# Patient Record
Sex: MALE | Race: WHITE | NOT HISPANIC OR LATINO | Employment: OTHER | URBAN - METROPOLITAN AREA
[De-identification: names, ages, dates, MRNs, and addresses within clinical notes are randomized per-mention and may not be internally consistent; named-entity substitution may affect disease eponyms.]

---

## 2021-06-04 ENCOUNTER — HOSPITAL ENCOUNTER (INPATIENT)
Facility: CLINIC | Age: 81
LOS: 5 days | Discharge: HOME HEALTH | DRG: 853 | End: 2021-06-10
Attending: EMERGENCY MEDICINE | Admitting: STUDENT IN AN ORGANIZED HEALTH CARE EDUCATION/TRAINING PROGRAM
Payer: MEDICARE

## 2021-06-04 DIAGNOSIS — N30.01 ACUTE CYSTITIS WITH HEMATURIA: ICD-10-CM

## 2021-06-04 DIAGNOSIS — N47.1 PHIMOSIS: ICD-10-CM

## 2021-06-04 DIAGNOSIS — I26.99 PULMONARY EMBOLISM WITHOUT ACUTE COR PULMONALE, UNSPECIFIED CHRONICITY, UNSPECIFIED PULMONARY EMBOLISM TYPE (H): Primary | ICD-10-CM

## 2021-06-04 DIAGNOSIS — R53.1 WEAKNESS: ICD-10-CM

## 2021-06-04 DIAGNOSIS — N13.30 HYDRONEPHROSIS OF RIGHT KIDNEY: ICD-10-CM

## 2021-06-04 LAB
BASOPHILS # BLD AUTO: 0.1 10E9/L (ref 0–0.2)
BASOPHILS NFR BLD AUTO: 0.3 %
DIFFERENTIAL METHOD BLD: ABNORMAL
EOSINOPHIL # BLD AUTO: 0 10E9/L (ref 0–0.7)
EOSINOPHIL NFR BLD AUTO: 0.2 %
ERYTHROCYTE [DISTWIDTH] IN BLOOD BY AUTOMATED COUNT: 13.2 % (ref 10–15)
HCT VFR BLD AUTO: 42.4 % (ref 40–53)
HGB BLD-MCNC: 13.9 G/DL (ref 13.3–17.7)
IMM GRANULOCYTES # BLD: 0.2 10E9/L (ref 0–0.4)
IMM GRANULOCYTES NFR BLD: 0.9 %
INTERPRETATION ECG - MUSE: NORMAL
LYMPHOCYTES # BLD AUTO: 1 10E9/L (ref 0.8–5.3)
LYMPHOCYTES NFR BLD AUTO: 5.8 %
MCH RBC QN AUTO: 32.4 PG (ref 26.5–33)
MCHC RBC AUTO-ENTMCNC: 32.8 G/DL (ref 31.5–36.5)
MCV RBC AUTO: 99 FL (ref 78–100)
MONOCYTES # BLD AUTO: 2 10E9/L (ref 0–1.3)
MONOCYTES NFR BLD AUTO: 11.2 %
NEUTROPHILS # BLD AUTO: 14.2 10E9/L (ref 1.6–8.3)
NEUTROPHILS NFR BLD AUTO: 81.6 %
NRBC # BLD AUTO: 0 10*3/UL
NRBC BLD AUTO-RTO: 0 /100
PLATELET # BLD AUTO: 177 10E9/L (ref 150–450)
RBC # BLD AUTO: 4.29 10E12/L (ref 4.4–5.9)
WBC # BLD AUTO: 17.4 10E9/L (ref 4–11)

## 2021-06-04 PROCEDURE — 80053 COMPREHEN METABOLIC PANEL: CPT | Performed by: EMERGENCY MEDICINE

## 2021-06-04 PROCEDURE — 87077 CULTURE AEROBIC IDENTIFY: CPT | Performed by: EMERGENCY MEDICINE

## 2021-06-04 PROCEDURE — 85025 COMPLETE CBC W/AUTO DIFF WBC: CPT | Performed by: EMERGENCY MEDICINE

## 2021-06-04 PROCEDURE — 83605 ASSAY OF LACTIC ACID: CPT | Performed by: EMERGENCY MEDICINE

## 2021-06-04 PROCEDURE — 87086 URINE CULTURE/COLONY COUNT: CPT | Performed by: EMERGENCY MEDICINE

## 2021-06-04 PROCEDURE — 84484 ASSAY OF TROPONIN QUANT: CPT | Performed by: EMERGENCY MEDICINE

## 2021-06-04 PROCEDURE — 87040 BLOOD CULTURE FOR BACTERIA: CPT | Performed by: EMERGENCY MEDICINE

## 2021-06-04 PROCEDURE — 87186 SC STD MICRODIL/AGAR DIL: CPT | Performed by: EMERGENCY MEDICINE

## 2021-06-04 PROCEDURE — 99285 EMERGENCY DEPT VISIT HI MDM: CPT | Mod: 25

## 2021-06-04 PROCEDURE — 87088 URINE BACTERIA CULTURE: CPT | Performed by: EMERGENCY MEDICINE

## 2021-06-04 PROCEDURE — C9803 HOPD COVID-19 SPEC COLLECT: HCPCS

## 2021-06-04 PROCEDURE — 87800 DETECT AGNT MULT DNA DIREC: CPT | Performed by: EMERGENCY MEDICINE

## 2021-06-04 PROCEDURE — 81001 URINALYSIS AUTO W/SCOPE: CPT | Performed by: EMERGENCY MEDICINE

## 2021-06-04 PROCEDURE — 93005 ELECTROCARDIOGRAM TRACING: CPT

## 2021-06-04 ASSESSMENT — MIFFLIN-ST. JEOR: SCORE: 1544.94

## 2021-06-05 PROBLEM — N30.01 ACUTE CYSTITIS WITH HEMATURIA: Status: ACTIVE | Noted: 2021-06-05

## 2021-06-05 PROBLEM — R53.1 WEAKNESS: Status: ACTIVE | Noted: 2021-06-05

## 2021-06-05 LAB
ALBUMIN SERPL-MCNC: 3.3 G/DL (ref 3.4–5)
ALBUMIN UR-MCNC: 50 MG/DL
ALP SERPL-CCNC: 147 U/L (ref 40–150)
ALT SERPL W P-5'-P-CCNC: 35 U/L (ref 0–70)
ANION GAP SERPL CALCULATED.3IONS-SCNC: 4 MMOL/L (ref 3–14)
ANION GAP SERPL CALCULATED.3IONS-SCNC: 6 MMOL/L (ref 3–14)
APPEARANCE UR: ABNORMAL
AST SERPL W P-5'-P-CCNC: 22 U/L (ref 0–45)
BACTERIA #/AREA URNS HPF: ABNORMAL /HPF
BILIRUB SERPL-MCNC: 0.6 MG/DL (ref 0.2–1.3)
BILIRUB UR QL STRIP: NEGATIVE
BUN SERPL-MCNC: 25 MG/DL (ref 7–30)
BUN SERPL-MCNC: 27 MG/DL (ref 7–30)
CALCIUM SERPL-MCNC: 8.1 MG/DL (ref 8.5–10.1)
CALCIUM SERPL-MCNC: 8.8 MG/DL (ref 8.5–10.1)
CHLORIDE SERPL-SCNC: 104 MMOL/L (ref 94–109)
CHLORIDE SERPL-SCNC: 108 MMOL/L (ref 94–109)
CO2 SERPL-SCNC: 27 MMOL/L (ref 20–32)
CO2 SERPL-SCNC: 30 MMOL/L (ref 20–32)
COLOR UR AUTO: ABNORMAL
CREAT SERPL-MCNC: 1.09 MG/DL (ref 0.66–1.25)
CREAT SERPL-MCNC: 1.23 MG/DL (ref 0.66–1.25)
ERYTHROCYTE [DISTWIDTH] IN BLOOD BY AUTOMATED COUNT: 13.3 % (ref 10–15)
GFR SERPL CREATININE-BSD FRML MDRD: 54 ML/MIN/{1.73_M2}
GFR SERPL CREATININE-BSD FRML MDRD: 62 ML/MIN/{1.73_M2}
GLUCOSE BLDC GLUCOMTR-MCNC: 112 MG/DL (ref 70–99)
GLUCOSE SERPL-MCNC: 129 MG/DL (ref 70–99)
GLUCOSE SERPL-MCNC: 148 MG/DL (ref 70–99)
GLUCOSE UR STRIP-MCNC: NEGATIVE MG/DL
HCT VFR BLD AUTO: 39.6 % (ref 40–53)
HGB BLD-MCNC: 13 G/DL (ref 13.3–17.7)
HGB UR QL STRIP: ABNORMAL
KETONES UR STRIP-MCNC: NEGATIVE MG/DL
LABORATORY COMMENT REPORT: NORMAL
LACTATE BLD-SCNC: 1.6 MMOL/L (ref 0.7–2)
LACTATE BLD-SCNC: 1.9 MMOL/L (ref 0.7–2)
LEUKOCYTE ESTERASE UR QL STRIP: ABNORMAL
MCH RBC QN AUTO: 32.4 PG (ref 26.5–33)
MCHC RBC AUTO-ENTMCNC: 32.8 G/DL (ref 31.5–36.5)
MCV RBC AUTO: 99 FL (ref 78–100)
NITRATE UR QL: NEGATIVE
PH UR STRIP: 6.5 PH (ref 5–7)
PLATELET # BLD AUTO: 149 10E9/L (ref 150–450)
POTASSIUM SERPL-SCNC: 3.4 MMOL/L (ref 3.4–5.3)
POTASSIUM SERPL-SCNC: 3.6 MMOL/L (ref 3.4–5.3)
PROT SERPL-MCNC: 7.2 G/DL (ref 6.8–8.8)
RBC # BLD AUTO: 4.01 10E12/L (ref 4.4–5.9)
RBC #/AREA URNS AUTO: >182 /HPF (ref 0–2)
SARS-COV-2 RNA RESP QL NAA+PROBE: NEGATIVE
SODIUM SERPL-SCNC: 138 MMOL/L (ref 133–144)
SODIUM SERPL-SCNC: 141 MMOL/L (ref 133–144)
SOURCE: ABNORMAL
SP GR UR STRIP: 1.01 (ref 1–1.03)
SPECIMEN SOURCE: NORMAL
SQUAMOUS #/AREA URNS AUTO: 4 /HPF (ref 0–1)
TROPONIN I SERPL-MCNC: <0.015 UG/L (ref 0–0.04)
UROBILINOGEN UR STRIP-MCNC: 0 MG/DL (ref 0–2)
WBC # BLD AUTO: 14.7 10E9/L (ref 4–11)
WBC #/AREA URNS AUTO: >182 /HPF (ref 0–5)
WBC CLUMPS #/AREA URNS HPF: PRESENT /HPF

## 2021-06-05 PROCEDURE — 258N000003 HC RX IP 258 OP 636: Performed by: STUDENT IN AN ORGANIZED HEALTH CARE EDUCATION/TRAINING PROGRAM

## 2021-06-05 PROCEDURE — 80048 BASIC METABOLIC PNL TOTAL CA: CPT | Performed by: STUDENT IN AN ORGANIZED HEALTH CARE EDUCATION/TRAINING PROGRAM

## 2021-06-05 PROCEDURE — 96365 THER/PROPH/DIAG IV INF INIT: CPT

## 2021-06-05 PROCEDURE — 99223 1ST HOSP IP/OBS HIGH 75: CPT | Mod: AI | Performed by: STUDENT IN AN ORGANIZED HEALTH CARE EDUCATION/TRAINING PROGRAM

## 2021-06-05 PROCEDURE — 96361 HYDRATE IV INFUSION ADD-ON: CPT

## 2021-06-05 PROCEDURE — 87040 BLOOD CULTURE FOR BACTERIA: CPT | Performed by: EMERGENCY MEDICINE

## 2021-06-05 PROCEDURE — 87635 SARS-COV-2 COVID-19 AMP PRB: CPT | Performed by: EMERGENCY MEDICINE

## 2021-06-05 PROCEDURE — 258N000003 HC RX IP 258 OP 636: Performed by: EMERGENCY MEDICINE

## 2021-06-05 PROCEDURE — 85027 COMPLETE CBC AUTOMATED: CPT | Performed by: STUDENT IN AN ORGANIZED HEALTH CARE EDUCATION/TRAINING PROGRAM

## 2021-06-05 PROCEDURE — 36415 COLL VENOUS BLD VENIPUNCTURE: CPT | Performed by: INTERNAL MEDICINE

## 2021-06-05 PROCEDURE — 250N000013 HC RX MED GY IP 250 OP 250 PS 637: Performed by: STUDENT IN AN ORGANIZED HEALTH CARE EDUCATION/TRAINING PROGRAM

## 2021-06-05 PROCEDURE — 999N001017 HC STATISTIC GLUCOSE BY METER IP

## 2021-06-05 PROCEDURE — 83605 ASSAY OF LACTIC ACID: CPT | Performed by: INTERNAL MEDICINE

## 2021-06-05 PROCEDURE — 120N000001 HC R&B MED SURG/OB

## 2021-06-05 PROCEDURE — 250N000011 HC RX IP 250 OP 636: Performed by: INTERNAL MEDICINE

## 2021-06-05 PROCEDURE — 250N000011 HC RX IP 250 OP 636: Performed by: EMERGENCY MEDICINE

## 2021-06-05 PROCEDURE — 250N000013 HC RX MED GY IP 250 OP 250 PS 637: Performed by: EMERGENCY MEDICINE

## 2021-06-05 PROCEDURE — 36415 COLL VENOUS BLD VENIPUNCTURE: CPT | Performed by: STUDENT IN AN ORGANIZED HEALTH CARE EDUCATION/TRAINING PROGRAM

## 2021-06-05 RX ORDER — MULTIVITAMIN,THERAPEUTIC
1 TABLET ORAL DAILY
COMMUNITY

## 2021-06-05 RX ORDER — METRONIDAZOLE 7.5 MG/G
GEL TOPICAL 2 TIMES DAILY PRN
Status: ON HOLD | COMMUNITY
End: 2021-06-05

## 2021-06-05 RX ORDER — ONDANSETRON 4 MG/1
4 TABLET, ORALLY DISINTEGRATING ORAL EVERY 6 HOURS PRN
Status: DISCONTINUED | OUTPATIENT
Start: 2021-06-05 | End: 2021-06-10 | Stop reason: HOSPADM

## 2021-06-05 RX ORDER — CETIRIZINE HYDROCHLORIDE 10 MG/1
10 TABLET ORAL EVERY EVENING
COMMUNITY

## 2021-06-05 RX ORDER — SWAB
1 SWAB, NON-MEDICATED MISCELLANEOUS DAILY
COMMUNITY

## 2021-06-05 RX ORDER — LIDOCAINE 40 MG/G
CREAM TOPICAL
Status: DISCONTINUED | OUTPATIENT
Start: 2021-06-05 | End: 2021-06-10 | Stop reason: HOSPADM

## 2021-06-05 RX ORDER — CEFTRIAXONE 1 G/1
1 INJECTION, POWDER, FOR SOLUTION INTRAMUSCULAR; INTRAVENOUS ONCE
Status: COMPLETED | OUTPATIENT
Start: 2021-06-05 | End: 2021-06-05

## 2021-06-05 RX ORDER — DUTASTERIDE 0.5 MG/1
0.5 CAPSULE, LIQUID FILLED ORAL DAILY
COMMUNITY

## 2021-06-05 RX ORDER — TORSEMIDE 10 MG/1
10 TABLET ORAL DAILY
COMMUNITY

## 2021-06-05 RX ORDER — CEFTRIAXONE 2 G/1
2 INJECTION, POWDER, FOR SOLUTION INTRAMUSCULAR; INTRAVENOUS EVERY 24 HOURS
Status: DISCONTINUED | OUTPATIENT
Start: 2021-06-05 | End: 2021-06-10 | Stop reason: HOSPADM

## 2021-06-05 RX ORDER — VIT A/VIT C/VIT E/ZINC/COPPER 4296-226
1 CAPSULE ORAL 2 TIMES DAILY
COMMUNITY

## 2021-06-05 RX ORDER — LEVOFLOXACIN 750 MG/1
750 TABLET, FILM COATED ORAL DAILY
Status: ON HOLD | COMMUNITY
End: 2021-06-05

## 2021-06-05 RX ORDER — ONDANSETRON 2 MG/ML
4 INJECTION INTRAMUSCULAR; INTRAVENOUS EVERY 6 HOURS PRN
Status: DISCONTINUED | OUTPATIENT
Start: 2021-06-05 | End: 2021-06-10 | Stop reason: HOSPADM

## 2021-06-05 RX ORDER — CEPHALEXIN 500 MG/1
500 CAPSULE ORAL 2 TIMES DAILY
Status: ON HOLD | COMMUNITY
End: 2021-06-05

## 2021-06-05 RX ORDER — SODIUM CHLORIDE 9 MG/ML
INJECTION, SOLUTION INTRAVENOUS CONTINUOUS
Status: DISCONTINUED | OUTPATIENT
Start: 2021-06-05 | End: 2021-06-06

## 2021-06-05 RX ORDER — AMLODIPINE AND BENAZEPRIL HYDROCHLORIDE 5; 10 MG/1; MG/1
1 CAPSULE ORAL DAILY
COMMUNITY
Start: 2021-06-08

## 2021-06-05 RX ORDER — ZINC GLUCONATE 50 MG
50 TABLET ORAL DAILY
Status: ON HOLD | COMMUNITY
End: 2021-06-05

## 2021-06-05 RX ORDER — ACETAMINOPHEN 500 MG
1000 TABLET ORAL ONCE
Status: COMPLETED | OUTPATIENT
Start: 2021-06-05 | End: 2021-06-05

## 2021-06-05 RX ORDER — ACETAMINOPHEN 325 MG/1
650 TABLET ORAL EVERY 4 HOURS PRN
Status: DISCONTINUED | OUTPATIENT
Start: 2021-06-05 | End: 2021-06-10 | Stop reason: HOSPADM

## 2021-06-05 RX ORDER — CEFTRIAXONE 1 G/1
1 INJECTION, POWDER, FOR SOLUTION INTRAMUSCULAR; INTRAVENOUS EVERY 24 HOURS
Status: DISCONTINUED | OUTPATIENT
Start: 2021-06-06 | End: 2021-06-05

## 2021-06-05 RX ORDER — ASPIRIN 81 MG/1
81 TABLET ORAL DAILY
Status: ON HOLD | COMMUNITY
End: 2021-06-10

## 2021-06-05 RX ADMIN — CEFTRIAXONE SODIUM 1 G: 1 INJECTION, POWDER, FOR SOLUTION INTRAMUSCULAR; INTRAVENOUS at 01:06

## 2021-06-05 RX ADMIN — SODIUM CHLORIDE 1000 ML: 9 INJECTION, SOLUTION INTRAVENOUS at 00:13

## 2021-06-05 RX ADMIN — SODIUM CHLORIDE: 9 INJECTION, SOLUTION INTRAVENOUS at 06:50

## 2021-06-05 RX ADMIN — SODIUM CHLORIDE: 9 INJECTION, SOLUTION INTRAVENOUS at 03:23

## 2021-06-05 RX ADMIN — ACETAMINOPHEN 650 MG: 325 TABLET, FILM COATED ORAL at 09:41

## 2021-06-05 RX ADMIN — ACETAMINOPHEN 1000 MG: 500 TABLET, FILM COATED ORAL at 00:13

## 2021-06-05 RX ADMIN — CEFTRIAXONE SODIUM 2 G: 2 INJECTION, POWDER, FOR SOLUTION INTRAMUSCULAR; INTRAVENOUS at 14:02

## 2021-06-05 RX ADMIN — SODIUM CHLORIDE: 9 INJECTION, SOLUTION INTRAVENOUS at 16:12

## 2021-06-05 ASSESSMENT — ACTIVITIES OF DAILY LIVING (ADL)
ADLS_ACUITY_SCORE: 24
DEPENDENT_IADLS:: INDEPENDENT
NUMBER_OF_TIMES_PATIENT_HAS_FALLEN_WITHIN_LAST_SIX_MONTHS: 3
ADLS_ACUITY_SCORE: 24
FALL_HISTORY_WITHIN_LAST_SIX_MONTHS: YES
DRESSING/BATHING_DIFFICULTY: NO
DOING_ERRANDS_INDEPENDENTLY_DIFFICULTY: NO
ADLS_ACUITY_SCORE: 24
DIFFICULTY_COMMUNICATING: NO
DIFFICULTY_EATING/SWALLOWING: NO
CONCENTRATING,_REMEMBERING_OR_MAKING_DECISIONS_DIFFICULTY: NO
WHICH_OF_THE_ABOVE_FUNCTIONAL_RISKS_HAD_A_RECENT_ONSET_OR_CHANGE?: AMBULATION
WALKING_OR_CLIMBING_STAIRS_DIFFICULTY: YES
WALKING_OR_CLIMBING_STAIRS: AMBULATION DIFFICULTY, REQUIRES EQUIPMENT
TOILETING_ASSISTANCE: TOILETING DIFFICULTY, REQUIRES EQUIPMENT
TOILETING_ISSUES: YES
EQUIPMENT_CURRENTLY_USED_AT_HOME: WALKER, STANDARD
ADLS_ACUITY_SCORE: 20
ADLS_ACUITY_SCORE: 25

## 2021-06-05 ASSESSMENT — ENCOUNTER SYMPTOMS
WEAKNESS: 1
FREQUENCY: 1
NAUSEA: 0
FEVER: 0
ABDOMINAL PAIN: 0
HEMATURIA: 1
DIARRHEA: 0
DYSURIA: 0
SHORTNESS OF BREATH: 0
VOMITING: 0

## 2021-06-05 NOTE — PHARMACY-ADMISSION MEDICATION HISTORY
Pharmacy Medication History  Admission medication history interview status for the 6/4/2021  admission is complete. See EPIC admission navigator for prior to admission medications     Location of Interview: Patient room  Medication history sources: Patient's family/friend (Wife Aziza )    Significant changes made to the medication list:      In the past week, patient estimated taking medication this percent of the time: greater than 90%    Additional medication history information:       Medication reconciliation completed by provider prior to medication history? No    Time spent in this activity: 10min     Prior to Admission medications    Medication Sig Last Dose Taking? Auth Provider   amLODIPine-benazepril (LOTREL) 5-10 MG capsule Take 1 capsule by mouth daily 6/4/2021 at Unknown time Yes Unknown, Entered By History   aspirin 81 MG EC tablet Take 81 mg by mouth daily 6/4/2021 at Unknown time Yes Unknown, Entered By History   cetirizine (ZYRTEC) 10 MG tablet Take 10 mg by mouth daily 6/4/2021 at Unknown time Yes Unknown, Entered By History   dutasteride (AVODART) 0.5 MG capsule Take 0.5 mg by mouth daily 6/4/2021 at Unknown time Yes Unknown, Entered By History   Multiple Vitamins-Minerals (PRESERVISION AREDS) CAPS Take by mouth 2 times daily 6/4/2021 at Unknown time Yes Unknown, Entered By History   multivitamin, therapeutic (THERA-VIT) TABS tablet Take 1 tablet by mouth daily 6/4/2021 at Unknown time Yes Unknown, Entered By History   torsemide (DEMADEX) 10 MG tablet Take 10 mg by mouth daily 6/4/2021 at Unknown time Yes Unknown, Entered By History   vitamin D3 (CHOLECALCIFEROL) 10 MCG (400 UNIT) capsule Take 1 capsule by mouth daily 6/4/2021 at Unknown time Yes Unknown, Entered By History   Zinc Gluconate 15 MG TABS Take by mouth daily  6/4/2021 at Unknown time Yes Unknown, Entered By History       The information provided in this note is only as accurate as the sources available at the time of update(s)   Karen  Dorita PharmD

## 2021-06-05 NOTE — ED PROVIDER NOTES
History   Chief Complaint:  Generalized Weakness and Fall       HPI   Shilo Menchaca is a 81 year old male with history of HTN and BPH who presents via EMS with generalized weakness after a fall.  The patient reports developing generalized weakness this afternoon and then this evening fell while ambulating with his walker.  He did not hit his head or lose consciousness.  He was admitted to [facility] 5/16 - 5/18/21 for urosepsis treated with Ceftriaxone and IV fluid resuscitation and discharged on Keflex.  He did feel improved upon completing the antibiotics however has continued to have occasional hematuria.  He has been using a walker since discharge which is new and is set up for outpatient PT.  He had a CT scan yesterday that showed new severe right-sided hydronephrosis and bladder wall thickening.  He is set up to see urology in about 10 days.  He has been urinating more than usual although denies any pain with urination.  He denies any abdominal pain, nausea, vomiting, or diarrhea and has not had any fevers at home.  He does have foot and lower leg swelling however notes this is not new or significantly increased from baseline.    Review of Systems   Constitutional: Negative for fever.   Respiratory: Negative for shortness of breath.    Cardiovascular: Positive for leg swelling. Negative for chest pain.   Gastrointestinal: Negative for abdominal pain, diarrhea, nausea and vomiting.   Genitourinary: Positive for frequency and hematuria. Negative for dysuria.   Neurological: Positive for weakness.   All other systems reviewed and are negative.    Allergies:  Azithromycin     Medications:  Amlodipine-benazepril  Torsemide   Aspirin  Avodart  Zyrtec  Vitamin D  Zinc  Multivitamin     Past Medical History:    Lower extremity edema  Benign prostatic hypertrophy  Hypertension  Spinal stenosis      Social History:  Presents to the ED with his wife  PCP: Gretel Malhotra     Physical Exam     Patient Vitals for  "the past 24 hrs:   BP Temp Temp src Pulse Resp SpO2 Height Weight   06/05/21 0256 137/85 100  F (37.8  C) Oral 110 22 94 % -- --   06/05/21 0145 -- -- -- 115 22 94 % -- --   06/05/21 0130 (!) 158/91 -- -- 112 24 92 % -- --   06/05/21 0115 -- -- -- 112 11 91 % -- --   06/05/21 0100 (!) 150/94 -- -- 115 25 92 % -- --   06/05/21 0045 -- -- -- 122 24 93 % -- --   06/05/21 0030 (!) 160/90 -- -- 116 25 93 % -- --   06/05/21 0015 -- -- -- 117 28 94 % -- --   06/05/21 0000 -- -- -- 121 24 94 % -- --   06/04/21 2345 -- -- -- 115 27 94 % -- --   06/04/21 2330 (!) 155/83 -- -- 122 23 94 % -- --   06/04/21 2311 (!) 160/104 100.4  F (38  C) Oral 121 18 94 % 1.676 m (5' 6\") 90.7 kg (200 lb)       Physical Exam  General: Appears well-developed and well-nourished.   Head: No signs of trauma.   Mouth/Throat: Oropharynx is clear and moist.   CV: Tachycardia and regular rhythm.    Resp: Effort normal and breath sounds normal. No respiratory distress.   GI: Soft. There is no tenderness.  No rebound or guarding.  Normal bowel sounds.  No CVA tenderness.  MSK: Normal range of motion. No Calf tenderness.  Neuro: The patient is alert and oriented.  Strength in upper/lower extremities normal and symmetrical. Sensation normal. Speech normal.  GCS 15  Skin: Skin is warm and dry. No rash noted.   Psych: normal mood and affect. behavior is normal.       Emergency Department Course   ECG:  ECG taken at 2320, ECG read at 2328  Sinu tachycardia. Nonspecific T-wave abnormality. Abnormal ECG.   Rate 123 bpm. LA interval 180 ms. QRS duration 70 ms. QT/QTc 290/415 ms. P-R-T axes 51 -12 81.     Laboratory:   CBC: WBC 17.4 (H), HGB 13.9,   CMP: Glucose 129 (H), GFR 54 (L), Albumin 3.3 (L), otherwise WNL (Creatinine 1.23)    Troponin I: <0.015  Lactic Acid: 1.6   Blood Culture: pending x 2     UA: Cloudy light brown urine, Blood large, Protein 50, Leukocyte Esterase large, RBC/HPF >182 (H), WBC/HPF >182 (H), WBC clumps present, Bacteria moderate, " Squamous Epithelial Cells/HPF 4 (H), otherwise WNL   Urine Culture: pending     Asymptomatic COVID19 Virus PCR, nasopharyngeal: pending      Emergency Department Course:  Reviewed:  I reviewed nursing notes, vitals and past medical history    Assessments:  (3186) I obtained history and examined the patient as noted above.   (0115) I rechecked the patient.  Findings and plan explained to the patient who consents to admission.     Consults:  (3891) I discussed the patient with Dr. Engel of the hospitalist service, who will admit the patient to a medical bed for further monitoring, evaluation, and treatment.      Interventions:  (0013) Tylenol, 1000 mg, PO   (0013) Normal Saline, 1 liter, IV bolus   (0106) Ceftriaxone, 1 g, IV infusion     Disposition:  The patient was admitted to the hospital under the care of Dr. Engel.     Impression & Plan     Medical Decision Making:  Shilo Menchaca 81-year-old gentleman who presents due to weakness and concern for urinary tract infection.  He reports that his legs felt quite weak and he has had some increased urinary frequency similar to prior presentations of a urinary tract infection.  Blood work was obtained that did show an elevated white blood cell count and UA did show findings that were consistent with a UTI.  Given his weakness the patient was given ceftriaxone and admitted to the hospital service for continued treatment and monitoring.    Covid-19  Shilo Menchaca was evaluated during a global COVID-19 pandemic, which necessitated consideration that the patient might be at risk for infection with the SARS-CoV-2 virus that causes COVID-19.   Applicable protocols for evaluation were followed during the patient's care.   COVID-19 was considered as part of the patient's evaluation. The plan for testing is:  a test was obtained during this visit.    Diagnosis:    ICD-10-CM    1. Acute cystitis with hematuria  N30.01 Asymptomatic SARS-CoV-2 COVID-19 Virus (Coronavirus) by PCR    2. Weakness  R53.1        Scribe Disclosure:  I, Shirin Whelan, am serving as a scribe at 11:56 PM on 6/4/2021 to document services personally performed by Rick Lerma MD based on my observations and the provider's statements to me.         Rick Lerma MD  06/05/21 0513

## 2021-06-05 NOTE — ED TRIAGE NOTES
"Pt was brought from home via EMS due to weakness and fall. Pt denies hitting his head. Pt reports having recent UTIs and was in the kitchen when he felt weak and \" fell\" which he states he lowered himself to the ground.   "

## 2021-06-05 NOTE — H&P
"[facility]    History and Physical - Hospitalist Service       Date of Admission:  6/4/2021    Assessment & Plan   Shilo Menchaca is a 81 year old male with past medical history significant for HTN, BPH, admitted on 6/4/2021 with fall and generalized weakness. He was found to have sepsis 2/2 UTI.     Sepsis 2/2 Complicated Urinary Tract Infection  Pt presented with generalized weakness and a fall. He has a low grade fever and tachycardia. Laboratory evaluation notable for leukocytosis with WBC count of 17.4. Lactate is wnl. Urinalysis is grossly abnormal.   Of note pt was recently admitted to [facility] from 5/16-5/18 for urosepsis. CT scan at the time showed \"capacious bladder... with moderately enlarged prostate\" there was also multiple calcification in the dependent portion of the bladder that were thought to represent bladder stones. Furthermore, he wa noted at this time to have penile phimosis and was instructed to follow-up with urology   - Continue Ceftriaxone   - Continue IVF   - Urine and blood cultures pending   - Consult urology     Generalized weakness  Mechanical Fall   2/2 above infection. No apparent injuries   - PT consult     HTN  Pt appears to be taking amlodipine-benazepril.  - resume when verified.      Diet: Advance Diet as Tolerated: Clear Liquid Diet    DVT Prophylaxis: Pneumatic Compression Devices  Alonzo Catheter: not present  Code Status: Full Code           Disposition Plan   Expected discharge: 2 - 3 days, recommended to prior living arrangement once antibiotic plan established and SIRS/Sepsis treated.  Entered: Ruth Engel MD 06/05/2021, 3:31 AM     The patient's care was discussed with the Patient.    Ruth Engel MD  [facility]  Contact information available via [facility] Paging/Directory      ______________________________________________________________________    Chief Complaint   Weakness, fall    History is obtained from " "the patient    History of Present Illness   Shilo Menchaca is a 81 year old male who presented with generalized weakness and a fall.     He was walking with his walker and just felt very weak and couldn't stand any more. His legs just gave out on him.   He did not hurt himself or hit his head.     In the ED he was found to have a low grade fever and tachycardia. Laboratory evaluation notable for leukocytosis with WBC count of 17.4. Lactate is wnl. Urinalysis is grossly abnormal.     Of note pt was recently admitted to [facility] from 5/16-5/18 for urosepsis. CT scan at the time showed \"capacious bladder... with moderately enlarged prostate\" there was also multiple calcification in the dependent portion of the bladder that were thought to represent bladder stones. Furthermore, he wa noted at this time to have penile phimosis and was instructed to follow-up with urology     Review of Systems    The 10 point Review of Systems is negative other than noted in the HPI or here.     Past Medical History    I have reviewed this patient's medical history and updated it with pertinent information if needed.   No past medical history on file.   - HTN   - BPH     Past Surgical History   I have reviewed this patient's surgical history and updated it with pertinent information if needed.  No past surgical history on file.    Social History   I have reviewed this patient's social history and updated it with pertinent information if needed.  Social History     Tobacco Use     Smoking status: Not on file   Substance Use Topics     Alcohol use: Not on file     Drug use: Not on file       Family History   Reviewed in Care Everywhere and non-contributory.   Prior to Admission Medications   None     Allergies   No Known Allergies    Physical Exam   Vital Signs: Temp: 100  F (37.8  C) Temp src: Oral BP: 137/85 Pulse: 110   Resp: 22 SpO2: 94 % O2 Device: None (Room air)    Weight: 200 lbs 0 oz    Constitutional: Awake, alert, cooperative, no " apparent distress.  Eyes: Conjunctiva and pupils examined and normal.  HEENT: Moist mucous membranes, normal dentition.  Respiratory: Clear to auscultation bilaterally, no crackles or wheezing.  Cardiovascular: Regular rate and rhythm, normal S1 and S2, and no murmur noted.  GI/: Soft, non-distended, non-tender, normal bowel sounds. Penile phimosis, appears somewhat inflamed    Skin: No rashes, no cyanosis, bilateral pedal edema.   Musculoskeletal: No joint swelling, erythema or tenderness.  Neurologic: Cranial nerves 2-12 intact, normal strength and sensation.  Psychiatric: Alert, oriented to person, place and time, no obvious anxiety or depression.      Data   Data reviewed today: I reviewed all medications, new labs and imaging results over the last 24 hours.     Recent Labs   Lab 06/04/21  2323   WBC 17.4*   HGB 13.9   MCV 99         POTASSIUM 3.6   CHLORIDE 104   CO2 30   BUN 27   CR 1.23   ANIONGAP 4   SABRINA 8.8   *   ALBUMIN 3.3*   PROTTOTAL 7.2   BILITOTAL 0.6   ALKPHOS 147   ALT 35   AST 22   TROPI <0.015     No results found for this or any previous visit (from the past 24 hour(s)).

## 2021-06-05 NOTE — PLAN OF CARE
Admission    Patient arrives to room 605 -2 via cart from ED.  Care plan note:   DATE & TIME: 06- Night shift   Cognitive Concerns/ Orientation : A/Ox4. [place]. Wears hearing aids at home; not with him here.    BEHAVIOR & AGGRESSION TOOL COLOR: Green; calm and cooperativ3   ABNL VS/O2: VSS on RA, except Temp 100.0 upon arrival from ED, later Temp 98.7. Tachycardic at time; -110s.   MOBILITY: Total care for now; T/R q2hr ; hx of recent falls at home.   PAIN MANAGMENT: Denies pain  DIET: Clear liquids, ADAT. Tolerating clears. Mayo nausea.   BOWEL/BLADDER: Incontinent of urine. No BM this shift. Abdomen is distended, obese, firm. +BS.   ABNL LAB/BG: WBC 17.4. Urine culture pending.   DRAIN/DEVICES: PIV infusing with IVF at 100 ml/hr; interm IV abx Rocephin q24hr  TELEMETRY RHYTHM: NSR to ST at times  SKIN: Manisha area excoriation/rash. Under abdominal fold rash. Cleaned and powder applied. Coccyx with blanchable erythema. Encouraged to turn side to side.   TESTS/PROCEDURES: Urology consulted; PT/SW consulted  D/C DATE: pending; once urology consult complete and plan for antibiotic established.   Inpatient nursing criteria listed below were met:    PCD's Documented: NA  Skin issues/needs documented :Yes  Isolation education started/completed NA  Patient allergies verified with patient: NA  Verified completion of [place] Risk Assessment Tool:  Yes  Verified completion of Guardianship screening tool: Yes  Fall Prevention: Care plan updated, Education given and documented Yes  Care Plan initiated: Yes  Home medications documented in belongings flowsheet: NA  Patient belongings documented in belongings flowsheet: NA  Reminder note (belongings/ medications) placed in discharge instructions:NA  Admission profile/ required documentation complete: NA  Bedside Report Letter given and explained to patient Yes  Visitor Designated? Yes  If patient is a 72 hour hold/Commitment are belongings removed from room and locked  up? NA

## 2021-06-05 NOTE — PROGRESS NOTES
RECEIVING UNIT ED HANDOFF REVIEW    ED Nurse Handoff Report was reviewed by: Nava Devi RN on June 5, 2021 at 2:21 AM

## 2021-06-05 NOTE — PROGRESS NOTES
Non Billing PN    Patient admitted early am with uti     Labs reviewed, wbc coming down and low grade temp 100.1    Continue IVF and antibiotics.    Noted hydronephrosis on CT from 6/3 and patient was referred to outpatient urology on 6/15    Awaiting  Urology consultation.    Jhonny Cuevas MD

## 2021-06-05 NOTE — CONSULTS
Care Management Initial Consult    General Information  Assessment completed with: Spouse or significant other, Aziza   Type of CM/SW Visit: Initial Assessment  Primary Care Provider verified and updated as needed: Yes(Dr. Malhotra [phone] [facility] )   Readmission within the last 30 days:        Reason for Consult: discharge planning  Advance Care Planning:    no ACP documents on file      Communication Assessment  Patient's communication style: spoken language (English or Bilingual)    Hearing Difficulty or Deaf: yes   Wear Glasses or Blind: yes    Cognitive  Cognitive/Neuro/Behavioral: WDL  Level of Consciousness: alert  Arousal Level: opens eyes spontaneously  Orientation: oriented x 4  Mood/Behavior: calm, cooperative  Best Language: 0 - No aphasia  Speech: clear    Living Environment:   People in home: spouse  Aziza  Current living Arrangements: house(townhouse )      Able to return to prior arrangements:  yes     Family/Social Support:  Care provided by: self  Provides care for:  Self   Marital Status:   Wife, Children  Aziza        Description of Support System: Supportive, Involved       Current Resources:   Patient receiving home care services: Yes([facility])  Skilled Home Care Services: Physicial Therapy, Occupational Therapy    [facility]  Phone: [phone]  / Fax: [phone]    Community Resources:  n/a  Equipment currently used at home: walker, standard, cane, straight, tub bench, shower chair, grab bar, toilet, grab bar, tub/shower  Supplies currently used at home:  n/a    Employment/Financial:  Employment Status: retired     Employment/ Comments: sales  Financial Concerns: No concerns identified   Finance Comments: active MEDICARE/MEDICARE and BCBS/BCBS [employer] insurance     Lifestyle & Psychosocial Needs:  Socioeconomic History     Marital status:      Spouse name: Not on file     Number of children: Not on file      Years of education: Not on file     Highest education level: Not on file     Functional Status:  Prior to admission patient needed assistance:   Dependent ADLs:: Independent  Dependent IADLs:: Independent  Assesssment of Functional Status: Not at  functional baseline(weakness)    Mental Health Status:  Mental Health Status: No Current Concerns       Chemical Dependency Status:  Chemical Dependency Status: No Current Concerns       Values/Beliefs:  Spiritual, Cultural Beliefs, Bahai Practices, Values that affect care: no          Values/Beliefs Comment: Buddhist     Additional Information:  Met with patient's spouse in room, introduced self and role in discharge planning.   She confirms the information in the above assessment.  Educated that an order for therapy is in place and they will assess and help with discharge recommendations--wife is hopeful pt will be able to return home with resumed home care services.  CM will follow along during the hospitalization and communicate discharge home care orders to [facility] (which spouse does want to continue).      Sarah Wyatt RN, BSN, PHN  [facility]  Inpatient Care Management - FLOAT  Mobile: [phone] 06/05/21 until 4pm  (after today's date, please call the patient's unit)

## 2021-06-05 NOTE — ED NOTES
Bed: ED01  Expected date:   Expected time:   Means of arrival:   Comments:  HEMS 427 81M weakness, recent UTI eta 2300

## 2021-06-05 NOTE — ED PROVIDER NOTES
[facility]  ED Nurse Handoff Report    ED Chief complaint: Generalized Weakness and Fall      ED Diagnosis:   Final diagnoses:   Acute cystitis with hematuria   Weakness       Code Status: Admitting provider     Allergies: No Known Allergies    Patient Story: Pt is a 81 year old male who presents via EMS with generalized weakness after a fall.  The patient reports developing generalized weakness this afternoon and then this evening fell while ambulating with his walker.  He did not hit his head or lose consciousness.  He was admitted to [facility] 5/16 - 5/18/21 for urosepsis treated with Ceftriaxone and IV fluid resuscitation and discharged on Keflex.  He did feel improved upon completing the antibiotics however has continued to have occasional hematuria.     He has been using a walker since discharge which is new and is set up for outpatient PT.  He had a CT scan yesterday that showed new severe right-sided hydronephrosis and bladder wall thickening.  He is set up to see urology in about 10 days.  He has been urinating more than usual although denies any pain with urination.         Focused Assessment:  Pt is awake, alert and orientated X 4. Pleasant mood. Positive for leg swelling.   Genitourinary: Positive for frequency and hematuria. Negative for dysuria. Neurological: Positive for weakness.     Treatments and/or interventions provided: Labs, IV fluids, A/B, Covid swab    Patient's response to treatments and/or interventions: Tolerating well.     To be done/followed up on inpatient unit:  None    Does this patient have any cognitive concerns?: AOX 4    Activity level - Baseline/Home:  Independent  Activity Level - Current:   Independent    Patient's Preferred language: English   Needed?: No    Isolation: None  Infection: Not Applicable  Patient tested for COVID 19 prior to admission: YES  Bariatric?: No    Vital Signs:   Vitals:    06/05/21 0015 06/05/21 0030 06/05/21 0045  06/05/21 0100   BP:  (!) 160/90  (!) 150/94   Pulse: 117 116 122 115   Resp: 28 25 24 25   Temp:       TempSrc:       SpO2: 94% 93% 93% 92%   Weight:       Height:           Cardiac Rhythm:     Was the PSS-3 completed:   Yes  What interventions are required if any? None  Family Comments: Wife Aziza at bedside   OBS brochure/video discussed/provided to patient/family: No              Name of person given brochure if not patient:               Relationship to patient:     For the majority of the shift this patient's behavior was Green.   Behavioral interventions performed were .    ED NURSE PHONE NUMBER: *[id number]

## 2021-06-06 LAB
BACTERIA SPEC CULT: ABNORMAL
BACTERIA SPEC CULT: ABNORMAL
LACTATE BLD-SCNC: 1.4 MMOL/L (ref 0.7–2)
Lab: ABNORMAL
SPECIMEN SOURCE: ABNORMAL

## 2021-06-06 PROCEDURE — 258N000003 HC RX IP 258 OP 636: Performed by: STUDENT IN AN ORGANIZED HEALTH CARE EDUCATION/TRAINING PROGRAM

## 2021-06-06 PROCEDURE — 52000 CYSTOURETHROSCOPY: CPT | Performed by: UROLOGY

## 2021-06-06 PROCEDURE — 250N000013 HC RX MED GY IP 250 OP 250 PS 637: Performed by: STUDENT IN AN ORGANIZED HEALTH CARE EDUCATION/TRAINING PROGRAM

## 2021-06-06 PROCEDURE — 99223 1ST HOSP IP/OBS HIGH 75: CPT | Mod: 25 | Performed by: UROLOGY

## 2021-06-06 PROCEDURE — 250N000011 HC RX IP 250 OP 636: Performed by: INTERNAL MEDICINE

## 2021-06-06 PROCEDURE — 99232 SBSQ HOSP IP/OBS MODERATE 35: CPT | Performed by: INTERNAL MEDICINE

## 2021-06-06 PROCEDURE — 36415 COLL VENOUS BLD VENIPUNCTURE: CPT | Performed by: INTERNAL MEDICINE

## 2021-06-06 PROCEDURE — 250N000013 HC RX MED GY IP 250 OP 250 PS 637: Performed by: UROLOGY

## 2021-06-06 PROCEDURE — 120N000001 HC R&B MED SURG/OB

## 2021-06-06 PROCEDURE — 250N000013 HC RX MED GY IP 250 OP 250 PS 637: Performed by: INTERNAL MEDICINE

## 2021-06-06 PROCEDURE — 83605 ASSAY OF LACTIC ACID: CPT | Performed by: INTERNAL MEDICINE

## 2021-06-06 RX ORDER — TAMSULOSIN HYDROCHLORIDE 0.4 MG/1
0.4 CAPSULE ORAL DAILY
Status: DISCONTINUED | OUTPATIENT
Start: 2021-06-06 | End: 2021-06-10 | Stop reason: HOSPADM

## 2021-06-06 RX ORDER — HYDROMORPHONE HYDROCHLORIDE 1 MG/ML
.3-.5 INJECTION, SOLUTION INTRAMUSCULAR; INTRAVENOUS; SUBCUTANEOUS ONCE
Status: COMPLETED | OUTPATIENT
Start: 2021-06-06 | End: 2021-06-06

## 2021-06-06 RX ORDER — HYDROMORPHONE HCL IN WATER/PF 6 MG/30 ML
0.2 PATIENT CONTROLLED ANALGESIA SYRINGE INTRAVENOUS
Status: ACTIVE | OUTPATIENT
Start: 2021-06-06 | End: 2021-06-06

## 2021-06-06 RX ADMIN — CEFTRIAXONE SODIUM 2 G: 2 INJECTION, POWDER, FOR SOLUTION INTRAMUSCULAR; INTRAVENOUS at 13:43

## 2021-06-06 RX ADMIN — SODIUM CHLORIDE: 9 INJECTION, SOLUTION INTRAVENOUS at 02:12

## 2021-06-06 RX ADMIN — ACETAMINOPHEN 650 MG: 325 TABLET, FILM COATED ORAL at 02:12

## 2021-06-06 RX ADMIN — TAMSULOSIN HYDROCHLORIDE 0.4 MG: 0.4 CAPSULE ORAL at 16:41

## 2021-06-06 RX ADMIN — MICONAZOLE NITRATE: 20 POWDER TOPICAL at 23:58

## 2021-06-06 RX ADMIN — MICONAZOLE NITRATE: 20 POWDER TOPICAL at 16:41

## 2021-06-06 RX ADMIN — HYDROMORPHONE HYDROCHLORIDE 0.5 MG: 1 INJECTION, SOLUTION INTRAMUSCULAR; INTRAVENOUS; SUBCUTANEOUS at 09:28

## 2021-06-06 ASSESSMENT — ACTIVITIES OF DAILY LIVING (ADL)
ADLS_ACUITY_SCORE: 22
ADLS_ACUITY_SCORE: 18
ADLS_ACUITY_SCORE: 22
ADLS_ACUITY_SCORE: 22

## 2021-06-06 NOTE — PROVIDER NOTIFICATION
MD Notification    Notified Person: MD    Notified Person Name: Mason    Notification Date/Time:  6/6/2021 @ 9:06    Notification Interaction: Text page    Purpose of Notification:     Urology requesting pain medication to pre-medicate for ultrasound guided Alonzo insertion. Please advise.    Orders Received:    Dilaudid 1x dose    Comments:

## 2021-06-06 NOTE — PLAN OF CARE
DATE & TIME: 06- Night shift   Cognitive Concerns/ Orientation : A/Ox4. [place]. Wears hearing aids at home; not with him here.    BEHAVIOR & AGGRESSION TOOL COLOR: Green; calm and cooperative   ABNL VS/O2: VSS on RA, except tachycardiac and Temp of 100.1*F orally  Temp after tylenol was 99.2 and continues to have low grade temp. Sepsis protocol triggered but LA returned normal at 1.9.  MOBILITY: AX2 with GB and walker  PAIN MANAGMENT: Denies pain  DIET: Regular diet  BOWEL/BLADDER: Incontinent of urine. No BM this shift. Abdomen is distended, obese, firm. +BS.   ABNL LAB/BG: WBC improved from 17.4 to 14.7, Ca down at 8.1.Urine culture pending.   DRAIN/DEVICES: PIV infusing with IVF at 100 ml/hr; interm IV abx Rocephin q24hr  TELEMETRY RHYTHM: ST with PVCs  SKIN: Manisha area excoriation/rash. Under abdominal fold rash. Cleaned and powder applied. Coccyx with blanchable erythema. Encouraged to turn side to side.   TESTS/PROCEDURES: CT abd  D/C DATE: pending Urology,  PT/SW consult

## 2021-06-06 NOTE — PLAN OF CARE
DATE & TIME: 06-05-21 1900-0730  Cognitive Concerns/ Orientation : A/Ox4. [place]. Wears hearing aids at home; not with him here.    BEHAVIOR & AGGRESSION TOOL COLOR: Green; calm and cooperative   ABNL VS/O2: Tmax of 100.8, tylenol given, temp went down to 98.3. tachycardiac with fevers  MOBILITY: AX2 with GB and walker  PAIN MANAGMENT: Denies pain  DIET: Regular diet  BOWEL/BLADDER: Incontinent of urine. No BM this shift. Abdomen is distended, obese, firm. +BS.   ABNL LAB/BG: WBC improved from 17.4 to 14.7, Ca down at 8.1. Urine culture pending.   DRAIN/DEVICES: PIV infusing with IVF at 100 ml/hr; interm IV abx Rocephin q24hr  TELEMETRY RHYTHM: SR with PVCs  SKIN: Manisha area excoriation/rash. Under abdominal fold rash. Cleaned and powder applied. Coccyx with blanchable erythema. Repositioning pt Q@ hours.   TESTS/PROCEDURES: n/a  D/C DATE: pending Urology consult for today  PT/SW consult

## 2021-06-06 NOTE — CONSULTS
Urology Consult    Name: Shilo Menchaca    MRN: [id number]   YOB: 1940               Chief Complaint:   Presumed Urosepsis     History is obtained from the patient and chart review          History of Present Illness:   Shilo Menchaca is a 81 year old male with  PMH of HTN and urologic history of persistent UTI and recently diagnosed bladder stones in the setting of a large prostate and bladder capacity who presented to the ED on 6/5 with generalized weakness and an associated fall as he was using his walker. On work up in the ED he was demonstrated to have a low grade fever with tachycardia and leukocytosis to 17.4. A UA was obtained and was suggestive of infection. Of note, the patient had recently been admitted to [facility] from 5/16-5/18 for urosepsis and at that time was noted to have the aforementioned findings of bladder stones. He was also reported to have notable phimosis. At that time he was to follow up with Urology and has an appointment on 6/15. Presently he is afebrile, cultures are pending, his tachycardia has resolved and he is feeling better compared to when he was admitted. A jessica catheter was not noted to be present and when we performed a bladder scan at the bedside he was noted to have ~ 1L in his bladder. He has been incontinent and this is likely secondary to overflow incontinence. An attempt was made to place a catheter at the bedside however after numerous failed attempts the decision was made to proceed with cystoscopic placement of jessica catheter.     Procedural note:     After obtaining verbal consent and pre-medication we prepped and draped in the patient in standard fashion. A 16-Fr flexible cystoscope was then inserted and once in the bladder a sensor wire advanced through the scope. The scope was then withdrawn and an 18-Fr [place] tipped catheter was advanced over the wire and into the bladder with return of clear yellow urine. We then instilled 10 ml in the balloon and  the catheter was secured in place and attached to drainage.           Past Medical History:   No past medical history on file.         Past Surgical History:   No past surgical history on file.         Social History:     Social History     Tobacco Use     Smoking status: Not on file   Substance Use Topics     Alcohol use: Not on file            Family History:   No family history on file.         Allergies:   No Known Allergies         Medications:     Current Facility-Administered Medications   Medication     acetaminophen (TYLENOL) tablet 650 mg     cefTRIAXone (ROCEPHIN) 2 g vial to attach to  ml bag for ADULTS or NS 50 ml bag for PEDS     lidocaine (LMX4) cream     lidocaine 1 % 0.1-1 mL     melatonin tablet 1 mg     ondansetron (ZOFRAN-ODT) ODT tab 4 mg    Or     ondansetron (ZOFRAN) injection 4 mg     sodium chloride (PF) 0.9% PF flush 3 mL     sodium chloride (PF) 0.9% PF flush 3 mL     sodium chloride 0.9% infusion             Review of Systems:    ROS: 10 point ROS neg other than the symptoms noted above in the HPI           Physical Exam:   VS:  T: 98.3    HR: 105    BP: 140/80    RR: 18   GEN:  AOx3.  NAD.  Difficulty hearing, hearing aids at home.   CV:  RRR  LUNGS: Non-labored breathing.   BACK:  No midline or CVA tenderness.  ABD:  Soft.  NT.  ND.  No rebound or guarding.  No masses.  :  Uncircumcised. Notable phimosis. Normal penile shaft.     EXT:  Warm, well perfused.  No edema.  SKIN:  Warm.  Dry.  No rashes.  NEURO:  CN grossly intact.            Data:   All laboratory data reviewed:    Recent Labs   Lab 06/05/21  0826 06/04/21  2323   WBC 14.7* 17.4*   HGB 13.0* 13.9   * 177     Recent Labs   Lab 06/05/21  0826 06/04/21  2323    138   POTASSIUM 3.4 3.6   CHLORIDE 108 104   CO2 27 30   BUN 25 27   CR 1.09 1.23   * 129*   SABRINA 8.1* 8.8     Recent Labs   Lab 06/04/21  2357   COLOR Light Brown   APPEARANCE Cloudy   URINEGLC Negative   URINEBILI Negative   URINEKETONE  Negative   SG 1.011   URINEPH 6.5   PROTEIN 50*   NITRITE Negative   LEUKEST Large*   RBCU >182*   WBCU >182*       All pertinent imaging reviewed.           Impression and Plan:   Impression:   Shilo Menchaca is a 81 year old male who presented with urosepsis after reporting generalized weakness and an associated fall. Previous imaging from a recent admission for urosepsis demonstrates he has bladder stones which are likely contributing towards this UTI. Additionally he has significant urinary retention and associated phimosis and required cystoscopic placement of a catheter at the bedside.       Plan:     - Patient should discharge with catheter and continue until follow up with his Urologist.     - Monitor electrolytes and follow urine cultures, narrow antibiotics as indicated.     - Do not anticipate any acute urologic intervention during this admission, please obtain CT in 24 hours to assess for previously documented right hydronephrosis.     - Urology will continue to follow. Please contact resident/PA on call with any questions or concerns.         This patient's exam findings, labs, and imaging discussed with urology staff surgeon Dr. Man, who developed the treatment plan.    Paulie Bruce MD  Urology Resident

## 2021-06-06 NOTE — PLAN OF CARE
DATE & TIME: 6/6/2021 5850-3075  Cognitive Concerns/ Orientation : A/Ox4. [place]. Wears hearing aids at home; not with him here.    BEHAVIOR & AGGRESSION TOOL COLOR: Green; calm and cooperative            ABNL VS/O2: VSS ex tachy on RA, afebrile   MOBILITY: AX2 with GB and walker pivot to BSC  PAIN MANAGMENT: Denies any pain  DIET: Tolerating regular diet  BOWEL/BLADDER: Jessica placed by urology for retention w/ large amount of UOP; BM x1 this shift.Abdomen is distended, obese, firm. +BS.   ABNL LAB/BG: WBC improved from 17.4 to 14.7, Ca 8.1. Urine culture pending.   DRAIN/DEVICES: PIV infusing with IVF at 100 ml/hr; IV Rocephin q24hr  TELEMETRY RHYTHM: NSR  SKIN: Manisha area excoriation/rash. Under abdominal fold rash. Cleaned and powder applied. Coccyx with blanchable erythema.   TESTS/PROCEDURES: n/a  D/C DATE: PT/SW consult; Urology saw patient today. Possibly discharging to prior living arrangement w/ home care, abx, & jessica in place.

## 2021-06-06 NOTE — PROGRESS NOTES
"[facility]  Hospitalist Progress Note  Jhonny Cuevas MD  06/06/2021    Assessment & Plan   Shilo Menchaca is a 81 year old male with past medical history significant for HTN, BPH, admitted on 6/4/2021 with fall and generalized weakness. He was found to have sepsis 2/2 UTI.      Sepsis 2/2 Complicated Urinary Tract Infection  Severe right sided hydronephrosis with hydroureter and urinary retention in bladder  Phimosis  Cystoscopy with jessica catheter placement  Pt presented with generalized weakness and a fall. He has a low grade fever and tachycardia. Laboratory evaluation notable for leukocytosis with WBC count of 17.4. Lactate is wnl. Urinalysis is grossly abnormal.   Of note pt was recently admitted to [facility] from 5/16-5/18 for urosepsis. CT scan at the time showed \"capacious bladder... with moderately enlarged prostate\" there was also multiple calcification in the dependent portion of the bladder that were thought to represent bladder stones. Furthermore, he wa noted at this time to have penile phimosis and was instructed to follow-up with urology   - Continue Ceftriaxone, anticipate to po at discharge  - Continue IVF   - Urine cxs show e.coli  and blood cultures negative to date  - seen by urology underwent cysto with catheter placement  - follow up abd CT on 6/7     Generalized weakness  Mechanical Fall   2/2 above infection. No apparent injuries   - PT consult      HTN  Pt appears to be taking amlodipine-benazepril.  - continue  - TTE for LE edema        Diet: Advance Diet as Tolerated: regular diet  DVT Prophylaxis: Pneumatic Compression Devices  Jessica Catheter: not present  Code Status: Full Code         Disposition Plan  -- anticipate home on 6/7    Interval History   Fever and tachycardia improved with jessica placement  Decrease in lower ext edema  Urology consult noted    -Data reviewed today: I reviewed all new labs and imaging over the last 24 hours. I personally reviewed " "no images or EKG's today.    Physical Exam    , Blood pressure 123/73, pulse 84, temperature 97.9  F (36.6  C), temperature source Oral, resp. rate 18, height 1.676 m (5' 6\"), weight 90.7 kg (200 lb), SpO2 93 %.  Vitals:    06/04/21 2311   Weight: 90.7 kg (200 lb)     Vital Signs with Ranges  Temp:  [97.9  F (36.6  C)-100.8  F (38.2  C)] 97.9  F (36.6  C)  Pulse:  [] 84  Resp:  [18-20] 18  BP: (123-142)/(73-91) 123/73  SpO2:  [93 %-96 %] 93 %  I/O's Last 24 hours  I/O last 3 completed shifts:  In: 600 [P.O.:600]  Out: -     Constitutional: Awake, alert, cooperative, no apparent distress  Respiratory: Clear to auscultation bilaterally, no crackles or wheezing  Cardiovascular: Regular rate and rhythm, normal S1 and S2, and no murmur noted  GI: Normal bowel sounds, obese, non-distended, non-tender  Skin/Integumen: No rashes, no cyanosis, no edema  Other:      Medications   All medications were reviewed.    sodium chloride 100 mL/hr at 06/06/21 0212       cefTRIAXone  2 g Intravenous Q24H     sodium chloride (PF)  3 mL Intracatheter Q8H        Data   Recent Labs   Lab 06/05/21  0826 06/04/21  2323   WBC 14.7* 17.4*   HGB 13.0* 13.9   MCV 99 99   * 177    138   POTASSIUM 3.4 3.6   CHLORIDE 108 104   CO2 27 30   BUN 25 27   CR 1.09 1.23   ANIONGAP 6 4   SABRINA 8.1* 8.8   * 129*   ALBUMIN  --  3.3*   PROTTOTAL  --  7.2   BILITOTAL  --  0.6   ALKPHOS  --  147   ALT  --  35   AST  --  22   TROPI  --  <0.015       No results found for this or any previous visit (from the past 24 hour(s)).    Jhonny Cuevas MD  Text Page  (7am to 6pm)       "

## 2021-06-07 ENCOUNTER — APPOINTMENT (OUTPATIENT)
Dept: CT IMAGING | Facility: CLINIC | Age: 81
DRG: 853 | End: 2021-06-07
Attending: UROLOGY
Payer: MEDICARE

## 2021-06-07 ENCOUNTER — PREP FOR PROCEDURE (OUTPATIENT)
Dept: UROLOGY | Facility: CLINIC | Age: 81
End: 2021-06-07

## 2021-06-07 ENCOUNTER — APPOINTMENT (OUTPATIENT)
Dept: PHYSICAL THERAPY | Facility: CLINIC | Age: 81
DRG: 853 | End: 2021-06-07
Payer: MEDICARE

## 2021-06-07 ENCOUNTER — APPOINTMENT (OUTPATIENT)
Dept: ULTRASOUND IMAGING | Facility: CLINIC | Age: 81
DRG: 853 | End: 2021-06-07
Attending: INTERNAL MEDICINE
Payer: MEDICARE

## 2021-06-07 ENCOUNTER — PREP FOR PROCEDURE (OUTPATIENT)
Dept: SURGERY | Facility: CLINIC | Age: 81
End: 2021-06-07

## 2021-06-07 ENCOUNTER — APPOINTMENT (OUTPATIENT)
Dept: CARDIOLOGY | Facility: CLINIC | Age: 81
DRG: 853 | End: 2021-06-07
Attending: INTERNAL MEDICINE
Payer: MEDICARE

## 2021-06-07 DIAGNOSIS — N47.1 PHIMOSIS: ICD-10-CM

## 2021-06-07 DIAGNOSIS — N13.30 HYDRONEPHROSIS OF RIGHT KIDNEY: Primary | ICD-10-CM

## 2021-06-07 LAB
ANION GAP SERPL CALCULATED.3IONS-SCNC: 6 MMOL/L (ref 3–14)
BUN SERPL-MCNC: 17 MG/DL (ref 7–30)
CALCIUM SERPL-MCNC: 8 MG/DL (ref 8.5–10.1)
CHLORIDE SERPL-SCNC: 108 MMOL/L (ref 94–109)
CO2 SERPL-SCNC: 25 MMOL/L (ref 20–32)
CREAT SERPL-MCNC: 0.92 MG/DL (ref 0.66–1.25)
ERYTHROCYTE [DISTWIDTH] IN BLOOD BY AUTOMATED COUNT: 13.1 % (ref 10–15)
ERYTHROCYTE [DISTWIDTH] IN BLOOD BY AUTOMATED COUNT: 13.2 % (ref 10–15)
GFR SERPL CREATININE-BSD FRML MDRD: 76 ML/MIN/{1.73_M2}
GLUCOSE SERPL-MCNC: 99 MG/DL (ref 70–99)
HCT VFR BLD AUTO: 36.9 % (ref 40–53)
HCT VFR BLD AUTO: 40.9 % (ref 40–53)
HGB BLD-MCNC: 12.3 G/DL (ref 13.3–17.7)
HGB BLD-MCNC: 13.4 G/DL (ref 13.3–17.7)
MCH RBC QN AUTO: 32.1 PG (ref 26.5–33)
MCH RBC QN AUTO: 32.8 PG (ref 26.5–33)
MCHC RBC AUTO-ENTMCNC: 32.8 G/DL (ref 31.5–36.5)
MCHC RBC AUTO-ENTMCNC: 33.3 G/DL (ref 31.5–36.5)
MCV RBC AUTO: 98 FL (ref 78–100)
MCV RBC AUTO: 98 FL (ref 78–100)
PLATELET # BLD AUTO: 148 10E9/L (ref 150–450)
PLATELET # BLD AUTO: 172 10E9/L (ref 150–450)
POTASSIUM SERPL-SCNC: 3.6 MMOL/L (ref 3.4–5.3)
RADIOLOGIST FLAGS: ABNORMAL
RBC # BLD AUTO: 3.75 10E12/L (ref 4.4–5.9)
RBC # BLD AUTO: 4.18 10E12/L (ref 4.4–5.9)
SODIUM SERPL-SCNC: 139 MMOL/L (ref 133–144)
UFH PPP CHRO-ACNC: >1.1 IU/ML
WBC # BLD AUTO: 10 10E9/L (ref 4–11)
WBC # BLD AUTO: 9.9 10E9/L (ref 4–11)

## 2021-06-07 PROCEDURE — 250N000011 HC RX IP 250 OP 636: Performed by: INTERNAL MEDICINE

## 2021-06-07 PROCEDURE — 99233 SBSQ HOSP IP/OBS HIGH 50: CPT | Performed by: UROLOGY

## 2021-06-07 PROCEDURE — 99233 SBSQ HOSP IP/OBS HIGH 50: CPT | Performed by: INTERNAL MEDICINE

## 2021-06-07 PROCEDURE — 80048 BASIC METABOLIC PNL TOTAL CA: CPT | Performed by: INTERNAL MEDICINE

## 2021-06-07 PROCEDURE — 120N000001 HC R&B MED SURG/OB

## 2021-06-07 PROCEDURE — 250N000011 HC RX IP 250 OP 636: Performed by: UROLOGY

## 2021-06-07 PROCEDURE — 97110 THERAPEUTIC EXERCISES: CPT | Mod: GP | Performed by: PHYSICAL THERAPIST

## 2021-06-07 PROCEDURE — 999N000208 ECHOCARDIOGRAM COMPLETE

## 2021-06-07 PROCEDURE — 85027 COMPLETE CBC AUTOMATED: CPT | Performed by: INTERNAL MEDICINE

## 2021-06-07 PROCEDURE — 97116 GAIT TRAINING THERAPY: CPT | Mod: GP | Performed by: PHYSICAL THERAPIST

## 2021-06-07 PROCEDURE — 93970 EXTREMITY STUDY: CPT

## 2021-06-07 PROCEDURE — 85520 HEPARIN ASSAY: CPT | Performed by: INTERNAL MEDICINE

## 2021-06-07 PROCEDURE — 74178 CT ABD&PLV WO CNTR FLWD CNTR: CPT | Mod: MG

## 2021-06-07 PROCEDURE — 250N000009 HC RX 250: Performed by: UROLOGY

## 2021-06-07 PROCEDURE — 250N000013 HC RX MED GY IP 250 OP 250 PS 637: Performed by: INTERNAL MEDICINE

## 2021-06-07 PROCEDURE — 36415 COLL VENOUS BLD VENIPUNCTURE: CPT | Performed by: INTERNAL MEDICINE

## 2021-06-07 PROCEDURE — 250N000013 HC RX MED GY IP 250 OP 250 PS 637: Performed by: UROLOGY

## 2021-06-07 PROCEDURE — 93306 TTE W/DOPPLER COMPLETE: CPT | Mod: 26 | Performed by: INTERNAL MEDICINE

## 2021-06-07 PROCEDURE — 255N000002 HC RX 255 OP 636: Performed by: INTERNAL MEDICINE

## 2021-06-07 PROCEDURE — 97161 PT EVAL LOW COMPLEX 20 MIN: CPT | Mod: GP | Performed by: PHYSICAL THERAPIST

## 2021-06-07 RX ORDER — HEPARIN SODIUM 10000 [USP'U]/100ML
0-5000 INJECTION, SOLUTION INTRAVENOUS CONTINUOUS
Status: DISCONTINUED | OUTPATIENT
Start: 2021-06-07 | End: 2021-06-08

## 2021-06-07 RX ORDER — AMLODIPINE AND BENAZEPRIL HYDROCHLORIDE 5; 10 MG/1; MG/1
1 CAPSULE ORAL DAILY
Status: DISCONTINUED | OUTPATIENT
Start: 2021-06-07 | End: 2021-06-07 | Stop reason: CLARIF

## 2021-06-07 RX ORDER — TORSEMIDE 10 MG/1
10 TABLET ORAL DAILY
Status: DISCONTINUED | OUTPATIENT
Start: 2021-06-07 | End: 2021-06-10 | Stop reason: HOSPADM

## 2021-06-07 RX ORDER — IOPAMIDOL 755 MG/ML
101 INJECTION, SOLUTION INTRAVASCULAR ONCE
Status: COMPLETED | OUTPATIENT
Start: 2021-06-07 | End: 2021-06-07

## 2021-06-07 RX ORDER — AMLODIPINE BESYLATE 5 MG/1
5 TABLET ORAL DAILY
Status: DISCONTINUED | OUTPATIENT
Start: 2021-06-07 | End: 2021-06-10 | Stop reason: HOSPADM

## 2021-06-07 RX ORDER — FUROSEMIDE 10 MG/ML
40 INJECTION INTRAMUSCULAR; INTRAVENOUS ONCE
Status: DISCONTINUED | OUTPATIENT
Start: 2021-06-07 | End: 2021-06-07

## 2021-06-07 RX ORDER — LISINOPRIL 10 MG/1
10 TABLET ORAL DAILY
Status: DISCONTINUED | OUTPATIENT
Start: 2021-06-07 | End: 2021-06-10 | Stop reason: HOSPADM

## 2021-06-07 RX ORDER — MULTIVITAMIN,THERAPEUTIC
1 TABLET ORAL DAILY
Status: DISCONTINUED | OUTPATIENT
Start: 2021-06-07 | End: 2021-06-10 | Stop reason: HOSPADM

## 2021-06-07 RX ORDER — ZINC SULFATE 50(220)MG
220 CAPSULE ORAL DAILY
Status: DISCONTINUED | OUTPATIENT
Start: 2021-06-07 | End: 2021-06-10 | Stop reason: HOSPADM

## 2021-06-07 RX ORDER — DUTASTERIDE 0.5 MG/1
0.5 CAPSULE, LIQUID FILLED ORAL DAILY
Status: DISCONTINUED | OUTPATIENT
Start: 2021-06-07 | End: 2021-06-10 | Stop reason: HOSPADM

## 2021-06-07 RX ORDER — CETIRIZINE HYDROCHLORIDE 10 MG/1
10 TABLET ORAL DAILY
Status: DISCONTINUED | OUTPATIENT
Start: 2021-06-07 | End: 2021-06-10 | Stop reason: HOSPADM

## 2021-06-07 RX ORDER — VIT C/E/ZN/COPPR/LUTEIN/ZEAXAN 60 MG-6 MG
1 CAPSULE ORAL 2 TIMES DAILY
Status: DISCONTINUED | OUTPATIENT
Start: 2021-06-07 | End: 2021-06-10 | Stop reason: HOSPADM

## 2021-06-07 RX ADMIN — HEPARIN SODIUM 1350 UNITS/HR: 10000 INJECTION, SOLUTION INTRAVENOUS at 23:30

## 2021-06-07 RX ADMIN — ZINC SULFATE 220 MG (50 MG) CAPSULE 220 MG: CAPSULE at 14:25

## 2021-06-07 RX ADMIN — MICONAZOLE NITRATE: 20 POWDER TOPICAL at 20:07

## 2021-06-07 RX ADMIN — TAMSULOSIN HYDROCHLORIDE 0.4 MG: 0.4 CAPSULE ORAL at 08:16

## 2021-06-07 RX ADMIN — CETIRIZINE HYDROCHLORIDE 10 MG: 10 TABLET, FILM COATED ORAL at 13:52

## 2021-06-07 RX ADMIN — LISINOPRIL 10 MG: 10 TABLET ORAL at 13:52

## 2021-06-07 RX ADMIN — CHOLECALCIFEROL TAB 10 MCG (400 UNIT) 10 MCG: 10 TAB at 14:25

## 2021-06-07 RX ADMIN — TORSEMIDE 10 MG: 10 TABLET ORAL at 13:52

## 2021-06-07 RX ADMIN — HEPARIN SODIUM 1650 UNITS/HR: 10000 INJECTION, SOLUTION INTRAVENOUS at 14:25

## 2021-06-07 RX ADMIN — AMLODIPINE BESYLATE 5 MG: 5 TABLET ORAL at 13:52

## 2021-06-07 RX ADMIN — HUMAN ALBUMIN MICROSPHERES AND PERFLUTREN 9 ML: 10; .22 INJECTION, SOLUTION INTRAVENOUS at 11:18

## 2021-06-07 RX ADMIN — CEFTRIAXONE SODIUM 2 G: 2 INJECTION, POWDER, FOR SOLUTION INTRAMUSCULAR; INTRAVENOUS at 12:26

## 2021-06-07 RX ADMIN — Medication 1 CAPSULE: at 20:06

## 2021-06-07 RX ADMIN — SODIUM CHLORIDE 69 ML: 9 INJECTION, SOLUTION INTRAVENOUS at 09:18

## 2021-06-07 RX ADMIN — THERA TABS 1 TABLET: TAB at 13:52

## 2021-06-07 RX ADMIN — IOPAMIDOL 101 ML: 755 INJECTION, SOLUTION INTRAVENOUS at 09:17

## 2021-06-07 RX ADMIN — RIVAROXABAN 15 MG: 15 TABLET, FILM COATED ORAL at 10:59

## 2021-06-07 RX ADMIN — MICONAZOLE NITRATE: 20 POWDER TOPICAL at 08:16

## 2021-06-07 RX ADMIN — DUTASTERIDE 0.5 MG: 0.5 CAPSULE, LIQUID FILLED ORAL at 13:52

## 2021-06-07 ASSESSMENT — ACTIVITIES OF DAILY LIVING (ADL)
ADLS_ACUITY_SCORE: 18

## 2021-06-07 NOTE — PLAN OF CARE
DATE & TIME: 06/06/21 8770-3230  Cognitive Concerns/ Orientation : A/Ox4. [place]  BEHAVIOR & AGGRESSION TOOL COLOR: Green  ABNL VS/O2:  VSS on RA, afebrile  MOBILITY: AX1 with GB and walker  PAIN MANAGMENT: Denies pain  DIET: Regular diet  BOWEL/BLADDER: Alonzo with good UO - cloudy with sediment, no BM this shift  ABNL LAB/BG: UC positive for Ecoli   DRAIN/DEVICES: IV SL   TELEMETRY RHYTHM: ST with PVCs  SKIN: Manisha area excoriation/rash. Q2h turns. Under abdominal fold rash. Cleaned and powder applied. Coccyx with blanchable erythema.  TESTS/PROCEDURES: ECHO and CT today  D/C DATE: pending  Urology following, Edema on BLE. PT/SW consult.

## 2021-06-07 NOTE — PROGRESS NOTES
"[facility]  Hospitalist Progress Note  Jhonny Cuevas MD  06/07/2021    Assessment & Plan   Shilo Menchaca is a 81 year old male with past medical history significant for HTN, BPH, admitted on 6/4/2021 with fall and generalized weakness. He was found to have sepsis 2/2 UTI.      E.coli Sepsis 2/2 Complicated Urinary Tract Infection, infected stone  Severe right sided hydronephrosis with hydroureter and urinary retention in bladder  Phimosis  Cystoscopy with jessica catheter placement  Pt presented with generalized weakness and a fall. He has a low grade fever and tachycardia. Laboratory evaluation notable for leukocytosis with WBC count of 17.4. Lactate is wnl. Urinalysis is grossly abnormal.   Of note pt was recently admitted to [facility] from 5/16-5/18 for urosepsis. CT scan at the time showed \"capacious bladder... with moderately enlarged prostate\" there was also multiple calcification in the dependent portion of the bladder that were thought to represent bladder stones. Furthermore, he wa noted at this time to have penile phimosis and was instructed to follow-up with urology   - Continue Ceftriaxone, anticipate to po at discharge  - Continue IVF   - Urine cxs show e.coli and now bcx + for e.coli  , plan to treat for total of 14 days.  - seen by urology underwent cysto with catheter placement  - follow up abd CT on 6/7 shows  hydronephrosis and hydroureter.  - discussed with urology - Dr Man  - now plan for phimosis surgery on Wednesday with ureteral stent placement.  - now anticipate discharge on 6/10     Rt Lung PE  -- noted on CT urogram with filling defects on RUL and RLL segmental and subsegmental lobes  -- patient not hypoxic  -- was given one dose of xarelto but hold further oral anticoagulation.    -- will start iv heparin pending further urological procedures before transitioning to DOAC.  -- CT urogram otherwise has not shown any suspicious lesion for malignancy.  Chest, " "abdomen and pelvis seemed to be visualized.   -- will check US of LE for clot burden    Generalized weakness  Mechanical Fall   2/2 above infection. No apparent injuries   - PT consult recommending TCU     HTN  Pt appears to be taking amlodipine-benazepril.  - continue  - TTE for LE edema pending along with now PE  - restart lotrel    Intertrigo  - micatin power to site in groin and abd folds  - exacerbated by antibiotic use    BPH  - continue avodart     Diet: Advance Diet as Tolerated: regular diet  DVT Prophylaxis: on iv heparin  Alonzo Catheter: not present  Code Status: Full Code         Disposition Plan  -- anticipate to TCU vs home on 6/10      Interval History   Noted + CT for PE  PT recommending TCU  Bcx + for e.coli      -Data reviewed today: I reviewed all new labs and imaging over the last 24 hours. I personally reviewed no images or EKG's today.    Physical Exam    , Blood pressure (!) 160/95, pulse 93, temperature 99.3  F (37.4  C), temperature source Oral, resp. rate 16, height 1.676 m (5' 6\"), weight 90.7 kg (200 lb), SpO2 92 %.  Vitals:    06/04/21 2311   Weight: 90.7 kg (200 lb)     Vital Signs with Ranges  Temp:  [97.6  F (36.4  C)-99.3  F (37.4  C)] 99.3  F (37.4  C)  Pulse:  [] 93  Resp:  [16-18] 16  BP: (141-170)/(85-97) 160/95  SpO2:  [92 %-96 %] 92 %  I/O's Last 24 hours  I/O last 3 completed shifts:  In: 250 [P.O.:250]  Out: 4500 [Urine:4500]    Constitutional: Awake, alert, cooperative, no apparent distress  Respiratory: Clear to auscultation bilaterally, no crackles or wheezing  Cardiovascular: Regular rate and rhythm, normal S1 and S2   GI: Normal bowel sounds, obese, non-distended, non-tender  Skin/Integumen: No rashes, no cyanosis, + edema  Other:      Medications   All medications were reviewed.      cefTRIAXone  2 g Intravenous Q24H     miconazole   Topical BID     rivaroxaban ANTICOAGULANT  15 mg Oral BID w/meals     sodium chloride (PF)  3 mL Intracatheter Q8H     tamsulosin  " 0.4 mg Oral Daily        Data   Recent Labs   Lab 06/07/21  0657 06/05/21  0826 06/04/21  2323   WBC 9.9 14.7* 17.4*   HGB 12.3* 13.0* 13.9   MCV 98 99 99   * 149* 177    141 138   POTASSIUM 3.6 3.4 3.6   CHLORIDE 108 108 104   CO2 25 27 30   BUN 17 25 27   CR 0.92 1.09 1.23   ANIONGAP 6 6 4   SABRINA 8.0* 8.1* 8.8   GLC 99 148* 129*   ALBUMIN  --   --  3.3*   PROTTOTAL  --   --  7.2   BILITOTAL  --   --  0.6   ALKPHOS  --   --  147   ALT  --   --  35   AST  --   --  22   TROPI  --   --  <0.015       Recent Results (from the past 24 hour(s))   CT Urogram wo & w Contrast   Result Value    Radiologist flags Pulmonary embolism (AA)    Narrative    CT UROGRAM WO & W CONTRAST 6/7/2021 9:42 AM    CLINICAL HISTORY: Hydronephrosis; Hydroureter    TECHNIQUE: CT urogram was performed without and following injection of  IV contrast. Multiplanar reformats were obtained. Dose reduction  techniques were used.  CONTRAST: 101mL Isovue-370    COMPARISON: None available.    FINDINGS:   LOWER CHEST: Mild bibasilar dependent atelectasis. Filling defects in  the segmental and subsegmental arteries of the right lower lobe,  compatible with pulmonary emboli (series 6, image 8, 18 and 25 for  example). Partly visualized filling defect in the right upper lobe  main pulmonary artery (series 6, image 2) also consistent with  pulmonary emboli.    HEPATOBILIARY: Subcentimeter hypodense lesions in the liver are too  small to characterize, likely cysts and hemangiomas. In the inferior  medial left hepatic lobe, 2.5 cm peripherally calcified hypodense  lesion, likely a peripherally calcified cyst. The gallbladder is not  visualized, and may be surgically absent. No biliary ductal  dilatation.    PANCREAS: Normal.    SPLEEN: Normal.    ADRENAL GLANDS: Normal.    KIDNEYS/BLADDER:   RIGHT KIDNEY: Moderate hydroureteronephrosis to the level of the  ureterovesicular junction. Cluster of calcifications at the right  ureterovesicular junction  measuring 2.2 x 1.6 cm. Subcentimeter  hypodense renal cortical lesions are likely small cysts. No delayed  nephrogram. No suspicious renal masses. Mild linear urothelial  enhancement in the ureter is likely inflammatory. No filling defects  in the right kidney. The right ureter is not opacified.    LEFT KIDNEY: Few punctate nonobstructing calculi in the left kidney  measuring up to 0.4 cm. No hydronephrosis, perinephric stranding or  hydroureter. Subcentimeter hypodense lesion in the left kidney is too  small to adequately characterize but likely a cyst. No suspicious  renal masses. No suspicious urothelial enhancement or filling defect  in the right renal collecting system or ureter.    BLADDER: Urinary bladder is decompressed with a Alonzo catheter. A  cluster of calcifications in the right ureterovesicular junction as  discussed. Few other calcification in the bladder lumen versus wall.  Circumferential urinary bladder wall thickening, exaggerated by the  bladder being decompressed.    BOWEL: Diverticulosis in the colon. No acute inflammatory change. No  obstruction.     PELVIC ORGANS: Mild prostatomegaly and intraprostatic calcifications.    ADDITIONAL FINDINGS: Indeterminate right perivesicle/external iliac  lymph node measuring 1.7 x 1.0 cm. No other lymphadenopathy in the  abdomen and pelvis. Moderate-sized fat-containing left inguinal hernia  and small fat-containing abdominal hernia. No ascites.    MUSCULOSKELETAL: Degenerative changes in the spine. No suspicious  lesions in the bones.      Impression    IMPRESSION:   1.  Filling defects in the partly visualized right upper lobe  pulmonary artery and segmental arteries of the right lower lobe  compatible with pulmonary emboli.  2.  Right hydroureteronephrosis to the level of the ureterovesicular  junction where there is a cluster of calcifications either in the  bladder lumen, urinary bladder diverticulum or urinary bladder wall.  3.  Mild linear  enhancement of the right ureter is likely  inflammatory. The right ureter is not opacified with contrast which  limits evaluation for filling defects.  4.  Few punctate nonobstructing left renal calculi.  5.  The urinary bladder is decompressed with a Alonzo catheter with  marked urinary bladder wall thickening. This limits evaluation for  focal masses by CT. Cystoscopic correlation should be considered as  clinically indicated.  6.  Indeterminate mildly enlarged right perivesical/external iliac  lymph node. This is either reactive or metastatic.    [Critical Result: Pulmonary embolism]    Finding was identified on 6/7/2021 9:57 AM.     MERI Arevalo RN was contacted by me on 6/7/2021 10:15 AM and verbalized  understanding of the critical result.     MD Jhonny PEREZ MD  Text Page  (7am to 6pm)

## 2021-06-07 NOTE — PROGRESS NOTES
06/07/21 1134   Quick Adds   Type of Visit Initial PT Evaluation   Living Environment   Current Living Arrangements house   Home Accessibility stairs to enter home;stairs within home   Number of Stairs, Main Entrance 2   Stair Railings, Main Entrance none   Number of Stairs, Within Home, Primary other (see comments)  (14)   Stair Railings, Within Home, Primary railings safe and in good condition   Living Environment Comments 2 level [facility], doesn't need to access lower level   Self-Care   Usual Activity Tolerance moderate   Current Activity Tolerance moderate   Regular Exercise Yes   Activity/Exercise Type other (see comments)  (Nu step device 500 steps daily)   Exercise Amount/Frequency daily   Equipment Currently Used at Home walker, standard   Activity/Exercise/Self-Care Comment indep w/ self cares and ADLs, WW for mobility prior to recent decline, had been having home PT/OT 2 x week   Disability/Function   Hearing Difficulty or Deaf yes   Patient's preferred means of communication verbal   Describe hearing loss bilateral hearing loss   Use of hearing assistive devices bilateral hearing aids   Were auxiliary aids offered? no   The following aids were provided; patient declined offer of auxiliary aids   Wear Glasses or Blind yes   Concentrating, Remembering or Making Decisions Difficulty no   Difficulty Communicating no   Difficulty Eating/Swallowing no   Walking or Climbing Stairs Difficulty yes   Walking or Climbing Stairs ambulation difficulty, requires equipment   Dressing/Bathing Difficulty no   Toileting issues yes   Toileting Assistance toileting difficulty, requires equipment   Doing Errands Independently Difficulty (such as shopping) no   Fall history within last six months yes   Number of times patient has fallen within last six months 3   General Information   Onset of Illness/Injury or Date of Surgery 06/05/21   Referring Physician Maren   Patient/Family Therapy Goals Statement (PT) pt wants to  return home with inc home PT/OT   Pertinent History of Current Problem (include personal factors and/or comorbidities that impact the POC) 81 year old male with history of HTN and BPH who presents via EMS with generalized weakness after a fall.  The patient reports developing generalized weakness this afternoon and then this evening fell while ambulating with his walker   Existing Precautions/Restrictions fall   Cognition   Orientation Status (Cognition) oriented x 3   Affect/Mental Status (Cognition) WNL   Follows Commands (Cognition) follows one-step commands;50-74% accuracy;repetition of directions required  ([place])   Safety Deficit (Cognition) at risk behavior observed   Posture    Posture Forward head position;Protracted shoulders   Range of Motion (ROM)   ROM Comment LE ROM WFL   Strength   Strength Comments 4-/5 B HF, reliance on UE to come to standing, unable to SLR thru full ROM   Bed Mobility   Comment (Bed Mobility) SBA   Transfers   Transfer Safety Comments CGA w WW   Gait/Stairs (Locomotion)   Comment (Gait/Stairs) CGA w/ WW dec step length slow pace dec endurance   Balance   Balance Comments impaired from baseline using WW at home   Clinical Impression   Criteria for Skilled Therapeutic Intervention yes, treatment indicated   PT Diagnosis (PT) Weakness   Influenced by the following impairments difficulty walking, dec indep w transfers dec endurance   Functional limitations due to impairments impaired mobility   Clinical Presentation Evolving/Changing   Clinical Presentation Rationale PMH and clinical assessment   Clinical Decision Making (Complexity) low complexity   Therapy Frequency (PT) Daily   Predicted Duration of Therapy Intervention (days/wks) 5 days   Planned Therapy Interventions (PT) bed mobility training;gait training;strengthening;transfer training   Risk & Benefits of therapy have been explained evaluation/treatment results reviewed;care plan/treatment goals reviewed;risks/benefits  "reviewed;patient   PT Discharge Planning    PT Discharge Recommendation (DC Rec) Transitional Care Facility;home with home care physical therapy   PT Rationale for DC Rec Pt with 2nd admit within 30 days, below baseline for ADLs and mobility req CGA for ambulation with WW, limited activity tolerance, had been rec PT/OT 2 x wk, pts wife stating pt \"wiped out\" after therapy session, pt will benefit from cont PT during stay, would benefit from OT as well as pts goal is to return home, pending progress pt could possible return home with inc frequency in home therapies however if progress limited or spouse uncomfortable, TCU should be the plan      "

## 2021-06-07 NOTE — PLAN OF CARE
DATE & TIME: 06/06/21 Evening shift  Cognitive Concerns/ Orientation : A/Ox4. [place]. Wears hearing aids at home; not with him here.  SO pleasant  BEHAVIOR & AGGRESSION TOOL COLOR: Green; calm and cooperative   ABNL VS/O2:  VSS on RA,  MOBILITY: AX1 with GB and walker  PAIN MANAGMENT: Denies pain  DIET: Regular diet- good appetite  BOWEL/BLADDER: Jessica with good UO, no BM this shift  ABNL LAB/BG: UC positive for Ecoli   DRAIN/DEVICES: IV SL   TELEMETRY RHYTHM: ST with PVCs  SKIN: Manisha area excoriation/rash. Under abdominal fold rash. Cleaned and powder applied. Coccyx with blanchable erythema. Repositioning pt Q@ hours.   TESTS/PROCEDURES: ECHO and CT tomorrow  D/C DATE: Urology following, jessica placed by them. Up to chair for meals, edema on BLE +2, kink in neck relieved with heat pack. PT/SW consult

## 2021-06-07 NOTE — PLAN OF CARE
A&O x4. SBA w GB and walker. Tele SR w/ PVC's. Lungs clear. Tolerating diet. Voiding without difficulty. Pt had CT which revealed pulm emboli. Pt started on xarelto. Currently asymptomatic for PE's. Uro considering cysto as outpt. CT showing stones as well.

## 2021-06-07 NOTE — PROGRESS NOTES
Infectious disease lab at the  called with critical lab of update to the blood cultures that were growing gram negative rods are now identified at [facility]. Blood culture is from 6/4/2021 2324 left arm. MD text paged with results.

## 2021-06-07 NOTE — PROGRESS NOTES
[facility] Urology Progress Note          Assessment and Plan:   Active Problems:    Weakness    Acute cystitis with hematuria  Urinary retention  BPH  Right hydro    Assessment: improving    Plan: Will need for further work-up with office cystoscopy, possible urodynamics, likely followed by a circumcision versus dorsal slit with a cystolitholapaxy and bladder outlet procedure.  Awaiting CT results.       Libra Feng PA-C  [facility] Urology  [phone]               Interval History:    Alonzo clear.              Review of Systems:   The 5 point Review of Systems is negative other than noted in the HPI             Medications:     Current Facility-Administered Medications Ordered in Epic   Medication Dose Route Frequency Last Rate Last Admin     acetaminophen (TYLENOL) tablet 650 mg  650 mg Oral Q4H PRN   650 mg at 06/06/21 0212     cefTRIAXone (ROCEPHIN) 2 g vial to attach to  ml bag for ADULTS or NS 50 ml bag for PEDS  2 g Intravenous Q24H 200 mL/hr at 06/06/21 1343 2 g at 06/06/21 1343     lidocaine (LMX4) cream   Topical Q1H PRN         lidocaine 1 % 0.1-1 mL  0.1-1 mL Other Q1H PRN         melatonin tablet 1 mg  1 mg Oral At Bedtime PRN         miconazole (MICATIN) 2 % powder   Topical BID   Given at 06/07/21 0816     ondansetron (ZOFRAN-ODT) ODT tab 4 mg  4 mg Oral Q6H PRN        Or     ondansetron (ZOFRAN) injection 4 mg  4 mg Intravenous Q6H PRN         sodium chloride (PF) 0.9% PF flush 3 mL  3 mL Intracatheter Q8H         sodium chloride (PF) 0.9% PF flush 3 mL  3 mL Intracatheter q1 min prn         tamsulosin (FLOMAX) capsule 0.4 mg  0.4 mg Oral Daily   0.4 mg at 06/07/21 0816     No current [facility]-ordered outpatient medications on file.                  Physical Exam:   Vitals were reviewed  Patient Vitals for the past 8 hrs:   BP Temp Temp src Pulse Resp SpO2   06/07/21 0747 (!) 160/95 99.3  F (37.4  C) Oral 93 16 92 %   06/07/21 0500 (!) 141/85 97.6  F (36.4  C) Oral 199 16 94 %      GEN: NAD  NEURO: AAO  : Clear           Data:   No results found for: NTBNPI, NTBNP  Lab Results   Component Value Date    WBC 9.9 06/07/2021    WBC 14.7 (H) 06/05/2021    WBC 17.4 (H) 06/04/2021    HGB 12.3 (L) 06/07/2021    HGB 13.0 (L) 06/05/2021    HGB 13.9 06/04/2021    HCT 36.9 (L) 06/07/2021    HCT 39.6 (L) 06/05/2021    HCT 42.4 06/04/2021    MCV 98 06/07/2021    MCV 99 06/05/2021    MCV 99 06/04/2021     (L) 06/07/2021     (L) 06/05/2021     06/04/2021     No results found for: INR

## 2021-06-08 ENCOUNTER — APPOINTMENT (OUTPATIENT)
Dept: PHYSICAL THERAPY | Facility: CLINIC | Age: 81
DRG: 853 | End: 2021-06-08
Payer: MEDICARE

## 2021-06-08 ENCOUNTER — ANESTHESIA EVENT (OUTPATIENT)
Dept: SURGERY | Facility: CLINIC | Age: 81
DRG: 853 | End: 2021-06-08
Payer: MEDICARE

## 2021-06-08 LAB
ANION GAP SERPL CALCULATED.3IONS-SCNC: 5 MMOL/L (ref 3–14)
APTT PPP: 123 SEC (ref 22–37)
APTT PPP: 94 SEC (ref 22–37)
BUN SERPL-MCNC: 17 MG/DL (ref 7–30)
CALCIUM SERPL-MCNC: 7.8 MG/DL (ref 8.5–10.1)
CHLORIDE SERPL-SCNC: 108 MMOL/L (ref 94–109)
CO2 SERPL-SCNC: 27 MMOL/L (ref 20–32)
CREAT SERPL-MCNC: 1 MG/DL (ref 0.66–1.25)
DEPRECATED CALCIDIOL+CALCIFEROL SERPL-MC: 36 UG/L (ref 20–75)
ERYTHROCYTE [DISTWIDTH] IN BLOOD BY AUTOMATED COUNT: 13.1 % (ref 10–15)
ERYTHROCYTE [DISTWIDTH] IN BLOOD BY AUTOMATED COUNT: 13.1 % (ref 10–15)
GFR SERPL CREATININE-BSD FRML MDRD: 69 ML/MIN/{1.73_M2}
GLUCOSE SERPL-MCNC: 96 MG/DL (ref 70–99)
HCT VFR BLD AUTO: 35.8 % (ref 40–53)
HCT VFR BLD AUTO: 39.3 % (ref 40–53)
HGB BLD-MCNC: 11.9 G/DL (ref 13.3–17.7)
HGB BLD-MCNC: 12.7 G/DL (ref 13.3–17.7)
MCH RBC QN AUTO: 31.8 PG (ref 26.5–33)
MCH RBC QN AUTO: 32.6 PG (ref 26.5–33)
MCHC RBC AUTO-ENTMCNC: 32.3 G/DL (ref 31.5–36.5)
MCHC RBC AUTO-ENTMCNC: 33.2 G/DL (ref 31.5–36.5)
MCV RBC AUTO: 98 FL (ref 78–100)
MCV RBC AUTO: 99 FL (ref 78–100)
PLATELET # BLD AUTO: 181 10E9/L (ref 150–450)
PLATELET # BLD AUTO: 188 10E9/L (ref 150–450)
POTASSIUM SERPL-SCNC: 3.3 MMOL/L (ref 3.4–5.3)
POTASSIUM SERPL-SCNC: 3.9 MMOL/L (ref 3.4–5.3)
RBC # BLD AUTO: 3.65 10E12/L (ref 4.4–5.9)
RBC # BLD AUTO: 3.99 10E12/L (ref 4.4–5.9)
SODIUM SERPL-SCNC: 140 MMOL/L (ref 133–144)
UFH PPP CHRO-ACNC: >1.1 IU/ML
UFH PPP CHRO-ACNC: >1.1 IU/ML
WBC # BLD AUTO: 7.7 10E9/L (ref 4–11)
WBC # BLD AUTO: 9.4 10E9/L (ref 4–11)

## 2021-06-08 PROCEDURE — 36415 COLL VENOUS BLD VENIPUNCTURE: CPT | Performed by: HOSPITALIST

## 2021-06-08 PROCEDURE — 120N000001 HC R&B MED SURG/OB

## 2021-06-08 PROCEDURE — 85027 COMPLETE CBC AUTOMATED: CPT | Performed by: INTERNAL MEDICINE

## 2021-06-08 PROCEDURE — 36415 COLL VENOUS BLD VENIPUNCTURE: CPT | Performed by: INTERNAL MEDICINE

## 2021-06-08 PROCEDURE — 250N000011 HC RX IP 250 OP 636: Performed by: INTERNAL MEDICINE

## 2021-06-08 PROCEDURE — 85520 HEPARIN ASSAY: CPT | Performed by: HOSPITALIST

## 2021-06-08 PROCEDURE — 97110 THERAPEUTIC EXERCISES: CPT | Mod: GP | Performed by: PHYSICAL THERAPIST

## 2021-06-08 PROCEDURE — 250N000013 HC RX MED GY IP 250 OP 250 PS 637: Performed by: UROLOGY

## 2021-06-08 PROCEDURE — 80048 BASIC METABOLIC PNL TOTAL CA: CPT | Performed by: INTERNAL MEDICINE

## 2021-06-08 PROCEDURE — 82306 VITAMIN D 25 HYDROXY: CPT | Performed by: INTERNAL MEDICINE

## 2021-06-08 PROCEDURE — 250N000013 HC RX MED GY IP 250 OP 250 PS 637: Performed by: INTERNAL MEDICINE

## 2021-06-08 PROCEDURE — 84132 ASSAY OF SERUM POTASSIUM: CPT | Performed by: STUDENT IN AN ORGANIZED HEALTH CARE EDUCATION/TRAINING PROGRAM

## 2021-06-08 PROCEDURE — 85730 THROMBOPLASTIN TIME PARTIAL: CPT | Performed by: INTERNAL MEDICINE

## 2021-06-08 PROCEDURE — 85520 HEPARIN ASSAY: CPT | Performed by: INTERNAL MEDICINE

## 2021-06-08 PROCEDURE — 97116 GAIT TRAINING THERAPY: CPT | Mod: GP | Performed by: PHYSICAL THERAPIST

## 2021-06-08 PROCEDURE — 99233 SBSQ HOSP IP/OBS HIGH 50: CPT | Performed by: INTERNAL MEDICINE

## 2021-06-08 PROCEDURE — 250N000013 HC RX MED GY IP 250 OP 250 PS 637: Performed by: STUDENT IN AN ORGANIZED HEALTH CARE EDUCATION/TRAINING PROGRAM

## 2021-06-08 PROCEDURE — 84132 ASSAY OF SERUM POTASSIUM: CPT | Performed by: INTERNAL MEDICINE

## 2021-06-08 RX ORDER — NALOXONE HYDROCHLORIDE 0.4 MG/ML
0.2 INJECTION, SOLUTION INTRAMUSCULAR; INTRAVENOUS; SUBCUTANEOUS
Status: CANCELLED | OUTPATIENT
Start: 2021-06-08 | End: 2021-06-09

## 2021-06-08 RX ORDER — POTASSIUM CHLORIDE 1500 MG/1
40 TABLET, EXTENDED RELEASE ORAL ONCE
Status: DISCONTINUED | OUTPATIENT
Start: 2021-06-08 | End: 2021-06-08

## 2021-06-08 RX ORDER — NALOXONE HYDROCHLORIDE 0.4 MG/ML
0.4 INJECTION, SOLUTION INTRAMUSCULAR; INTRAVENOUS; SUBCUTANEOUS
Status: CANCELLED | OUTPATIENT
Start: 2021-06-08 | End: 2021-06-09

## 2021-06-08 RX ORDER — POTASSIUM CHLORIDE 1500 MG/1
40 TABLET, EXTENDED RELEASE ORAL ONCE
Status: COMPLETED | OUTPATIENT
Start: 2021-06-08 | End: 2021-06-08

## 2021-06-08 RX ORDER — HEPARIN SODIUM 10000 [USP'U]/100ML
0-5000 INJECTION, SOLUTION INTRAVENOUS CONTINUOUS
Status: ACTIVE | OUTPATIENT
Start: 2021-06-08 | End: 2021-06-09

## 2021-06-08 RX ADMIN — Medication 1 CAPSULE: at 21:30

## 2021-06-08 RX ADMIN — ZINC SULFATE 220 MG (50 MG) CAPSULE 220 MG: CAPSULE at 08:44

## 2021-06-08 RX ADMIN — THERA TABS 1 TABLET: TAB at 08:44

## 2021-06-08 RX ADMIN — AMLODIPINE BESYLATE 5 MG: 5 TABLET ORAL at 08:44

## 2021-06-08 RX ADMIN — POTASSIUM CHLORIDE 40 MEQ: 1500 TABLET, EXTENDED RELEASE ORAL at 08:43

## 2021-06-08 RX ADMIN — TAMSULOSIN HYDROCHLORIDE 0.4 MG: 0.4 CAPSULE ORAL at 08:44

## 2021-06-08 RX ADMIN — Medication 1 CAPSULE: at 08:44

## 2021-06-08 RX ADMIN — TORSEMIDE 10 MG: 10 TABLET ORAL at 08:44

## 2021-06-08 RX ADMIN — DUTASTERIDE 0.5 MG: 0.5 CAPSULE, LIQUID FILLED ORAL at 08:44

## 2021-06-08 RX ADMIN — CHOLECALCIFEROL TAB 10 MCG (400 UNIT) 10 MCG: 10 TAB at 08:44

## 2021-06-08 RX ADMIN — LISINOPRIL 10 MG: 10 TABLET ORAL at 08:44

## 2021-06-08 RX ADMIN — CEFTRIAXONE SODIUM 2 G: 2 INJECTION, POWDER, FOR SOLUTION INTRAMUSCULAR; INTRAVENOUS at 12:55

## 2021-06-08 RX ADMIN — HEPARIN SODIUM 1050 UNITS/HR: 10000 INJECTION, SOLUTION INTRAVENOUS at 08:49

## 2021-06-08 RX ADMIN — MICONAZOLE NITRATE: 20 POWDER TOPICAL at 21:33

## 2021-06-08 RX ADMIN — CETIRIZINE HYDROCHLORIDE 10 MG: 10 TABLET, FILM COATED ORAL at 08:44

## 2021-06-08 RX ADMIN — Medication 1 MG: at 00:53

## 2021-06-08 RX ADMIN — MICONAZOLE NITRATE: 20 POWDER TOPICAL at 08:53

## 2021-06-08 ASSESSMENT — ACTIVITIES OF DAILY LIVING (ADL)
ADLS_ACUITY_SCORE: 18

## 2021-06-08 NOTE — PROGRESS NOTES
[facility]    HOSPITALIST PROGRESS NOTE :   --------------------------------------------------    Date of Admission:  6/4/2021    Cumulative Summary: Shilo Menchaca is a 81 year old male with past medical history significant for essential hypertension, benign prostate hyperplasia, admitted on June 4, 2021 with fall and generalized weakness and was found to have E. coli sepsis secondary to complicated urinary tract infection and infected nephrolithiasis.    Assessment & Plan     Generalized weakness.  E. coli sepsis secondary to complicated urinary tract infection with infected nephrolithiasis.  Severe right-sided hydronephrosis with hydroureter and urinary retention and bladder  Phimosis  S/p cystoscopy with Alonzo catheter placement;    Patient presented with generalized weakness and a fall.  He also had associated low-grade fever and tachycardia, lab was concerning for leukocytosis with WBC count of 17.4, normal lactate, urine analysis was grossly abnormal.  Of note patient was recently admitted to [facility] from May 16 to May 18 with sepsis associated with UTI.  CT scan at that time showed capacious bladder with moderately enlarged prostate there was also multiple calcifications in the dependent portion of the bladder that were thought to represent bladder stones.  He was also noticed to have penile phimosis and was instructed to follow-up with urology.  Patient was admitted for further evaluation and management, his urine cultures grew E. coli with positive bacteremia.  Urology took patient for cystoscopy with catheter placement.  CT scan was repeated on June 7 which showed hydronephrosis and hydroureter.    --Patient care was assumed this morning, patient was seen and examined, wife also present in the room.  --Highly appreciate urology help, plan for taking patient for phimosis surgery on Wednesday with ureteral stent placement.  --Plan to make patient n.p.o. after midnight.  --Start  heparin GTT at 1 AM for surgery tomorrow at 7:30 with Dr. Man , patient is planned to undergo cystoscopy, right ureteral stent and dorsal slit.  --Patient is recommended to be discharged with Alonzo on 10 to 14-day course of antibiotics  --Trial of voiding in 2 to 3 weeks.  --Urology also discussed with patient and wife that if trial of voiding is unsuccessful, then he will likely need replacement of his Alonzo catheter until he could be taken to the operating room for bladder outlet procedure after completion of 3 months of anticoagulation for his pulmonary embolism, patient will need Alonzo catheter change every 4 weeks.  --Patient and his wife do not remember having this conversation with urology, I reiterated the plan with them again this morning.    Right lung pulmonary embolism; incidentally was noted to have filling defects on the right upper lung and right lower lung segmental and subsegmental lobes on CT urogram, patient was not hypoxic.  --Patient was just given 1 dose of Xarelto but then was held further anticoagulation due to plan for further procedures.  --Patient has been started on heparin infusion pending further urological procedures before transitioning to DOAC.  --CT urogram otherwise has not shown any suspicious lesions or malignancy.  Chest, abdomen and pelvis seem to be visualized  --Bilateral lower extremity venous duplex is negative for any deep vein thrombosis.  We will discuss with urology regarding safe time to start heparin infusion or DOAC.    Generalized weakness  Mechanical fall  Most likely multifactorial, secondary to infection, no apparent injuries  --Physical therapy has been consulted, recommending TCU, patient granddaughter is graduating this week and they have her graduation party this Sunday.  --Wife and patient has questions if he would be able to go home with home therapies, and also discussed with him regarding possibility of patient going to rehab and then can get the past  to go and attend her granddaughter's graduation.    Essential hypertension  Bilateral lower extremity edema: Patient does not seem to have any history of congestive heart failure, lower extremity edema can be secondary to amlodipine.  Blood pressure is slightly elevated.    --Continue PT amlodipine/benazepril and Lotrel has been started.  --Transthoracic echocardiogram was ordered for lower extremity edema along with recent diagnosis of pulmonary embolism to check for right heart strain.  --Echocardiogram results were reviewed, frequent PVCs make wall motion and Doppler analysis difficult, there is mild concentric left ventricular hypertrophy with normal LV systolic function.  Normal LV wall motion.  Septal bounce seen this can be seen with bundle branch block, constrictive physiology, abnormal RV loading none of these are classically seen on this echo however right ventricle not well seen, grossly normal size and function.  -- Most likely these findings are secondary to pulmonary embolism, curbside to Dr. Beckwith, who was in agreement with assessment, recommended to continue patient for PE, possibly will repeat echocardiogram in 3 months and can be seen by cardiology as an outpatient.    Intertrigo:   --Micatin powder to groin and abdominal folds, mostly exacerbated by antibiotic use.    Benign prostate hyperplasia  --Continue Avodart.    Diet: Advance Diet as Tolerated: Regular Diet Adult  NPO per Anesthesia Guidelines for Procedure/Surgery Except for: Meds    Alonzo Catheter: in place, indication: Retention  DVT Prophylaxis: on IV Heparin   Code Status: No CPR- Do NOT Intubate    The patient's care was discussed with the Bedside Nurse, Patient and Patient's Family.    Disposition Plan   Expected discharge: Tomorrow or Thursday, recommended to transitional care unit   Patient and wife are wondering that if he does well with physical therapy if he would be able to go home, they are interested in attending their  granddaughter graduation party on Sunday.  We also discussed about possibility of going to TCU and getting the past to go and attend granddaughter graduation.  More than 35 min of the time was spend in care today, more than 50% of the time was spent in patient care coordination and counseling.    Rita Webb MD, FACP  Text Page (7am - 6pm)    ----------------------------------------------------------------------------------------------------------------------    Interval History   Patient care was assumed this morning, patient was seen and examined, wife also present in the room, patient is sitting in the chair.  Patient is feeling slightly tired, unable to sleep very well in the hospital, aware about plan for going for surgery tomorrow, we discussed about the stopping heparin and then going for surgery, they had questions regarding anticoagulation, surgery, Alonzo catheter, echocardiogram.  All the questions were answered  Echocardiogram results were also reviewed with patient and his wife.  Wife was also wondering if patient does well with physical therapy if he would be able to go to home, the main reason they are trying to go home is their granddaughter is graduating this week and does have a graduation party on Sunday.    -Data reviewed today: I reviewed all new labs and imaging results over the last 24 hours.    I personally reviewed EKG showing sinus tachycardia, echocardiogram seems to have mild concentric left ventricular hypertrophy, normal LV function and septal bounce with no significant right heart strain.    Physical Exam   Temp: 98.3  F (36.8  C) Temp src: Oral BP: (!) 151/85 Pulse: 98   Resp: 16 SpO2: 94 % O2 Device: None (Room air)    Vitals:    06/04/21 2311   Weight: 90.7 kg (200 lb)     Vital Signs with Ranges  Temp:  [97.9  F (36.6  C)-98.8  F (37.1  C)] 98.3  F (36.8  C)  Pulse:  [] 98  Resp:  [16] 16  BP: (140-160)/(85-91) 151/85  SpO2:  [94 %-95 %] 94 %  I/O last 3 completed  shifts:  In: 520 [P.O.:520]  Out: 2825 [Urine:2825]    GENERAL: Alert , awake and oriented. NAD. Conversational, appropriate, sitting in chair , wife present in the room    HEENT: Normocephalic. EOMI. No icterus or injection. Nares normal.   LUNGS: Clear to auscultation. No dyspnea at rest.   HEART: Regular rate. Extremities perfused.   ABDOMEN: Soft, nontender, and nondistended. Positive bowel sounds.   EXTREMITIES: positive 2+ B/L lower extremities edema   NEUROLOGIC: Moves extremities x4 on command. No acute focal neurologic abnormalities noted.     Medications     heparin 1,050 Units/hr (06/08/21 0849)       amLODIPine  5 mg Oral Daily    And     lisinopril  10 mg Oral Daily     cefTRIAXone  2 g Intravenous Q24H     cetirizine  10 mg Oral Daily     cholecalciferol  10 mcg Oral Daily     dutasteride  0.5 mg Oral Daily     miconazole   Topical BID     multivitamin  with lutein  1 capsule Oral BID     multivitamin, therapeutic  1 tablet Oral Daily     sodium chloride (PF)  3 mL Intracatheter Q8H     tamsulosin  0.4 mg Oral Daily     torsemide  10 mg Oral Daily     zinc sulfate  220 mg Oral Daily       Data   Recent Labs   Lab 06/08/21  0531 06/07/21  1420 06/07/21  0657 06/05/21  0826 06/04/21  2323   WBC 9.4 10.0 9.9 14.7* 17.4*   HGB 11.9* 13.4 12.3* 13.0* 13.9   MCV 98 98 98 99 99    172 148* 149* 177     --  139 141 138   POTASSIUM 3.3*  --  3.6 3.4 3.6   CHLORIDE 108  --  108 108 104   CO2 27  --  25 27 30   BUN 17  --  17 25 27   CR 1.00  --  0.92 1.09 1.23   ANIONGAP 5  --  6 6 4   SABRINA 7.8*  --  8.0* 8.1* 8.8   GLC 96  --  99 148* 129*   ALBUMIN  --   --   --   --  3.3*   PROTTOTAL  --   --   --   --  7.2   BILITOTAL  --   --   --   --  0.6   ALKPHOS  --   --   --   --  147   ALT  --   --   --   --  35   AST  --   --   --   --  22   TROPI  --   --   --   --  <0.015       Imaging:   Recent Results (from the past 24 hour(s))   US Lower Extremity Venous Duplex Bilateral    Narrative    VENOUS  ULTRASOUND BOTH LEGS  6/7/2021 3:55 PM     HISTORY: Pulmonary embolism. Leg swelling.    COMPARISON: None.    FINDINGS:  Examination of the deep veins with graded compression and  color flow Doppler with spectral wave form analysis was performed.   There is no evidence for DVT in the right lower extremity.      Impression    IMPRESSION: No evidence of deep venous thrombosis.    SILVERIO PEREZ MD

## 2021-06-08 NOTE — PROGRESS NOTES
Urology Chart Check    NPO after midnight and Heparin gtt to be stopped at 1am for surgery tomorrow at 7:30am with Dr. Man (cysto, right ureteral stent, dorsal slit).     He will need to discharge with Alonzo in place on 10-14 day course of abx.  TOV in 2-3 weeks. Patient and his wife understand that if this trial of void is unsuccessful will likely need replacement of his Alonzo catheter until he could be taken to the operating room for bladder outlet procedure after completion of 3 months of anticoagulation for his pulmonary embolism (Changing Alonzo q 4 wks).     Libra Feng PA-C  [facility]rology  [phone]

## 2021-06-08 NOTE — PROVIDER NOTIFICATION
Floor Pharmacist Jeremy notified for   PTT critical high 123.     Orders:  Pharmacist going to change Heparin dosing based on PTT instead of Hep10A.      Comment:  Pt received Xaxelto yesterday.       Update: Heparin 10a greater than 1.1. Floor pharmacist notified again-no new orders.

## 2021-06-08 NOTE — PLAN OF CARE
Pt is Ax1 w/ GB and walker, A&Ox4, regular diet. Has continuous heparin ordered, was just stopped at 2230 d/t Hep 10a being greater than 1.10. Stop for 1 hour then decrease units by 300. Recheck ordered for AM. Tele was SR w/ CODI's.

## 2021-06-08 NOTE — PLAN OF CARE
DATE & TIME: 06/07-06/08/21 Night shift   Cognitive Concerns/ Orientation : alert and oriented x4; so pleasant   BEHAVIOR & AGGRESSION TOOL COLOR: GREEN  CIWA SCORE: NA   ABNL VS/O2: WDL on RA, except for tachycardia (not new)  MOBILITY: SBAx1 BG walker  PAIN MANAGMENT: None needed  DIET: Regular diet  BOWEL/BLADDER: No BM, has jessica with great output  ABNL LAB/BG: Hep 10a once again above 1.10; K+ 3.3.   DRAIN/DEVICES: IV infusing heparin drip; jessica.  TELEMETRY RHYTHM: ST with PVCs  SKIN: Pale; bruised, +2 edema BLE. Yeasty rash in folds (abdomen/breasts)  TESTS/PROCEDURES: None today; had CT yesterday  D/C DATE: Patient to have cystoscopy tomorrow; on heparin drip for PEs. Will need to be transferred to PO anticoagulants.

## 2021-06-08 NOTE — PLAN OF CARE
DATE & TIME: 6/8/2021 [id number]   Cognitive Concerns/ Orientation : alert and oriented x4; so pleasant   BEHAVIOR & AGGRESSION TOOL COLOR: GREEN  CIWA SCORE: NA   ABNL VS/O2: VS stable on room air  MOBILITY: SBAx1 BG walker. Up in chair for meals. Ambulated in halls.  PAIN MANAGMENT: Denies  DIET: Regular diet  BOWEL/BLADDER: No BM, has jessica with great output-clear  ABNL LAB/BG:  K+ 3.3-replaced, recheck K 3.9. heparin 10A greater than 1.1. .   DRAIN/DEVICES: IV infusing heparin drip; Jessica.  TELEMETRY RHYTHM: SR with PVCs  SKIN: Pale; bruised, +2 edema BLE. Yeasty rash in folds (abdomen/breasts), using antifungal powder to groin/perineum.  TESTS/PROCEDURES: plan is cystoscopy with stent placement tomorrow  D/C DATE:  Plan is cystoscopy with stent placement tomorrow at 0730. Heparin need to stop 1am. Following PTT level for heparin drip dosing per pharmacist. PTT check at 1945. Urology following. Continue to monitor.  Addendum:  NPO at midnight-pt aware.

## 2021-06-09 ENCOUNTER — ANESTHESIA (OUTPATIENT)
Dept: SURGERY | Facility: CLINIC | Age: 81
DRG: 853 | End: 2021-06-09
Payer: MEDICARE

## 2021-06-09 ENCOUNTER — APPOINTMENT (OUTPATIENT)
Dept: GENERAL RADIOLOGY | Facility: CLINIC | Age: 81
DRG: 853 | End: 2021-06-09
Attending: UROLOGY
Payer: MEDICARE

## 2021-06-09 ENCOUNTER — APPOINTMENT (OUTPATIENT)
Dept: OCCUPATIONAL THERAPY | Facility: CLINIC | Age: 81
DRG: 853 | End: 2021-06-09
Payer: MEDICARE

## 2021-06-09 LAB
APTT PPP: 64 SEC (ref 22–37)
BACTERIA SPEC CULT: ABNORMAL
POTASSIUM SERPL-SCNC: 3.7 MMOL/L (ref 3.4–5.3)
SPECIMEN SOURCE: ABNORMAL

## 2021-06-09 PROCEDURE — 120N000001 HC R&B MED SURG/OB

## 2021-06-09 PROCEDURE — 54001 SLITTING OF PREPUCE: CPT | Mod: 51 | Performed by: UROLOGY

## 2021-06-09 PROCEDURE — 360N000075 HC SURGERY LEVEL 2, PER MIN: Performed by: UROLOGY

## 2021-06-09 PROCEDURE — 36415 COLL VENOUS BLD VENIPUNCTURE: CPT | Performed by: ANESTHESIOLOGY

## 2021-06-09 PROCEDURE — 250N000011 HC RX IP 250 OP 636: Performed by: UROLOGY

## 2021-06-09 PROCEDURE — 52351 CYSTOURETERO & OR PYELOSCOPE: CPT | Mod: RT | Performed by: UROLOGY

## 2021-06-09 PROCEDURE — 250N000013 HC RX MED GY IP 250 OP 250 PS 637: Performed by: UROLOGY

## 2021-06-09 PROCEDURE — 272N000001 HC OR GENERAL SUPPLY STERILE: Performed by: UROLOGY

## 2021-06-09 PROCEDURE — 97165 OT EVAL LOW COMPLEX 30 MIN: CPT | Mod: GO

## 2021-06-09 PROCEDURE — C1769 GUIDE WIRE: HCPCS | Performed by: UROLOGY

## 2021-06-09 PROCEDURE — 250N000009 HC RX 250: Performed by: ANESTHESIOLOGY

## 2021-06-09 PROCEDURE — 74420 UROGRAPHY RTRGR +-KUB: CPT | Mod: 26 | Performed by: UROLOGY

## 2021-06-09 PROCEDURE — 84132 ASSAY OF SERUM POTASSIUM: CPT | Performed by: ANESTHESIOLOGY

## 2021-06-09 PROCEDURE — 0T768DZ DILATION OF RIGHT URETER WITH INTRALUMINAL DEVICE, VIA NATURAL OR ARTIFICIAL OPENING ENDOSCOPIC: ICD-10-PCS | Performed by: UROLOGY

## 2021-06-09 PROCEDURE — 250N000011 HC RX IP 250 OP 636: Performed by: NURSE ANESTHETIST, CERTIFIED REGISTERED

## 2021-06-09 PROCEDURE — 710N000009 HC RECOVERY PHASE 1, LEVEL 1, PER MIN: Performed by: UROLOGY

## 2021-06-09 PROCEDURE — 258N000003 HC RX IP 258 OP 636: Performed by: NURSE ANESTHETIST, CERTIFIED REGISTERED

## 2021-06-09 PROCEDURE — 97530 THERAPEUTIC ACTIVITIES: CPT | Mod: GO

## 2021-06-09 PROCEDURE — 99233 SBSQ HOSP IP/OBS HIGH 50: CPT | Performed by: INTERNAL MEDICINE

## 2021-06-09 PROCEDURE — 250N000013 HC RX MED GY IP 250 OP 250 PS 637: Performed by: INTERNAL MEDICINE

## 2021-06-09 PROCEDURE — 370N000017 HC ANESTHESIA TECHNICAL FEE, PER MIN: Performed by: UROLOGY

## 2021-06-09 PROCEDURE — 250N000009 HC RX 250: Performed by: UROLOGY

## 2021-06-09 PROCEDURE — 0VNTXZZ RELEASE PREPUCE, EXTERNAL APPROACH: ICD-10-PCS | Performed by: UROLOGY

## 2021-06-09 PROCEDURE — C2617 STENT, NON-COR, TEM W/O DEL: HCPCS | Performed by: UROLOGY

## 2021-06-09 PROCEDURE — 97535 SELF CARE MNGMENT TRAINING: CPT | Mod: GO

## 2021-06-09 PROCEDURE — 999N000179 XR SURGERY CARM FLUORO LESS THAN 5 MIN W STILLS: Mod: TC

## 2021-06-09 PROCEDURE — 250N000025 HC SEVOFLURANE, PER MIN: Performed by: UROLOGY

## 2021-06-09 PROCEDURE — 85730 THROMBOPLASTIN TIME PARTIAL: CPT | Performed by: INTERNAL MEDICINE

## 2021-06-09 PROCEDURE — 250N000011 HC RX IP 250 OP 636: Performed by: INTERNAL MEDICINE

## 2021-06-09 PROCEDURE — 258N000003 HC RX IP 258 OP 636: Performed by: ANESTHESIOLOGY

## 2021-06-09 PROCEDURE — 52332 CYSTOSCOPY AND TREATMENT: CPT | Mod: RT | Performed by: UROLOGY

## 2021-06-09 PROCEDURE — 250N000009 HC RX 250: Performed by: NURSE ANESTHETIST, CERTIFIED REGISTERED

## 2021-06-09 PROCEDURE — 999N000141 HC STATISTIC PRE-PROCEDURE NURSING ASSESSMENT: Performed by: UROLOGY

## 2021-06-09 PROCEDURE — 36415 COLL VENOUS BLD VENIPUNCTURE: CPT | Performed by: INTERNAL MEDICINE

## 2021-06-09 DEVICE — STENT URETERAL POLARIS ULTRA 6FRX24CM M0061921320: Type: IMPLANTABLE DEVICE | Site: URETHRA | Status: FUNCTIONAL

## 2021-06-09 RX ORDER — DEXAMETHASONE SODIUM PHOSPHATE 4 MG/ML
INJECTION, SOLUTION INTRA-ARTICULAR; INTRALESIONAL; INTRAMUSCULAR; INTRAVENOUS; SOFT TISSUE PRN
Status: DISCONTINUED | OUTPATIENT
Start: 2021-06-09 | End: 2021-06-09

## 2021-06-09 RX ORDER — SODIUM CHLORIDE, SODIUM LACTATE, POTASSIUM CHLORIDE, CALCIUM CHLORIDE 600; 310; 30; 20 MG/100ML; MG/100ML; MG/100ML; MG/100ML
INJECTION, SOLUTION INTRAVENOUS CONTINUOUS
Status: DISCONTINUED | OUTPATIENT
Start: 2021-06-09 | End: 2021-06-09 | Stop reason: HOSPADM

## 2021-06-09 RX ORDER — CEFAZOLIN SODIUM 2 G/100ML
2 INJECTION, SOLUTION INTRAVENOUS SEE ADMIN INSTRUCTIONS
Status: DISCONTINUED | OUTPATIENT
Start: 2021-06-09 | End: 2021-06-09 | Stop reason: HOSPADM

## 2021-06-09 RX ORDER — ONDANSETRON 4 MG/1
4 TABLET, ORALLY DISINTEGRATING ORAL EVERY 30 MIN PRN
Status: DISCONTINUED | OUTPATIENT
Start: 2021-06-09 | End: 2021-06-09 | Stop reason: HOSPADM

## 2021-06-09 RX ORDER — HEPARIN SODIUM 10000 [USP'U]/100ML
0-5000 INJECTION, SOLUTION INTRAVENOUS CONTINUOUS
Status: DISCONTINUED | OUTPATIENT
Start: 2021-06-09 | End: 2021-06-10

## 2021-06-09 RX ORDER — ONDANSETRON 2 MG/ML
INJECTION INTRAMUSCULAR; INTRAVENOUS PRN
Status: DISCONTINUED | OUTPATIENT
Start: 2021-06-09 | End: 2021-06-09

## 2021-06-09 RX ORDER — FENTANYL CITRATE 50 UG/ML
25-50 INJECTION, SOLUTION INTRAMUSCULAR; INTRAVENOUS
Status: DISCONTINUED | OUTPATIENT
Start: 2021-06-09 | End: 2021-06-09 | Stop reason: HOSPADM

## 2021-06-09 RX ORDER — ONDANSETRON 2 MG/ML
4 INJECTION INTRAMUSCULAR; INTRAVENOUS EVERY 30 MIN PRN
Status: DISCONTINUED | OUTPATIENT
Start: 2021-06-09 | End: 2021-06-09 | Stop reason: HOSPADM

## 2021-06-09 RX ORDER — FENTANYL CITRATE 50 UG/ML
INJECTION, SOLUTION INTRAMUSCULAR; INTRAVENOUS PRN
Status: DISCONTINUED | OUTPATIENT
Start: 2021-06-09 | End: 2021-06-09

## 2021-06-09 RX ORDER — LIDOCAINE HYDROCHLORIDE 20 MG/ML
INJECTION, SOLUTION INFILTRATION; PERINEURAL PRN
Status: DISCONTINUED | OUTPATIENT
Start: 2021-06-09 | End: 2021-06-09

## 2021-06-09 RX ORDER — PROPOFOL 10 MG/ML
INJECTION, EMULSION INTRAVENOUS PRN
Status: DISCONTINUED | OUTPATIENT
Start: 2021-06-09 | End: 2021-06-09

## 2021-06-09 RX ORDER — BACITRACIN ZINC 500 [USP'U]/G
OINTMENT TOPICAL PRN
Status: DISCONTINUED | OUTPATIENT
Start: 2021-06-09 | End: 2021-06-09 | Stop reason: HOSPADM

## 2021-06-09 RX ORDER — HYDROMORPHONE HYDROCHLORIDE 1 MG/ML
.3-.5 INJECTION, SOLUTION INTRAMUSCULAR; INTRAVENOUS; SUBCUTANEOUS EVERY 5 MIN PRN
Status: DISCONTINUED | OUTPATIENT
Start: 2021-06-09 | End: 2021-06-09 | Stop reason: HOSPADM

## 2021-06-09 RX ORDER — CEFAZOLIN SODIUM 2 G/100ML
2 INJECTION, SOLUTION INTRAVENOUS
Status: COMPLETED | OUTPATIENT
Start: 2021-06-09 | End: 2021-06-09

## 2021-06-09 RX ORDER — FENTANYL CITRATE 50 UG/ML
50 INJECTION, SOLUTION INTRAMUSCULAR; INTRAVENOUS
Status: DISCONTINUED | OUTPATIENT
Start: 2021-06-09 | End: 2021-06-09 | Stop reason: HOSPADM

## 2021-06-09 RX ORDER — MEPERIDINE HYDROCHLORIDE 25 MG/ML
12.5 INJECTION INTRAMUSCULAR; INTRAVENOUS; SUBCUTANEOUS EVERY 5 MIN PRN
Status: DISCONTINUED | OUTPATIENT
Start: 2021-06-09 | End: 2021-06-09 | Stop reason: HOSPADM

## 2021-06-09 RX ORDER — POLYETHYLENE GLYCOL 3350 17 G/17G
17 POWDER, FOR SOLUTION ORAL DAILY
Status: DISCONTINUED | OUTPATIENT
Start: 2021-06-09 | End: 2021-06-10 | Stop reason: HOSPADM

## 2021-06-09 RX ADMIN — FENTANYL CITRATE 50 MCG: 50 INJECTION, SOLUTION INTRAMUSCULAR; INTRAVENOUS at 07:37

## 2021-06-09 RX ADMIN — CEFTRIAXONE SODIUM 2 G: 2 INJECTION, POWDER, FOR SOLUTION INTRAMUSCULAR; INTRAVENOUS at 13:23

## 2021-06-09 RX ADMIN — PHENYLEPHRINE HYDROCHLORIDE 100 MCG: 10 INJECTION INTRAVENOUS at 08:16

## 2021-06-09 RX ADMIN — PHENYLEPHRINE HYDROCHLORIDE 100 MCG: 10 INJECTION INTRAVENOUS at 08:04

## 2021-06-09 RX ADMIN — PROPOFOL 170 MG: 10 INJECTION, EMULSION INTRAVENOUS at 07:37

## 2021-06-09 RX ADMIN — CEFAZOLIN SODIUM 2 G: 2 INJECTION, SOLUTION INTRAVENOUS at 07:43

## 2021-06-09 RX ADMIN — TORSEMIDE 10 MG: 10 TABLET ORAL at 10:49

## 2021-06-09 RX ADMIN — PROPOFOL 30 MG: 10 INJECTION, EMULSION INTRAVENOUS at 07:59

## 2021-06-09 RX ADMIN — FENTANYL CITRATE 50 MCG: 50 INJECTION, SOLUTION INTRAMUSCULAR; INTRAVENOUS at 07:54

## 2021-06-09 RX ADMIN — ZINC SULFATE 220 MG (50 MG) CAPSULE 220 MG: CAPSULE at 10:50

## 2021-06-09 RX ADMIN — SODIUM CHLORIDE, POTASSIUM CHLORIDE, SODIUM LACTATE AND CALCIUM CHLORIDE: 600; 310; 30; 20 INJECTION, SOLUTION INTRAVENOUS at 07:29

## 2021-06-09 RX ADMIN — ONDANSETRON 4 MG: 2 INJECTION INTRAMUSCULAR; INTRAVENOUS at 08:27

## 2021-06-09 RX ADMIN — DUTASTERIDE 0.5 MG: 0.5 CAPSULE, LIQUID FILLED ORAL at 10:50

## 2021-06-09 RX ADMIN — POLYETHYLENE GLYCOL 3350 17 G: 17 POWDER, FOR SOLUTION ORAL at 13:31

## 2021-06-09 RX ADMIN — PHENYLEPHRINE HYDROCHLORIDE 100 MCG: 10 INJECTION INTRAVENOUS at 08:22

## 2021-06-09 RX ADMIN — AMLODIPINE BESYLATE 5 MG: 5 TABLET ORAL at 06:03

## 2021-06-09 RX ADMIN — Medication 1 CAPSULE: at 10:48

## 2021-06-09 RX ADMIN — LIDOCAINE HYDROCHLORIDE 100 MG: 20 INJECTION, SOLUTION INFILTRATION; PERINEURAL at 07:37

## 2021-06-09 RX ADMIN — LISINOPRIL 10 MG: 10 TABLET ORAL at 10:48

## 2021-06-09 RX ADMIN — Medication 1 CAPSULE: at 20:54

## 2021-06-09 RX ADMIN — TAMSULOSIN HYDROCHLORIDE 0.4 MG: 0.4 CAPSULE ORAL at 10:47

## 2021-06-09 RX ADMIN — DEXMEDETOMIDINE HYDROCHLORIDE 12 MCG: 100 INJECTION, SOLUTION INTRAVENOUS at 07:36

## 2021-06-09 RX ADMIN — AMLODIPINE BESYLATE 5 MG: 5 TABLET ORAL at 10:49

## 2021-06-09 RX ADMIN — PHENYLEPHRINE HYDROCHLORIDE 100 MCG: 10 INJECTION INTRAVENOUS at 08:00

## 2021-06-09 RX ADMIN — THERA TABS 1 TABLET: TAB at 10:48

## 2021-06-09 RX ADMIN — CHOLECALCIFEROL TAB 10 MCG (400 UNIT) 10 MCG: 10 TAB at 10:49

## 2021-06-09 RX ADMIN — PHENYLEPHRINE HYDROCHLORIDE 100 MCG: 10 INJECTION INTRAVENOUS at 08:13

## 2021-06-09 RX ADMIN — MICONAZOLE NITRATE: 20 POWDER TOPICAL at 20:55

## 2021-06-09 RX ADMIN — DEXAMETHASONE SODIUM PHOSPHATE 4 MG: 4 INJECTION, SOLUTION INTRA-ARTICULAR; INTRALESIONAL; INTRAMUSCULAR; INTRAVENOUS; SOFT TISSUE at 07:49

## 2021-06-09 RX ADMIN — HEPARIN SODIUM 850 UNITS/HR: 10000 INJECTION, SOLUTION INTRAVENOUS at 13:23

## 2021-06-09 RX ADMIN — CETIRIZINE HYDROCHLORIDE 10 MG: 10 TABLET, FILM COATED ORAL at 10:50

## 2021-06-09 RX ADMIN — PHENYLEPHRINE HYDROCHLORIDE 100 MCG: 10 INJECTION INTRAVENOUS at 08:07

## 2021-06-09 RX ADMIN — PHENYLEPHRINE HYDROCHLORIDE 100 MCG: 10 INJECTION INTRAVENOUS at 07:45

## 2021-06-09 ASSESSMENT — ENCOUNTER SYMPTOMS
DYSRHYTHMIAS: 0
SEIZURES: 0
ORTHOPNEA: 0

## 2021-06-09 ASSESSMENT — ACTIVITIES OF DAILY LIVING (ADL)
ADLS_ACUITY_SCORE: 18
ADLS_ACUITY_SCORE: 20
ADLS_ACUITY_SCORE: 18

## 2021-06-09 ASSESSMENT — LIFESTYLE VARIABLES: TOBACCO_USE: 0

## 2021-06-09 NOTE — PROVIDER NOTIFICATION
Brief update:    Paged re: difficulty sleeping    Increased melatonin to 5 mg hs prn    Mil Griffiths MD  1:16 AM

## 2021-06-09 NOTE — PROGRESS NOTES
Called and spoke with urology office, regarding time of rounds. Wife concerned that she may miss rounds and information.    Crystal (spelling?) at the urology office states that they will reach out to the patient's wife to discuss progress and updates.

## 2021-06-09 NOTE — PROGRESS NOTES
06/09/21 1141   Quick Adds   Type of Visit Initial Occupational Therapy Evaluation   Living Environment   People in home spouse   Current Living Arrangements house  ([facility])   Home Accessibility stairs to enter home;stairs within home   Number of Stairs, Main Entrance 2   Number of Stairs, Within Home, Primary other (see comments)  (14)   Transportation Anticipated car, drives self   Living Environment Comments Able to live on main, no need for basement   Self-Care   Usual Activity Tolerance moderate   Current Activity Tolerance fair   Regular Exercise Yes   Activity/Exercise Type other (see comments)  (Nu step device 500 steps daily)   Exercise Amount/Frequency daily   Equipment Currently Used at Home walker, standard   Activity/Exercise/Self-Care Comment indep w/ self cares and ADLs, WW for mobility prior to recent decline, had been having home PT/OT 2 x week. Wife is very helpful per pt report and completes all cooking and running errands.    Disability/Function   Wear Glasses or Blind yes   Vision Management glasses   Fall history within last six months yes   Number of times patient has fallen within last six months 3   Change in Functional Status Since Onset of Current Illness/Injury yes   General Information   Onset of Illness/Injury or Date of Surgery 06/04/21   Referring Physician Shadi Man MD   Patient/Family Therapy Goal Statement (OT) return home for [facility] graduation   Additional Occupational Profile Info/Pertinent History of Current Problem Shilo Menchaca is a 81 year old male with past medical history significant for essential hypertension, benign prostate hyperplasia, admitted on June 4, 2021 with fall and generalized weakness and was found to have E. coli sepsis secondary to complicated urinary tract infection and infected nephrolithiasis.   Existing Precautions/Restrictions fall   Cognitive Status Examination   Orientation Status orientation to person, place and time   Visual  Perception   Visual Impairment/Limitations corrective lenses full-time   Sensory   Sensory Quick Adds No deficits were identified   Posture   Posture forward head position;protracted shoulders   Range of Motion Comprehensive   General Range of Motion upper extremity range of motion deficits identified   Comment, General Range of Motion Limited in B UE shld flex but pt reports it does not inhibit his function   Strength Comprehensive (MMT)   General Manual Muscle Testing (MMT) Assessment upper extremity strength deficits identified   Comment, General Manual Muscle Testing (MMT) Assessment Generalized 4/5   Coordination   Upper Extremity Coordination No deficits were identified   Bed Mobility   Bed Mobility supine-sit   Supine-Sit Caroline (Bed Mobility) moderate assist (50% patient effort)   Assistive Device (Bed Mobility) bed rails  (HOB elevated)   Transfers   Transfers sit-stand transfer;toilet transfer   Transfer Comments min A with FWW   Activities of Daily Living   BADL Assessment/Intervention grooming;toileting   Grooming Assessment/Training   Caroline Level (Grooming) contact guard assist   Position (Grooming) sink side   Clinical Impression   Criteria for Skilled Therapeutic Interventions Met (OT) yes;skilled treatment is necessary;meets criteria   OT Diagnosis Impaired self-cares and functional mobility   OT Problem List-Impairments impacting ADL problems related to;activity tolerance impaired;balance;range of motion (ROM);strength   Assessment of Occupational Performance 1-3 Performance Deficits   Identified Performance Deficits dressing, functional mobility, bed mobility   Planned Therapy Interventions (OT) ADL retraining;bed mobility training;strengthening;transfer training;home program guidelines;progressive activity/exercise   Clinical Decision Making Complexity (OT) low complexity   Therapy Frequency (OT) Daily   Predicted Duration of Therapy 3   Risk & Benefits of therapy have been explained  evaluation/treatment results reviewed;care plan/treatment goals reviewed   OT Discharge Planning    OT Discharge Recommendation (DC Rec) home with home care occupational therapy   OT Rationale for DC Rec Pt and wife have strong desire to return home and continue with home therapy. Pt just out of surgery requiring mod A for bed mobility and min A for ambulation. Pt reports he did much better yesterday and believe this is still side affects from sx. Will continue to assess progress and possible need for TCU if mobility/self-care status does not improve.    Total Evaluation Time (Minutes)   Total Evaluation Time (Minutes) 10

## 2021-06-09 NOTE — PLAN OF CARE
DATE & TIME: 6/8/21 5504-3400    Cognitive Concerns/ Orientation : Pt A/Ox4   BEHAVIOR & AGGRESSION TOOL COLOR: Green   ABNL VS/O2: VSS on RA  MOBILITY: SBA with gait belt and walker, fall risk  PAIN MANAGMENT: Denies  DIET: Regular, NPO at midnight  BOWEL/BLADDER: Alonzo patent  ABNL LAB/BG: K 3.9/PTT 34  DRAIN/DEVICES: Hep gtt infusing at 850 units/hr  TELEMETRY RHYTHM: NSR  SKIN: Bruised. Bilateral lower extremity edema  D/C DATE: Discharge pending progerss  OTHER IMPORTANT INFO: Plan for cysto with stent placement at 0730. Stop heparin gtt at 1am.

## 2021-06-09 NOTE — PLAN OF CARE
Cognitive Concerns/ Orientation : Pt A/Ox4   BEHAVIOR & AGGRESSION TOOL COLOR: Green   ABNL VS/O2: VSS on RA  MOBILITY: SBA with gait belt and walker, fall risk  PAIN MANAGMENT: Denies  DIET: Regular  BOWEL/BLADDER: Alonzo patent, good UOP  ABNL LAB/BG: K 3.7 (replacement protocol), PTT scheduled for 2000 (not using Xa).  DRAIN/DEVICES: Hep gtt infusing at 850 units/hr- restarted at 1345.  TELEMETRY RHYTHM: ST  SKIN: Bruised. Bilateral lower extremity edema +2  D/C DATE: Discharge pending progress    OTHER IMPORTANT INFO:   cysto with stent placement done this AM.   PT/OT following  Walking around unit

## 2021-06-09 NOTE — ANESTHESIA PREPROCEDURE EVALUATION
Anesthesia Pre-Procedure Evaluation    Patient: Shilo Menchaca   MRN: [id number] : 1940        Preoperative Diagnosis: Hydronephrosis of right kidney [N13.30]  Phimosis [N47.1]   Procedure : Procedure(s):  CYSTOSCOPY, WITH RIGHT RETROGRADE PYELOGRAM AND URETERAL STENT REPLACEMENT  DORSAL SLIT     History reviewed. No pertinent past medical history.   History reviewed. No pertinent surgical history.   No Known Allergies   Social History     Tobacco Use     Smoking status: Not on file   Substance Use Topics     Alcohol use: Not on file      Wt Readings from Last 1 Encounters:   21 90.7 kg (200 lb)        Prior to Admission medications    Medication Sig Start Date End Date Taking? Authorizing Provider   amLODIPine-benazepril (LOTREL) 5-10 MG capsule Take 1 capsule by mouth daily 21  Yes Unknown, Entered By History   aspirin 81 MG EC tablet Take 81 mg by mouth daily   Yes Unknown, Entered By History   cetirizine (ZYRTEC) 10 MG tablet Take 10 mg by mouth daily   Yes Unknown, Entered By History   dutasteride (AVODART) 0.5 MG capsule Take 0.5 mg by mouth daily   Yes Unknown, Entered By History   Multiple Vitamins-Minerals (PRESERVISION AREDS) CAPS Take by mouth 2 times daily   Yes Unknown, Entered By History   multivitamin, therapeutic (THERA-VIT) TABS tablet Take 1 tablet by mouth daily   Yes Unknown, Entered By History   torsemide (DEMADEX) 10 MG tablet Take 10 mg by mouth daily   Yes Unknown, Entered By History   vitamin D3 (CHOLECALCIFEROL) 10 MCG (400 UNIT) capsule Take 1 capsule by mouth daily   Yes Unknown, Entered By History   Zinc Gluconate 15 MG TABS Take by mouth daily    Yes Unknown, Entered By History     Recent Results (from the past 744 hour(s))   CT Urogram wo & w Contrast   Result Value    Radiologist flags Pulmonary embolism (AA)    Narrative    CT UROGRAM WO & W CONTRAST 2021 9:42 AM    CLINICAL HISTORY: Hydronephrosis; Hydroureter    TECHNIQUE: CT urogram was performed without and  following injection of  IV contrast. Multiplanar reformats were obtained. Dose reduction  techniques were used.  CONTRAST: 101mL Isovue-370    COMPARISON: None available.    FINDINGS:   LOWER CHEST: Mild bibasilar dependent atelectasis. Filling defects in  the segmental and subsegmental arteries of the right lower lobe,  compatible with pulmonary emboli (series 6, image 8, 18 and 25 for  example). Partly visualized filling defect in the right upper lobe  main pulmonary artery (series 6, image 2) also consistent with  pulmonary emboli.    HEPATOBILIARY: Subcentimeter hypodense lesions in the liver are too  small to characterize, likely cysts and hemangiomas. In the inferior  medial left hepatic lobe, 2.5 cm peripherally calcified hypodense  lesion, likely a peripherally calcified cyst. The gallbladder is not  visualized, and may be surgically absent. No biliary ductal  dilatation.    PANCREAS: Normal.    SPLEEN: Normal.    ADRENAL GLANDS: Normal.    KIDNEYS/BLADDER:   RIGHT KIDNEY: Moderate hydroureteronephrosis to the level of the  ureterovesicular junction. Cluster of calcifications at the right  ureterovesicular junction measuring 2.2 x 1.6 cm. Subcentimeter  hypodense renal cortical lesions are likely small cysts. No delayed  nephrogram. No suspicious renal masses. Mild linear urothelial  enhancement in the ureter is likely inflammatory. No filling defects  in the right kidney. The right ureter is not opacified.    LEFT KIDNEY: Few punctate nonobstructing calculi in the left kidney  measuring up to 0.4 cm. No hydronephrosis, perinephric stranding or  hydroureter. Subcentimeter hypodense lesion in the left kidney is too  small to adequately characterize but likely a cyst. No suspicious  renal masses. No suspicious urothelial enhancement or filling defect  in the right renal collecting system or ureter.    BLADDER: Urinary bladder is decompressed with a Alonzo catheter. A  cluster of calcifications in the right  ureterovesicular junction as  discussed. Few other calcification in the bladder lumen versus wall.  Circumferential urinary bladder wall thickening, exaggerated by the  bladder being decompressed.    BOWEL: Diverticulosis in the colon. No acute inflammatory change. No  obstruction.     PELVIC ORGANS: Mild prostatomegaly and intraprostatic calcifications.    ADDITIONAL FINDINGS: Indeterminate right perivesicle/external iliac  lymph node measuring 1.7 x 1.0 cm. No other lymphadenopathy in the  abdomen and pelvis. Moderate-sized fat-containing left inguinal hernia  and small fat-containing abdominal hernia. No ascites.    MUSCULOSKELETAL: Degenerative changes in the spine. No suspicious  lesions in the bones.      Impression    IMPRESSION:   1.  Filling defects in the partly visualized right upper lobe  pulmonary artery and segmental arteries of the right lower lobe  compatible with pulmonary emboli.  2.  Right hydroureteronephrosis to the level of the ureterovesicular  junction where there is a cluster of calcifications either in the  bladder lumen, urinary bladder diverticulum or urinary bladder wall.  3.  Mild linear enhancement of the right ureter is likely  inflammatory. The right ureter is not opacified with contrast which  limits evaluation for filling defects.  4.  Few punctate nonobstructing left renal calculi.  5.  The urinary bladder is decompressed with a Alonzo catheter with  marked urinary bladder wall thickening. This limits evaluation for  focal masses by CT. Cystoscopic correlation should be considered as  clinically indicated.  6.  Indeterminate mildly enlarged right perivesical/external iliac  lymph node. This is either reactive or metastatic.    [Critical Result: Pulmonary embolism]    Finding was identified on 6/7/2021 9:57 AM.     MERI Arevalo RN was contacted by me on 6/7/2021 10:15 AM and verbalized  understanding of the critical result.     LEONIDES OROPEZA MD   Echocardiogram Complete    Narrative  "   [id number]  [facility]  [id number]  [id number]^RHONDA^YSABEL^LUIS M     [facility]  Echocardiography Laboratory  [address] [place] [address]     Name: KHADIJAH VELARDE  MRN: [id number]  : 1940  Study Date: 2021 10:52 AM  Age: 81 yrs  Gender: Male  Patient Location: [facility]  Reason For Study: Edema  Ordering Physician: YSABEL ELLIS  Referring Physician: GURU MANZO  Performed By: Mil Stevenson RDCS     BSA: 2.0 m2  Height: 66 in  Weight: 200 lb  HR: 104  BP: 123/73 mmHg  ______________________________________________________________________________  Procedure  Complete Portable Echo Adult. Optison (NDC #[id number]) given intravenously.  ______________________________________________________________________________  Interpretation Summary     Technically difficult study limited views obtained due to body habitus  Very frequent PVC's make wall motion and doppler analysis difficult  There is mild concentric left ventricular hypertrophy.  Left ventricular systolic function is normal.  Normal left ventricular wall motion  \"Septal bounce\" seen- this can be seen with bundle branch block, constrictive  physiology, abnormal RV loading-none of these are classically seen on this  echo however  RV not well seen, grossly normal size/function  Right ventricular systolic pressure could not be approximated due to  inadequate tricuspid regurgitation.  IVC diameter and respiratory changes fall into an intermediate range  suggesting an RA pressure of 8 mmHg.  The rhythm was sinus tachycardia.  The patient exhibited frequent PVCs.  ______________________________________________________________________________  Left Ventricle  The left ventricle is normal in size. There is mild concentric left  ventricular hypertrophy. Left ventricular systolic function is normal. The  visual ejection fraction is estimated at 55-60%. Diastolic function not  assessed due to arrhythmia. Normal left ventricular wall " "motion. \"Septal  bounce\" seen- this can be seen with bundle branch block, constrictive  physiology, abnormal RV loading-none of these are classically seen on this  echo however. There is no thrombus seen in the left ventricle.     Right Ventricle  The right ventricle is not well visualized. RV not well seen, grossly normal  size/function.     Atria  Normal left atrial size. Right atrial size is normal.     Mitral Valve  The mitral valve leaflets appear normal. There is no evidence of stenosis,  fluttering, or prolapse.     Tricuspid Valve  Normal tricuspid valve. Right ventricular systolic pressure could not be  approximated due to inadequate tricuspid regurgitation. There is trace  tricuspid regurgitation.     Aortic Valve  The aortic valve is not well visualized. There is trace aortic regurgitation.  No hemodynamically significant valvular aortic stenosis.     Pulmonic Valve  The pulmonic valve is not well seen, but is grossly normal.     Vessels  The ascending aorta is Borderline dilated. IVC diameter and respiratory  changes fall into an intermediate range suggesting an RA pressure of 8 mmHg.     Pericardium  The pericardium appears normal.     Rhythm  The rhythm was sinus tachycardia. The patient exhibited frequent PVCs.  ______________________________________________________________________________  MMode/2D Measurements & Calculations  IVSd: 1.2 cm     LVIDd: 4.0 cm  LVIDs: 3.4 cm  LVPWd: 1.0 cm  FS: 14.7 %  LV mass(C)d: 148.9 grams  LV mass(C)dI: 74.4 grams/m2  Ao root diam: 3.7 cm  LA dimension: 3.5 cm  asc Aorta Diam: 3.7 cm  LA/Ao: 0.93  RWT: 0.50     Doppler Measurements & Calculations  PA acc time: 0.09 sec     ______________________________________________________________________________  Report approved by: Andrews Fontana 06/07/2021 03:12 PM         US Lower Extremity Venous Duplex Bilateral    Narrative    VENOUS ULTRASOUND BOTH LEGS  6/7/2021 3:55 PM     HISTORY: Pulmonary embolism. Leg " "swelling.    COMPARISON: None.    FINDINGS:  Examination of the deep veins with graded compression and  color flow Doppler with spectral wave form analysis was performed.   There is no evidence for DVT in the right lower extremity.      Impression    IMPRESSION: No evidence of deep venous thrombosis.    SILVERIO PEREZ MD     Anesthesia Evaluation   Pt has had prior anesthetic. Type: General.    No history of anesthetic complications       ROS/MED HX  ENT/Pulmonary:     (+) allergic rhinitis,  (-) tobacco use, asthma and sleep apnea   Neurologic: Comment: Generalized Weakness   (-) no seizures and no CVA   Cardiovascular:     (+) hypertension-----Irregular Heartbeat/Palpitations (PVCs noted on rhythm strip in preop), Previous cardiac testing   Echo: Date: Results:  Technically difficult study limited views obtained due to body habitus  Very frequent PVC's make wall motion and doppler analysis difficult  There is mild concentric left ventricular hypertrophy.  Left ventricular systolic function is normal.  Normal left ventricular wall motion  \"Septal bounce\" seen- this can be seen with bundle branch block, constrictive  physiology, abnormal RV loading-none of these are classically seen on this  echo however  RV not well seen, grossly normal size/function  Right ventricular systolic pressure could not be approximated due to  inadequate tricuspid regurgitation.  IVC diameter and respiratory changes fall into an intermediate range  suggesting an RA pressure of 8 mmHg.  The rhythm was sinus tachycardia.  The patient exhibited frequent PVCs.  Stress Test: Date: Results:    ECG Reviewed: Date: Results:    Cath: Date: Results:   (-) angina, CAD, orthopnea/PND, syncope, arrhythmias, angina, murmur and wheezes   METS/Exercise Tolerance:     Hematologic: Comments: Current PEs noted on CT scan - anticoagulation started      Musculoskeletal:       GI/Hepatic:    (-) GERD and liver disease   Renal/Genitourinary:     (+) renal " disease, Nephrolithiasis , BPH,     Endo:    (-) Type II DM, thyroid disease and chronic steroid usage   Psychiatric/Substance Use:       Infectious Disease: Comment: Current E Coli Sepsis on this admit      Malignancy:       Other:            Physical Exam    Airway        Mallampati: III   TM distance: > 3 FB   Neck ROM: full   Mouth opening: > 3 cm    Respiratory Devices and Support         Dental       (+) caps      Cardiovascular          Rhythm and rate: regular and tachycardia (-) no murmur    Pulmonary           breath sounds clear to auscultation   (-) no rhonchi and no wheezes        OUTSIDE LABS:  CBC:   Lab Results   Component Value Date    WBC 7.7 06/08/2021    WBC 9.4 06/08/2021    HGB 12.7 (L) 06/08/2021    HGB 11.9 (L) 06/08/2021    HCT 39.3 (L) 06/08/2021    HCT 35.8 (L) 06/08/2021     06/08/2021     06/08/2021     BMP:   Lab Results   Component Value Date     06/08/2021     06/07/2021    POTASSIUM 3.9 06/08/2021    POTASSIUM 3.3 (L) 06/08/2021    CHLORIDE 108 06/08/2021    CHLORIDE 108 06/07/2021    CO2 27 06/08/2021    CO2 25 06/07/2021    BUN 17 06/08/2021    BUN 17 06/07/2021    CR 1.00 06/08/2021    CR 0.92 06/07/2021    GLC 96 06/08/2021    GLC 99 06/07/2021     COAGS:   Lab Results   Component Value Date    PTT 94 (H) 06/08/2021     POC:   Lab Results   Component Value Date     (H) 06/05/2021     HEPATIC:   Lab Results   Component Value Date    ALBUMIN 3.3 (L) 06/04/2021    PROTTOTAL 7.2 06/04/2021    ALT 35 06/04/2021    AST 22 06/04/2021    ALKPHOS 147 06/04/2021    BILITOTAL 0.6 06/04/2021     OTHER:   Lab Results   Component Value Date    LACT 1.6 06/04/2021    SABRINA 7.8 (L) 06/08/2021       Anesthesia Plan    ASA Status:  3   NPO Status:  NPO Appropriate    Anesthesia Type: General.     - Airway: LMA   Induction: Propofol, Intravenous.   Maintenance: Balanced.        Consents    Anesthesia Plan(s) and associated risks, benefits, and realistic alternatives  discussed. Questions answered and patient/representative(s) expressed understanding.     - Discussed with:  Patient         Postoperative Care    Pain management: Multi-modal analgesia.   PONV prophylaxis: Ondansetron (or other 5HT-3), Dexamethasone or Solumedrol     Comments:    Avoid Versed  Jack Dotson MD

## 2021-06-09 NOTE — ANESTHESIA CARE TRANSFER NOTE
Patient: Shilo Menchaca    Procedure(s):  CYSTOSCOPY, WITH RIGHT RETROGRADE PYELOGRAM, RIGHT DIAGNOSTIC  URETEROSCOPY  AND URETERAL STENT REPLACEMENT AND PENILE BLOCK  DORSAL SLIT    Diagnosis: Hydronephrosis of right kidney [N13.30]  Phimosis [N47.1]  Diagnosis Additional Information: No value filed.    Anesthesia Type:   General     Note:    Oropharynx: oropharynx clear of all foreign objects  Level of Consciousness: drowsy  Oxygen Supplementation: face mask    Independent Airway: airway patency satisfactory and stable  Dentition: dentition unchanged  Vital Signs Stable: post-procedure vital signs reviewed and stable  Report to RN Given: handoff report given  Patient transferred to: PACU    Handoff Report: Identifed the Patient, Identified the Reponsible Provider, Reviewed the pertinent medical history, Discussed the surgical course, Reviewed Intra-OP anesthesia mangement and issues during anesthesia, Set expectations for post-procedure period and Allowed opportunity for questions and acknowledgement of understanding      Vitals: (Last set prior to Anesthesia Care Transfer)  CRNA VITALS  6/9/2021 0813 - 6/9/2021 0849      6/9/2021             Pulse:  115    SpO2:  99 %    Resp Rate (observed):  (!) 5    Resp Rate (set):  10        Electronically Signed By: GARY Solorio CRNA  June 9, 2021  8:49 AM

## 2021-06-09 NOTE — PROGRESS NOTES
[facility]    HOSPITALIST PROGRESS NOTE :   --------------------------------------------------    Date of Admission:  6/4/2021    Cumulative Summary: Shilo Menchaca is a 81 year old male with past medical history significant for essential hypertension, benign prostate hyperplasia, admitted on June 4, 2021 with fall and generalized weakness and was found to have E. coli sepsis secondary to complicated urinary tract infection and infected nephrolithiasis.    Assessment & Plan     Generalized weakness.  E. coli sepsis secondary to complicated urinary tract infection with infected nephrolithiasis.  Severe right-sided hydronephrosis with hydroureter and urinary retention and bladder  Phimosis  S/p cystoscopy with Alonzo catheter placement;  S/p cystoscopy, right ureteroscopy, right JJ stent placement, right retrograde pyelogram and dorsal slit of phimosis with penile nerve block on 06/09:      Patient presented with generalized weakness and a fall.  He also had associated low-grade fever and tachycardia, lab was concerning for leukocytosis with WBC count of 17.4, normal lactate, urine analysis was grossly abnormal.  Of note patient was recently admitted to [facility] from May 16 to May 18 with sepsis associated with UTI.  CT scan at that time showed capacious bladder with moderately enlarged prostate there was also multiple calcifications in the dependent portion of the bladder that were thought to represent bladder stones.  He was also noticed to have penile phimosis and was instructed to follow-up with urology.  Patient was admitted for further evaluation and management, his urine cultures grew E. coli with positive bacteremia.  Urology took patient for cystoscopy with catheter placement.  CT scan was repeated on June 7 which showed hydronephrosis and hydroureter.  Patient was seen and examined the s/p procedure.  Patient underwent dorsal slit of phimosis, cystoscopy, right retrograde pyelogram  and ureteroscopy diagnostic and right JJ stent placement and penile nerve block.    --Patient was seen and examined, wife also present in the room, hoping to have a bowel movement today.  --Patient tolerated the procedure well, denying any pain at this point.  -- start patient back on heparin infusion as per urology recommendation at noon   --Planing of constipation, will add MiraLAX and milk of magnesia.  Patient has used Xarelto before the procedure, might change him to oral anticoagulant tomorrow, see below.  --Patient is recommended to be discharged with Alonzo on 10 to 14-day course of antibiotics  --Trial of voiding in 2 to 3 weeks.  --Urology also discussed with patient and wife that if trial of voiding is unsuccessful, then he will likely need replacement of his Alonzo catheter until he could be taken to the operating room for bladder outlet procedure after completion of 3 months of anticoagulation for his pulmonary embolism, patient will need Alonzo catheter change every 4 weeks.  --Patient and his wife do not remember having this conversation with urology, I reiterated the plan with them again this morning.  --Patient is currently receiving IV ceftriaxone 2 g every 24 hours.    Right lung pulmonary embolism; incidentally was noted to have filling defects on the right upper lung and right lower lung segmental and subsegmental lobes on CT urogram, patient was not hypoxic.  --Patient was just given 1 dose of Xarelto but then was held further anticoagulation due to plan for further procedures.  --Start patient back on heparin infusion  --CT urogram otherwise has not shown any suspicious lesions or malignancy.  Chest, abdomen and pelvis seem to be visualized  --Bilateral lower extremity venous duplex is negative for any deep vein thrombosis.  -- will post pharmacy to check DOAC coverage     Generalized weakness  Mechanical fall  Most likely multifactorial, secondary to infection, no apparent injuries  --Physical  therapy has been consulted, recommending TCU, patient granddaughter is graduating this week and they have her graduation party this Sunday.  --Wife and patient has questions if he would be able to go home with home therapies, and also discussed with him regarding possibility of patient going to rehab and then can get the past to go and attend her granddaughter's graduation.    Essential hypertension  Bilateral lower extremity edema: Patient does not seem to have any history of congestive heart failure, lower extremity edema can be secondary to amlodipine.  Blood pressure is slightly elevated.    --Continue PT amlodipine/benazepril and Lotrel has been started.  --Transthoracic echocardiogram was ordered for lower extremity edema along with recent diagnosis of pulmonary embolism to check for right heart strain.  --Echocardiogram results were reviewed, frequent PVCs make wall motion and Doppler analysis difficult, there is mild concentric left ventricular hypertrophy with normal LV systolic function.  Normal LV wall motion.  Septal bounce seen this can be seen with bundle branch block, constrictive physiology, abnormal RV loading none of these are classically seen on this echo however right ventricle not well seen, grossly normal size and function.  -- Most likely these findings are secondary to pulmonary embolism, curbside to Dr. Beckwith, who was in agreement with assessment, recommended to continue patient for PE, possibly will repeat echocardiogram in 3 months and can be seen by cardiology as an outpatient.    Intertrigo:   --Micatin powder to groin and abdominal folds, mostly exacerbated by antibiotic use.    Benign prostate hyperplasia  --Continue Avodart.    Diet: Regular Diet Adult    Alonzo Catheter: in place, indication: Retention  DVT Prophylaxis: on IV Heparin   Code Status: No CPR- Do NOT Intubate    The patient's care was discussed with the Bedside Nurse, Patient and Patient's Family.    Disposition Plan    Expected discharge: Tomorrow or Thursday, recommended to transitional care unit Planning to work with physical therapy and occupational therapy later in the day and is hoping to go home with home health care.  More than 35 min of the time was spend in care today, more than 50% of the time was spent in patient care coordination and counseling.    Rita Webb MD, FACP  Text Page (7am - 6pm)    ----------------------------------------------------------------------------------------------------------------------    Interval History    Patient was seen and examined, wife also present in the room , just came back from the procedure, had bowel movement 2 days ago and is requesting Miralax to be added , discussed with patient that at this time his ECHO seems ok, as I curb sided  with cardiology with the plan for repeating ECHO in 3 months and then maybe following up with cards as an outpatient , we also discussed abwout starting patient back on heparin infusion     -Data reviewed today: I reviewed all new labs and imaging results over the last 24 hours.    I personally reviewed EKG showing sinus tachycardia, echocardiogram seems to have mild concentric left ventricular hypertrophy, normal LV function and septal bounce with no significant right heart strain.    Physical Exam   Temp: 99.3  F (37.4  C) Temp src: Temporal BP: 139/75 Pulse: 98   Resp: 16 SpO2: 99 % O2 Device: None (Room air) Oxygen Delivery: 6 LPM  Vitals:    06/04/21 2311   Weight: 90.7 kg (200 lb)     Vital Signs with Ranges  Temp:  [97  F (36.1  C)-99.3  F (37.4  C)] 99.3  F (37.4  C)  Pulse:  [] 98  Resp:  [13-26] 16  BP: (135-162)/(75-98) 139/75  SpO2:  [93 %-99 %] 99 %  I/O last 3 completed shifts:  In: 520 [P.O.:520]  Out: 2950 [Urine:2950]    GENERAL: Alert , awake and oriented. NAD. Conversational, appropriate, sitting in chair , wife present in the room    HEENT: Normocephalic. EOMI. No icterus or injection. Nares normal.   LUNGS: Clear to  auscultation. No dyspnea at rest.   HEART: Regular rate. Extremities perfused.   ABDOMEN: Soft, nontender, and nondistended. Positive bowel sounds.   EXTREMITIES: positive 2+ B/L lower extremities edema   NEUROLOGIC: Moves extremities x4 on command. No acute focal neurologic abnormalities noted.     Medications       amLODIPine  5 mg Oral Daily    And     lisinopril  10 mg Oral Daily     cefTRIAXone  2 g Intravenous Q24H     cetirizine  10 mg Oral Daily     cholecalciferol  10 mcg Oral Daily     dutasteride  0.5 mg Oral Daily     miconazole   Topical BID     multivitamin  with lutein  1 capsule Oral BID     multivitamin, therapeutic  1 tablet Oral Daily     sodium chloride (PF)  3 mL Intracatheter Q8H     tamsulosin  0.4 mg Oral Daily     torsemide  10 mg Oral Daily     zinc sulfate  220 mg Oral Daily       Data   Recent Labs   Lab 06/09/21  0641 06/08/21  1448 06/08/21  1257 06/08/21  0531 06/07/21  1420 06/07/21  0657 06/05/21  0826 06/04/21  2323   WBC  --  7.7  --  9.4 10.0 9.9 14.7* 17.4*   HGB  --  12.7*  --  11.9* 13.4 12.3* 13.0* 13.9   MCV  --  99  --  98 98 98 99 99   PLT  --  188  --  181 172 148* 149* 177   NA  --   --   --  140  --  139 141 138   POTASSIUM 3.7  --  3.9 3.3*  --  3.6 3.4 3.6   CHLORIDE  --   --   --  108  --  108 108 104   CO2  --   --   --  27  --  25 27 30   BUN  --   --   --  17  --  17 25 27   CR  --   --   --  1.00  --  0.92 1.09 1.23   ANIONGAP  --   --   --  5  --  6 6 4   SABRINA  --   --   --  7.8*  --  8.0* 8.1* 8.8   GLC  --   --   --  96  --  99 148* 129*   ALBUMIN  --   --   --   --   --   --   --  3.3*   PROTTOTAL  --   --   --   --   --   --   --  7.2   BILITOTAL  --   --   --   --   --   --   --  0.6   ALKPHOS  --   --   --   --   --   --   --  147   ALT  --   --   --   --   --   --   --  35   AST  --   --   --   --   --   --   --  22   TROPI  --   --   --   --   --   --   --  <0.015       Imaging:   Recent Results (from the past 24 hour(s))   XR Surgery IVA L/T 5 Min Fluoro  w Stills    Narrative    This exam was marked as non-reportable because it will not be read by a   radiologist or a [place] non-radiologist provider.

## 2021-06-09 NOTE — PLAN OF CARE
DATE & TIME: 6/8-06/09/21 Night shift Cognitive Concerns/ Orientation : Pt A/Ox4   BEHAVIOR & AGGRESSION TOOL COLOR: Green   ABNL VS/O2: VSS on RA  MOBILITY: SBA with gait belt and walker, fall risk  PAIN MANAGMENT: Denies  DIET: Regular, NPO at midnight  BOWEL/BLADDER: Alonzo patent, great output  ABNL LAB/BG: K 3.9/PTT 34  DRAIN/DEVICES: Hep gtt infusing at 850 units/hr- stopped at 0100.   TELEMETRY RHYTHM: NSR  SKIN: Bruised. Bilateral lower extremity edema +2  D/C DATE: Discharge pending progerss  OTHER IMPORTANT INFO: Plan for cysto with stent placement at 0730. Stop heparin gtt at 1am. Once heparin drip restarts- place order to check PTT 6 hours afterwards.

## 2021-06-09 NOTE — ANESTHESIA POSTPROCEDURE EVALUATION
Patient: Shilo Menchaca    Procedure(s):  CYSTOSCOPY, WITH RIGHT RETROGRADE PYELOGRAM, RIGHT DIAGNOSTIC  URETEROSCOPY  AND URETERAL STENT REPLACEMENT AND PENILE BLOCK  DORSAL SLIT    Diagnosis:Hydronephrosis of right kidney [N13.30]  Phimosis [N47.1]  Diagnosis Additional Information: No value filed.    Anesthesia Type:  General    Note:  Disposition: Inpatient   Postop Pain Control: Uneventful            Sign Out: Well controlled pain   PONV: No   Neuro/Psych: Uneventful            Sign Out: Acceptable/Baseline neuro status   Airway/Respiratory: Uneventful            Sign Out: Acceptable/Baseline resp. status   CV/Hemodynamics: Uneventful            Sign Out: Acceptable CV status   Other NRE: NONE   DID A NON-ROUTINE EVENT OCCUR? No    Event details/Postop Comments:  Pt doing well prior to discharge home.             Last vitals:  Vitals:    06/09/21 0920 06/09/21 0930 06/09/21 1025   BP: (!) 147/83 139/75    Pulse: 96 98    Resp: 16 26 16   Temp:  37.4  C (99.3  F)    SpO2:          Last vitals prior to Anesthesia Care Transfer:  [facility] VITALS  6/9/2021 0813 - 6/9/2021 0913      6/9/2021             Pulse:  115    SpO2:  99 %    Resp Rate (observed):  (!) 5    Resp Rate (set):  10          Electronically Signed By: Mil Dotson MD  June 9, 2021  12:19 PM

## 2021-06-09 NOTE — ANESTHESIA PROCEDURE NOTES
Airway       Patient location during procedure: OR  Staff -        Anesthesiologist:  Mil Dotson MD       CRNA: Claudia Mejia APRN CRNA       Other Anesthesia Staff: Tiera Chow       Performed By: SRNA  Consent for Airway        Urgency: elective  Indications and Patient Condition       Indications for airway management: tyron-procedural       Induction type:intravenous       Mask difficulty assessment: 0 - not attempted    Final Airway Details       Final airway type: supraglottic airway    Supraglottic Airway Details        Type: LMA       Brand: ProSeal       LMA size: 5    Post intubation assessment        Placement verified by: capnometry, equal breath sounds and chest rise        Number of attempts at approach: 1       Number of other approaches attempted: 0       Secured with: pink tape       Ease of procedure: easy       Dentition: Intact and Unchanged

## 2021-06-09 NOTE — OP NOTE
OPERATIVE REPORT  DATE OF SURGERY: 06/09/21  LOCATION OF SURGERY: [facility]  PREOPERATIVE DIAGNOSIS:  (N30.01) Acute cystitis with hematuria  (N13.30) Hydronephrosis of right kidney  (N47.1) Phimosis  POSTOPERATIVE DIAGNOSIS: (N30.01) Acute cystitis with hematuria  (N13.30) Hydronephrosis of right kidney  (N47.1) Phimosis    START TIME: 7:51 AM  END TIME: 8:33 AM    PROCEDURE PERFORMED:   1. Dorsal slit of phimosis  2. Cystoscopy  3. RIGHT retrograde pyelogram  4. RIGHT ureteroscopy diagnostic  5. RIGHT JJ stent placement  6. <1hr physician fluoroscopy time   7. Penile nerve block     STAFF SURGEON: Shadi Man MD  ANESTHESIA: General.   ESTIMATED BLOOD LOSS: 2 mL.   DRAINS AND TUBES: RIGHT 6fr x 24cm ureteral stent, 18fr coude catheter  COMPLICATIONS: None.   DISPOSITION: PACU.   SPECIMENS OBTAINED: None  SIGNIFICANT FINDINGS: Significant phimosis opened with a dorsal slit. Cystoscopy with evidence of significant trilobar prostatic hypertrophy with a fairly large median lobe and numerous bladder stones.  Right retrograde pyelogram with evidence of a dilated and tortuous right ureter.  Right ureteroscopy with no evidence of stone in the distal ureter and ureteral stent placed.  Penile nerve block completed.     HISTORY OF PRESENT ILLNESS: Shilo Menchaca is a 81 year old man with prior urologic history of recurrent urinary tract infections and bladder stones who presented to the emergency room on 6/5/2021 with generalized weakness.  Work-up in the emergency room found to have a low-grade fever and leukocytosis to 17.4.  Outpatient CT scan prior to admission had demonstrated significant right hydronephrosis and a distended bladder.  He was noted to have significant phimosis requiring bedside cystoscopy for Alonzo catheter placement with drainage of roughly 2 L of urine.  Follow-up CT urogram demonstrated an incidental pulmonary embolism for which she has now started on anticoagulation and persistent  hydroureteronephrosis on the right with possible obstruction at the level of the UVJ.  He was counseled on the need for the above surgery and elected to proceed.    OPERATION PERFORMED:   Informed consent was obtained and the patient was brought to the operating room where general anesthesia was induced. The patient was given appropriate preoperative antibiotics and positioned supine. The patient was then repositioned in dorsal lithotomy with all pressure points padded. We then performed a timeout, verifying the correct patient's site and procedure to be performed.    A midline dorsal slit was planned.  The foreskin was crushed with a hemostat and then opened with cutting current electrocautery down to the base of the corona.  Betadine and irrigation were used to clean around the corona.  Hemostasis was ensured with electrocautery.  The edges of the dorsal slit were then reapproximated with 3-0 chromic interrupted sutures.  A 22 Malay cystoscope was then inserted atraumatically into the bladder.  He was noted to have significant trilobar prostatic hypertrophy with a fairly large median lobe.  He had numerous bladder stones surrounding the right ureteral orifice which was noted to be edematous.  The right ureteral orifice was cannulated with a 5 Malay open-ended catheter and a retrograde pyelogram was attempted with no opacification of the ureter.  A 0.035 sensor wire was advanced to the open-ended catheter, however would not advance up the ureter.  The cystoscope was removed.  A semirigid ureteroscope was assembled and inserted atraumatically into the bladder.  The right ureteral orifice was cannulated and the ureteroscope was advanced up the distal ureter.  No evidence of an obstructing stone was encountered however he was noted to have a torturous ureter.  A repeat retrograde pyelogram was performed through the ureteroscope with evidence of significant hydroureteronephrosis and a tortuous ureter.  The sensor  wire was advanced up to the renal pelvis under fluoroscopic guidance and the ureteroscope was removed.  The cystoscope was replaced in the bladder and a 5 Mauritanian open-ended catheter was advanced over the wire to the proximal ureter and the wire was removed.  Repeat retrograde pyelogram was performed which outlined the dilated renal pelvis and proximal ureter.  The wire was replaced and the open-ended catheter was removed.  A 6 Mauritanian by 24 cm JJ ureteral stent was advanced over the wire with good curl noted in the renal pelvis fluoroscopically and in the bladder on direct vision.  The cystoscope was removed.  A 18 Mauritanian Alonzo catheter was placed with 10 cc in the balloon.  A penile block was performed with 10 cc 1% lidocaine.  The dorsal slit incision was covered with bacitracin and Vaseline gauze and wrapped with Kerlix and Coban.  He was emerged from anesthesia and taken to the PACU in stable condition.    Plan:  -Maintain Alonzo catheter to gravity drainage  -Okay to resume his heparin drip at noon  -We will monitor for sign of bleeding at his dorsal slit incision site  -Plan for potential discharge tomorrow pending his clinical course    Shadi Man MD   Urology  [facility]  Clinic Phone [phone]

## 2021-06-10 ENCOUNTER — APPOINTMENT (OUTPATIENT)
Dept: OCCUPATIONAL THERAPY | Facility: CLINIC | Age: 81
DRG: 853 | End: 2021-06-10
Payer: MEDICARE

## 2021-06-10 ENCOUNTER — APPOINTMENT (OUTPATIENT)
Dept: PHYSICAL THERAPY | Facility: CLINIC | Age: 81
DRG: 853 | End: 2021-06-10
Payer: MEDICARE

## 2021-06-10 VITALS
TEMPERATURE: 98.7 F | HEIGHT: 66 IN | BODY MASS INDEX: 32.14 KG/M2 | HEART RATE: 100 BPM | RESPIRATION RATE: 16 BRPM | DIASTOLIC BLOOD PRESSURE: 102 MMHG | OXYGEN SATURATION: 94 % | SYSTOLIC BLOOD PRESSURE: 159 MMHG | WEIGHT: 200 LBS

## 2021-06-10 LAB
APTT PPP: 62 SEC (ref 22–37)
ERYTHROCYTE [DISTWIDTH] IN BLOOD BY AUTOMATED COUNT: 13.2 % (ref 10–15)
HCT VFR BLD AUTO: 37.3 % (ref 40–53)
HGB BLD-MCNC: 12 G/DL (ref 13.3–17.7)
MCH RBC QN AUTO: 31.7 PG (ref 26.5–33)
MCHC RBC AUTO-ENTMCNC: 32.2 G/DL (ref 31.5–36.5)
MCV RBC AUTO: 98 FL (ref 78–100)
PLATELET # BLD AUTO: 228 10E9/L (ref 150–450)
RBC # BLD AUTO: 3.79 10E12/L (ref 4.4–5.9)
WBC # BLD AUTO: 13.1 10E9/L (ref 4–11)

## 2021-06-10 PROCEDURE — 85730 THROMBOPLASTIN TIME PARTIAL: CPT | Performed by: INTERNAL MEDICINE

## 2021-06-10 PROCEDURE — 97116 GAIT TRAINING THERAPY: CPT | Mod: GP | Performed by: PHYSICAL THERAPIST

## 2021-06-10 PROCEDURE — 250N000013 HC RX MED GY IP 250 OP 250 PS 637: Performed by: INTERNAL MEDICINE

## 2021-06-10 PROCEDURE — 250N000013 HC RX MED GY IP 250 OP 250 PS 637: Performed by: UROLOGY

## 2021-06-10 PROCEDURE — 36415 COLL VENOUS BLD VENIPUNCTURE: CPT | Performed by: UROLOGY

## 2021-06-10 PROCEDURE — 85027 COMPLETE CBC AUTOMATED: CPT | Performed by: UROLOGY

## 2021-06-10 PROCEDURE — 97535 SELF CARE MNGMENT TRAINING: CPT | Mod: GO | Performed by: OCCUPATIONAL THERAPIST

## 2021-06-10 PROCEDURE — 36415 COLL VENOUS BLD VENIPUNCTURE: CPT | Performed by: INTERNAL MEDICINE

## 2021-06-10 PROCEDURE — 97110 THERAPEUTIC EXERCISES: CPT | Mod: GP | Performed by: PHYSICAL THERAPIST

## 2021-06-10 PROCEDURE — 99239 HOSP IP/OBS DSCHRG MGMT >30: CPT | Performed by: INTERNAL MEDICINE

## 2021-06-10 RX ORDER — CEFPODOXIME PROXETIL 200 MG/1
200 TABLET, FILM COATED ORAL 2 TIMES DAILY
Qty: 20 TABLET | Refills: 0 | Status: SHIPPED | OUTPATIENT
Start: 2021-06-10 | End: 2021-06-20

## 2021-06-10 RX ORDER — TAMSULOSIN HYDROCHLORIDE 0.4 MG/1
0.4 CAPSULE ORAL DAILY
Qty: 30 CAPSULE | Refills: 3 | Status: SHIPPED | OUTPATIENT
Start: 2021-06-10 | End: 2021-06-23

## 2021-06-10 RX ADMIN — POLYETHYLENE GLYCOL 3350 17 G: 17 POWDER, FOR SOLUTION ORAL at 08:47

## 2021-06-10 RX ADMIN — THERA TABS 1 TABLET: TAB at 08:46

## 2021-06-10 RX ADMIN — Medication 1 CAPSULE: at 08:46

## 2021-06-10 RX ADMIN — TORSEMIDE 10 MG: 10 TABLET ORAL at 08:46

## 2021-06-10 RX ADMIN — CETIRIZINE HYDROCHLORIDE 10 MG: 10 TABLET, FILM COATED ORAL at 08:47

## 2021-06-10 RX ADMIN — MICONAZOLE NITRATE: 20 POWDER TOPICAL at 08:55

## 2021-06-10 RX ADMIN — RIVAROXABAN 15 MG: 15 TABLET, FILM COATED ORAL at 08:46

## 2021-06-10 RX ADMIN — ZINC SULFATE 220 MG (50 MG) CAPSULE 220 MG: CAPSULE at 08:46

## 2021-06-10 RX ADMIN — AMLODIPINE BESYLATE 5 MG: 5 TABLET ORAL at 08:46

## 2021-06-10 RX ADMIN — DUTASTERIDE 0.5 MG: 0.5 CAPSULE, LIQUID FILLED ORAL at 08:46

## 2021-06-10 RX ADMIN — TAMSULOSIN HYDROCHLORIDE 0.4 MG: 0.4 CAPSULE ORAL at 08:47

## 2021-06-10 RX ADMIN — LISINOPRIL 10 MG: 10 TABLET ORAL at 08:47

## 2021-06-10 RX ADMIN — CHOLECALCIFEROL TAB 10 MCG (400 UNIT) 10 MCG: 10 TAB at 08:46

## 2021-06-10 ASSESSMENT — ACTIVITIES OF DAILY LIVING (ADL)
ADLS_ACUITY_SCORE: 18
ADLS_ACUITY_SCORE: 19
ADLS_ACUITY_SCORE: 18
ADLS_ACUITY_SCORE: 19
ADLS_ACUITY_SCORE: 18

## 2021-06-10 NOTE — CONSULTS
Discharge Pharmacy Test Claim    Ran test scripts for eliquis and xarelto through pt's [facility] [employer] insurance. Both eliquis and xarelto are covered for $50/mo copay. Pt is also eligible for copay cards that reduce the copay to $10/mo. Pt will either have to call or visit the websites below to activate. [place] pharmacy has one-time use 30-day free trial vouchers available for eliquis or xarelto.    Eliquis copay card: [email or web address]#  Xarelto copay card: [email or web address]://[email or web address]/hcp/xarelto/[email or web address]-program-overview      Chelle Person 6/7 - 6/11  [facility] Pharmacy Liaison  Ph: [phone] Pager: [phone]

## 2021-06-10 NOTE — PROGRESS NOTES
Care Management Discharge Note    Discharge Date: 06/10/21       Discharge Disposition: Home     Discharge Services: [facility] RN/PT/OT     Discharge DME:  NA    Discharge Transportation: car, drives self    Private pay costs discussed: Not applicable    PAS Confirmation Code:  NA  Patient/family educated on Medicare website which has current facility and service quality ratings: NA     Education Provided on the Discharge Plan: yes   Persons Notified of Discharge Plans: Pt, Nsg  Patient/Family in Agreement with the Plan:  yes    Handoff Referral Completed: No    Additional Information:  Pt is discharging home with resumption of [facility] and adding RN.  Have asked that home care sees pt more often than once per week for a few weeks.  They are able to provide this.  HC orders have been faxed through [employer] to [facility] at ([phone]) and have confirmed orders were received.  Follow-up appointments have been scheduled for both PCP and Urology.  Nsg will educate on catheter care prior to discharge.        Nina Almonte RN   Inpatient Care Management  [phone]

## 2021-06-10 NOTE — DISCHARGE INSTRUCTIONS
You are eligible for a discounted co-pay on your Xarelto.  You will need to apply for this on line or phone.    Please go to [email or web address]    [facility] will resume services and we have asked that they see you more often for a few weeks.  Also adding a home nurse.  [facility] phone number is ([phone])        POSTOPERATIVE INSTRUCTIONS    Diagnosis-------------------------------   Phimosis  Urinary retention  Right hydronephrosis  Bladder stones  BPH with retention     Procedure-------------------------------  Procedure(s) (LRB):  CYSTOSCOPY, WITH RIGHT RETROGRADE PYELOGRAM, RIGHT DIAGNOSTIC  URETEROSCOPY  AND URETERAL STENT REPLACEMENT AND PENILE BLOCK (Right)  DORSAL SLIT (N/A)      Home-going instructions-----------------           FOLLOW THESE INSTRUCTIONS AS INDICATED BELOW:  - Observe operative area for signs of excessive bleeding.  - You may shower.  - Increase fluid intake to promote clear urine.  - Resume usual diet as tolerated    What to expect while recovering-----------  - You may experience some intermittent bleeding that makes your urine pink or cherry colored. This is normal.  - However, if you are unable to urinate, passing large amount of clots, have kaley blood in your urine, or have a temperature >101 degrees, call the urology nurse on call, or present to your nearest emergency department.  - You are encouraged to walk daily, and have no activity restrictions.   - A URETERAL STENT has been placed that allows urine to flow unobstructed from your kidney into your bladder.  The stent has a curl in the kidney and a curl in the bladder.  The curl in the bladder can cause some urgency and frequency of urination as well as some mild blood in the urine.  The curl in the kidney can cause some mild flank discomfort.  This may be more noticeable when you urinate.  A URETERAL STENT is meant to be left in temporarily.  It must be removed or changed  no later than 3 months after it's insertion.  If it's not removed it can result in stone overgrowth on the stent that can cause pain, infection, and can be very difficult to remove.      What to expect while recovering----------- WOUND CARE:  - You may shower and get incisions wet starting 48 hrs after surgery.  - Do not scrub incisions or submerge wounds (aka, bath, pool, hot tub, etc.) for 2 weeks or until wounds have healed and catheter is removed.   - Remove wound dressing 48 hours after surgery if already not removed.   - Leave incision open to air. Cover with gauze only if needed for comfort or to protect clothing from drainage.  - Apply Bacitracin daily to the incision site on both sides  - The stitches with dissolve and the knots will fall out sometime in the next ~2-4 weeks.      What to expect while recovering----------- TUBES AND DRAINS:  1) Alonzo Catheter: You are going home with a Alonzo catheter which will remain in place until your follow-up appointment. Your nurse or  will provide written catheter care instructions for you to take home.   - Protect the catheter and treat it like an extension of your body - keep it secured to your leg and do not let it get caught, snagged, or tugged.   - Should the catheter somehow come out of position, do not let anyone but a urologist who is aware of your recent surgery try to replace a catheter. Best yet, contact our urology office right away with any concerns.   - Apply vaseline twice daily to the tip of the penis/catheter insertion point to keep the area lubricated and more comfortable.      Discharge Medications/instructions:     - Flomax (tamsulosin) to be taken daily     - Take Tylenol 1000mg every 6 hours for pain      Questions/concerns------------------------  [facility]: [phone]    Future appointments  You will be contacted to arrange follow up with Dr. Man in ~2 weeks.    Shadi Man MD

## 2021-06-10 NOTE — PLAN OF CARE
Occupational Therapy Discharge Summary    Reason for therapy discharge:    Discharged to home with home therapy.    Progress towards therapy goal(s). See goals on Care Plan in [facility] electronic health record for goal details.  Goals partially met.  Barriers to achieving goals:   discharge from facility.    Therapy recommendation(s):    Continued therapy is recommended.  Rationale/Recommendations:  Increase ADL independence and safety.

## 2021-06-10 NOTE — DISCHARGE SUMMARY
[facility]  Hospitalist Discharge Summary      Date of Admission:  6/4/2021  Date of Discharge:  6/10/2021  Discharging Provider: Rita Webb MD, FACP    Discharge Diagnoses   Generalized weakness.  E. coli sepsis secondary to complicated urinary tract infection with infected nephrolithiasis.  Severe right-sided hydronephrosis with hydroureter and urinary retention and bladder  Phimosis  S/p cystoscopy with Alonzo catheter placement  S/p cystoscopy, right ureteroscopy, right JJ stent placement, right retrograde pyelogram and dorsal slit of phimosis with penile nerve block on 06/09  Right lung pulmonary embolism, most likely present at admission,  Generalized weakness  Mechanical fall  Essential hypertension  Bilateral lower extremity edema  Intertrigo    Benign prostate hyperplasia    Follow-ups Needed After Discharge   Follow-up Appointments     Follow-up and recommended labs and tests      Follow up with primary care provider, Gretel Malhotra, within 7 days to   evaluate medication change, for hospital follow- up and to follow up on   results.  The following labs/tests are recommended: BMP and CBC.  Follow up with urology as recommended           Unresulted Labs Ordered in the Past 30 Days of this Admission     Date and Time Order Name Status Description    6/5/2021 0011 Blood culture Preliminary       These results will be followed up by PCP    Discharge Disposition   Discharged to home  Condition at discharge: Stable    Hospital Course     Cumulative Summary: Shilo Menchaca is a 81 year old male with past medical history significant for essential hypertension, benign prostate hyperplasia, admitted on June 4, 2021 with fall and generalized weakness and was found to have E. coli sepsis secondary to complicated urinary tract infection and infected nephrolithiasis.Here are further details regarding his current hospitalization.      Generalized weakness:  E. coli sepsis secondary to  complicated urinary tract infection with infected nephrolithiasis:  Severe right-sided hydronephrosis with hydroureter and urinary retention and bladder:  Phimosis:  S/p cystoscopy with Alonzo catheter placement:  S/p cystoscopy, right ureteroscopy, right JJ stent placement, right retrograde pyelogram and dorsal slit of phimosis with penile nerve block on 06/09:      Patient presented with generalized weakness and a fall.  He also had associated low-grade fever and tachycardia, lab was concerning for leukocytosis with WBC count of 17.4, normal lactate, urine analysis was grossly abnormal.  Of note patient was recently admitted to [facility] from May 16 to May 18 with sepsis associated with UTI.  CT scan at that time showed capacious bladder with moderately enlarged prostate there was also multiple calcifications in the dependent portion of the bladder that were thought to represent bladder stones.  He was also noticed to have penile phimosis and was instructed to follow-up with urology.  Patient was admitted for further evaluation and management, his urine cultures grew E. coli with positive bacteremia.  Urology took patient for cystoscopy with catheter placement.  CT scan was repeated on June 7 which showed hydronephrosis and hydroureter.  Patient was seen and examined the s/p procedure.  Patient underwent dorsal slit of phimosis, cystoscopy, right retrograde pyelogram and ureteroscopy diagnostic and right JJ stent placement and penile nerve block.    --Patient was seen and examined on the day of discharge, wife was also present in the room.  --Patient has tolerated the procedure well, hoping to go home today, has been off heparin infusion and we discussed about restarting him back on Xarelto first dose is given in the hospital and a prescription is given.  --His constipation has resolved and he will continue to use MiraLAX after the discharge.  --Patient is aware about plan to be discharged with Alonzo catheter , he  will also be discharged with antibiotics to finish the course after the discharge.  --Urology is planning to give trial of voiding in 2 to 3 weeks.  --Urology has also discussed with patient and wife that if trial of voiding is unsuccessful in few weeks then he will likely need replacement of his Alonzo catheter until he could be taken to the operating room for bladder outlet procedure after completion of 3 months of anticoagulation for his pulmonary embolism.     Right lung pulmonary embolism; present on admission and was incidentally was noted to have filling defects on the right upper lung and right lower lung segmental and subsegmental lobes on CT urogram, patient was not hypoxic.    -- Initially patient was given a dose of Xarelto but then it was held for further urological procedure and he was a started on heparin infusion.  After the procedure, patient was started back on heparin infusion after the urological procedure.  This morning will stop heparin infusion and will start patient back on Xarelto twice daily.    Generalized weakness  Mechanical fall  Most likely multifactorial, secondary to infection, no apparent injuries  --  Physical therapy was consulted, initially recommended TCU, patient granddaughter is graduating this week and they have her graduation party this Sunday.  --  Patient continued to work with therapies and have made improvement and prefers to go home with home therapies      Essential hypertension  Bilateral lower extremity edema: Patient does not seem to have any history of congestive heart failure, lower extremity edema can be secondary to amlodipine. Blood pressure was slightly elevated in the beginning     -- Continue PTA Amlodipine/benazepril and Lotrel on discharge  --Transthoracic echocardiogram was ordered for lower extremity edema along with recent diagnosis of pulmonary embolism to check for right heart strain.  --Echocardiogram results were reviewed, frequent PVCs make wall  motion and Doppler analysis difficult, there is mild concentric left ventricular hypertrophy with normal LV systolic function.  Normal LV wall motion.  Septal bounce seen this can be seen with bundle branch block, constrictive physiology, abnormal RV loading none of these are classically seen on this echo however right ventricle not well seen, grossly normal size and function.  -- Most likely these findings are secondary to pulmonary embolism, curbsided Dr. Beckwith, who was in agreement with assessment, recommended to continue patient for PE, possibly will repeat echocardiogram in 3 months and can be seen by cardiology as an outpatient.     Intertrigo:   -- Micatin powder to groin and abdominal folds, mostly exacerbated by antibiotic use.     Benign prostate hyperplasia  -- Continue Avodart.     Patient was seen and examined on the day of discharge , he is feeling well, does not have any complaints , I did review the discharge medications and instructions with the patient and plan for him to follow up with the PCP after the hospitalization .patient was in agreement , he is discharged in stable condition back to his home.    Consultations This Hospital Stay   PHYSICAL THERAPY ADULT IP CONSULT  CARE MANAGEMENT / SOCIAL WORK IP CONSULT  UROLOGY IP CONSULT  OCCUPATIONAL THERAPY ADULT IP CONSULT  PHARMACY IP CONSULT  PHARMACY IP CONSULT  PHARMACY IP CONSULT  PHARMACY IP CONSULT  PHARMACY LIAISON FOR MEDICATION COVERAGE CONSULT    Code Status   No CPR- Do NOT Intubate    Time Spent on this Encounter   I, Rita Webb MD, personally saw the patient today and spent greater than 30 minutes discharging this patient.       Rita Webb MD  [place] [address] [facility]  [address] [place] [address]  Phone: [phone]  ______________________________________________________________________    Physical Exam   Vital Signs: Temp: 98.7  F (37.1  C) Temp src: Oral BP: (!) 159/102 Pulse: 100   Resp: 16  SpO2: 94 % O2 Device: None (Room air)    Weight: 200 lbs 0 oz    Physical Exam  Vitals signs and nursing note reviewed.   Constitutional:       Appearance: He is well-developed.   HENT:      Head: Normocephalic and atraumatic.   Eyes:      Pupils: Pupils are equal, round, and reactive to light.   Neck:      Musculoskeletal: Normal range of motion and neck supple.      Thyroid: No thyromegaly.   Cardiovascular:      Rate and Rhythm: Normal rate and regular rhythm.      Heart sounds: Normal heart sounds.   Pulmonary:      Effort: Pulmonary effort is normal. No respiratory distress.      Breath sounds: Normal breath sounds.   Abdominal:      General: Bowel sounds are normal. There is no distension.      Palpations: Abdomen is soft.   Musculoskeletal: Normal range of motion.         General: No tenderness.   Skin:     General: Skin is warm and dry.   Neurological:      General: No focal deficit present.      Mental Status: He is alert and oriented to person, place, and time. Mental status is at baseline.   Psychiatric:         Behavior: Behavior normal.          Primary Care Physician   Gretel Malhotra    Discharge Orders      Home care nursing referral      Home Care PT Referral for Hospital Discharge      Home Care OT Referral for Hospital Discharge      Reason for your hospital stay    You were admitted to the hospital secondary to E coli UTI and Bacteremia with phimosis , you underwent surgery and will be finishing antibiotics , you were also found to have blood clot and are discharged on blood thinners.     Follow-up and recommended labs and tests    Follow up with primary care provider, Gretel Malhotra, within 7 days to evaluate medication change, for hospital follow- up and to follow up on results.  The following labs/tests are recommended: BMP and CBC.  Follow up with urology as recommended     Activity    Your activity upon discharge: activity as tolerated     Discharge Instructions    You are started on blood  thinner Xarelto, you will be taking 15 mg two times day for 3 weeks , then you will continue 20 mg po daily to finish your 3 weeks course, your PCP will give you further refills . Please stay off baby ASA while you are on Xarelto, please resume it back when you are done with the blood thinner course.you are also discharged on Flomax, you will also be seen by urology after the discharge, you are given 10 days of antibiotics to finish the course.Continue your other home medications as above     No CPR- Do NOT Intubate     Diet    Follow this diet upon discharge: Orders Placed This Encounter  Regular Diet Adult       Significant Results and Procedures   Results for orders placed or performed during the hospital encounter of 06/04/21   CT Urogram wo & w Contrast     Value    Radiologist flags Pulmonary embolism (AA)    Narrative    CT UROGRAM WO & W CONTRAST 6/7/2021 9:42 AM    CLINICAL HISTORY: Hydronephrosis; Hydroureter    TECHNIQUE: CT urogram was performed without and following injection of  IV contrast. Multiplanar reformats were obtained. Dose reduction  techniques were used.  CONTRAST: 101mL Isovue-370    COMPARISON: None available.    FINDINGS:   LOWER CHEST: Mild bibasilar dependent atelectasis. Filling defects in  the segmental and subsegmental arteries of the right lower lobe,  compatible with pulmonary emboli (series 6, image 8, 18 and 25 for  example). Partly visualized filling defect in the right upper lobe  main pulmonary artery (series 6, image 2) also consistent with  pulmonary emboli.    HEPATOBILIARY: Subcentimeter hypodense lesions in the liver are too  small to characterize, likely cysts and hemangiomas. In the inferior  medial left hepatic lobe, 2.5 cm peripherally calcified hypodense  lesion, likely a peripherally calcified cyst. The gallbladder is not  visualized, and may be surgically absent. No biliary ductal  dilatation.    PANCREAS: Normal.    SPLEEN: Normal.    ADRENAL GLANDS:  Normal.    KIDNEYS/BLADDER:   RIGHT KIDNEY: Moderate hydroureteronephrosis to the level of the  ureterovesicular junction. Cluster of calcifications at the right  ureterovesicular junction measuring 2.2 x 1.6 cm. Subcentimeter  hypodense renal cortical lesions are likely small cysts. No delayed  nephrogram. No suspicious renal masses. Mild linear urothelial  enhancement in the ureter is likely inflammatory. No filling defects  in the right kidney. The right ureter is not opacified.    LEFT KIDNEY: Few punctate nonobstructing calculi in the left kidney  measuring up to 0.4 cm. No hydronephrosis, perinephric stranding or  hydroureter. Subcentimeter hypodense lesion in the left kidney is too  small to adequately characterize but likely a cyst. No suspicious  renal masses. No suspicious urothelial enhancement or filling defect  in the right renal collecting system or ureter.    BLADDER: Urinary bladder is decompressed with a Alonzo catheter. A  cluster of calcifications in the right ureterovesicular junction as  discussed. Few other calcification in the bladder lumen versus wall.  Circumferential urinary bladder wall thickening, exaggerated by the  bladder being decompressed.    BOWEL: Diverticulosis in the colon. No acute inflammatory change. No  obstruction.     PELVIC ORGANS: Mild prostatomegaly and intraprostatic calcifications.    ADDITIONAL FINDINGS: Indeterminate right perivesicle/external iliac  lymph node measuring 1.7 x 1.0 cm. No other lymphadenopathy in the  abdomen and pelvis. Moderate-sized fat-containing left inguinal hernia  and small fat-containing abdominal hernia. No ascites.    MUSCULOSKELETAL: Degenerative changes in the spine. No suspicious  lesions in the bones.      Impression    IMPRESSION:   1.  Filling defects in the partly visualized right upper lobe  pulmonary artery and segmental arteries of the right lower lobe  compatible with pulmonary emboli.  2.  Right hydroureteronephrosis to the level  of the ureterovesicular  junction where there is a cluster of calcifications either in the  bladder lumen, urinary bladder diverticulum or urinary bladder wall.  3.  Mild linear enhancement of the right ureter is likely  inflammatory. The right ureter is not opacified with contrast which  limits evaluation for filling defects.  4.  Few punctate nonobstructing left renal calculi.  5.  The urinary bladder is decompressed with a Alonzo catheter with  marked urinary bladder wall thickening. This limits evaluation for  focal masses by CT. Cystoscopic correlation should be considered as  clinically indicated.  6.  Indeterminate mildly enlarged right perivesical/external iliac  lymph node. This is either reactive or metastatic.    [Critical Result: Pulmonary embolism]    Finding was identified on 2021 9:57 AM.     MERI Arevalo RN was contacted by me on 2021 10:15 AM and verbalized  understanding of the critical result.     LEONIDES OROPEZA MD   US Lower Extremity Venous Duplex Bilateral    Narrative    VENOUS ULTRASOUND BOTH LEGS  2021 3:55 PM     HISTORY: Pulmonary embolism. Leg swelling.    COMPARISON: None.    FINDINGS:  Examination of the deep veins with graded compression and  color flow Doppler with spectral wave form analysis was performed.   There is no evidence for DVT in the right lower extremity.      Impression    IMPRESSION: No evidence of deep venous thrombosis.    SILVERIO PEREZ MD   XR Surgery IVA L/T 5 Min Fluoro w Stills    Narrative    This exam was marked as non-reportable because it will not be read by a   radiologist or a [place] non-radiologist provider.         Echocardiogram Complete    Narrative    [id number]  [id number]  [id number]  [id number]^RHONDA^YSABEL^LUIS M     [facility]  Echocardiography Laboratory  [address] [place] [address]     Name: KHADIJAH VELARDE  MRN: [id number]  : 1940  Study Date: 2021 10:52 AM  Age: 81 yrs  Gender: Male  Patient  "Location: [facility]  Reason For Study: Edema  Ordering Physician: YSABEL ELLIS  Referring Physician: GURU MANZO  Performed By: Mil Stevenson RDCS     BSA: 2.0 m2  Height: 66 in  Weight: 200 lb  HR: 104  BP: 123/73 mmHg  ______________________________________________________________________________  Procedure  Complete Portable Echo Adult. Optison (NDC #[id number]) given intravenously.  ______________________________________________________________________________  Interpretation Summary     Technically difficult study limited views obtained due to body habitus  Very frequent PVC's make wall motion and doppler analysis difficult  There is mild concentric left ventricular hypertrophy.  Left ventricular systolic function is normal.  Normal left ventricular wall motion  \"Septal bounce\" seen- this can be seen with bundle branch block, constrictive  physiology, abnormal RV loading-none of these are classically seen on this  echo however  RV not well seen, grossly normal size/function  Right ventricular systolic pressure could not be approximated due to  inadequate tricuspid regurgitation.  IVC diameter and respiratory changes fall into an intermediate range  suggesting an RA pressure of 8 mmHg.  The rhythm was sinus tachycardia.  The patient exhibited frequent PVCs.  ______________________________________________________________________________  Left Ventricle  The left ventricle is normal in size. There is mild concentric left  ventricular hypertrophy. Left ventricular systolic function is normal. The  visual ejection fraction is estimated at 55-60%. Diastolic function not  assessed due to arrhythmia. Normal left ventricular wall motion. \"Septal  bounce\" seen- this can be seen with bundle branch block, constrictive  physiology, abnormal RV loading-none of these are classically seen on this  echo however. There is no thrombus seen in the left ventricle.     Right Ventricle  The right ventricle is not well visualized. " RV not well seen, grossly normal  size/function.     Atria  Normal left atrial size. Right atrial size is normal.     Mitral Valve  The mitral valve leaflets appear normal. There is no evidence of stenosis,  fluttering, or prolapse.     Tricuspid Valve  Normal tricuspid valve. Right ventricular systolic pressure could not be  approximated due to inadequate tricuspid regurgitation. There is trace  tricuspid regurgitation.     Aortic Valve  The aortic valve is not well visualized. There is trace aortic regurgitation.  No hemodynamically significant valvular aortic stenosis.     Pulmonic Valve  The pulmonic valve is not well seen, but is grossly normal.     Vessels  The ascending aorta is Borderline dilated. IVC diameter and respiratory  changes fall into an intermediate range suggesting an RA pressure of 8 mmHg.     Pericardium  The pericardium appears normal.     Rhythm  The rhythm was sinus tachycardia. The patient exhibited frequent PVCs.  ______________________________________________________________________________  MMode/2D Measurements & Calculations  IVSd: 1.2 cm     LVIDd: 4.0 cm  LVIDs: 3.4 cm  LVPWd: 1.0 cm  FS: 14.7 %  LV mass(C)d: 148.9 grams  LV mass(C)dI: 74.4 grams/m2  Ao root diam: 3.7 cm  LA dimension: 3.5 cm  asc Aorta Diam: 3.7 cm  LA/Ao: 0.93  RWT: 0.50     Doppler Measurements & Calculations  PA acc time: 0.09 sec     ______________________________________________________________________________  Report approved by: Andrews Fontana 06/07/2021 03:12 PM               Discharge Medications   Current Discharge Medication List      START taking these medications    Details   bacitracin-neomycin-polymyxin (NEOSPORIN) 400-5-5000 external ointment Apply to the effected area 2-3 tikes a day  Qty: 28 g, Refills: 0    Comments: Future refills by PCP Dr. Gretel Malhotra with phone number [phone].  Associated Diagnoses: Acute cystitis with hematuria; Weakness; Hydronephrosis of right kidney       cefpodoxime (VANTIN) 200 MG tablet Take 1 tablet (200 mg) by mouth 2 times daily for 10 days  Qty: 20 tablet, Refills: 0    Associated Diagnoses: Acute cystitis with hematuria; Hydronephrosis of right kidney      miconazole (MICATIN) 2 % external powder Apply topically 2 times daily  Qty: 90 g, Refills: 0    Comments: Future refills by PCP Dr. Gretel Malhotra with phone number [phone].  Associated Diagnoses: Acute cystitis with hematuria; Hydronephrosis of right kidney      Rivaroxaban ANTICOAGULANT 15 & 20 MG TBPK 15 mg po BID for 21 days then 20 mg po daily .  Qty: 51 each, Refills: 0    Comments: 15 mg po BID for 21 days then 20 mg po daily . Future refills by PCP Dr. Gretel Malhotra with phone number [phone].  Associated Diagnoses: Acute cystitis with hematuria; Hydronephrosis of right kidney; Pulmonary embolism without acute cor pulmonale, unspecified chronicity, unspecified pulmonary embolism type (H)      tamsulosin (FLOMAX) 0.4 MG capsule Take 1 capsule (0.4 mg) by mouth daily  Qty: 30 capsule, Refills: 3    Associated Diagnoses: Acute cystitis with hematuria; Hydronephrosis of right kidney         CONTINUE these medications which have NOT CHANGED    Details   amLODIPine-benazepril (LOTREL) 5-10 MG capsule Take 1 capsule by mouth daily      cetirizine (ZYRTEC) 10 MG tablet Take 10 mg by mouth daily      dutasteride (AVODART) 0.5 MG capsule Take 0.5 mg by mouth daily      Multiple Vitamins-Minerals (PRESERVISION AREDS) CAPS Take by mouth 2 times daily      multivitamin, therapeutic (THERA-VIT) TABS tablet Take 1 tablet by mouth daily      torsemide (DEMADEX) 10 MG tablet Take 10 mg by mouth daily      vitamin D3 (CHOLECALCIFEROL) 10 MCG (400 UNIT) capsule Take 1 capsule by mouth daily      Zinc Gluconate 15 MG TABS Take by mouth daily          STOP taking these medications       aspirin 81 MG EC tablet Comments:   Reason for Stopping:             Allergies   No Known Allergies

## 2021-06-10 NOTE — PROGRESS NOTES
"Urology  Progress Note    No acute issues overnight  Jessica in place with clear urine     Exam  BP (!) 159/102 (BP Location: Right arm)   Pulse 100   Temp 98.7  F (37.1  C) (Oral)   Resp 16   Ht 1.676 m (5' 6\")   Wt 90.7 kg (200 lb)   SpO2 94%   BMI 32.28 kg/m    No acute distress  Unlabored breathing  Jessica clear yellow in appearance  Took down dressing overlying penis, incision CDI, dressed with bacitracin     UOP 1800/350    Labs  WBC 13.1  Hgb 12.0    Assessment/Plan  81 year old y/o male POD#1 s/p dorsal slit and right ureteral stent placment and for phimosis and right hydronephrosis in the setting of a UTI and urinary retention     - Patient may discharge with jessica catheter from Urology perspective   - Instructed patient to dress incision with Bacitracin twice per day   - Follow up as scheduled for removal of jessica catheter and TOV    Discussed with Dr. Man.    Paulie Bruce MD, PGY-5  Urology Resident      "

## 2021-06-10 NOTE — PLAN OF CARE
Cognitive Concerns/ Orientation : Pt A/Ox4   BEHAVIOR & AGGRESSION TOOL COLOR: Green   ABNL VS/O2: VSS on RA  MOBILITY: SBA with walker and gait belt, fall risk  PAIN MANAGMENT: Denies  DIET: Regular  BOWEL/BLADDER: Alonzo patent with pink/red urine  ABNL LAB/BG: WBC 13.1, reviewed with MD prior to discharge.  DRAIN/DEVICES: Hep gtt stopped. Xarelto given.  TELEMETRY RHYTHM: NSR  SKIN: Bruised. Lower extremity edema +2. Dressing on penis, CDI  D/C DATE: Discharge home with home care    Reviewed AVS and medications with patient and wife. Catheter care with teach back. Leg bag placed. Leg bag use taught with teach back and demonstration. Wipes given. CC has educated regarding home care.   All education leaflets attached to AVS and reviewed at discharge with wife and patient.  To door 2 with transport for discharge.    PCP follow up with [facility] 6/16/21 at 1330 and Urology on 6/23/21 for catheter evaluation/removal.

## 2021-06-10 NOTE — PROGRESS NOTES
Discharge orders from hospitalist. Charge RN reached out to urology to establish approximate time of rounding.     Plan is for member of their team to round prior to patient's discharge.    Education for meatal, glans cleansing/wound care and follow up needs per their recommendation.    Will discharge after urology rounds. CC aware of need for home care resumption.

## 2021-06-10 NOTE — PLAN OF CARE
DATE & TIME: 6/9/21 4724-2558    Cognitive Concerns/ Orientation : Pt A/Ox4   BEHAVIOR & AGGRESSION TOOL COLOR: Green   ABNL VS/O2: VSS on RA  MOBILITY: SBA with walker and gait belt, fall risk  PAIN MANAGMENT: Denies  DIET: Regular  BOWEL/BLADDER: Alonzo patent with pink/red urine  ABNL LAB/BG: PTT 62 (Hep 10a recheck tomorrow morning)  DRAIN/DEVICES: Hep gtt infusing at 850 units/hr  TELEMETRY RHYTHM: ST with PVC  SKIN: Bruised. Lower extremity edema +2. Dressing on penis, CDI  D/C DATE: Discharge 1-2 days home with home care  OTHER IMPORTANT INFO: Pt doing well after cysto yesterday morning. Daughter him visiting this evening.

## 2021-06-11 ENCOUNTER — TELEPHONE (OUTPATIENT)
Dept: UROLOGY | Facility: CLINIC | Age: 81
End: 2021-06-11

## 2021-06-11 LAB
BACTERIA SPEC CULT: NO GROWTH
SPECIMEN SOURCE: NORMAL

## 2021-06-11 NOTE — PLAN OF CARE
Physical Therapy Discharge Summary    Reason for therapy discharge:    Discharged to home with home therapy.    Progress towards therapy goal(s). See goals on Care Plan in [facility] electronic health record for goal details.  Goals partially met.  Barriers to achieving goals:   discharge from facility.    Therapy recommendation(s):    Continued therapy is recommended.  Rationale/Recommendations:  to maximize return of indep function.

## 2021-06-11 NOTE — TELEPHONE ENCOUNTER
[facility] Call Center    Phone Message    May a detailed message be left on voicemail: yes     Reason for Call: Other: pts is calling, he was sent home with a small cath bag, he needs a bigger one at night, how can the pt get a bigger bag? please call Can, thank you     Action Taken: Message routed to:  [facility]): uro    Travel Screening: Not Applicable

## 2021-06-16 NOTE — TELEPHONE ENCOUNTER
Had patient pick-up a night-bag and gave care instructions for how to change it and clean bag.     Dinorah Cunningham LPN

## 2021-06-23 ENCOUNTER — OFFICE VISIT (OUTPATIENT)
Dept: UROLOGY | Facility: CLINIC | Age: 81
End: 2021-06-23
Payer: MEDICARE

## 2021-06-23 VITALS — HEIGHT: 66 IN | BODY MASS INDEX: 32.14 KG/M2 | WEIGHT: 200 LBS

## 2021-06-23 DIAGNOSIS — N13.8 BPH WITH OBSTRUCTION/LOWER URINARY TRACT SYMPTOMS: Primary | ICD-10-CM

## 2021-06-23 DIAGNOSIS — N30.01 ACUTE CYSTITIS WITH HEMATURIA: ICD-10-CM

## 2021-06-23 DIAGNOSIS — N40.1 BPH WITH OBSTRUCTION/LOWER URINARY TRACT SYMPTOMS: Primary | ICD-10-CM

## 2021-06-23 DIAGNOSIS — N13.30 HYDRONEPHROSIS OF RIGHT KIDNEY: ICD-10-CM

## 2021-06-23 DIAGNOSIS — N21.0 BLADDER STONES: ICD-10-CM

## 2021-06-23 DIAGNOSIS — I26.99 PULMONARY EMBOLISM WITHOUT ACUTE COR PULMONALE, UNSPECIFIED CHRONICITY, UNSPECIFIED PULMONARY EMBOLISM TYPE (H): ICD-10-CM

## 2021-06-23 DIAGNOSIS — N47.1 PHIMOSIS: ICD-10-CM

## 2021-06-23 PROCEDURE — 99214 OFFICE O/P EST MOD 30 MIN: CPT | Performed by: UROLOGY

## 2021-06-23 RX ORDER — TAMSULOSIN HYDROCHLORIDE 0.4 MG/1
0.4 CAPSULE ORAL DAILY
Qty: 90 CAPSULE | Refills: 3 | Status: SHIPPED | OUTPATIENT
Start: 2021-06-23 | End: 2021-09-16

## 2021-06-23 RX ORDER — CEFPODOXIME PROXETIL 200 MG/1
TABLET, FILM COATED ORAL
COMMUNITY
Start: 2021-06-10 | End: 2021-06-23

## 2021-06-23 RX ORDER — TRIAMCINOLONE ACETONIDE 1 MG/G
OINTMENT TOPICAL 2 TIMES DAILY PRN
COMMUNITY
Start: 2021-06-16 | End: 2021-09-16

## 2021-06-23 SDOH — HEALTH STABILITY: MENTAL HEALTH: HOW MANY STANDARD DRINKS CONTAINING ALCOHOL DO YOU HAVE ON A TYPICAL DAY?: 1 OR 2

## 2021-06-23 SDOH — HEALTH STABILITY: MENTAL HEALTH: HOW OFTEN DO YOU HAVE 6 OR MORE DRINKS ON ONE OCCASION?: NEVER

## 2021-06-23 SDOH — HEALTH STABILITY: MENTAL HEALTH: HOW OFTEN DO YOU HAVE A DRINK CONTAINING ALCOHOL?: MONTHLY OR LESS

## 2021-06-23 ASSESSMENT — MIFFLIN-ST. JEOR: SCORE: 1544.94

## 2021-06-23 ASSESSMENT — PAIN SCALES - GENERAL: PAINLEVEL: MILD PAIN (2)

## 2021-06-23 NOTE — PROGRESS NOTES
Urology Consult History and Physical  [facility]   Name: Shilo Menchaca    MRN: [id number]   YOB: 1940       We were asked to see Shilo Menchaca at the request of Dr. Malhotra for evaluation and treatment of hospital follow-up, urinary retention, phimosis status post dorsal slit, hydronephrosis status post right ureteral stent placement.        Chief Complaint:   hospital follow-up, urinary retention, phimosis status post dorsal slit, hydronephrosis status post right ureteral stent placement.    History is obtained from the patient, his wife, and the medical record            History of Present Illness:   Shilo Menchaca is a 81 year old male with significant past medical history of hypertension, a history of BPH with an episode of retention about 20 years ago for which she has been on Avodart for the last 20 years.  He was recently hospitalized at [facility] with a UTI.  He underwent an outpatient CT on 6/4/2021 which demonstrated a significantly distended bladder, bladder stones, and right hydroureteronephrosis with no clear evidence of obstruction.  He was then admitted through the emergency room on 6/5/2021 and noted to have significant leukocytosis to 17.4 and urinalysis concerning for infection.  Alonzo catheter placement was complicated by his significant phimosis which required bedside cystoscopy and placement of a catheter over a wire.  A CT urogram was then obtained on 6/7/2021 which demonstrated persistent right hydronephrosis and an incidental pulmonary embolism and he was started on anticoagulation.  On 6/9/2021 he underwent a dorsal slit of his phimosis, cystoscopy and right retrograde pyelogram, diagnostic ureteroscopy, and right ureteral stent placement.  He returns today for a trial of void.             Past Medical History:     Past Medical History:   Diagnosis Date     Cancer (H)     skin     Hypertension             Past Surgical History:     Past Surgical History:   Procedure  Laterality Date     CIRCUMCISION N/A 6/9/2021    Procedure: DORSAL SLIT;  Surgeon: Shadi Man MD;  Location: [facility] OR     COMBINED CYSTOSCOPY, RETROGRADES, EXCHANGE STENT URETER(S) Right 6/9/2021    Procedure: CYSTOSCOPY, WITH RIGHT RETROGRADE PYELOGRAM, RIGHT DIAGNOSTIC  URETEROSCOPY  AND URETERAL STENT REPLACEMENT AND PENILE BLOCK;  Surgeon: Shadi Man MD;  Location: [facility] OR     CYSTOSCOPY       ENT SURGERY       HERNIA REPAIR              Social History:     Social History     Tobacco Use     Smoking status: Former Smoker     Types: Cigars     Smokeless tobacco: Never Used   Substance Use Topics     Alcohol use: Yes     Frequency: Monthly or less     Drinks per session: 1 or 2     Binge frequency: Never     Comment: 1 glass of wine per week       History   Smoking Status     Former Smoker     Types: Cigars   Smokeless Tobacco     Never Used            Family History:   No family history on file.           Allergies:     Allergies   Allergen Reactions     Azithromycin      PN: LW Reaction: UNKNOWN            Medications:     Current Outpatient Medications   Medication Sig     amLODIPine-benazepril (LOTREL) 5-10 MG capsule Take 1 capsule by mouth daily     bacitracin-neomycin-polymyxin (NEOSPORIN) 400-5-5000 external ointment Apply to the effected area 2-3 tikes a day     cetirizine (ZYRTEC) 10 MG tablet Take 10 mg by mouth daily     dutasteride (AVODART) 0.5 MG capsule Take 0.5 mg by mouth daily     miconazole (MICATIN) 2 % external powder Apply topically 2 times daily     Multiple Vitamins-Minerals (PRESERVISION AREDS) CAPS Take by mouth 2 times daily     multivitamin, therapeutic (THERA-VIT) TABS tablet Take 1 tablet by mouth daily     Rivaroxaban ANTICOAGULANT 15 & 20 MG TBPK 15 mg po BID for 21 days then 20 mg po daily .     tamsulosin (FLOMAX) 0.4 MG capsule Take 1 capsule (0.4 mg) by mouth daily     torsemide (DEMADEX) 10 MG tablet Take 10 mg by mouth daily     triamcinolone (KENALOG) 0.1 % external  "ointment      vitamin D3 (CHOLECALCIFEROL) 10 MCG (400 UNIT) capsule Take 1 capsule by mouth daily     Zinc Gluconate 15 MG TABS Take by mouth daily      aspirin (ASA) 81 MG EC tablet Take 81 mg by mouth     No current facility-administered medications for this visit.              Review of Systems:     Skin: negative  Eyes: negative  Ears/Nose/Throat: negative  Respiratory: No shortness of breath, dyspnea on exertion, cough, or hemoptysis  Cardiovascular: negative  Gastrointestinal: negative  Genitourinary: as above  Musculoskeletal: negative  Neurologic: negative  Psychiatric: negative  Hematologic/Lymphatic/Immunologic: negative  Endocrine: negative          Physical Exam:     Patient Vitals for the past 24 hrs:   Height Weight   06/23/21 1311 1.676 m (5' 6\") 90.7 kg (200 lb)     Body mass index is 32.28 kg/m .     General: age-appropriate appearing male in NAD  HEENT: Head AT/NC, EOMI, CN Grossly intact  Lungs: no respiratory distress, or pursed lip breathing  Heart: No obvious jugular venous distension present  Back: no bony midline tenderness, no CVAT bilaterally.  Abdomen: soft, obesely-distended, non-tender. No organomegaly  : Phallus with well-healing dorsal slit incision with no evidence of infection or hematoma, mild edema of the penile skin, Alonzo catheter draining clear.   Lymph: no palpable inguinal lymphadenopathy.  LE: no edema.   Musculoskeltal: extremities normal, no peripheral edema  Skin: no suspicious lesions or rashes  Neuro: Alert, oriented, speech and mentation normal;  moving all 4 extremities equally.  Psych: affect and mood normal          Data:   All laboratory data reviewed:    UA RESULTS:  Recent Labs   Lab Test 06/04/21  2357   COLOR Light Brown   APPEARANCE Cloudy   URINEGLC Negative   URINEBILI Negative   URINEKETONE Negative   SG 1.011   UBLD Large*   URINEPH 6.5   PROTEIN 50*   NITRITE Negative   LEUKEST Large*   RBCU >182*   WBCU >182*     Lab Results   Component Value Date    " CR 1.00 06/08/2021      IMAGING:  All pertinent imaging reviewed:    All imaging studies reviewed by me.  I personally reviewed these imaging films.  A formal report from radiology will follow.    CT UROGRAM 6/7/21:  IMPRESSION:   1.  Filling defects in the partly visualized right upper lobe  pulmonary artery and segmental arteries of the right lower lobe  compatible with pulmonary emboli.  2.  Right hydroureteronephrosis to the level of the ureterovesicular  junction where there is a cluster of calcifications either in the  bladder lumen, urinary bladder diverticulum or urinary bladder wall.  3.  Mild linear enhancement of the right ureter is likely  inflammatory. The right ureter is not opacified with contrast which  limits evaluation for filling defects.  4.  Few punctate nonobstructing left renal calculi.  5.  The urinary bladder is decompressed with a Alonzo catheter with  marked urinary bladder wall thickening. This limits evaluation for  focal masses by CT. Cystoscopic correlation should be considered as  clinically indicated.  6.  Indeterminate mildly enlarged right perivesical/external iliac  lymph node. This is either reactive or metastatic.         Impression and Plan:   Impression:   81-year-old man with recent hospitalization for urinary retention and urinary tract infection with significant phimosis and right hydroureteronephrosis now status post a dorsal slit and a right ureteral stent placement on 6/9/2021.  Hospital course complicated by an incidental finding of a pulmonary embolism for which she is now on anticoagulation      Plan:   Longstanding BPH with bladder outlet obstruction  -We discussed the pathophysiology of the bladder and the prostate and the changes associated with development of BPH and LUTS  -He has been on Avodart for the last 20 years  -He was started on tamsulosin 0.4 mg daily and will continue on this medication  -We discussed the finding of bladder stones indicates a long  "history of bladder outlet obstruction  -His cystoscopy on 6/9/2021 was notable for \"significant trilobar prostatic hypertrophy with a fairly large median lobe, with numerous bladder stones surrounding an edematous right ureteral orifice.\"  -We discussed that trial of void is reasonable today and that further work-up for possible bladder outlet procedure will need to wait for 3 months of anticoagulation for his PE  -Trial of void was successful today    Right hydroureteronephrosis  -There is no clear evidence of a stone within the distal ureter and he was noted to have a tortuous ureter  -We will plan for return in about 1 month for ureteral stent removal if he continues to do well     Thank you for the kind consultation.    Chart documentation with Dragon Voice recognition Software. Although reviewed after completion, some words and grammatical errors may remain.     Time spent: 40 minutes spent on the date of the encounter doing chart review, history and exam, documentation and further activities as noted above.    Shadi Man MD   Urology  [facility]  [facility] Phone: [phone]  [facility] Phone: [phone]       "

## 2021-06-23 NOTE — NURSING NOTE
Chief Complaint   Patient presents with     Phimosis     Patient is here for two week post-op check Post Op Inniswoldolga Yao/ SOLANGE Cunningham LPN

## 2021-06-23 NOTE — LETTER
6/23/2021       RE: Shilo Menchaca  [address]  [place] [address]     Dear Colleague,    Thank you for referring your patient, Shilo Menchaca, to the [facility]. Please see a copy of my visit note below.    Urology Consult History and Physical  [place]   Name: Shilo Menchaca    MRN: [id number]   YOB: 1940       We were asked to see Shilo Menchaca at the request of Dr. Malhotra for evaluation and treatment of hospital follow-up, urinary retention, phimosis status post dorsal slit, hydronephrosis status post right ureteral stent placement.        Chief Complaint:   hospital follow-up, urinary retention, phimosis status post dorsal slit, hydronephrosis status post right ureteral stent placement.    History is obtained from the patient, his wife, and the medical record            History of Present Illness:   Shilo Menchaca is a 81 year old male with significant past medical history of hypertension, a history of BPH with an episode of retention about 20 years ago for which she has been on Avodart for the last 20 years.  He was recently hospitalized at [facility] with a UTI.  He underwent an outpatient CT on 6/4/2021 which demonstrated a significantly distended bladder, bladder stones, and right hydroureteronephrosis with no clear evidence of obstruction.  He was then admitted through the emergency room on 6/5/2021 and noted to have significant leukocytosis to 17.4 and urinalysis concerning for infection.  Alonzo catheter placement was complicated by his significant phimosis which required bedside cystoscopy and placement of a catheter over a wire.  A CT urogram was then obtained on 6/7/2021 which demonstrated persistent right hydronephrosis and an incidental pulmonary embolism and he was started on anticoagulation.  On 6/9/2021 he underwent a dorsal slit of his phimosis, cystoscopy and right retrograde  pyelogram, diagnostic ureteroscopy, and right ureteral stent placement.  He returns today for a trial of void.             Past Medical History:     Past Medical History:   Diagnosis Date     Cancer (H)     skin     Hypertension             Past Surgical History:     Past Surgical History:   Procedure Laterality Date     CIRCUMCISION N/A 6/9/2021    Procedure: DORSAL SLIT;  Surgeon: Shadi Man MD;  Location:  OR     COMBINED CYSTOSCOPY, RETROGRADES, EXCHANGE STENT URETER(S) Right 6/9/2021    Procedure: CYSTOSCOPY, WITH RIGHT RETROGRADE PYELOGRAM, RIGHT DIAGNOSTIC  URETEROSCOPY  AND URETERAL STENT REPLACEMENT AND PENILE BLOCK;  Surgeon: Shadi Man MD;  Location:  OR     CYSTOSCOPY       ENT SURGERY       HERNIA REPAIR              Social History:     Social History     Tobacco Use     Smoking status: Former Smoker     Types: Cigars     Smokeless tobacco: Never Used   Substance Use Topics     Alcohol use: Yes     Frequency: Monthly or less     Drinks per session: 1 or 2     Binge frequency: Never     Comment: 1 glass of wine per week       History   Smoking Status     Former Smoker     Types: Cigars   Smokeless Tobacco     Never Used            Family History:   No family history on file.           Allergies:     Allergies   Allergen Reactions     Azithromycin      PN: LW Reaction: UNKNOWN            Medications:     Current Outpatient Medications   Medication Sig     amLODIPine-benazepril (LOTREL) 5-10 MG capsule Take 1 capsule by mouth daily     bacitracin-neomycin-polymyxin (NEOSPORIN) 400-5-5000 external ointment Apply to the effected area 2-3 tikes a day     cetirizine (ZYRTEC) 10 MG tablet Take 10 mg by mouth daily     dutasteride (AVODART) 0.5 MG capsule Take 0.5 mg by mouth daily     miconazole (MICATIN) 2 % external powder Apply topically 2 times daily     Multiple Vitamins-Minerals (PRESERVISION AREDS) CAPS Take by mouth 2 times daily     multivitamin, therapeutic (THERA-VIT) TABS tablet Take 1  "tablet by mouth daily     Rivaroxaban ANTICOAGULANT 15 & 20 MG TBPK 15 mg po BID for 21 days then 20 mg po daily .     tamsulosin (FLOMAX) 0.4 MG capsule Take 1 capsule (0.4 mg) by mouth daily     torsemide (DEMADEX) 10 MG tablet Take 10 mg by mouth daily     triamcinolone (KENALOG) 0.1 % external ointment      vitamin D3 (CHOLECALCIFEROL) 10 MCG (400 UNIT) capsule Take 1 capsule by mouth daily     Zinc Gluconate 15 MG TABS Take by mouth daily      aspirin (ASA) 81 MG EC tablet Take 81 mg by mouth     No current facility-administered medications for this visit.              Review of Systems:     Skin: negative  Eyes: negative  Ears/Nose/Throat: negative  Respiratory: No shortness of breath, dyspnea on exertion, cough, or hemoptysis  Cardiovascular: negative  Gastrointestinal: negative  Genitourinary: as above  Musculoskeletal: negative  Neurologic: negative  Psychiatric: negative  Hematologic/Lymphatic/Immunologic: negative  Endocrine: negative          Physical Exam:     Patient Vitals for the past 24 hrs:   Height Weight   06/23/21 1311 1.676 m (5' 6\") 90.7 kg (200 lb)     Body mass index is 32.28 kg/m .     General: age-appropriate appearing male in NAD  HEENT: Head AT/NC, EOMI, CN Grossly intact  Lungs: no respiratory distress, or pursed lip breathing  Heart: No obvious jugular venous distension present  Back: no bony midline tenderness, no CVAT bilaterally.  Abdomen: soft, obesely-distended, non-tender. No organomegaly  : Phallus with well-healing dorsal slit incision with no evidence of infection or hematoma, mild edema of the penile skin, Alonzo catheter draining clear.   Lymph: no palpable inguinal lymphadenopathy.  LE: no edema.   Musculoskeltal: extremities normal, no peripheral edema  Skin: no suspicious lesions or rashes  Neuro: Alert, oriented, speech and mentation normal;  moving all 4 extremities equally.  Psych: affect and mood normal          Data:   All laboratory data reviewed:    DANIELE " RESULTS:  Recent Labs   Lab Test 06/04/21  2357   COLOR Light Brown   APPEARANCE Cloudy   URINEGLC Negative   URINEBILI Negative   URINEKETONE Negative   SG 1.011   UBLD Large*   URINEPH 6.5   PROTEIN 50*   NITRITE Negative   LEUKEST Large*   RBCU >182*   WBCU >182*     Lab Results   Component Value Date    CR 1.00 06/08/2021      IMAGING:  All pertinent imaging reviewed:    All imaging studies reviewed by me.  I personally reviewed these imaging films.  A formal report from radiology will follow.    CT UROGRAM 6/7/21:  IMPRESSION:   1.  Filling defects in the partly visualized right upper lobe  pulmonary artery and segmental arteries of the right lower lobe  compatible with pulmonary emboli.  2.  Right hydroureteronephrosis to the level of the ureterovesicular  junction where there is a cluster of calcifications either in the  bladder lumen, urinary bladder diverticulum or urinary bladder wall.  3.  Mild linear enhancement of the right ureter is likely  inflammatory. The right ureter is not opacified with contrast which  limits evaluation for filling defects.  4.  Few punctate nonobstructing left renal calculi.  5.  The urinary bladder is decompressed with a Alonzo catheter with  marked urinary bladder wall thickening. This limits evaluation for  focal masses by CT. Cystoscopic correlation should be considered as  clinically indicated.  6.  Indeterminate mildly enlarged right perivesical/external iliac  lymph node. This is either reactive or metastatic.         Impression and Plan:   Impression:   81-year-old man with recent hospitalization for urinary retention and urinary tract infection with significant phimosis and right hydroureteronephrosis now status post a dorsal slit and a right ureteral stent placement on 6/9/2021.  Hospital course complicated by an incidental finding of a pulmonary embolism for which she is now on anticoagulation      Plan:   Longstanding BPH with bladder outlet obstruction  -We discussed  "the pathophysiology of the bladder and the prostate and the changes associated with development of BPH and LUTS  -He has been on Avodart for the last 20 years  -He was started on tamsulosin 0.4 mg daily and will continue on this medication  -We discussed the finding of bladder stones indicates a long history of bladder outlet obstruction  -His cystoscopy on 6/9/2021 was notable for \"significant trilobar prostatic hypertrophy with a fairly large median lobe, with numerous bladder stones surrounding an edematous right ureteral orifice.\"  -We discussed that trial of void is reasonable today and that further work-up for possible bladder outlet procedure will need to wait for 3 months of anticoagulation for his PE  -Trial of void was successful today    Right hydroureteronephrosis  -There is no clear evidence of a stone within the distal ureter and he was noted to have a tortuous ureter  -We will plan for return in about 1 month for ureteral stent removal if he continues to do well     Thank you for the kind consultation.    Chart documentation with Dragon Voice recognition Software. Although reviewed after completion, some words and grammatical errors may remain.     Time spent: 40 minutes spent on the date of the encounter doing chart review, history and exam, documentation and further activities as noted above.    Shadi Man MD   Urology  [facility]  [facility] Phone: [phone]  [facility] Phone: [phone]         "

## 2021-06-23 NOTE — NURSING NOTE
Patient presents to clinic for a trial of void per MD order. Patient properly identified and procedure explained to patient. Patient's leg-bag emptied, 250cc's clear-yellow urine drained from patient's catheter.Catheter detached from leg-bag and sterile cysto tubing inserted into catheter opening. Via gravity 200cc's sterile water was gently instilled with brief pauses into bladder. Patient tolerated fairly well and then catheter balloon was completely deflated.  Alonzo catheter was removed from bladder. Patient was able to successfully void 125cc and leaked some following procedure. Patient was instructed to drink plenty of water, (At least 6-8 glasses daily). Patient instructed to call office during daytime hours, otherwise go to ER if unable to urinate/difficulty emptying. Per MD-Okay to go home without catheter.     Dinorah Cunningham LPN

## 2021-07-20 ENCOUNTER — TELEPHONE (OUTPATIENT)
Dept: UROLOGY | Facility: CLINIC | Age: 81
End: 2021-07-20

## 2021-07-20 NOTE — TELEPHONE ENCOUNTER
Urology pt of Dr. Man s/p dorsal slit of phimosis, RIGHT ureteroscopy diagnostic, and RIGHT stent placement on 6/9/21. Shilo was seen in clinic for follow-up 6/23. Stent removal scheduled for 8/4.  Plan is for further work-up for possible bladder outlet procedure but will need to wait for 3 months of anticoagulation for his PE. Blood thinner is being managed by PCP.  Spoke with pt's wife, Aziza, re: bleeding with stent in place, his anticoagulants, and the possible procedure as above. Advised her to contact Can's PCP re: blood thinner and monitoring this. Encouraged increased water intake. She expressed understanding and will contact clinic with further questions or concerns.  NAEL Ochoa RN       Health Call Center    Phone Message    May a detailed message be left on voicemail: yes     Reason for Call: Symptoms or Concerns     If patient has red-flag symptoms, warm transfer to triage line    Current symptom or concern: PT Spouse Aziza called on behalf of PT Can. Per Aziza PT Can started having intermittent clots in urine about 10 days to 2 weeks ago, and gradually has become worse. PT currently is experiencing cloudy urine, blood clots in urine, blood in urine all the time. Per Aziza PT Can would like to be seen in the next couple of days if possible. Per protocol referring to clinic for same day or next day scheduling. Pls call Aziza to schedule [phone].    Symptoms have been present for:  2 week(s)    Has patient previously been seen for this? Yes    By : Dr. Man    Date: 7/20/21    Are there any new or worsening symptoms? Yes: Blood clots in urine, cloudy urine.      Action Taken: Other: [facility] URO    Travel Screening: Not Applicable

## 2021-07-20 NOTE — TELEPHONE ENCOUNTER
Returned call to Aziza. Left message requesting call back. Also will try again to reach her later this morning.  NAEL Ochoa RN

## 2021-07-29 ENCOUNTER — TELEPHONE (OUTPATIENT)
Dept: UROLOGY | Facility: CLINIC | Age: 81
End: 2021-07-29

## 2021-07-29 NOTE — TELEPHONE ENCOUNTER
Patient's wife called nurse line with patient. They report that patient was having a level 5 out of ten pain when he passed a unsizeable blood clot/(s). Patient and his wife were instructed to monitor urine for more and if this reoccurs to call back. Also instructed to call during office hours if unable to urinate. If unable to urinate during non-office hours. Patient and his wife understand they are to go to ER. Patient encouraged to drink plenty of water for now and to monitor. Call back if questions/concerns.       Dinorah Cunningham LPN

## 2021-08-04 ENCOUNTER — OFFICE VISIT (OUTPATIENT)
Dept: UROLOGY | Facility: CLINIC | Age: 81
End: 2021-08-04
Payer: MEDICARE

## 2021-08-04 VITALS — HEIGHT: 66 IN | WEIGHT: 193 LBS | BODY MASS INDEX: 31.02 KG/M2

## 2021-08-04 DIAGNOSIS — R82.90 CLOUDY URINE: ICD-10-CM

## 2021-08-04 DIAGNOSIS — N40.1 BPH WITH OBSTRUCTION/LOWER URINARY TRACT SYMPTOMS: Primary | ICD-10-CM

## 2021-08-04 DIAGNOSIS — N13.8 BPH WITH OBSTRUCTION/LOWER URINARY TRACT SYMPTOMS: Primary | ICD-10-CM

## 2021-08-04 DIAGNOSIS — N13.30 HYDRONEPHROSIS OF RIGHT KIDNEY: ICD-10-CM

## 2021-08-04 PROCEDURE — 87086 URINE CULTURE/COLONY COUNT: CPT | Performed by: UROLOGY

## 2021-08-04 PROCEDURE — 99215 OFFICE O/P EST HI 40 MIN: CPT | Mod: 25 | Performed by: UROLOGY

## 2021-08-04 PROCEDURE — 52310 CYSTOSCOPY AND TREATMENT: CPT | Performed by: UROLOGY

## 2021-08-04 RX ORDER — LIDOCAINE HYDROCHLORIDE 20 MG/ML
JELLY TOPICAL ONCE
Status: COMPLETED | OUTPATIENT
Start: 2021-08-04 | End: 2021-08-04

## 2021-08-04 RX ADMIN — LIDOCAINE HYDROCHLORIDE: 20 JELLY TOPICAL at 14:19

## 2021-08-04 ASSESSMENT — MIFFLIN-ST. JEOR: SCORE: 1513.19

## 2021-08-04 ASSESSMENT — PAIN SCALES - GENERAL: PAINLEVEL: NO PAIN (0)

## 2021-08-04 NOTE — PROCEDURES
CYSTOSCOPY AND URETERAL STENT REMOVAL PROCEDURE NOTE:    Shilo Menchaca is a 81 year old male  who presents with ureteral stent  for cystoscopy and ureteral stent removal.    Pt ID verified with patient: Yes     Procedure verified with patient: Yes     Procedure confirmed with physician and support staff: Yes     Consent form confirmed with physician and support staff.    Sign In  History and Physical Exam reviewed .  Informed Consent Discussed: Yes   Sign in Communication: Yes   Time Out:  Team Confirms the Correct Patient, Correct Procedure; Yes , Correct Site and Site Marking, Correct Position (if applicable).    Affirmation of Time Out: Yes   Sign Out:  Sign Out Discussion: Yes   Shilo Menchaca is a 81 year old male with an indwelling ureteral stent in need of removal.    CYSTOSCOPY PROCEDURE:  After sterile preparation and draping of the patient,  a 17-Estonian flexible cystoscope was introduced via the urethra.  It was passed without difficulty into the bladder.  The urethra was open without evidence of stricture. He has significantly enlarged prostate. Bladder with large volume of cloudy urine. Bladder drained of ~600cc. The ureteral orifices were orthotopic.  The double J stent was seen coming out the right side.  It was grasped with an alligator forceps and extracted intact without difficulty.  The patient tolerated the procedure well    A/P Successful stent removal  - See clinic note    Watch for any new onset fevers, signs of UTI.  May expect some pain after removal.  If this is severe, or last many hours, you may need to return for replacement of stent.    Shadi Man MD   Urology  [facility]

## 2021-08-04 NOTE — PATIENT INSTRUCTIONS
"AFTER YOUR CYSTOSCOPY  ?  ?  You have just completed a cystoscopy, or \"cysto\", which allowed your physician to learn more about your bladder (or to remove a stent placed after surgery). We suggest that you continue to avoid caffeine, fruit juice, and alcohol for the next 24 hours, however, you are encouraged to return to your normal activities.  ?  ?  A few things that are considered normal after your cystoscopy:  ?  * small amount of bleeding (or spotting) that clears within the next 24 hours  ?  * slight burning sensation with urination  ?  * sensation of needing to void (urinate) more frequently  ?  * the feeling of \"air\" in your urine  ?  * mild discomfort that is relieved with Tylenol    * bladder spasms  ?  ?  ?  Please contact our office promptly if you:  ?  * develop a fever above 101 degrees  ?  * are unable to urinate  ?  * develop bright red blood that does not stop  ?  * experience severe pain or swelling  ?  ?  ?  And of course, please contact our office with any concerns or questions [phone]  ?    AFTER YOUR CYSTOSCOPY        You have just completed a cystoscopy, or \"cysto\", which allowed your physician to learn more about your bladder (or to remove a stent placed after surgery). We suggest that you continue to avoid caffeine, fruit juice, and alcohol for the next 24 hours, however, you are encouraged to return to your normal activities.         A few things that are considered normal after your cystoscopy:     * Small amount of bleeding (or spotting) that clears within the next 24 hours     * Slight burning sensation with urination     * Sensation to of needing to avoid more frequently     * The feeling of \"air\" in your urine     * Mild discomfort that is relieved with Tylenol        Please contact our office promptly if you:     * Develop a fever above 101 degrees     * Are unable to urinate     * Develop bright red blood that does not stop     * Severe pain or swelling         Please contact " our office with any concerns or questions @[facility].

## 2021-08-04 NOTE — NURSING NOTE
Prior to the start of the procedure and with procedural staff participation, I verbally confirmed the patient s identity using two indicators, relevant allergies, that the procedure was appropriate and matched the consent or emergent situation, and that the correct equipment/implants were available. Immediately prior to starting the procedure I conducted the Time Out with the procedural staff and re-confirmed the patient s name, procedure, and site/side. I have wiped the patient off with the povidone-Iodine solution, draped them,  used Lidocaine hydrochloride jelly, and instilled sterile water into the bladder. (The Joint Commission universal protocol was followed.)  Yes    Sedation (Moderate or Deep): None  5mL 2% lidocaine hydrochloride Urojet instilled into urethra.    NDC# [address]-4  Lot #: [id number]  Expiration Date:  2-23  Janna King LPN

## 2021-08-04 NOTE — PROGRESS NOTES
"[facility]  CHIEF COMPLAINT   It was my pleasure to see Shilo Menchaca who is a 81 year old male for follow-up of Retention, right hydronephrosis.      HPI  Shilo Menchaca is a 81 year old male with significant past medical history of hypertension, a history of BPH with an episode of retention about 20 years ago for which she has been on Avodart for the last 20 years.  He was recently hospitalized at [facility] with a UTI.  He underwent an outpatient CT on 6/4/2021 which demonstrated a significantly distended bladder, bladder stones, and right hydroureteronephrosis with no clear evidence of obstruction.  He was then admitted through the emergency room on 6/5/2021 and noted to have significant leukocytosis to 17.4 and urinalysis concerning for infection.  Alonzo catheter placement was complicated by his significant phimosis which required bedside cystoscopy and placement of a catheter over a wire.  A CT urogram was then obtained on 6/7/2021 which demonstrated persistent right hydronephrosis and an incidental pulmonary embolism and he was started on anticoagulation.  On 6/9/2021 he underwent a dorsal slit of his phimosis, cystoscopy and right retrograde pyelogram, diagnostic ureteroscopy, and right ureteral stent placement.  He returns today for a trial of void.    TODAY 8/4/21:  He returns today for follow-up for ureteral stent removal  He notes that he has been having significant urinary frequency with small volume voids and urine leakage especially overnight  He notes intermittent sensation of incomplete bladder emptying    PHYSICAL EXAM  Patient is a 81 year old  male   Vitals: Height 1.676 m (5' 6\"), weight 87.5 kg (193 lb).  Body mass index is 31.15 kg/m .  General Appearance Adult:   Alert, no acute distress, oriented  HENT: throat/mouth:normal, good dentition  Lungs: no respiratory distress, or pursed lip breathing  Heart: No obvious jugular venous distension present  Abdomen: soft, nontender, no " organomegaly or masses  Musculoskeltal: extremities normal, no peripheral edema  Skin: no suspicious lesions or rashes  Neuro: Alert, oriented, speech and mentation normal  Psych: affect and mood normal  : Well-healed dorsal slit    Creatinine   Date Value Ref Range Status   06/08/2021 1.00 0.66 - 1.25 mg/dL Final      IMAGING  All pertinent imaging reviewed:    All imaging studies reviewed by me.  I personally reviewed these imaging films.  A formal report from radiology will follow.    CT UROGRAM 6/7/21:  FINDINGS:   LOWER CHEST: Mild bibasilar dependent atelectasis. Filling defects in  the segmental and subsegmental arteries of the right lower lobe,  compatible with pulmonary emboli (series 6, image 8, 18 and 25 for  example). Partly visualized filling defect in the right upper lobe  main pulmonary artery (series 6, image 2) also consistent with  pulmonary emboli.     HEPATOBILIARY: Subcentimeter hypodense lesions in the liver are too  small to characterize, likely cysts and hemangiomas. In the inferior  medial left hepatic lobe, 2.5 cm peripherally calcified hypodense  lesion, likely a peripherally calcified cyst. The gallbladder is not  visualized, and may be surgically absent. No biliary ductal  dilatation.     PANCREAS: Normal.     SPLEEN: Normal.     ADRENAL GLANDS: Normal.     KIDNEYS/BLADDER:   RIGHT KIDNEY: Moderate hydroureteronephrosis to the level of the  ureterovesicular junction. Cluster of calcifications at the right  ureterovesicular junction measuring 2.2 x 1.6 cm. Subcentimeter  hypodense renal cortical lesions are likely small cysts. No delayed  nephrogram. No suspicious renal masses. Mild linear urothelial  enhancement in the ureter is likely inflammatory. No filling defects  in the right kidney. The right ureter is not opacified.     LEFT KIDNEY: Few punctate nonobstructing calculi in the left kidney  measuring up to 0.4 cm. No hydronephrosis, perinephric stranding or  hydroureter.  Subcentimeter hypodense lesion in the left kidney is too  small to adequately characterize but likely a cyst. No suspicious  renal masses. No suspicious urothelial enhancement or filling defect  in the right renal collecting system or ureter.     BLADDER: Urinary bladder is decompressed with a Alonzo catheter. A  cluster of calcifications in the right ureterovesicular junction as  discussed. Few other calcification in the bladder lumen versus wall.  Circumferential urinary bladder wall thickening, exaggerated by the  bladder being decompressed.     BOWEL: Diverticulosis in the colon. No acute inflammatory change. No  obstruction.      PELVIC ORGANS: Mild prostatomegaly and intraprostatic calcifications.     ADDITIONAL FINDINGS: Indeterminate right perivesicle/external iliac  lymph node measuring 1.7 x 1.0 cm. No other lymphadenopathy in the  abdomen and pelvis. Moderate-sized fat-containing left inguinal hernia  and small fat-containing abdominal hernia. No ascites.     MUSCULOSKELETAL: Degenerative changes in the spine. No suspicious  lesions in the bones.                                                                      IMPRESSION:   1.  Filling defects in the partly visualized right upper lobe  pulmonary artery and segmental arteries of the right lower lobe  compatible with pulmonary emboli.  2.  Right hydroureteronephrosis to the level of the ureterovesicular  junction where there is a cluster of calcifications either in the  bladder lumen, urinary bladder diverticulum or urinary bladder wall.  3.  Mild linear enhancement of the right ureter is likely  inflammatory. The right ureter is not opacified with contrast which  limits evaluation for filling defects.  4.  Few punctate nonobstructing left renal calculi.  5.  The urinary bladder is decompressed with a Alonzo catheter with  marked urinary bladder wall thickening. This limits evaluation for  focal masses by CT. Cystoscopic correlation should be considered  "as  clinically indicated.  6.  Indeterminate mildly enlarged right perivesical/external iliac  lymph node. This is either reactive or metastatic.    ASSESSMENT and PLAN  81-year-old man with recent hospitalization for urinary retention and urinary tract infection with significant phimosis and right hydroureteronephrosis now status post a dorsal slit and a right ureteral stent placement on 6/9/2021.  Hospital course complicated by an incidental finding of a pulmonary embolism for which she is now on anticoagulation.     Urinary retention  -He had previously had a successful trial of void  -Cystoscopy today with removal of roughly 600 cc cloudy urine  -Following the cystoscopy he was able to void with bladder scan of roughly 200  -Will plan for follow-up for RN visit next week with a bladder scan and possible Alonzo placement  -His cystoscopy on 6/9/2021 was notable for \"significant trilobar prostatic hypertrophy with a fairly large median lobe, with numerous bladder stones surrounding an edematous right ureteral orifice.\"  -He will continue on tamsulosin and Avodart  -He is meeting with anticoagulation tomorrow to discuss duration of his Xarelto  -We discussed that a bladder outlet procedure is possible with a greenlight PVP while on Xarelto, however this greatly increases the risk of bleeding and would require hospitalization postoperatively for monitoring  -Pending the results of his bladder scan next week, and the input from his anticoagulation team will discuss timing of bladder outlet procedures  -He will finish his course of Keflex    Right hydroureteronephrosis  -Right ureteral stent removed today  -We will plan for repeat CT urogram in 1 month to assess for any return of hydronephrosis  -At his diagnostic ureteroscopy there is no evidence of ureteral obstruction    Time spent: 40 minutes spent on the date of the encounter doing chart review, history and exam, documentation and further activities as noted " above.  This was in addition to cystoscopy time    Shadi Man MD   Urology  [facility]  [facility] Phone: [phone]  [facility] Phone: [phone]

## 2021-08-04 NOTE — LETTER
"8/4/2021       RE: Shilo Menchaca  [address]  [place] [address]     Dear Colleague,    Thank you for referring your patient, Shilo Menchaca, to the [facility] at [facility]. Please see a copy of my visit note below.    [facility]  CHIEF COMPLAINT   It was my pleasure to see Shilo Menchaca who is a 81 year old male for follow-up of Retention, right hydronephrosis.      HPI  Shilo Menchaca is a 81 year old male with significant past medical history of hypertension, a history of BPH with an episode of retention about 20 years ago for which she has been on Avodart for the last 20 years.  He was recently hospitalized at [facility] with a UTI.  He underwent an outpatient CT on 6/4/2021 which demonstrated a significantly distended bladder, bladder stones, and right hydroureteronephrosis with no clear evidence of obstruction.  He was then admitted through the emergency room on 6/5/2021 and noted to have significant leukocytosis to 17.4 and urinalysis concerning for infection.  Alonzo catheter placement was complicated by his significant phimosis which required bedside cystoscopy and placement of a catheter over a wire.  A CT urogram was then obtained on 6/7/2021 which demonstrated persistent right hydronephrosis and an incidental pulmonary embolism and he was started on anticoagulation.  On 6/9/2021 he underwent a dorsal slit of his phimosis, cystoscopy and right retrograde pyelogram, diagnostic ureteroscopy, and right ureteral stent placement.  He returns today for a trial of void.    TODAY 8/4/21:  He returns today for follow-up for ureteral stent removal  He notes that he has been having significant urinary frequency with small volume voids and urine leakage especially overnight  He notes intermittent sensation of incomplete bladder emptying    PHYSICAL EXAM  Patient is a 81 year old  male   Vitals: Height 1.676 m (5' 6\"), weight 87.5 " kg (193 lb).  Body mass index is 31.15 kg/m .  General Appearance Adult:   Alert, no acute distress, oriented  HENT: throat/mouth:normal, good dentition  Lungs: no respiratory distress, or pursed lip breathing  Heart: No obvious jugular venous distension present  Abdomen: soft, nontender, no organomegaly or masses  Musculoskeltal: extremities normal, no peripheral edema  Skin: no suspicious lesions or rashes  Neuro: Alert, oriented, speech and mentation normal  Psych: affect and mood normal  : Well-healed dorsal slit    Creatinine   Date Value Ref Range Status   06/08/2021 1.00 0.66 - 1.25 mg/dL Final      IMAGING  All pertinent imaging reviewed:    All imaging studies reviewed by me.  I personally reviewed these imaging films.  A formal report from radiology will follow.    CT UROGRAM 6/7/21:  FINDINGS:   LOWER CHEST: Mild bibasilar dependent atelectasis. Filling defects in  the segmental and subsegmental arteries of the right lower lobe,  compatible with pulmonary emboli (series 6, image 8, 18 and 25 for  example). Partly visualized filling defect in the right upper lobe  main pulmonary artery (series 6, image 2) also consistent with  pulmonary emboli.     HEPATOBILIARY: Subcentimeter hypodense lesions in the liver are too  small to characterize, likely cysts and hemangiomas. In the inferior  medial left hepatic lobe, 2.5 cm peripherally calcified hypodense  lesion, likely a peripherally calcified cyst. The gallbladder is not  visualized, and may be surgically absent. No biliary ductal  dilatation.     PANCREAS: Normal.     SPLEEN: Normal.     ADRENAL GLANDS: Normal.     KIDNEYS/BLADDER:   RIGHT KIDNEY: Moderate hydroureteronephrosis to the level of the  ureterovesicular junction. Cluster of calcifications at the right  ureterovesicular junction measuring 2.2 x 1.6 cm. Subcentimeter  hypodense renal cortical lesions are likely small cysts. No delayed  nephrogram. No suspicious renal masses. Mild linear  urothelial  enhancement in the ureter is likely inflammatory. No filling defects  in the right kidney. The right ureter is not opacified.     LEFT KIDNEY: Few punctate nonobstructing calculi in the left kidney  measuring up to 0.4 cm. No hydronephrosis, perinephric stranding or  hydroureter. Subcentimeter hypodense lesion in the left kidney is too  small to adequately characterize but likely a cyst. No suspicious  renal masses. No suspicious urothelial enhancement or filling defect  in the right renal collecting system or ureter.     BLADDER: Urinary bladder is decompressed with a Alonzo catheter. A  cluster of calcifications in the right ureterovesicular junction as  discussed. Few other calcification in the bladder lumen versus wall.  Circumferential urinary bladder wall thickening, exaggerated by the  bladder being decompressed.     BOWEL: Diverticulosis in the colon. No acute inflammatory change. No  obstruction.      PELVIC ORGANS: Mild prostatomegaly and intraprostatic calcifications.     ADDITIONAL FINDINGS: Indeterminate right perivesicle/external iliac  lymph node measuring 1.7 x 1.0 cm. No other lymphadenopathy in the  abdomen and pelvis. Moderate-sized fat-containing left inguinal hernia  and small fat-containing abdominal hernia. No ascites.     MUSCULOSKELETAL: Degenerative changes in the spine. No suspicious  lesions in the bones.                                                                      IMPRESSION:   1.  Filling defects in the partly visualized right upper lobe  pulmonary artery and segmental arteries of the right lower lobe  compatible with pulmonary emboli.  2.  Right hydroureteronephrosis to the level of the ureterovesicular  junction where there is a cluster of calcifications either in the  bladder lumen, urinary bladder diverticulum or urinary bladder wall.  3.  Mild linear enhancement of the right ureter is likely  inflammatory. The right ureter is not opacified with contrast  "which  limits evaluation for filling defects.  4.  Few punctate nonobstructing left renal calculi.  5.  The urinary bladder is decompressed with a Alonzo catheter with  marked urinary bladder wall thickening. This limits evaluation for  focal masses by CT. Cystoscopic correlation should be considered as  clinically indicated.  6.  Indeterminate mildly enlarged right perivesical/external iliac  lymph node. This is either reactive or metastatic.    ASSESSMENT and PLAN  81-year-old man with recent hospitalization for urinary retention and urinary tract infection with significant phimosis and right hydroureteronephrosis now status post a dorsal slit and a right ureteral stent placement on 6/9/2021.  Hospital course complicated by an incidental finding of a pulmonary embolism for which she is now on anticoagulation.     Urinary retention  -He had previously had a successful trial of void  -Cystoscopy today with removal of roughly 600 cc cloudy urine  -Following the cystoscopy he was able to void with bladder scan of roughly 200  -Will plan for follow-up for RN visit next week with a bladder scan and possible Alonzo placement  -His cystoscopy on 6/9/2021 was notable for \"significant trilobar prostatic hypertrophy with a fairly large median lobe, with numerous bladder stones surrounding an edematous right ureteral orifice.\"  -He will continue on tamsulosin and Avodart  -He is meeting with anticoagulation tomorrow to discuss duration of his Xarelto  -We discussed that a bladder outlet procedure is possible with a greenlight PVP while on Xarelto, however this greatly increases the risk of bleeding and would require hospitalization postoperatively for monitoring  -Pending the results of his bladder scan next week, and the input from his anticoagulation team will discuss timing of bladder outlet procedures  -He will finish his course of Keflex    Right hydroureteronephrosis  -Right ureteral stent removed today  -We will plan for " repeat CT urogram in 1 month to assess for any return of hydronephrosis  -At his diagnostic ureteroscopy there is no evidence of ureteral obstruction    Time spent: 40 minutes spent on the date of the encounter doing chart review, history and exam, documentation and further activities as noted above.  This was in addition to cystoscopy time    Shadi Man MD   Urology  [facility]  [facility] Phone: [phone]  [facility] Phone: [phone]

## 2021-08-05 ENCOUNTER — TRANSFERRED RECORDS (OUTPATIENT)
Dept: HEALTH INFORMATION MANAGEMENT | Facility: CLINIC | Age: 81
End: 2021-08-05

## 2021-08-05 LAB — BACTERIA UR CULT: NO GROWTH

## 2021-08-06 ENCOUNTER — APPOINTMENT (OUTPATIENT)
Dept: GENERAL RADIOLOGY | Facility: CLINIC | Age: 81
DRG: 872 | End: 2021-08-06
Attending: EMERGENCY MEDICINE
Payer: MEDICARE

## 2021-08-06 ENCOUNTER — HOSPITAL ENCOUNTER (INPATIENT)
Facility: CLINIC | Age: 81
LOS: 3 days | Discharge: HOME HEALTH | DRG: 872 | End: 2021-08-09
Attending: EMERGENCY MEDICINE | Admitting: INTERNAL MEDICINE
Payer: MEDICARE

## 2021-08-06 ENCOUNTER — NURSE TRIAGE (OUTPATIENT)
Dept: NURSING | Facility: CLINIC | Age: 81
End: 2021-08-06

## 2021-08-06 ENCOUNTER — APPOINTMENT (OUTPATIENT)
Dept: CT IMAGING | Facility: CLINIC | Age: 81
DRG: 872 | End: 2021-08-06
Attending: EMERGENCY MEDICINE
Payer: MEDICARE

## 2021-08-06 DIAGNOSIS — R33.9 URINARY RETENTION: ICD-10-CM

## 2021-08-06 DIAGNOSIS — N17.9 ACUTE KIDNEY INJURY (H): ICD-10-CM

## 2021-08-06 DIAGNOSIS — N13.30 HYDRONEPHROSIS OF RIGHT KIDNEY: ICD-10-CM

## 2021-08-06 DIAGNOSIS — N30.01 ACUTE CYSTITIS WITH HEMATURIA: ICD-10-CM

## 2021-08-06 LAB
ABO/RH(D): NORMAL
ALBUMIN SERPL-MCNC: 2.3 G/DL (ref 3.4–5)
ALBUMIN UR-MCNC: 100 MG/DL
ALP SERPL-CCNC: 178 U/L (ref 40–150)
ALT SERPL W P-5'-P-CCNC: 72 U/L (ref 0–70)
ANION GAP SERPL CALCULATED.3IONS-SCNC: 5 MMOL/L (ref 3–14)
ANTIBODY SCREEN: NEGATIVE
APPEARANCE UR: ABNORMAL
AST SERPL W P-5'-P-CCNC: 37 U/L (ref 0–45)
ATRIAL RATE - MUSE: 127 BPM
BACTERIA #/AREA URNS HPF: ABNORMAL /HPF
BASOPHILS # BLD AUTO: 0 10E3/UL (ref 0–0.2)
BASOPHILS NFR BLD AUTO: 0 %
BILIRUB SERPL-MCNC: 0.3 MG/DL (ref 0.2–1.3)
BILIRUB UR QL STRIP: NEGATIVE
BUN SERPL-MCNC: 41 MG/DL (ref 7–30)
CALCIUM SERPL-MCNC: 8.4 MG/DL (ref 8.5–10.1)
CHLORIDE BLD-SCNC: 98 MMOL/L (ref 94–109)
CO2 SERPL-SCNC: 25 MMOL/L (ref 20–32)
COLOR UR AUTO: ABNORMAL
CREAT SERPL-MCNC: 1.66 MG/DL (ref 0.66–1.25)
DIASTOLIC BLOOD PRESSURE - MUSE: NORMAL MMHG
EOSINOPHIL # BLD AUTO: 0.1 10E3/UL (ref 0–0.7)
EOSINOPHIL NFR BLD AUTO: 1 %
ERYTHROCYTE [DISTWIDTH] IN BLOOD BY AUTOMATED COUNT: 13.5 % (ref 10–15)
GFR SERPL CREATININE-BSD FRML MDRD: 38 ML/MIN/1.73M2
GLUCOSE BLD-MCNC: 126 MG/DL (ref 70–99)
GLUCOSE UR STRIP-MCNC: NEGATIVE MG/DL
HCO3 BLDV-SCNC: 28 MMOL/L (ref 21–28)
HCT VFR BLD AUTO: 33.5 % (ref 40–53)
HGB BLD-MCNC: 10.9 G/DL (ref 13.3–17.7)
HGB UR QL STRIP: ABNORMAL
HOLD SPECIMEN: NORMAL
IMM GRANULOCYTES # BLD: 0.1 10E3/UL
IMM GRANULOCYTES NFR BLD: 1 %
INR PPP: 1.87 (ref 0.85–1.15)
INTERPRETATION ECG - MUSE: NORMAL
KETONES UR STRIP-MCNC: NEGATIVE MG/DL
LACTATE BLD-SCNC: 1.4 MMOL/L
LACTATE SERPL-SCNC: 1.2 MMOL/L (ref 0.7–2)
LEUKOCYTE ESTERASE UR QL STRIP: ABNORMAL
LYMPHOCYTES # BLD AUTO: 0.5 10E3/UL (ref 0.8–5.3)
LYMPHOCYTES NFR BLD AUTO: 5 %
MCH RBC QN AUTO: 31 PG (ref 26.5–33)
MCHC RBC AUTO-ENTMCNC: 32.5 G/DL (ref 31.5–36.5)
MCV RBC AUTO: 95 FL (ref 78–100)
MONOCYTES # BLD AUTO: 1.2 10E3/UL (ref 0–1.3)
MONOCYTES NFR BLD AUTO: 10 %
NEUTROPHILS # BLD AUTO: 9.6 10E3/UL (ref 1.6–8.3)
NEUTROPHILS NFR BLD AUTO: 83 %
NITRATE UR QL: NEGATIVE
NRBC # BLD AUTO: 0 10E3/UL
NRBC BLD AUTO-RTO: 0 /100
P AXIS - MUSE: 58 DEGREES
PCO2 BLDV: 38 MM HG (ref 40–50)
PH BLDV: 7.48 [PH] (ref 7.32–7.43)
PH UR STRIP: 6 [PH] (ref 5–7)
PLATELET # BLD AUTO: 306 10E3/UL (ref 150–450)
PO2 BLDV: 20 MM HG (ref 25–47)
POTASSIUM BLD-SCNC: 5 MMOL/L (ref 3.4–5.3)
PR INTERVAL - MUSE: 184 MS
PROT SERPL-MCNC: 6.7 G/DL (ref 6.8–8.8)
QRS DURATION - MUSE: 70 MS
QT - MUSE: 278 MS
QTC - MUSE: 404 MS
R AXIS - MUSE: -20 DEGREES
RBC # BLD AUTO: 3.52 10E6/UL (ref 4.4–5.9)
RBC URINE: 20 /HPF
SAO2 % BLDV: 35 % (ref 94–100)
SARS-COV-2 RNA RESP QL NAA+PROBE: NEGATIVE
SODIUM SERPL-SCNC: 128 MMOL/L (ref 133–144)
SP GR UR STRIP: 1.01 (ref 1–1.03)
SPECIMEN EXPIRATION DATE: NORMAL
SYSTOLIC BLOOD PRESSURE - MUSE: NORMAL MMHG
T AXIS - MUSE: 84 DEGREES
UROBILINOGEN UR STRIP-MCNC: NORMAL MG/DL
VENTRICULAR RATE- MUSE: 127 BPM
WBC # BLD AUTO: 11.5 10E3/UL (ref 4–11)
WBC CLUMPS #/AREA URNS HPF: PRESENT /HPF
WBC URINE: >182 /HPF

## 2021-08-06 PROCEDURE — 258N000003 HC RX IP 258 OP 636: Performed by: INTERNAL MEDICINE

## 2021-08-06 PROCEDURE — 99285 EMERGENCY DEPT VISIT HI MDM: CPT | Mod: 25

## 2021-08-06 PROCEDURE — 87040 BLOOD CULTURE FOR BACTERIA: CPT | Performed by: EMERGENCY MEDICINE

## 2021-08-06 PROCEDURE — 250N000013 HC RX MED GY IP 250 OP 250 PS 637: Performed by: EMERGENCY MEDICINE

## 2021-08-06 PROCEDURE — 51702 INSERT TEMP BLADDER CATH: CPT

## 2021-08-06 PROCEDURE — 120N000001 HC R&B MED SURG/OB

## 2021-08-06 PROCEDURE — 71045 X-RAY EXAM CHEST 1 VIEW: CPT

## 2021-08-06 PROCEDURE — 96367 TX/PROPH/DG ADDL SEQ IV INF: CPT

## 2021-08-06 PROCEDURE — 74176 CT ABD & PELVIS W/O CONTRAST: CPT | Mod: ME

## 2021-08-06 PROCEDURE — 85025 COMPLETE CBC W/AUTO DIFF WBC: CPT | Performed by: EMERGENCY MEDICINE

## 2021-08-06 PROCEDURE — 82374 ASSAY BLOOD CARBON DIOXIDE: CPT | Performed by: EMERGENCY MEDICINE

## 2021-08-06 PROCEDURE — 250N000011 HC RX IP 250 OP 636: Performed by: EMERGENCY MEDICINE

## 2021-08-06 PROCEDURE — 93005 ELECTROCARDIOGRAM TRACING: CPT

## 2021-08-06 PROCEDURE — 81001 URINALYSIS AUTO W/SCOPE: CPT | Performed by: EMERGENCY MEDICINE

## 2021-08-06 PROCEDURE — C9803 HOPD COVID-19 SPEC COLLECT: HCPCS

## 2021-08-06 PROCEDURE — 83605 ASSAY OF LACTIC ACID: CPT | Performed by: EMERGENCY MEDICINE

## 2021-08-06 PROCEDURE — 96361 HYDRATE IV INFUSION ADD-ON: CPT

## 2021-08-06 PROCEDURE — 99223 1ST HOSP IP/OBS HIGH 75: CPT | Mod: AI | Performed by: INTERNAL MEDICINE

## 2021-08-06 PROCEDURE — 250N000009 HC RX 250: Performed by: EMERGENCY MEDICINE

## 2021-08-06 PROCEDURE — 36415 COLL VENOUS BLD VENIPUNCTURE: CPT | Performed by: EMERGENCY MEDICINE

## 2021-08-06 PROCEDURE — 99207 PR NO CHARGE LOS: CPT | Performed by: STUDENT IN AN ORGANIZED HEALTH CARE EDUCATION/TRAINING PROGRAM

## 2021-08-06 PROCEDURE — 82803 BLOOD GASES ANY COMBINATION: CPT

## 2021-08-06 PROCEDURE — 85610 PROTHROMBIN TIME: CPT | Performed by: EMERGENCY MEDICINE

## 2021-08-06 PROCEDURE — 86901 BLOOD TYPING SEROLOGIC RH(D): CPT | Performed by: EMERGENCY MEDICINE

## 2021-08-06 PROCEDURE — 87635 SARS-COV-2 COVID-19 AMP PRB: CPT | Performed by: EMERGENCY MEDICINE

## 2021-08-06 PROCEDURE — 87086 URINE CULTURE/COLONY COUNT: CPT | Performed by: EMERGENCY MEDICINE

## 2021-08-06 PROCEDURE — 96365 THER/PROPH/DIAG IV INF INIT: CPT

## 2021-08-06 PROCEDURE — 99221 1ST HOSP IP/OBS SF/LOW 40: CPT | Performed by: UROLOGY

## 2021-08-06 PROCEDURE — 250N000013 HC RX MED GY IP 250 OP 250 PS 637: Performed by: INTERNAL MEDICINE

## 2021-08-06 PROCEDURE — 51798 US URINE CAPACITY MEASURE: CPT

## 2021-08-06 PROCEDURE — 258N000003 HC RX IP 258 OP 636: Performed by: EMERGENCY MEDICINE

## 2021-08-06 RX ORDER — LIDOCAINE 40 MG/G
CREAM TOPICAL
Status: DISCONTINUED | OUTPATIENT
Start: 2021-08-06 | End: 2021-08-09 | Stop reason: HOSPADM

## 2021-08-06 RX ORDER — ACETAMINOPHEN 650 MG/1
650 SUPPOSITORY RECTAL EVERY 6 HOURS PRN
Status: DISCONTINUED | OUTPATIENT
Start: 2021-08-06 | End: 2021-08-09 | Stop reason: HOSPADM

## 2021-08-06 RX ORDER — PIPERACILLIN SODIUM, TAZOBACTAM SODIUM 3; .375 G/15ML; G/15ML
3.38 INJECTION, POWDER, LYOPHILIZED, FOR SOLUTION INTRAVENOUS EVERY 6 HOURS
Status: DISCONTINUED | OUTPATIENT
Start: 2021-08-06 | End: 2021-08-06

## 2021-08-06 RX ORDER — ACETAMINOPHEN 325 MG/1
650 TABLET ORAL EVERY 6 HOURS PRN
Status: DISCONTINUED | OUTPATIENT
Start: 2021-08-06 | End: 2021-08-09 | Stop reason: HOSPADM

## 2021-08-06 RX ORDER — TAMSULOSIN HYDROCHLORIDE 0.4 MG/1
0.4 CAPSULE ORAL DAILY
Status: DISCONTINUED | OUTPATIENT
Start: 2021-08-06 | End: 2021-08-09 | Stop reason: HOSPADM

## 2021-08-06 RX ORDER — HYDRALAZINE HYDROCHLORIDE 20 MG/ML
10 INJECTION INTRAMUSCULAR; INTRAVENOUS EVERY 4 HOURS PRN
Status: DISCONTINUED | OUTPATIENT
Start: 2021-08-06 | End: 2021-08-09 | Stop reason: HOSPADM

## 2021-08-06 RX ORDER — SODIUM CHLORIDE 9 MG/ML
INJECTION, SOLUTION INTRAVENOUS CONTINUOUS
Status: DISCONTINUED | OUTPATIENT
Start: 2021-08-06 | End: 2021-08-07

## 2021-08-06 RX ORDER — CEPHALEXIN 500 MG/1
500 CAPSULE ORAL 2 TIMES DAILY
Status: ON HOLD | COMMUNITY
End: 2021-08-08

## 2021-08-06 RX ORDER — DUTASTERIDE 0.5 MG/1
0.5 CAPSULE, LIQUID FILLED ORAL DAILY
Status: DISCONTINUED | OUTPATIENT
Start: 2021-08-06 | End: 2021-08-09 | Stop reason: HOSPADM

## 2021-08-06 RX ORDER — PIPERACILLIN SODIUM, TAZOBACTAM SODIUM 2; .25 G/10ML; G/10ML
2.25 INJECTION, POWDER, LYOPHILIZED, FOR SOLUTION INTRAVENOUS EVERY 6 HOURS
Status: DISCONTINUED | OUTPATIENT
Start: 2021-08-06 | End: 2021-08-08

## 2021-08-06 RX ORDER — ONDANSETRON 4 MG/1
4 TABLET, ORALLY DISINTEGRATING ORAL EVERY 6 HOURS PRN
Status: DISCONTINUED | OUTPATIENT
Start: 2021-08-06 | End: 2021-08-09 | Stop reason: HOSPADM

## 2021-08-06 RX ORDER — ACETAMINOPHEN 500 MG
1000 TABLET ORAL ONCE
Status: COMPLETED | OUTPATIENT
Start: 2021-08-06 | End: 2021-08-06

## 2021-08-06 RX ORDER — PIPERACILLIN SODIUM, TAZOBACTAM SODIUM 4; .5 G/20ML; G/20ML
4.5 INJECTION, POWDER, LYOPHILIZED, FOR SOLUTION INTRAVENOUS ONCE
Status: COMPLETED | OUTPATIENT
Start: 2021-08-06 | End: 2021-08-06

## 2021-08-06 RX ORDER — AMLODIPINE BESYLATE 5 MG/1
5 TABLET ORAL DAILY
Status: DISCONTINUED | OUTPATIENT
Start: 2021-08-06 | End: 2021-08-09 | Stop reason: HOSPADM

## 2021-08-06 RX ORDER — ONDANSETRON 2 MG/ML
4 INJECTION INTRAMUSCULAR; INTRAVENOUS EVERY 6 HOURS PRN
Status: DISCONTINUED | OUTPATIENT
Start: 2021-08-06 | End: 2021-08-09 | Stop reason: HOSPADM

## 2021-08-06 RX ORDER — LIDOCAINE HYDROCHLORIDE 20 MG/ML
10 JELLY TOPICAL ONCE
Status: COMPLETED | OUTPATIENT
Start: 2021-08-06 | End: 2021-08-06

## 2021-08-06 RX ADMIN — ACETAMINOPHEN 1000 MG: 500 TABLET, FILM COATED ORAL at 01:26

## 2021-08-06 RX ADMIN — DUTASTERIDE 0.5 MG: 0.5 CAPSULE ORAL at 09:12

## 2021-08-06 RX ADMIN — ACETAMINOPHEN 650 MG: 325 TABLET, FILM COATED ORAL at 22:25

## 2021-08-06 RX ADMIN — SODIUM CHLORIDE: 9 INJECTION, SOLUTION INTRAVENOUS at 18:15

## 2021-08-06 RX ADMIN — SODIUM CHLORIDE 1000 ML: 9 INJECTION, SOLUTION INTRAVENOUS at 01:30

## 2021-08-06 RX ADMIN — SODIUM CHLORIDE 1000 ML: 9 INJECTION, SOLUTION INTRAVENOUS at 06:50

## 2021-08-06 RX ADMIN — PIPERACILLIN SODIUM,TAZOBACTAM SODIUM 2.25 G: 2; .25 INJECTION, POWDER, FOR SOLUTION INTRAVENOUS at 10:58

## 2021-08-06 RX ADMIN — LIDOCAINE HYDROCHLORIDE 10 ML: 20 JELLY TOPICAL at 02:55

## 2021-08-06 RX ADMIN — PIPERACILLIN SODIUM AND TAZOBACTAM SODIUM 4.5 G: 4; .5 INJECTION, POWDER, LYOPHILIZED, FOR SOLUTION INTRAVENOUS at 03:48

## 2021-08-06 RX ADMIN — PIPERACILLIN SODIUM,TAZOBACTAM SODIUM 2.25 G: 2; .25 INJECTION, POWDER, FOR SOLUTION INTRAVENOUS at 23:50

## 2021-08-06 RX ADMIN — SODIUM CHLORIDE 500 ML: 9 INJECTION, SOLUTION INTRAVENOUS at 03:48

## 2021-08-06 RX ADMIN — PIPERACILLIN SODIUM,TAZOBACTAM SODIUM 2.25 G: 2; .25 INJECTION, POWDER, FOR SOLUTION INTRAVENOUS at 18:08

## 2021-08-06 RX ADMIN — TAMSULOSIN HYDROCHLORIDE 0.4 MG: 0.4 CAPSULE ORAL at 09:12

## 2021-08-06 RX ADMIN — AMLODIPINE BESYLATE 5 MG: 5 TABLET ORAL at 09:12

## 2021-08-06 ASSESSMENT — ACTIVITIES OF DAILY LIVING (ADL)
DRESSING/BATHING_MANAGEMENT: WIFE HELPS
CONCENTRATING,_REMEMBERING_OR_MAKING_DECISIONS_DIFFICULTY: NO
DRESSING/BATHING: BATHING DIFFICULTY, ASSISTANCE 1 PERSON;DRESSING DIFFICULTY, ASSISTANCE 1 PERSON
DRESSING/BATHING_DIFFICULTY: YES
ADLS_ACUITY_SCORE: 19

## 2021-08-06 ASSESSMENT — ENCOUNTER SYMPTOMS
BACK PAIN: 1
RHINORRHEA: 1
DIARRHEA: 1
ABDOMINAL PAIN: 0
COUGH: 0
FEVER: 1
SHORTNESS OF BREATH: 0

## 2021-08-06 ASSESSMENT — MIFFLIN-ST. JEOR: SCORE: 1531.34

## 2021-08-06 NOTE — ED NOTES
Bed: ED12  Expected date:   Expected time:   Means of arrival:   Comments:  419 81m fever, uti, falls

## 2021-08-06 NOTE — PROGRESS NOTES
RECEIVING UNIT ED HANDOFF REVIEW    ED Nurse Handoff Report was reviewed by: Aixa Martínez RN on August 6, 2021 at 3:37 PM

## 2021-08-06 NOTE — H&P
Admitted: 08/06/2021    CHIEF COMPLAINT:  Generalized weakness, fall and fever.    HISTORY OF PRESENT ILLNESS:  HIstory was obtained partially from the patient, who is a relatively good historian.  I did discuss with ER attending, Dr. Womack, and I reviewed his chart as well.    Mr. Froylan Menchaca is a very pleasant 81-year-old gentleman with past medical history of severe right sided hydronephrosis with a hydroureter, status post right JJ stent placement and recent stent removal, history of bladder retention with a history of a Alonzo catheter, phimosis, status post dorsal slit of phimosis in 06/2021, history of E. coli sepsis secondary to complicated UTI, hypertension, pulmonary embolism, on Xarelto and bilateral lower extremity edema, who was brought in for evaluation of generalized weakness, fall, and fever.    The patient had a recent admission to [facility] from 06/04/2021 to 06/10/2021. At that time he was treated for E. coli sepsis secondary to complicated urinary tract infection with infected nephrolithiasis.  He had a known history of severe right sided hydronephrosis with hydroureter and urinary retention.  He had a Alonzo catheter placed by Urology.  He also underwent a cystoscopy with right ureteroscopy and right JJ stent placement on 06/09/2021.  At that time, he also had a dorsal slit of his phimosis.  The patient was discharged home with a Alonzo catheter in place.    He saw Dr. Man from Urology on 08/04/2021.  He had a cystoscopy with right ureteral stent removal.  He also had a Alonzo catheter removed and apparently after cystoscopy he was able to void with bladder scan of 200 mL. He was discharged home with plan to have home RN visit next week for a bladder scan and possible replacement of Alonzo catheter and also plan to repeat a CT urogram in month.    He was discharged home apparently on Keflex.  He started taking his antibiotic as prescribed.  Apparently today, he started not  feeling well.  He describes feeling very weak.  At some point, his legs gave up and he felt he was not able to get up by himself.  There was no loss of consciousness.  Apparently, he had fever and chills at home.  He also reports that he had some diarrhea earlier today.  He denies having any headache, no nausea, no vomiting.  No abdominal pain.  He denies any chest pain, no shortness of breath.  He does report having some mild cough.  He does seem to have chronic lower extremity edema.    In ER, he was seen by Dr. Womack, his vitals in ER showed a blood pressure of 139/94, heart rate was initially 130, later on improved to 110, temperature in .3, respiratory rate 18, oxygen saturation 96% on room air.  He did have basic blood work, which showed a BMP with sodium of 128, potassium of 5, chloride 98, bicarbonate 25, BUN 41, creatinine 1.66.  Of note, his creatinine back in June was 1.  His anion gap was 5.  LFTs with albumin 2.3, total protein 6.7, total bilirubin 0.3, alkaline phosphatase 178, ALT 72, AST 37.  Lactic acid 1.2, glucose 126.  His VBG with pH 7.48, pCO2 of 38.  CBC with white blood cells 11.5, hemoglobin 10.9, hematocrit 35.5 and platelet count 306.  His INR was 1.87.    He was also noted to have urinary retention in ER.  A bladder scan showed 800 mL He had a Alonzo catheter placed.  UA obtained through catheterization was grossly abnormal with white blood cells, more than 182, large leukocyte esterase, large blood, urine cultures and blood cultures pending at this time.  He had a chest x-ray which does not show any active cardiopulmonary disease.  He had a CT of the abdomen and pelvis, which showed interval increase in the right sided hydronephrosis with marked right sided hydronephrosis and marked right ureteral dilation down to the level of the right UVJ where there is a 1.5 cm ovoid soft tissue density.  Soft tissue density could be due to edema.  There are multiple calculi present in the  bladder lumen with nonobstructing calculi in the left kidney.  There is a diffuse bladder wall thickening with perivesical edema.    In ER, he received 1 dose of Zosyn, 2 boluses of normal saline, 1 dose of Tylenol.    PAST MEDICAL HISTORY:    1.  Severe right sided hydronephrosis with hydroureter, status post a recent right JJ stent placement in 06/2021 and ureteral stent removal on 08/04/2021.  2.  History of urinary retention, status post Alonzo catheter placement and recent removal.  3.  History of phimosis, status post dorsal slit of phimosis by Urology on 06/09/2021  4.  BPH.  5.  History of E. coli sepsis secondary to a complicated UTI.  6.  Hypertension.  7.  Bilateral lower extremity edema.  8.  Recent pulmonary embolism, on Xarelto.    PAST SURGICAL HISTORY:   Past Surgical History:   Procedure Laterality Date     CIRCUMCISION N/A 6/9/2021    Procedure: DORSAL SLIT;  Surgeon: Shadi Man MD;  Location:  OR     COMBINED CYSTOSCOPY, RETROGRADES, EXCHANGE STENT URETER(S) Right 6/9/2021    Procedure: CYSTOSCOPY, WITH RIGHT RETROGRADE PYELOGRAM, RIGHT DIAGNOSTIC  URETEROSCOPY  AND URETERAL STENT REPLACEMENT AND PENILE BLOCK;  Surgeon: Shadi Man MD;  Location:  OR     CYSTOSCOPY       ENT SURGERY       HERNIA REPAIR         SOCIAL HISTORY: The patient denies smoking, denies alcohol drinking.  He denies illicit drug abuse.    FAMILY HISTORY: reviewed with the patient and is non-contributory to current admission.    PRIOR TO ADMISSION MEDICATIONS:  Needs to be verified by the pharmacy.   amLODIPine-benazepril (LOTREL) 5-10 MG capsule Take 1 capsule by mouth daily 8/5/2021 at Unknown time Yes Unknown, Entered By History   cephALEXin (KEFLEX) 500 MG capsule Take 500 mg by mouth 2 times daily 8/5/2021 at Unknown time Yes Unknown, Entered By History   cetirizine (ZYRTEC) 10 MG tablet Take 10 mg by mouth daily 8/5/2021 at Unknown time Yes Unknown, Entered By History   dutasteride (AVODART) 0.5 MG capsule  Take 0.5 mg by mouth daily 8/5/2021 at Unknown time Yes Unknown, Entered By History   miconazole (MICATIN) 2 % external powder Apply topically 2 times daily 8/5/2021 at Unknown time Yes Rita Webb MD   Multiple Vitamins-Minerals (PRESERVISION AREDS) CAPS Take 1 capsule by mouth 2 times daily  8/5/2021 at Unknown time Yes Unknown, Entered By History   multivitamin, therapeutic (THERA-VIT) TABS tablet Take 1 tablet by mouth daily 8/5/2021 at Unknown time Yes Unknown, Entered By History   Rivaroxaban ANTICOAGULANT 15 & 20 MG TBPK 15 mg po BID for 21 days then 20 mg po daily . 8/5/2021 at Unknown time Yes Rita Webb MD   tamsulosin (FLOMAX) 0.4 MG capsule Take 1 capsule (0.4 mg) by mouth daily 8/5/2021 at Unknown time Yes Shadi Man MD   torsemide (DEMADEX) 10 MG tablet Take 10 mg by mouth daily 8/5/2021 at Unknown time Yes Unknown, Entered By History   triamcinolone (KENALOG) 0.1 % external ointment Apply topically 2 times daily as needed for irritation (to back)  prn at prn Yes Reported, Patient   vitamin D3 (CHOLECALCIFEROL) 10 MCG (400 UNIT) capsule Take 1 capsule by mouth daily 8/5/2021 at Unknown time Yes Unknown, Entered By History   Zinc Gluconate 15 MG TABS Take 15 mg by mouth daily  8/5/2021 at Unknown time Yes Unknown, Entered By History   bacitracin-neomycin-polymyxin (NEOSPORIN) 400-5-5000 external ointment Apply to the effected area 2-3 tikes a day     Rita Webb MD       ALLERGIES:  HE IS ALLERGIC TO AZITHROMYCIN.    REVIEW OF SYSTEMS:  A 10-point review of systems was conducted and was negative except for pertinent positives mentioned in history of present illness.    PHYSICAL EXAMINATION:    VITAL SIGNS:  Blood pressure is 124/74, heart rate initially was in the 130s later on improved to 110, temperature in the .3, respiratory rate 18, oxygen saturation 95% on room air.  GENERAL:  The patient is awake, alert, no acute distress at the time of my examination.  HEENT:  Head is  normocephalic, atraumatic.  Pupils are equally round and reactive to light.  Oral mucosa is mildly dry.  NECK:  Supple, no cervical lymphadenopathy, no thyromegaly.  CHEST:  There is bilateral air entry.  No wheezing, no rales, no crackles.  CARDIOVASCULAR:  There is normal S1 and S2, mild tachycardia, no murmurs, no rubs.  ABDOMEN:  Soft, nontender, nondistended.  Bowel sounds are present.  EXTREMITIES:  He has 1+ pitting edema bilateral lower extremities, no calf tenderness, 2+ peripheral pulses are palpable.  SKIN:  Intact.  No rashes, no cyanosis.  NEUROLOGIC:  The patient is awake, alert, oriented to self, place and time.  He is hard of hearing.  There are no focal neurological deficits.  PSYCHIATRIC:  Normal mood, normal affect.  MUSCULOSKELETAL:  He moves all extremities freely.  There are no obvious joint deformities.    LABORATORY DATA:  Reviewed and dictated above.    IMPRESSION AND PLAN:  Mr. Shilo Menchaca is a very pleasant 81-year-old gentleman with a past medical history of chronic severe right sided hydronephrosis with a right hydroureter, status post recent right ureteral stent placement and then removal, history of urinary retention, phimosis status post dorsal slit of phimosis, history of hypertension, recent pulmonary embolism, on Xarelto, bilateral lower extremity edema and BPH, who presented for evaluation of generalized weakness, fall and fever.    PLAN:      1.  Complicated urinary tract infection associated with a recent cystoscopy.  2.  Early sepsis.    As mentioned above, he had a recent cystoscopy by Dr. Man on 08/04/2021 when he had ureteral stent removal.  He was prescribed Keflex at the time of the discharge.  It seems that yesterday he started having generalized weakness and fever.  In the ER, he had a fever of 100.8, white blood cells is elevated at 11.5.  His lactic acid is 1.4.  He was tachycardic, but blood pressure was stable.  He was noted to have urinary retention in ER.  Cheri  catheter was placed.  A urinalysis from catheterized specimen is grossly abnormal with white blood cells, more than 182, large leukocyte esterase, large blood.  He was given one dose of Zosyn in ER.  Plan for now is to continue the same broad-spectrum antibiotics.  We will start him on mild IV hydration, normal saline at 75 mL per hour.  We will follow up urine cultures and blood cultures.  We will follow up fever curve and white blood cells trend.  Urology consult requested as below.    3.  Severe right sided hydronephrosis with marked right hydroureter.  4.  Urinary retention.    As mentioned above, the patient has history of a right sided hydronephrosis and a right hydroureter.  He was admitted to [facility] in  06/2021.  At that time, he underwent cystoscopy with right JJ stent placement.  He also had urinary retention and had a Alonzo catheter placed by Urology as well.  He followed up with Urology on 08/04/2021 when he had the Alonzo catheter removed and the ureteral stent removed as well.  As per the note, he was supposed to have a home RN visiting him in one week to monitor for urinary retention and also they are talking about to repeat a CT urogram in one month to assess for any return of hydronephrosis.  A CT of the abdomen and pelvis done in ER today shows interval increase in the right sided hydronephrosis and hydroureter with a 1.5 cm ovoid soft tissue density that could be related to edema at the level of right UVJ.  There is evidence of multiple calculi present within the bladder lumen and nonobstructing calculi in the left kidney.  In ER, he was noted to have urinary retention again of 800 mL. A Alonzo catheter was placed, which is draining well now.  We will place Urology consult for further evaluation and management.    5.  Acute kidney injury, possible postobstructive uropathy.  His creatinine today is 1.66.  His creatinine in June was 0.921.  He had a Alonzo catheter placed in the ER  for urinary retention.  We will start him on mild IV fluids, normal saline at 75 mL per hour.  We will avoid nephrotoxic drugs and plan to repeat BMP in the morning.  We will hold his vauzn-bi-uvbbhpagm, benazepril and torsemide.    6.  Hypertension.  His blood pressure seems to be a reasonable controlled at this time.  I think it at home, he is on amlodipine/benazepril and torsemide.  I am holding his torsemide and benazepril given acute kidney injury and we will continue his amlodipine at this time.  Hydralazine IV p.r.n. had been ordered for elevated blood pressures.    7.  History of pulmonary embolism Xarelto.  I am holding his Xarelto for now given Urology consult and further recommendation regarding possible procedures.    8.  BPH.  Resume his vilnd-su-sdiyhyixc Flomax and Avodart.    9.  Generalized weakness, likely related to his infection.  Fall precautions in place.  PT and OT evaluation ordered.    10.  Bilateral lower extremity edema, which seems to be chronic. It had been noted during his prior hospitalization. He had an echocardiogram at that time on 2021 which was a difficult study due to body habitus, but it seems that EF was 55 to 60%.  We will hold his aouor-kg-yquvxjzto, torsemide for now given acute kidney injury, he will receive mild IV hydration.  We will closely monitor fluid status.    11.  Deep venous thrombosis prophylaxis.  Pneumatic compression device.  Holding his wcjdd-pi-xcilwmnze Xarelto in case that he will need any OR procedures.    CODE STATUS:  Discussed with the patient.  The patient is a DNR/DNI.    DISPOSITION:  Admit inpatient.  I anticipate a couple of days of hospitalization.    Mojgan Martinez MD        D: 2021   T: 2021   MT: [email or web address]    Name:     KHADIJAH VELARDE  MRN:      [id number]        Account:     [id number]   :      1940           Admitted:    2021       Document: [id number]

## 2021-08-06 NOTE — PHARMACY-ADMISSION MEDICATION HISTORY
Pharmacy Medication History  Admission medication history interview status for the 8/6/2021  admission is complete. See EPIC admission navigator for prior to admission medications     Location of Interview: Patient room  Medication history sources: Patient, Patient's family/friend (wife) and Surescripts    Significant changes made to the medication list:  Added: Keflex  Removed: ASA, Neosporin Oint      In the past week, patient estimated taking medication this percent of the time: greater than 90%    Additional medication history information:   On Xarelto 20 mg dose    Medication reconciliation completed by provider prior to medication history? No    Time spent in this activity: 20 min    Prior to Admission medications    Medication Sig Last Dose Taking? Auth Provider   amLODIPine-benazepril (LOTREL) 5-10 MG capsule Take 1 capsule by mouth daily 8/5/2021 at Unknown time Yes Unknown, Entered By History   cephALEXin (KEFLEX) 500 MG capsule Take 500 mg by mouth 2 times daily 8/5/2021 at Unknown time Yes Unknown, Entered By History   cetirizine (ZYRTEC) 10 MG tablet Take 10 mg by mouth daily 8/5/2021 at Unknown time Yes Unknown, Entered By History   dutasteride (AVODART) 0.5 MG capsule Take 0.5 mg by mouth daily 8/5/2021 at Unknown time Yes Unknown, Entered By History   miconazole (MICATIN) 2 % external powder Apply topically 2 times daily 8/5/2021 at Unknown time Yes Rita Webb MD   Multiple Vitamins-Minerals (PRESERVISION AREDS) CAPS Take 1 capsule by mouth 2 times daily  8/5/2021 at Unknown time Yes Unknown, Entered By History   multivitamin, therapeutic (THERA-VIT) TABS tablet Take 1 tablet by mouth daily 8/5/2021 at Unknown time Yes Unknown, Entered By History   Rivaroxaban ANTICOAGULANT 15 & 20 MG TBPK 15 mg po BID for 21 days then 20 mg po daily . 8/5/2021 at Unknown time Yes Rita Webb MD   tamsulosin (FLOMAX) 0.4 MG capsule Take 1 capsule (0.4 mg) by mouth daily 8/5/2021 at Unknown time Yes Shadi Man  MD YAIR   torsemide (DEMADEX) 10 MG tablet Take 10 mg by mouth daily 8/5/2021 at Unknown time Yes Unknown, Entered By History   triamcinolone (KENALOG) 0.1 % external ointment Apply topically 2 times daily as needed for irritation (to back)  prn at prn Yes Reported, Patient   vitamin D3 (CHOLECALCIFEROL) 10 MCG (400 UNIT) capsule Take 1 capsule by mouth daily 8/5/2021 at Unknown time Yes Unknown, Entered By History   Zinc Gluconate 15 MG TABS Take 15 mg by mouth daily  8/5/2021 at Unknown time Yes Unknown, Entered By History   bacitracin-neomycin-polymyxin (NEOSPORIN) 400-5-5000 external ointment Apply to the effected area 2-3 tikes a day   Rita Webb MD       The information provided in this note is only as accurate as the sources available at the time of update(s)

## 2021-08-06 NOTE — PROGRESS NOTES
[facility]    Medicine Progress Note - Hospitalist Service       Date of Admission:  8/6/2021    Assessment & Plan           Mr. Shilo Menchaca is a very pleasant 81-year-old gentleman with a past medical history of chronic severe right sided hydronephrosis with a right hydroureter, status post recent right ureteral stent placement and then removal, history of urinary retention, phimosis status post dorsal slit of phimosis, history of hypertension, recent pulmonary embolism, on Xarelto, bilateral lower extremity edema and BPH, who presented for evaluation of generalized weakness, fall and fever.     Complicated urinary tract infection associated with a recent cystoscopy.  Early sepsis.  As mentioned above, he had a recent cystoscopy by Dr. Man on 08/04/2021 when he had ureteral stent removal.  He was prescribed Keflex at the time of the discharge.  It seems that 8/5/21 he started having generalized weakness and fever.  In the ER, he had a fever of 100.8, white blood cells is elevated at 11.5.  His lactic acid is 1.4.  He was tachycardic, but blood pressure was stable.  He was noted to have urinary retention in ER.  Alonzo catheter was placed.  A urinalysis from catheterized specimen is grossly abnormal with white blood cells, more than 182, large leukocyte esterase, large blood.  He was given one dose of Zosyn in ER.   - continue zosyn  - IVF  - appreciate urology consultation   - follow labs and fever     Severe right sided hydronephrosis with marked right hydroureter.  Urinary retention.  As mentioned above, the patient has history of a right sided hydronephrosis and a right hydroureter.  He was admitted to [facility] in  06/2021.  At that time, he underwent cystoscopy with right JJ stent placement.  He also had urinary retention and had a Alonzo catheter placed by Urology as well.  He followed up with Urology on 08/04/2021 when he had the Alonzo catheter removed and the  ureteral stent removed as well.  As per the note, he was supposed to have a home RN visiting him in one week to monitor for urinary retention and also they are talking about to repeat a CT urogram in one month to assess for any return of hydronephrosis.  A CT of the abdomen and pelvis done 8/5/21 shows interval increase in the right sided hydronephrosis and hydroureter with a 1.5 cm ovoid soft tissue density that could be related to edema at the level of right UVJ.  There is evidence of multiple calculi present within the bladder lumen and nonobstructing calculi in the left kidney.  In ER, he was noted to have urinary retention again of 800 mL.   - continue jessica  - appreciate urology consultation.    Acute kidney injury, possible postobstructive uropathy.    His creatinine today is 1.66.  His creatinine in June was 0.9.    - continue jessica until BPH surgery  - monitor crt  - IVF  - hold [id number], benazepril, and torsemide.    Hypertension.    His blood pressure seems to be a reasonable controlled at this time. - Hold benazepril and torsemide given ALLAN.    - continue amlodipine  - Hydralazine IV p.r.n. had been ordered for elevated blood pressures.    History of pulmonary embolism Xarelto.   - hold PTA rivaroxaban until 8/7, if creatinine improving will resume as he likely will not need procedure.      BPH.  Resume his [id number] Flomax and Avodart.    Generalized weakness, likely related to his infection.    - Fall precautions in place.   - PT and OT evaluation ordered.    Bilateral lower extremity edema, which seems to be chronic.   It had been noted during his prior hospitalization. He had an echocardiogram at that time on 06/07/2021 which was a difficult study due to body habitus, but it seems that EF was 55 to 60%.    - hold torsemide  - IVF  - monitor labs       Diet: Regular Diet Adult  DVT Prophylaxis: Pneumatic Compression Devices  Jessica Catheter: PRESENT, indication: Retention  Central  Lines: None  Code Status: No CPR- Do NOT Intubate      Disposition Plan   Expected discharge: 8/8 at the earliest recommended to prior living arrangement once antibiotic plan established, renal function improved and safe disposition plan/ TCU bed available.     The patient's care was discussed with the Bedside Nurse, Patient and Patient's Family.    Zoran Richardson MD  Hospitalist Service  [facility]  Securely message with the Vocera Web Console (learn more here)  Text page via [employer] Paging/Directory      Clinically Significant Risk Factors Present on Admission         # Hyponatremia: Na = 128 mmol/L (Ref range: 133 - 144 mmol/L) on admission, will monitor as appropriate     # Coagulation Defect: home medication list includes an anticoagulant medication     35 min spent today in discussion with patient re management of sepsis.  ______________________________________________________________________    Interval History   Feels improved weakness from presentation. No other new symptoms. discussion with patient and wife today    Data reviewed today: I reviewed all medications, new labs and imaging results over the last 24 hours. I personally reviewed no images or EKG's today.    Physical Exam   Vital Signs: Temp: 98.5  F (36.9  C) Temp src: Oral BP: (!) 147/78 Pulse: 105   Resp: 16 SpO2: 97 % O2 Device: None (Room air)    Weight: 197 lbs 0 oz  Constitutional: Awake, alert, cooperative, no apparent distress  Respiratory: Clear to auscultation bilaterally, no crackles or wheezing  Cardiovascular: Regular rate and rhythm, normal S1 and S2, and no murmur noted  GI: Normal bowel sounds, soft, non-distended, non-tender  Skin/Integumen: No rashes, no cyanosis, no edema  Other:       Data   Recent Labs   Lab 08/06/21 0213 08/06/21 0212   WBC 11.5*  --    HGB 10.9*  --    MCV 95  --      --    INR  --  1.87*   NA  --  128*   POTASSIUM  --  5.0   CHLORIDE  --  98   CO2  --  25   BUN  --  41*    CR  --  1.66*   ANIONGAP  --  5   SABRINA  --  8.4*   GLC  --  126*   ALBUMIN  --  2.3*   PROTTOTAL  --  6.7*   BILITOTAL  --  0.3   ALKPHOS  --  178*   ALT  --  72*   AST  --  37     Recent Results (from the past 24 hour(s))   XR Chest Port 1 View    Narrative    EXAM: CHEST SINGLE VIEW PORTABLE  LOCATION: [facility]  DATE/TIME: 8/6/2021 1:21 AM    INDICATION: Fever.    COMPARISON: None.    FINDINGS: No convincing pulmonary opacities. Normal size cardiac silhouette. Atherosclerotic calcification in the thoracic aorta.      Impression    IMPRESSION: No convincing evidence of active cardiopulmonary disease.    CT Abdomen Pelvis w/o Contrast    Narrative    EXAM: CT ABDOMEN PELVIS W/O CONTRAST  LOCATION: [facility]  DATE/TIME: 8/6/2021 1:54 AM    INDICATION: Recent removal of right ureteral stent with new onset fever, weakness, and blood in urine. History of obstruction of the right kidney with marked hydronephrosis shown on CT 6/7/2021.    COMPARISON: CT urogram 6/7/2021.    TECHNIQUE: CT scan of the abdomen and pelvis was performed without IV contrast. Multiplanar reformats were obtained. Dose reduction techniques were used.    CONTRAST: None.    FINDINGS:   LOWER CHEST: Partially visualized coronary artery calcification. No infiltrates or effusions.    HEPATOBILIARY: Stable peripherally calcified low-density lesion in the liver. No biliary dilatation.    PANCREAS: Normal.    SPLEEN: Normal.    ADRENAL GLANDS: Normal.    KIDNEYS/BLADDER: Interval increase in marked right-sided hydronephrosis since prior study. Marked right ureteral dilatation extending down to the level of the right UVJ where there is focal increased area of soft tissue density (series 3, image 164)   measuring 1.5 cm. No obstructing ureteral calculi. Nonobstructing calculi measuring 2 mm left upper renal pole and 3 mm in the left lower renal pole. Multiple bladder calculi present. Diffuse bladder  wall thickening with perivesical edema.    BOWEL: Minimal sliding esophageal hiatal hernia. No bowel dilatation or inflammatory change.    LYMPH NODES: No lymphadenopathy.    VASCULATURE: Normal caliber abdominal aorta. Minimal aortic calcification.    PELVIC ORGANS: Moderate prostate enlargement. No free fluid.    MUSCULOSKELETAL: Minimal degenerative changes in the spine.      Impression    IMPRESSION:   1.  Interval increase in right-sided hydronephrosis with marked right-sided hydronephrosis and marked right ureteral dilatation down to the level of the right UVJ where there is a 1.5 cm ovoid soft tissue density at the right UVJ. No obstructing right   ureteral calculi. The soft tissue density could be due to edema at the level of the right UVJ.    2.  Multiple calculi present within the bladder lumen with nonobstructing calculi in the left kidney.    3.  Diffuse bladder wall thickening with perivesical edema.    4.  Remainder of findings unchanged as detailed above.       Medications     - MEDICATION INSTRUCTIONS -       sodium chloride 75 mL/hr at 08/06/21 1811       amLODIPine  5 mg Oral Daily     dutasteride  0.5 mg Oral Daily     piperacillin-tazobactam  2.25 g Intravenous Q6H     sodium chloride (PF)  3 mL Intracatheter Q8H     tamsulosin  0.4 mg Oral Daily

## 2021-08-06 NOTE — CONSULTS
Urology Consult History and Physical    Name: Shilo Menchaca    MRN: [id number]   YOB: 1940       We were asked to see Shilo Menchaca at the request of Dr. Martinez for evaluation and treatment of acute urinary retention and UTI.          Chief Complaint:   Urinary retention and UTI    History is obtained from the patient          History of Present Illness:   Shilo Menchaca is a 81 year old male with significant past medical history of hypertension, a history of BPH with an episode of retention about 20 years ago for which she has been on Avodart for the last 20 years.  He was recently hospitalized at [facility] with a UTI.  He underwent an outpatient CT on 6/4/2021 which demonstrated a significantly distended bladder, bladder stones, and right hydroureteronephrosis with no clear evidence of obstruction.  He was then admitted through the emergency room on 6/5/2021 and noted to have significant leukocytosis to 17.4 and urinalysis concerning for infection.  Alonzo catheter placement was complicated by his significant phimosis which required bedside cystoscopy and placement of a catheter over a wire.  A CT urogram was then obtained on 6/7/2021 which demonstrated persistent right hydronephrosis and an incidental pulmonary embolism and he was started on anticoagulation.  On 6/9/2021 he underwent a dorsal slit of his phimosis, cystoscopy and right retrograde pyelogram, diagnostic ureteroscopy, and right ureteral stent placement.     He had his ureteral stent removed on 8/4/21 and UCx negative.  Borderline high PVR and planned for close follow up.    He presented to the emergency room today following a fall and found to have urinary retention with significantly cloudy urine concerning for infection.  CT scan with persistent right hydroureteronephrosis.  He is doing well after his Alonzo catheter placement           Past Medical History:     Past Medical History:   Diagnosis Date     Cancer (H)     skin      Hypertension             Past Surgical History:     Past Surgical History:   Procedure Laterality Date     CIRCUMCISION N/A 6/9/2021    Procedure: DORSAL SLIT;  Surgeon: Shadi Man MD;  Location: [facility] OR     COMBINED CYSTOSCOPY, RETROGRADES, EXCHANGE STENT URETER(S) Right 6/9/2021    Procedure: CYSTOSCOPY, WITH RIGHT RETROGRADE PYELOGRAM, RIGHT DIAGNOSTIC  URETEROSCOPY  AND URETERAL STENT REPLACEMENT AND PENILE BLOCK;  Surgeon: Shadi aMn MD;  Location: [facility] OR     CYSTOSCOPY       ENT SURGERY       HERNIA REPAIR              Social History:     Social History     Tobacco Use     Smoking status: Former Smoker     Types: Cigars     Smokeless tobacco: Never Used   Substance Use Topics     Alcohol use: Yes     Comment: 1 glass of wine per week            Family History:   No family history on file.           Allergies:     Allergies   Allergen Reactions     Azithromycin      PN: LW Reaction: UNKNOWN            Medications:     Current Facility-Administered Medications   Medication     acetaminophen (TYLENOL) tablet 650 mg    Or     acetaminophen (TYLENOL) Suppository 650 mg     amLODIPine (NORVASC) tablet 5 mg     dutasteride (AVODART) capsule 0.5 mg     hydrALAZINE (APRESOLINE) injection 10 mg     lidocaine (LMX4) cream     lidocaine 1 % 0.1-1 mL     melatonin tablet 1 mg     ondansetron (ZOFRAN-ODT) ODT tab 4 mg    Or     ondansetron (ZOFRAN) injection 4 mg     Patient is already receiving anticoagulation with heparin, enoxaparin (LOVENOX), warfarin (COUMADIN)  or other anticoagulant medication     piperacillin-tazobactam (ZOSYN) 2.25 g vial to attach to  ml bag     sodium chloride (PF) 0.9% PF flush 3 mL     sodium chloride (PF) 0.9% PF flush 3 mL     sodium chloride 0.9% infusion     tamsulosin (FLOMAX) capsule 0.4 mg             Review of Systems:    ROS: 10 point ROS neg other than the symptoms noted above in the HPI.          Physical Exam:     Patient Vitals for the past 24 hrs:   BP Temp Temp src  "Pulse Resp SpO2 Height Weight   08/06/21 1625 (!) 147/78 98.5  F (36.9  C) Oral 105 -- 97 % 1.676 m (5' 6\") 89.4 kg (197 lb)   08/06/21 1513 130/78 98.5  F (36.9  C) Oral 109 -- 96 % -- --   08/06/21 1230 (!) 142/85 98.2  F (36.8  C) Oral 107 16 96 % -- --   08/06/21 1105 -- -- -- -- -- 95 % -- --   08/06/21 1030 -- -- -- -- -- 96 % -- --   08/06/21 0915 133/86 -- -- 98 -- 96 % -- --   08/06/21 0910 -- -- -- -- -- 96 % -- --   08/06/21 0845 -- -- -- -- -- 95 % -- --   08/06/21 0800 117/70 -- -- 90 -- 93 % -- --   08/06/21 0735 -- -- -- -- -- 95 % -- --   08/06/21 0600 117/86 -- -- 101 -- 96 % -- --   08/06/21 0500 118/75 -- -- 104 -- 96 % -- --   08/06/21 0430 111/74 -- -- 110 -- 94 % -- --   08/06/21 0400 112/75 -- -- 109 -- 94 % -- --   08/06/21 0330 116/72 98.8  F (37.1  C) Oral 114 -- 93 % -- --   08/06/21 0300 124/74 -- -- -- -- -- -- --   08/06/21 0230 -- -- -- -- -- 91 % -- --   08/06/21 0215 124/89 -- -- -- -- 94 % -- --   08/06/21 0130 (!) 157/111 -- -- (!) 127 -- 95 % -- --   08/06/21 0100 (!) 139/94 -- -- -- -- 95 % -- --   08/06/21 0057 -- -- -- -- -- -- -- 87.5 kg (193 lb)   08/06/21 0056 (!) 139/94 100.3  F (37.9  C) -- (!) 130 18 96 % -- --     General: age-appropriate appearing male in NAD  HEENT: Head AT/NC, EOMI, CN Grossly intact  Lungs: no respiratory distress, or pursed lip breathing  Heart: No obvious jugular venous distension present  Back: no bony midline tenderness, no CVAT bilaterally.  Abdomen: soft, obesely-distended, non-tender. No organomegaly  : Alonzo catheter in place draining yellow and cloudy urine  Lymph: no palpable inguinal lymphadenopathy.  LE: no edema. pneumoboots in place.  Musculoskeltal: extremities normal, no peripheral edema  Skin: no suspicious lesions or rashes  Neuro: Alert, oriented, speech and mentation normal;  moving all 4 extremities equally.  Psych: affect and mood normal          Data:   All laboratory data reviewed:    Recent Labs   Lab 08/06/21  0213   WBC " 11.5*   HGB 10.9*        Recent Labs   Lab 08/06/21  0212   *   POTASSIUM 5.0   CHLORIDE 98   CO2 25   BUN 41*   CR 1.66*   *   SABRINA 8.4*     Recent Labs   Lab 08/06/21  0240   COLOR Brown*   APPEARANCE Cloudy*   URINEGLC Negative   URINEBILI Negative   URINEKETONE Negative   SG 1.010   URINEPH 6.0   PROTEIN 100 *   NITRITE Negative   LEUKEST Large*   RBCU 20*   WBCU >182*     IMAGING:  All pertinent imaging reviewed:    All imaging studies reviewed by me.  I personally reviewed these imaging films.  A formal report from radiology will follow.    CT ABD/PEL:  FINDINGS:   LOWER CHEST: Partially visualized coronary artery calcification. No infiltrates or effusions.     HEPATOBILIARY: Stable peripherally calcified low-density lesion in the liver. No biliary dilatation.     PANCREAS: Normal.     SPLEEN: Normal.     ADRENAL GLANDS: Normal.     KIDNEYS/BLADDER: Interval increase in marked right-sided hydronephrosis since prior study. Marked right ureteral dilatation extending down to the level of the right UVJ where there is focal increased area of soft tissue density (series 3, image 164)   measuring 1.5 cm. No obstructing ureteral calculi. Nonobstructing calculi measuring 2 mm left upper renal pole and 3 mm in the left lower renal pole. Multiple bladder calculi present. Diffuse bladder wall thickening with perivesical edema.     BOWEL: Minimal sliding esophageal hiatal hernia. No bowel dilatation or inflammatory change.     LYMPH NODES: No lymphadenopathy.     VASCULATURE: Normal caliber abdominal aorta. Minimal aortic calcification.     PELVIC ORGANS: Moderate prostate enlargement. No free fluid.     MUSCULOSKELETAL: Minimal degenerative changes in the spine.                                                     IMPRESSION:   1.  Interval increase in right-sided hydronephrosis with marked right-sided hydronephrosis and marked right ureteral dilatation down to the level of the right UVJ where there is a  1.5 cm ovoid soft tissue density at the right UVJ. No obstructing right   ureteral calculi. The soft tissue density could be due to edema at the level of the right UVJ.     2.  Multiple calculi present within the bladder lumen with nonobstructing calculi in the left kidney.     3.  Diffuse bladder wall thickening with perivesical edema.     4.  Remainder of findings unchanged as detailed above.\           Impression and Plan:   Impression:   81-year-old man with complex significant past medical history of hypertension and BPH with recent hospitalization for a UTI.  He is status post a dorsal slit for phimosis with ureteral stent placement on the right on 6/9/2021 with diagnostic ureteroscopy without evidence of stone or cause of obstruction.  During this hospitalization he was found to have an incidental pulmonary embolism and is on anticoagulation.  His ureteral stent was subsequently removed on 8/4/2021.  He returns with persistent urinary retention and concern for possible UTI.      Plan:   Urinary retention  -We will plan to maintain Alonzo catheter to gravity drainage likely until the time of a bladder outlet procedure    Right hydronephrosis  -Given his recent diagnostic ureteroscopy with no evidence of obstruction, ureteral mass, ureteral stone, I would like to monitor without replacement of a stent at this point  -Likely this hydronephrosis is related to his bladder outlet obstruction and urinary retention  -Continue to monitor daily labs for serum creatinine improvement  -If his serum creatinine does not improve or worsens, or if his leukocytosis worsens he may require ureteral stent placement over the weekend    Likely urinary tract infection  -Urine culture from 8/4/2021 - however significant cloudy urine  -I agree with empiric antibiotics    Urology will continue to follow     Shadi Man MD   Urology  [facility]  Clinic Phone [phone]

## 2021-08-06 NOTE — TELEPHONE ENCOUNTER
CELESTINO Rocha from [facility] was the caller. Ky's number is [phone]  [facility] phone number is [phone].    Can under the care of [facility] currently.  RN said he's under their care for the next six weeks.  Ky called to check on Can and was told Can was in the ER at [facility].  RN asking for validation that patient is in [facility]'s ER.  Per ER note, patient was admitted to the hospital under Dr Martinez's (hospitalist's) care.    Ky also gave me the phone number for [facility]. See above.        Additional Information    Information only question and nurse able to answer    Protocols used: INFORMATION ONLY CALL - NO TRIAGE-A-[place]    Lucita BASHIR RN [facility]

## 2021-08-06 NOTE — ED PROVIDER NOTES
History     Chief Complaint:  Fever and Fall       The history is provided by the patient and the spouse.      Shilo Menchaca is a 81 year old male with history of hypertension, cancer and hydronephrosis who presents for evaluation of fall. Patient fell at home today due to weakness. His wife eased him to the ground and explained he becomes weak when he is sick. He had a urinary stent removed yesterday, he was placed on Keflex. He had cloudy urine yesterday and it was sent in for testing. He was complaining of a fever, diarrhea, runny nose. Denies flank pain. Had some back pain when he was moved by EMS but none now. Denies cough, shortness of breath, abdominal pain and rash. Recently around daughter who had a cold which they think was passed to wife and patient.     Review of Systems   Constitutional: Positive for fever.   HENT: Positive for rhinorrhea.    Respiratory: Negative for cough and shortness of breath.    Gastrointestinal: Positive for diarrhea. Negative for abdominal pain.   Musculoskeletal: Positive for back pain.   Skin: Negative for rash.   All other systems reviewed and are negative.    Allergies:  Azithromycin    Medications:  Aspirin 81 mg   Avodart  Lotrel  Torsemide  Vitamin D  Ribaroxaban    Past Medical History:    Cancer  Hypertension  Acute Cystitis  Hydronephrosis of right kidney  Phimosis     Past Surgical History:    Circumcision  ENT Surgery  Hernia Repair    Social History:  The patient was accompanied to the ED by wife.  Arrived by ambulance.    Physical Exam     Patient Vitals for the past 24 hrs:   BP Temp Temp src Pulse Resp SpO2 Weight   08/06/21 0600 117/86 -- -- 101 -- 96 % --   08/06/21 0500 118/75 -- -- 104 -- 96 % --   08/06/21 0430 111/74 -- -- 110 -- 94 % --   08/06/21 0400 112/75 -- -- 109 -- 94 % --   08/06/21 0330 116/72 98.8  F (37.1  C) Oral 114 -- 93 % --   08/06/21 0300 124/74 -- -- -- -- -- --   08/06/21 0230 -- -- -- -- -- 91 % --   08/06/21 0215 124/89 -- -- -- -- 94  % --   08/06/21 0130 (!) 157/111 -- -- (!) 127 -- 95 % --   08/06/21 0100 (!) 139/94 -- -- -- -- 95 % --   08/06/21 0057 -- -- -- -- -- -- 87.5 kg (193 lb)   08/06/21 0056 (!) 139/94 100.3  F (37.9  C) -- (!) 130 18 96 % --       Physical Exam  General: Sitting up in bed  Eyes:  The pupils are equal and round    Conjunctivae and sclerae are normal  ENT:    Wearing a mask  Neck:  Normal range of motion  CV:  Tachycardic rate, regular rhythm    Skin warm and well perfused   Resp:  Non labored breathing on room air    No tachypnea    No cough heard    Lungs clear bilaterally  GI:  Abdomen is soft, there is no rigidity    No distension    No rebound tenderness     No abdominal tenderness    No back tenderness    No flank pain  MS:  Normal muscular tone  Skin:  No rash or acute skin lesions noted  Neuro:   Awake, alert.      Speech is normal and fluent.    Face is symmetric.     Moves all extremities equally  Psych: Normal affect.  Appropriate interactions.    Emergency Department Course     ECG:  ECG taken at 016, ECG read at 0131  Sinus Tachycardia  Otherwise Normal ECG  No significant changes as compared to prior, dated 06/04/2021.  Rate 127 bpm. NE interval 184 ms. QRS duration 70 ms. QT/QTc 278/404 ms. P-R-T axes 58 -20 84.     Imaging:    CT Abdomen Pelvis w/o Contrast  IMPRESSION:   1.  Interval increase in right-sided hydronephrosis with marked right-sided hydronephrosis and marked right ureteral dilatation down to the level of the right UVJ where there is a 1.5 cm ovoid soft tissue density at the right UVJ. No obstructing right   ureteral calculi. The soft tissue density could be due to edema at the level of the right UVJ.     2.  Multiple calculi present within the bladder lumen with nonobstructing calculi in the left kidney.     3.  Diffuse bladder wall thickening with perivesical edema.     4.  Remainder of findings unchanged as detailed above.   Report per radiology     XR Chest Port 1 View  IMPRESSION: No  convincing evidence of active cardiopulmonary disease.   Report per radiology     Laboratory:    CBC: WBC 11.5 (H), HGB 10.9 (L),   CMP: Sodium: 128 (L), Urea: 41 (H), Calcium: 8.4 (L), Glucose: 126 (H), Alkaline Phosphatase: 178 (H), ALT: 72 (H), Protein: 6.7 (L), Albumin: 2.3 (L), GFR: 38 (L), o/w WNL (Creatinine 1.66 (H))    Blood cultures pending x2    INR: 1.87 (H)    Lactic acid (result time 0224) 1.2    IStat Lactate: LACTW: 1.4, Bicarbonate Venous: 28, O2 Sat: 35 (L), PCO2: 38 (L), pH: 7.48 (H), PO2: 20 (L)    Symptomatic Influenza A/B & SARS-CoV2 (COVID19) Virus PCR Multiplex: Negative     Adult Type: Type: O Positive, Antibody screen: Negative    UA with microscopic: Color: Brown, Appearance: Cloudy, Blood: Large, Protein Albumin: 100, Leukocyte: Large, Bacteria: Moderate, WBC Clumps: Present, RBC: 20 (H), WBC: >182 (H) o/w WNL     Urine Culture: Pending    Emergency Department Course:    Reviewed:  0105 I reviewed the patient's nursing notes, vitals, past history and care everywhere    Assessments:    0115 I performed an exam of the patient as documented above.     0320 I rechecked the patient and explained findings.    Consults:     0340 I consulted with Dr. Martinez, hospitalist, about the patient and his admission.     Interventions:  0126 Tylenol 1,000 PO  0130 NS 1L IV Bolus  0255 Xylocaine 10 mL Urethral  0348 Zosyn 4.5 g IV  0348  mL IV Bolus    Disposition:  Care was transferred to Dr. Gonzalez pending moving to the floor.  The patient was admitted to the hospital under the care of Dr. Martinez.     Impression & Plan      Covid-19  Shilo Menchaca was evaluated during a global COVID-19 pandemic, which necessitated consideration that the patient might be at risk for infection with the SARS-CoV-2 virus that causes COVID-19.   Applicable protocols for evaluation were followed during the patient's care.   COVID-19 was considered as part of the patient's evaluation. The plan for testing is:  a  test was obtained during this visit.    Medical Decision Making:  Shilo Menchaca is a 81 year old male who presents to the emergency department today for evaluation of fever.  Patient had a fall at home due to weakness.  There is no focal weakness on exam.  He denies hitting his head or any injuries from the fall.  Wife tried to catch him so it was not a severe fall.  Temperature was 102 at home per EMS report.  Temperature of 100.3 here in the emergency department.  He was tachycardic.  Covid was negative.  CT abdomen done that shows persistent right hydronephrosis and actually worse than last CT.  Bladder was full on CT and after imaging he attempted to urinate but still had 850 mL on bladder scan so Alonzo catheter was placed.  Urinalysis is concerning for infection.  Patient given Zosyn and blood cultures done.  Will need urology consultation.  Lactate was normal and blood pressure was okay here in the emergency department.  Kept n.p.o.  Did not think urology consult indicated overnight and Dr. Martinez agreed with this. Patient kept in ED for many hours due to hospital capacity/boarding. Discussed with Dr. Man urology in morning who will evaluate the patient later this morning/early afternoon, patient okay to eat.    Diagnosis:    ICD-10-CM    1. Hydronephrosis of right kidney  N13.30    2. Acute cystitis with hematuria  N30.01    3. Urinary retention  R33.9    4. Acute kidney injury (H)  N17.9        Scribe Disclosure:  Aletha HYATT, am serving as a scribe at 1:09 AM on 8/6/2021 to document services personally performed by Denise Womack MD based on my observations and the provider's statements to me.     [facility] EMERGENCY DEPT         Denise Womack MD  08/06/21 0924

## 2021-08-06 NOTE — ED TRIAGE NOTES
Pt here via EMS from home.  Fever of 102 at home.  Fell getting into bed.  Weak, chills & nausea.  Urinary stint removed yesterday.  Intermittent blood in urine.

## 2021-08-06 NOTE — ED NOTES
[facility]  ED Nurse Handoff Report    ED Chief complaint: Fever and Fall      ED Diagnosis:   Final diagnoses:   None       Code Status: DNR / DNI, confirm with hospitalist    Allergies:   Allergies   Allergen Reactions     Azithromycin      PN: LW Reaction: UNKNOWN       Patient Story: Pt from home where he recently had a urinary stent removed. Has had fever up to 102 and increased weakness.    Focused Assessment:  Pt urine very thick and cloudy. Up with 2 assist at bedside to use urinal, able to void 250cc. PVR 820cc. Alonzo catheter placed. Pt tolerated well. Pt responds slowly but appropriately. Pleasant and cooperative.     Treatments and/or interventions provided: Labs, UA, medications, monitoring  Labs Ordered and Resulted from Time of ED Arrival Up to the Time of Departure from the ED   INR - Abnormal; Notable for the following components:       Result Value    INR 1.87 (*)     All other components within normal limits   COMPREHENSIVE METABOLIC PANEL - Abnormal; Notable for the following components:    Sodium 128 (*)     Urea Nitrogen 41 (*)     Creatinine 1.66 (*)     Calcium 8.4 (*)     Glucose 126 (*)     Alkaline Phosphatase 178 (*)     ALT 72 (*)     Protein Total 6.7 (*)     Albumin 2.3 (*)     GFR Estimate 38 (*)     All other components within normal limits   ROUTINE UA WITH MICROSCOPIC REFLEX TO CULTURE - Abnormal; Notable for the following components:    Color Urine Brown (*)     Appearance Urine Cloudy (*)     Blood Urine Large (*)     Protein Albumin Urine 100  (*)     Leukocyte Esterase Urine Large (*)     Bacteria Urine Moderate (*)     WBC Clumps Urine Present (*)     RBC Urine 20 (*)     WBC Urine >182 (*)     All other components within normal limits    Narrative:     Urine Culture ordered based on laboratory criteria   CBC WITH PLATELETS AND DIFFERENTIAL - Abnormal; Notable for the following components:    WBC Count 11.5 (*)     RBC Count 3.52 (*)     Hemoglobin 10.9 (*)      Hematocrit 33.5 (*)     Absolute Neutrophils 9.6 (*)     Absolute Lymphocytes 0.5 (*)     Absolute Immature Granulocytes 0.1 (*)     All other components within normal limits   ISTAT GASES LACTATE VENOUS POCT - Abnormal; Notable for the following components:    O2 Sat, Venous POCT 35 (*)     pCO2V Venous POCT 38 (*)     pH Venous POCT 7.48 (*)     pO2 Venous POCT 20 (*)     All other components within normal limits   LACTIC ACID WHOLE BLOOD - Normal   SARS-COV2 (COVID-19) VIRUS RT-PCR - Normal    Narrative:     Testing was performed using the kai  SARS-CoV-2 & Influenza A/B Assay on the kai  [employer]il[employer]  System.  This test should be ordered for the detection of SARS-COV-2 in individuals who meet SARS-CoV-2 clinical and/or epidemiological criteria. Test performance is unknown in asymptomatic patients.  This test is for in vitro diagnostic use under the FDA EUA for laboratories certified under CLIA to perform moderate and/or high complexity testing. This test has not been FDA cleared or approved.  A negative test does not rule out the presence of PCR inhibitors in the specimen or target RNA in concentration below the limit of detection for the assay. The possibility of a false negative should be considered if the patient's recent exposure or clinical presentation suggests COVID-19.  [facility] are certified under the Clinical Laboratory Improvement Amendments of 1988 (CLIA-88) as qualified to perform moderate and/or high complexity laboratory testing.   CBC WITH PLATELETS & DIFFERENTIAL    Narrative:     The following orders were created for panel order CBC with platelets differential.  Procedure                               Abnormality         Status                     ---------                               -----------         ------                     CBC with platelets and d...[[id number]]  Abnormal            Final result                 Please view results for these tests on the individual  orders.   EXTRA RED TOP TUBE   EXTRA BLOOD BANK PURPLE TOP TUBE   PERIPHERAL IV CATHETER   IP ACUTE INDWELLING URINARY CATHETER ([facility])   TYPE AND SCREEN, ADULT   COVID-19 VIRUS (CORONAVIRUS) BY PCR    Narrative:     The following orders were created for panel order Symptomatic COVID-19 Virus (Coronavirus) by PCR Nasopharyngeal.  Procedure                               Abnormality         Status                     ---------                               -----------         ------                     SARS-COV2 (COVID-19) Vir...[[id number]]  Normal              Final result                 Please view results for these tests on the individual orders.   BLOOD CULTURE   BLOOD CULTURE   URINE CULTURE   ABO/RH TYPE AND SCREEN    Narrative:     The following orders were created for panel order ABO/Rh type and screen.  Procedure                               Abnormality         Status                     ---------                               -----------         ------                     Adult Type and Screen[[id number]]                            Edited Result - FINAL        Please view results for these tests on the individual orders.   EXTRA TUBE    Narrative:     The following orders were created for panel order [place] Draw.  Procedure                               Abnormality         Status                     ---------                               -----------         ------                     Extra Red Top Tube[[id number]]                               Final result               Extra Blood Bank Purple ...[[id number]]                      In process                   Please view results for these tests on the individual orders.     CT Abdomen Pelvis w/o Contrast   Final Result   IMPRESSION:    1.  Interval increase in right-sided hydronephrosis with marked right-sided hydronephrosis and marked right ureteral dilatation down to the level of the right UVJ where there is a 1.5 cm ovoid soft tissue density at the right  UVJ. No obstructing right    ureteral calculi. The soft tissue density could be due to edema at the level of the right UVJ.      2.  Multiple calculi present within the bladder lumen with nonobstructing calculi in the left kidney.      3.  Diffuse bladder wall thickening with perivesical edema.      4.  Remainder of findings unchanged as detailed above.         XR Chest Port 1 View   Final Result   IMPRESSION: No convincing evidence of active cardiopulmonary disease.           Patient's response to treatments and/or interventions: Tolerated well    To be done/followed up on inpatient unit:  Continue plan of care    Does this patient have any cognitive concerns?: none noted    Activity level - Baseline/Home:  Unknown  Activity Level - Current:   Stand with assist x2    Patient's Preferred language: English   Needed?: No    Isolation: None  Infection: Not Applicable  Patient tested for COVID 19 prior to admission: Yes  Bariatric?: No    Vital Signs:   Vitals:    08/06/21 0056 08/06/21 0057 08/06/21 0100 08/06/21 0215   BP: (!) 139/94  (!) 139/94 124/89   Pulse: (!) 130      Resp: 18      Temp: 100.3  F (37.9  C)      SpO2: 96%  95% 94%   Weight:  87.5 kg (193 lb)         Cardiac Rhythm:     Was the PSS-3 completed:   Yes  What interventions are required if any?               Family Comments: Wife at bedside  OBS brochure/video discussed/provided to patient/family: No              Name of person given brochure if not patient: n/a              Relationship to patient: n/a    For the majority of the shift this patient's behavior was Green.   Behavioral interventions performed were Rounding.    ED NURSE PHONE NUMBER: *[phone]

## 2021-08-06 NOTE — ED NOTES
Pt stood up at side of cot to urinate with 2 assist. Voided 250cc of thick cloudy foul smelling urine.

## 2021-08-06 NOTE — ED NOTES
Pt sleeping. Awakens to verbal stimuli. Wife at bedside. Updated on potential wait time for hospital bed to be ready. Informed pt that he was NPO at this time except for sips of water to take his medications.

## 2021-08-07 ENCOUNTER — APPOINTMENT (OUTPATIENT)
Dept: PHYSICAL THERAPY | Facility: CLINIC | Age: 81
DRG: 872 | End: 2021-08-07
Attending: INTERNAL MEDICINE
Payer: MEDICARE

## 2021-08-07 ENCOUNTER — APPOINTMENT (OUTPATIENT)
Dept: OCCUPATIONAL THERAPY | Facility: CLINIC | Age: 81
DRG: 872 | End: 2021-08-07
Attending: INTERNAL MEDICINE
Payer: MEDICARE

## 2021-08-07 LAB
ANION GAP SERPL CALCULATED.3IONS-SCNC: 4 MMOL/L (ref 3–14)
BACTERIA UR CULT: NORMAL
BUN SERPL-MCNC: 20 MG/DL (ref 7–30)
CALCIUM SERPL-MCNC: 7.9 MG/DL (ref 8.5–10.1)
CHLORIDE BLD-SCNC: 107 MMOL/L (ref 94–109)
CO2 SERPL-SCNC: 26 MMOL/L (ref 20–32)
CREAT SERPL-MCNC: 1.13 MG/DL (ref 0.66–1.25)
ERYTHROCYTE [DISTWIDTH] IN BLOOD BY AUTOMATED COUNT: 13.7 % (ref 10–15)
GFR SERPL CREATININE-BSD FRML MDRD: 60 ML/MIN/1.73M2
GLUCOSE BLD-MCNC: 205 MG/DL (ref 70–99)
HCT VFR BLD AUTO: 34.4 % (ref 40–53)
HGB BLD-MCNC: 11 G/DL (ref 13.3–17.7)
MCH RBC QN AUTO: 31.4 PG (ref 26.5–33)
MCHC RBC AUTO-ENTMCNC: 32 G/DL (ref 31.5–36.5)
MCV RBC AUTO: 98 FL (ref 78–100)
PLATELET # BLD AUTO: 289 10E3/UL (ref 150–450)
POTASSIUM BLD-SCNC: 3.8 MMOL/L (ref 3.4–5.3)
RBC # BLD AUTO: 3.5 10E6/UL (ref 4.4–5.9)
SODIUM SERPL-SCNC: 137 MMOL/L (ref 133–144)
WBC # BLD AUTO: 8.1 10E3/UL (ref 4–11)

## 2021-08-07 PROCEDURE — 250N000013 HC RX MED GY IP 250 OP 250 PS 637: Performed by: INTERNAL MEDICINE

## 2021-08-07 PROCEDURE — 85027 COMPLETE CBC AUTOMATED: CPT | Performed by: INTERNAL MEDICINE

## 2021-08-07 PROCEDURE — 36415 COLL VENOUS BLD VENIPUNCTURE: CPT | Performed by: INTERNAL MEDICINE

## 2021-08-07 PROCEDURE — 97530 THERAPEUTIC ACTIVITIES: CPT | Mod: GO

## 2021-08-07 PROCEDURE — 99232 SBSQ HOSP IP/OBS MODERATE 35: CPT | Performed by: STUDENT IN AN ORGANIZED HEALTH CARE EDUCATION/TRAINING PROGRAM

## 2021-08-07 PROCEDURE — 97535 SELF CARE MNGMENT TRAINING: CPT | Mod: GO

## 2021-08-07 PROCEDURE — 80048 BASIC METABOLIC PNL TOTAL CA: CPT | Performed by: INTERNAL MEDICINE

## 2021-08-07 PROCEDURE — 97116 GAIT TRAINING THERAPY: CPT | Mod: GP

## 2021-08-07 PROCEDURE — 120N000001 HC R&B MED SURG/OB

## 2021-08-07 PROCEDURE — 97530 THERAPEUTIC ACTIVITIES: CPT | Mod: GP

## 2021-08-07 PROCEDURE — 97161 PT EVAL LOW COMPLEX 20 MIN: CPT | Mod: GP

## 2021-08-07 PROCEDURE — 97165 OT EVAL LOW COMPLEX 30 MIN: CPT | Mod: GO

## 2021-08-07 PROCEDURE — 250N000013 HC RX MED GY IP 250 OP 250 PS 637: Performed by: STUDENT IN AN ORGANIZED HEALTH CARE EDUCATION/TRAINING PROGRAM

## 2021-08-07 PROCEDURE — 250N000011 HC RX IP 250 OP 636: Performed by: EMERGENCY MEDICINE

## 2021-08-07 PROCEDURE — 99207 PR CDG-MDM COMPONENT: MEETS MODERATE - DOWN CODED: CPT | Performed by: STUDENT IN AN ORGANIZED HEALTH CARE EDUCATION/TRAINING PROGRAM

## 2021-08-07 RX ORDER — CETIRIZINE HYDROCHLORIDE 10 MG/1
10 TABLET ORAL DAILY
Status: DISCONTINUED | OUTPATIENT
Start: 2021-08-07 | End: 2021-08-09 | Stop reason: HOSPADM

## 2021-08-07 RX ADMIN — DUTASTERIDE 0.5 MG: 0.5 CAPSULE ORAL at 09:27

## 2021-08-07 RX ADMIN — ACETAMINOPHEN 650 MG: 325 TABLET, FILM COATED ORAL at 16:47

## 2021-08-07 RX ADMIN — AMLODIPINE BESYLATE 5 MG: 5 TABLET ORAL at 09:27

## 2021-08-07 RX ADMIN — CETIRIZINE HYDROCHLORIDE 10 MG: 10 TABLET, FILM COATED ORAL at 09:28

## 2021-08-07 RX ADMIN — PIPERACILLIN SODIUM,TAZOBACTAM SODIUM 2.25 G: 2; .25 INJECTION, POWDER, FOR SOLUTION INTRAVENOUS at 05:41

## 2021-08-07 RX ADMIN — PIPERACILLIN SODIUM,TAZOBACTAM SODIUM 2.25 G: 2; .25 INJECTION, POWDER, FOR SOLUTION INTRAVENOUS at 11:26

## 2021-08-07 RX ADMIN — PIPERACILLIN SODIUM,TAZOBACTAM SODIUM 2.25 G: 2; .25 INJECTION, POWDER, FOR SOLUTION INTRAVENOUS at 17:50

## 2021-08-07 RX ADMIN — TAMSULOSIN HYDROCHLORIDE 0.4 MG: 0.4 CAPSULE ORAL at 09:28

## 2021-08-07 RX ADMIN — RIVAROXABAN 20 MG: 20 TABLET, FILM COATED ORAL at 17:49

## 2021-08-07 ASSESSMENT — ACTIVITIES OF DAILY LIVING (ADL)
ADLS_ACUITY_SCORE: 18
ADLS_ACUITY_SCORE: 22
ADLS_ACUITY_SCORE: 18
ADLS_ACUITY_SCORE: 22
ADLS_ACUITY_SCORE: 22
ADLS_ACUITY_SCORE: 18

## 2021-08-07 ASSESSMENT — MIFFLIN-ST. JEOR: SCORE: 1554.92

## 2021-08-07 NOTE — PROGRESS NOTES
08/07/21 0900   Quick Adds   Type of Visit Initial Occupational Therapy Evaluation   Living Environment   People in home spouse   Current Living Arrangements house   Home Accessibility stairs to enter home;stairs within home   Living Environment Comments Uses FWW for all household distances and tasks   Self-Care   Usual Activity Tolerance moderate   Current Activity Tolerance fair   Regular Exercise No   Equipment Currently Used at Home walker, standard   Activity/Exercise/Self-Care Comment Spouse helping at least 80% in bathing and dressing, mod I for toileting and hy/gr tasks    Instrumental Activities of Daily Living (IADL)   IADL Comments Spouse completes all necessary IADLs   Disability/Function   Hearing Difficulty or Deaf yes   Patient's preferred means of communication English speaker with hearing loss, no speech problems.   Describe hearing loss bilateral hearing loss   Use of hearing assistive devices bilateral hearing aids   The following aids were provided; patient declined offer of auxiliary aids   Concentrating, Remembering or Making Decisions Difficulty no   Difficulty Communicating no   Difficulty Eating/Swallowing no   Walking or Climbing Stairs Difficulty yes   Dressing/Bathing Difficulty yes   Dressing/Bathing bathing difficulty, assistance 1 person;dressing difficulty, assistance 1 person   Toileting issues no   Doing Errands Independently Difficulty (such as shopping) yes   Errands Management spouse   Fall history within last six months yes   Number of times patient has fallen within last six months 3   General Information   Onset of Illness/Injury or Date of Surgery 08/06/21   Referring Physician Mojgan Martinez MD   Existing Precautions/Restrictions fall   Limitations/Impairments hearing   General Observations and Info Pt very sore in L abdominal/rib area due to police offer attempt at helping up from ground   Cognitive Status Examination   Orientation Status orientation to person,  place and time   Affect/Mental Status (Cognitive) WNL   Follows Commands WNL   Pain Assessment   Patient Currently in Pain No   Range of Motion Comprehensive   Comment, General Range of Motion WFL   Strength Comprehensive (MMT)   Comment, General Manual Muscle Testing (MMT) Assessment WFL   Bed Mobility   Bed Mobility supine-sit   Supine-Sit [place] (Bed Mobility) verbal cues;set up;contact guard   Assistive Device (Bed Mobility) bed rails   Comment (Bed Mobility) extra time, HOB elevated    Transfers   Transfers sit-stand transfer;toilet transfer;bed-chair transfer   Transfer Skill: Bed to Chair/Chair to Bed   Bed-Chair [place] (Transfers) minimum assist (75% patient effort);2 person assist   Assistive Device (Bed-Chair Transfers) standard walker   Sit-Stand Transfer   Sit-Stand [place] (Transfers) minimum assist (75% patient effort);2 person assist   Toilet Transfer   Type (Toilet Transfer) stand pivot/stand step   [place] Level (Toilet Transfer) 2 person assist;moderate assist (50% patient effort)   Assistive Device (Toilet Transfer) commode chair   Activities of Daily Living   BADL Assessment lower body dressing;grooming;toileting   Lower Body Dressing Assessment   [place] Level (Lower Body Dressing) maximum assist (25% patient effort)   Grooming Assessment   [place] Level (Grooming) minimum assist (75% patient effort)   Toileting   [place] Level (Toileting) maximum assist (25% patient effort)   Clinical Impression   Criteria for Skilled Therapeutic Interventions Met (OT) yes   OT Diagnosis decreased I and safety with ADLs   OT Problem List-Impairments impacting ADL problems related to;pain;strength   Assessment of Occupational Performance 1-3 Performance Deficits   Identified Performance Deficits dressing, bathing, grooming/hygiene   Planned Therapy Interventions (OT) ADL retraining   Clinical Decision Making Complexity (OT) low complexity   Therapy Frequency (OT) Daily    Predicted Duration of Therapy 3-4   Anticipated Equipment Needs Upon Discharge (OT) raised toilet seat   Risk & Benefits of therapy have been explained evaluation/treatment results reviewed;care plan/treatment goals reviewed;patient;spouse/significant other   OT Discharge Planning    OT Discharge Recommendation (DC Rec) Home with assist   OT Rationale for DC Rec Pt has spouse assist at home and that is able to continue. Anticipate as medically clears, will advance in safety and I with ADLs/IADLs close to previous level of A. If not, could recommend HHOT and increased AE   OT Brief overview of current status  Pt min Ax2 with FWW for ambulation and transfers. First time out of bed, anticipate will improve over hospital stay. Has spouse assist at baseline, but currently requiring more.    Total Evaluation Time (Minutes)   Total Evaluation Time (Minutes) 15

## 2021-08-07 NOTE — PLAN OF CARE
Summary:     DATE & TIME: 1630-2330 8-6-21  Cognitive Concerns/ Orientation :  A and O x4, [facility]  BEHAVIOR & AGGRESSION TOOL COLOR: Green  CIWA SCORE: NA  ABNL VS/O2:  BP elevated 154/87, has PRN  Hydralazine ordered if systolic>180  MOBILITY: Dangled at bedside with assistance of 2, has PT consult ordred  PAIN MANAGMENT: Tylenol at HS for c/o left sided rib cage pain  DIET: Regular  BOWEL/BLADDER: Alonzo placed in ED for retention, urine cloudy  ABNL LAB/BG: Blood and urine cultures pending  DRAIN/DEVICES: PIV with NS at 75 c/hr, on IV Zosyn every   TELEMETRY RHYTHM: NA  SKIN: Blancheable redness to coccyx, Bilateral feet edema  TESTS/PROCEDURES:  D/C DAY/GOALS/PLACE:  Return to home with wife and hoe care if carissa per his wife  OTHER IMPORTANT INFO:  Hx of right  hydronephrosis and JJ stets with recent hospitalization.

## 2021-08-07 NOTE — PROGRESS NOTES
[facility]    Medicine Progress Note - Hospitalist Service       Date of Admission:  8/6/2021    Assessment & Plan           Mr. Shilo Menchaca is a very pleasant 81-year-old gentleman with a past medical history of chronic severe right sided hydronephrosis with a right hydroureter, status post recent right ureteral stent placement and then removal, history of urinary retention, phimosis status post dorsal slit of phimosis, history of hypertension, recent pulmonary embolism, on Xarelto, bilateral lower extremity edema and BPH, who presented for evaluation of generalized weakness, fall and fever.     Complicated urinary tract infection associated with a recent cystoscopy.  Sepsis, resolving  As mentioned above, he had a recent cystoscopy by Dr. Man on 08/04/2021 when he had ureteral stent removal.  He was prescribed Keflex at the time of the discharge.  It seems that 8/5/21 he started having generalized weakness and fever.  In the ER, he had a fever of 100.8, white blood cells is elevated at 11.5.  His lactic acid is 1.4.  He was tachycardic, but blood pressure was stable.  He was noted to have urinary retention in ER.  Alonzo catheter was placed.  A urinalysis from catheterized specimen is grossly abnormal with white blood cells, more than 182, large leukocyte esterase, large blood.  Urine culture with <10CFU of urogenital edison. He was quite ill/symptomatic on presentation though, so concern for possible bacterial translocation to blood.   - continue zosyn  - likely transition to oral abx 8/8 pending blood cultures  - stop IVF  - appreciate urology consultation   - follow labs and fever   - follow blood cultures    Severe right sided hydronephrosis with marked right hydroureter  Urinary retention  As mentioned above, the patient has history of a right sided hydronephrosis and a right hydroureter.  He was admitted to [facility] in  06/2021.  At that time, he underwent  cystoscopy with right JJ stent placement.  He also had urinary retention and had a Jessica catheter placed by Urology as well.  He followed up with Urology on 08/04/2021 when he had the Jessica catheter removed and the ureteral stent removed as well.  As per the note, he was supposed to have a home RN visiting him in one week to monitor for urinary retention and also they are talking about to repeat a CT urogram in one month to assess for any return of hydronephrosis.  A CT of the abdomen and pelvis done 8/5/21 shows interval increase in the right sided hydronephrosis and hydroureter with a 1.5 cm ovoid soft tissue density that could be related to edema at the level of right UVJ.  There is evidence of multiple calculi present within the bladder lumen and nonobstructing calculi in the left kidney.  In ER, he was noted to have urinary retention again of 800 mL.   - continue jessica at discharge. Will plan to continue until follow up with urology and final plan for bladder outlet procedure.  - appreciate urology consultation.    Acute kidney injury, postobstructive uropathy, resolving  His creatinine on admission is 1.66.  His creatinine in June was 0.9.    - continue jessica until BPH surgery  - monitor crt  - hold [id number], benazepril, and torsemide, likely resume 8/8    Hypertension.    His blood pressure seems to be a reasonable controlled at this time. - Hold benazepril and torsemide given ALLAN.    - continue amlodipine  - Hydralazine IV p.r.n. had been ordered for elevated blood pressures.    History of pulmonary embolism Xarelto.   - resume PTA rivaroxaban   - patient plans to stop this on 9/7/21 per his discussion with thrombosis MD    BPH.  Resume his [id number] Flomax and Avodart.    Generalized weakness, likely related to his infection.    - Fall precautions in place.   - PT and OT evaluation ordered.    Bilateral lower extremity edema, which seems to be chronic.   It had been noted during his prior  hospitalization. He had an echocardiogram at that time on 06/07/2021 which was a difficult study due to body habitus, but it seems that EF was 55 to 60%.    - hold torsemide  - monitor labs       Diet: Regular Diet Adult  DVT Prophylaxis: Pneumatic Compression Devices  Alonzo Catheter: PRESENT, indication: Retention  Central Lines: None  Code Status: No CPR- Do NOT Intubate      Disposition Plan   Expected discharge: 8/8 at the earliest recommended to prior living arrangement once antibiotic plan established, renal function improved and safe disposition plan/ TCU bed available.     The patient's care was discussed with the Bedside Nurse, Patient and Patient's Family.    Zoran Richardson MD  Hospitalist Service  [facility]  Securely message with the Vocera Web Console (learn more here)  Text page via [employer] Paging/Directory      Clinically Significant Risk Factors Present on Admission              35 min spent today in discussion with patient re management of sepsis, abx plan, crt.  ______________________________________________________________________    Interval History   Feels improved. Sitting up in chair, eating well. Labs improving. Discussed at length with patient and wife.    Data reviewed today: I reviewed all medications, new labs and imaging results over the last 24 hours. I personally reviewed no images or EKG's today.    Physical Exam   Vital Signs: Temp: 98.4  F (36.9  C) Temp src: Oral BP: (!) 144/90 Pulse: 87   Resp: 16 SpO2: 96 % O2 Device: None (Room air)    Weight: 202 lbs 3.2 oz  Constitutional: Awake, alert, cooperative, no apparent distress  Respiratory: Clear to auscultation bilaterally, no crackles or wheezing  Cardiovascular: Regular rate and rhythm, normal S1 and S2, and no murmur noted  GI: Normal bowel sounds, soft, non-distended, non-tender  Skin/Integumen: No rashes, no cyanosis, no edema  Other:       Data   Recent Labs   Lab 08/07/21  1051 08/06/21  0217  08/06/21  0212   WBC 8.1 11.5*  --    HGB 11.0* 10.9*  --    MCV 98 95  --     306  --    INR  --   --  1.87*     --  128*   POTASSIUM 3.8  --  5.0   CHLORIDE 107  --  98   CO2 26  --  25   BUN 20  --  41*   CR 1.13  --  1.66*   ANIONGAP 4  --  5   SABRINA 7.9*  --  8.4*   *  --  126*   ALBUMIN  --   --  2.3*   PROTTOTAL  --   --  6.7*   BILITOTAL  --   --  0.3   ALKPHOS  --   --  178*   ALT  --   --  72*   AST  --   --  37     No results found for this or any previous visit (from the past 24 hour(s)).  Medications     - MEDICATION INSTRUCTIONS -         amLODIPine  5 mg Oral Daily     cetirizine  10 mg Oral Daily     dutasteride  0.5 mg Oral Daily     piperacillin-tazobactam  2.25 g Intravenous Q6H     Rivaroxaban ANTICOAGULANT  20 mg Oral Daily with supper     sodium chloride (PF)  3 mL Intracatheter Q8H     tamsulosin  0.4 mg Oral Daily

## 2021-08-07 NOTE — PROGRESS NOTES
08/07/21 1045   Quick Adds   Type of Visit Initial PT Evaluation   Living Environment   People in home spouse   Current Living Arrangements house   Home Accessibility stairs to enter home;stairs within home   Number of Stairs, Main Entrance 2   Stair Railings, Main Entrance railing on right side (ascending)   Number of Stairs, Within Home, Primary 10   Stair Railings, Within Home, Primary railings on both sides of stairs  (doesn't go down to lower level since May)   Transportation Anticipated car, drives self;family or friend will provide   Self-Care   Usual Activity Tolerance moderate   Current Activity Tolerance moderate   Regular Exercise No   Equipment Currently Used at Home walker, rolling;grab bar, tub/shower  (has 4WW when out, )   Activity/Exercise/Self-Care Comment Prior to admission was getting help with some transfers   Disability/Function   Fall history within last six months yes   Number of times patient has fallen within last six months 3   General Information   Onset of Illness/Injury or Date of Surgery 08/06/21   Referring Physician Mojgan Martinez MD   Patient/Family Therapy Goals Statement (PT) Pt would like to improve walking and ideally to not using walker.   Pertinent History of Current Problem (include personal factors and/or comorbidities that impact the POC) Pt is an 81 YOM admitted secondary to fall and fever with complicated UTI and early sepsis   Existing Precautions/Restrictions fall   Cognition   Orientation Status (Cognition) oriented x 3   Pain Assessment   Patient Currently in Pain Yes, see Vital Sign flowsheet  (L upper lateral thorax discomfort due to assist from )   Integumentary/Edema   Integumentary/Edema Comments pitting Edema noted B LE   Posture    Posture Comments Rounded shoulders and forward head   Range of Motion (ROM)   ROM Comment WFL for mobility   Strength   Strength Comments 4/5 B LE except L LE 3-/5 hip flexion   Bed Mobility   Comment (Bed Mobility) not  observed as pt was up in chair upon PT arrival   Transfers   Transfer Safety Comments sit to stand from chair with SBA and FWW   Gait/Stairs (Locomotion)   Comment (Gait/Stairs) CGA with FWW and slow gait pattern   Balance   Balance Comments good static standing balance, able to doff brief in standing   Sensory Examination   Sensory Perception Comments denies numbness/tingling   Clinical Impression   Criteria for Skilled Therapeutic Intervention yes, treatment indicated   PT Diagnosis (PT) impaired gait   Influenced by the following impairments weakness, decreased activity tolerance   Functional limitations due to impairments impaired mobility requiring assist of 1 and assistive device    Clinical Presentation Stable/Uncomplicated   Clinical Presentation Rationale Pt presenation and clinical judgement   Clinical Decision Making (Complexity) low complexity   Therapy Frequency (PT) Daily   Predicted Duration of Therapy Intervention (days/wks) 3 days   Planned Therapy Interventions (PT) bed mobility training;gait training;strengthening;stair training;transfer training   Risk & Benefits of therapy have been explained evaluation/treatment results reviewed;care plan/treatment goals reviewed;risks/benefits reviewed;current/potential barriers reviewed;participants voiced agreement with care plan;participants included;patient;spouse/significant other   Clinical Impression Comments Pt improving in functional mobility.   PT Discharge Planning    PT Discharge Recommendation (DC Rec) home with home care physical therapy   PT Rationale for DC Rec Spouse able to assist if needed up to minimal A. Pt has 2 stairs to enter home.   PT Brief overview of current status  Pt SBA sit <> stand with FWW and CGA for gait x60' with FWW.   Total Evaluation Time   Total Evaluation Time (Minutes) 10

## 2021-08-07 NOTE — PLAN OF CARE
Summary:     DATE & TIME: 8/7/21@0630  Cognitive Concerns/ Orientation :A&OX4, [place]  BEHAVIOR & AGGRESSION TOOL COLOR: Green  CIWA SCORE: NA  ABNL VS/O2:  N/A  MOBILITY: Dangled at bedside with assistance of 2  PAIN MANAGMENT: Lt rib cage pain, heat applied and was effective  DIET: Regular  BOWEL/BLADDER: Alonzo in place, draining cloudy urine   ABNL LAB/BG: Blood and urine cultures pending  DRAIN/DEVICES: PIV with NS cont at 75 c/hr, on IV Zosyn every   TELEMETRY RHYTHM: NA  SKIN: Blancheable redness to coccyx, Bilateral feet edema  TESTS/PROCEDURES:  D/C DAY/GOALS/PLACE: Anticipate discharge 8/8,return to home with wife and hoe care if carissa per his wife  OTHER IMPORTANT INFO:  Hx of right  hydronephrosis and JJ stets with recent hospitalization.

## 2021-08-07 NOTE — PLAN OF CARE
Summary:     DATE & TIME: 8/7/21 8352-3694  Cognitive Concerns/ Orientation : A&O x4, calm and cooperative; [place]  BEHAVIOR & AGGRESSION TOOL COLOR: Green  CIWA SCORE: N/A  ABNL VS/O2:  BP elevated 144/90  MOBILITY: Assist of 2 with gait belt and walker. Sat up in chair most of shift.  PAIN MANAGMENT: Lt rib cage pain, heat/cold applied and was effective  DIET: Regular  BOWEL/BLADDER: Alonzo in place. Up to bedside commode for BMs, x2 BM this shift  ABNL LAB/BG: Blood and urine cultures pending  DRAIN/DEVICES: PIV with NS cont at 75 c/hr, on IV Zosyn  TELEMETRY RHYTHM: N/A  SKIN: Blancheable redness to coccyx, +3 edema on LLE and +2 edema to RLE  TESTS/PROCEDURES: None  D/C DAY/GOALS/PLACE: Anticipate discharge 8/8 at the earliest per MD note. return to home with wife and home care if needed per his wife  OTHER IMPORTANT INFO:  Hx of right  hydronephrosis and JJ stets with recent hospitalization.

## 2021-08-07 NOTE — PROGRESS NOTES
"Urology  Progress Note    Feels well  Wife in room    Exam  BP (!) 144/90 (BP Location: Left arm)   Pulse 87   Temp 98.4  F (36.9  C) (Oral)   Resp 16   Ht 1.676 m (5' 6\")   Wt 91.7 kg (202 lb 3.2 oz)   SpO2 96%   BMI 32.64 kg/m    No acute distress  Unlabored breathing  Abdomen soft, nontender, nondistended.   Alonzo with yellow urine in tubing    UOP 3450/NR    Labs  WBC 8.1 (11.5)  Hgb 11 (10.9)  Cr 1.13 (1.66)    Assessment/Plan  81 year old y/o male with urinary retention now with Alonzo catheter, right hydronephrosis s/p removal of R ureteral stent and negative URS, and UTI.     - continue Alonzo on discharge - will discuss bladder outlet procedure as outpatient  - AM creat is downtrending therefore no need for stent placement  - abx per primary team  - urology will sign off    Maria Dolores Persaud MD    "

## 2021-08-08 ENCOUNTER — APPOINTMENT (OUTPATIENT)
Dept: OCCUPATIONAL THERAPY | Facility: CLINIC | Age: 81
DRG: 872 | End: 2021-08-08
Payer: MEDICARE

## 2021-08-08 ENCOUNTER — APPOINTMENT (OUTPATIENT)
Dept: PHYSICAL THERAPY | Facility: CLINIC | Age: 81
DRG: 872 | End: 2021-08-08
Payer: MEDICARE

## 2021-08-08 LAB
ANION GAP SERPL CALCULATED.3IONS-SCNC: 2 MMOL/L (ref 3–14)
BUN SERPL-MCNC: 23 MG/DL (ref 7–30)
CALCIUM SERPL-MCNC: 8.1 MG/DL (ref 8.5–10.1)
CHLORIDE BLD-SCNC: 103 MMOL/L (ref 94–109)
CO2 SERPL-SCNC: 28 MMOL/L (ref 20–32)
CREAT SERPL-MCNC: 1.17 MG/DL (ref 0.66–1.25)
ERYTHROCYTE [DISTWIDTH] IN BLOOD BY AUTOMATED COUNT: 13.7 % (ref 10–15)
GFR SERPL CREATININE-BSD FRML MDRD: 57 ML/MIN/1.73M2
GLUCOSE BLD-MCNC: 176 MG/DL (ref 70–99)
HCT VFR BLD AUTO: 35.4 % (ref 40–53)
HGB BLD-MCNC: 11.1 G/DL (ref 13.3–17.7)
MCH RBC QN AUTO: 30.9 PG (ref 26.5–33)
MCHC RBC AUTO-ENTMCNC: 31.4 G/DL (ref 31.5–36.5)
MCV RBC AUTO: 99 FL (ref 78–100)
PLATELET # BLD AUTO: 342 10E3/UL (ref 150–450)
POTASSIUM BLD-SCNC: 3.6 MMOL/L (ref 3.4–5.3)
RBC # BLD AUTO: 3.59 10E6/UL (ref 4.4–5.9)
SODIUM SERPL-SCNC: 133 MMOL/L (ref 133–144)
WBC # BLD AUTO: 10.7 10E3/UL (ref 4–11)

## 2021-08-08 PROCEDURE — 80048 BASIC METABOLIC PNL TOTAL CA: CPT | Performed by: STUDENT IN AN ORGANIZED HEALTH CARE EDUCATION/TRAINING PROGRAM

## 2021-08-08 PROCEDURE — 250N000013 HC RX MED GY IP 250 OP 250 PS 637: Performed by: STUDENT IN AN ORGANIZED HEALTH CARE EDUCATION/TRAINING PROGRAM

## 2021-08-08 PROCEDURE — 36415 COLL VENOUS BLD VENIPUNCTURE: CPT | Performed by: STUDENT IN AN ORGANIZED HEALTH CARE EDUCATION/TRAINING PROGRAM

## 2021-08-08 PROCEDURE — 250N000011 HC RX IP 250 OP 636: Performed by: EMERGENCY MEDICINE

## 2021-08-08 PROCEDURE — 250N000011 HC RX IP 250 OP 636: Performed by: INTERNAL MEDICINE

## 2021-08-08 PROCEDURE — 120N000001 HC R&B MED SURG/OB

## 2021-08-08 PROCEDURE — 97530 THERAPEUTIC ACTIVITIES: CPT | Mod: GO

## 2021-08-08 PROCEDURE — 85027 COMPLETE CBC AUTOMATED: CPT | Performed by: STUDENT IN AN ORGANIZED HEALTH CARE EDUCATION/TRAINING PROGRAM

## 2021-08-08 PROCEDURE — 99207 PR CDG-MDM COMPONENT: MEETS MODERATE - DOWN CODED: CPT | Performed by: STUDENT IN AN ORGANIZED HEALTH CARE EDUCATION/TRAINING PROGRAM

## 2021-08-08 PROCEDURE — 250N000013 HC RX MED GY IP 250 OP 250 PS 637: Performed by: INTERNAL MEDICINE

## 2021-08-08 PROCEDURE — 97530 THERAPEUTIC ACTIVITIES: CPT | Mod: GP | Performed by: PHYSICAL THERAPIST

## 2021-08-08 PROCEDURE — 99232 SBSQ HOSP IP/OBS MODERATE 35: CPT | Performed by: STUDENT IN AN ORGANIZED HEALTH CARE EDUCATION/TRAINING PROGRAM

## 2021-08-08 RX ORDER — LIDOCAINE 4 G/G
1 PATCH TOPICAL
Status: DISCONTINUED | OUTPATIENT
Start: 2021-08-08 | End: 2021-08-09 | Stop reason: HOSPADM

## 2021-08-08 RX ORDER — PIPERACILLIN SODIUM, TAZOBACTAM SODIUM 3; .375 G/15ML; G/15ML
3.38 INJECTION, POWDER, LYOPHILIZED, FOR SOLUTION INTRAVENOUS EVERY 6 HOURS
Status: DISCONTINUED | OUTPATIENT
Start: 2021-08-08 | End: 2021-08-09 | Stop reason: HOSPADM

## 2021-08-08 RX ORDER — TORSEMIDE 10 MG/1
10 TABLET ORAL DAILY
Status: DISCONTINUED | OUTPATIENT
Start: 2021-08-08 | End: 2021-08-09 | Stop reason: HOSPADM

## 2021-08-08 RX ADMIN — CETIRIZINE HYDROCHLORIDE 10 MG: 10 TABLET, FILM COATED ORAL at 08:27

## 2021-08-08 RX ADMIN — TORSEMIDE 10 MG: 10 TABLET ORAL at 16:24

## 2021-08-08 RX ADMIN — LIDOCAINE 1 PATCH: 246 PATCH TOPICAL at 10:07

## 2021-08-08 RX ADMIN — PIPERACILLIN SODIUM,TAZOBACTAM SODIUM 2.25 G: 2; .25 INJECTION, POWDER, FOR SOLUTION INTRAVENOUS at 00:00

## 2021-08-08 RX ADMIN — PIPERACILLIN SODIUM,TAZOBACTAM SODIUM 2.25 G: 2; .25 INJECTION, POWDER, FOR SOLUTION INTRAVENOUS at 06:12

## 2021-08-08 RX ADMIN — PIPERACILLIN SODIUM AND TAZOBACTAM SODIUM 3.38 G: 3; .375 INJECTION, POWDER, LYOPHILIZED, FOR SOLUTION INTRAVENOUS at 17:51

## 2021-08-08 RX ADMIN — DUTASTERIDE 0.5 MG: 0.5 CAPSULE ORAL at 08:27

## 2021-08-08 RX ADMIN — ACETAMINOPHEN 650 MG: 325 TABLET, FILM COATED ORAL at 00:00

## 2021-08-08 RX ADMIN — ACETAMINOPHEN 650 MG: 325 TABLET, FILM COATED ORAL at 16:32

## 2021-08-08 RX ADMIN — ACETAMINOPHEN 650 MG: 325 TABLET, FILM COATED ORAL at 08:27

## 2021-08-08 RX ADMIN — AMLODIPINE BESYLATE 5 MG: 5 TABLET ORAL at 08:27

## 2021-08-08 RX ADMIN — ACETAMINOPHEN 650 MG: 325 TABLET, FILM COATED ORAL at 22:24

## 2021-08-08 RX ADMIN — PIPERACILLIN SODIUM AND TAZOBACTAM SODIUM 3.38 G: 3; .375 INJECTION, POWDER, LYOPHILIZED, FOR SOLUTION INTRAVENOUS at 12:00

## 2021-08-08 RX ADMIN — TAMSULOSIN HYDROCHLORIDE 0.4 MG: 0.4 CAPSULE ORAL at 08:27

## 2021-08-08 RX ADMIN — RIVAROXABAN 20 MG: 20 TABLET, FILM COATED ORAL at 17:50

## 2021-08-08 ASSESSMENT — ACTIVITIES OF DAILY LIVING (ADL)
ADLS_ACUITY_SCORE: 19
ADLS_ACUITY_SCORE: 19
ADLS_ACUITY_SCORE: 20

## 2021-08-08 ASSESSMENT — MIFFLIN-ST. JEOR: SCORE: 1557.64

## 2021-08-08 NOTE — PROGRESS NOTES
"SPIRITUAL HEALTH SERVICES  SPIRITUAL ASSESSMENT Progress Note  [facility]     REFERRAL SOURCE: Admission Request    Reviewed documentation. Reflective conversation shared with Can and his wife, Aziza, which integrated elements of illness and family narratives.       Can was tearful as he shared that he has been hospitalized 3 times recently due to losing his strength and the challenge of \"not knowing when it's going to happen.\"    Can names his family and his Oriental orthodox mendoza and [employer]ism community ([facility]) as his sources of support. He shared his distress related to not being able to attend [employer] in person and to participate in [employer] and their volunteer services, but is grateful for being able to participate through online services.    Can shared that he will be discharging later today and will be returning in a few weeks for surgery.     I offered spiritual and emotional support through reflective listening that affirmed emotions, experience, and meaning. Offered assurance through prayer which incorporated conversational themes. Brought the sacrament of Holy Communion.    PLAN: Nothing further at this time due to anticipated discharge.    Feli Moreland  Associate   Pager [phone]  Phone [phone]    "

## 2021-08-08 NOTE — PLAN OF CARE
Summary:     DATE & TIME: 8/7/21   Cognitive Concerns/ Orientation : A&O x4, calm and cooperative; [place]  BEHAVIOR & AGGRESSION TOOL COLOR: Green  CIWA SCORE: N/A  ABNL VS/O2:  BP elevated 143/89. PRN parameters for Hydralazine not met.   MOBILITY: Assist of 2 with gait belt and walker. Sat up in chair for evening meal  PAIN MANAGMENT: Lt rib cage pain, heat applied and Tylenol x1 with stated decrease   DIET: Regular  BOWEL/BLADDER: Alonzo in place. Up to bedside commode for BMs,   ABNL LAB/BG: Blood and urine cultures pending  DRAIN/DEVICES: PIV restart per [employer] --on IV Zosyn  TELEMETRY RHYTHM: N/A  SKIN: Blancheable redness to coccyx, +3 edema on LLE and +2 edema to RLE.  TESTS/PROCEDURES: None  D/C DAY/GOALS/PLACE: Anticipate discharge 8/8 at the earliest per MD note. return to home with wife and home care if needed per his wife  OTHER IMPORTANT INFO:  Hx of right  hydronephrosis and JJ stents with recent hospitalization.

## 2021-08-08 NOTE — PLAN OF CARE
Summary:     DATE & TIME: 8/7/21   Cognitive Concerns/ Orientation : A&O x4, calm and cooperative; [place]  BEHAVIOR & AGGRESSION TOOL COLOR: Green  CIWA SCORE: N/A  ABNL VS/O2:  BP elevated 143/89. PRN parameters for Hydralazine not met.   MOBILITY: Assist of 2 with gait belt and walker. Sat up in chair for evening meal  PAIN MANAGMENT: Lt rib cage pain, heat applied and Tylenol x1 with stated decrease   DIET: Regular  BOWEL/BLADDER: Alonzo in place. Up to bedside commode for BMs,   ABNL LAB/BG: Blood and urine cultures pending  DRAIN/DEVICES: PIV restart per [employer] --on IV Zosyn  TELEMETRY RHYTHM: N/A  SKIN: Blancheable redness to coccyx, +3 edema on LLE and +2 edema to RLE.  TESTS/PROCEDURES: None  D/C DAY/GOALS/PLACE: Anticipate discharge 8/8 at the earliest per MD note. return to home with wife and home care if needed per his wife  OTHER IMPORTANT INFO:  Hx of right  hydronephrosis and JJ stents with recent hospitalization. Urology saw pt on previous shift.

## 2021-08-08 NOTE — PLAN OF CARE
Summary:     DATE & TIME: 8/7/21-8/8/21 [id number]  Cognitive Concerns/ Orientation : A&O x4, calm and cooperative; [place]  BEHAVIOR & AGGRESSION TOOL COLOR: Green  CIWA SCORE: N/A  ABNL VS/O2:  VSS on RA  MOBILITY: Assist of 2 with gait belt and walker, not OOB this shift  PAIN MANAGMENT: Lt rib cage pain, heat applied and Tylenol x1, effective  DIET: Regular  BOWEL/BLADDER: Alonzo in place. Up to bedside commode for BMs, no BM this shift  ABNL LAB/BG: Blood and urine cultures pending  DRAIN/DEVICES: PIV SL with int ABX  TELEMETRY RHYTHM: N/A  SKIN: Blancheable redness to coccyx, +3 edema on BLE  TESTS/PROCEDURES: None  D/C DAY/GOALS/PLACE: Anticipate discharge 8/8 at the earliest per MD note. return to home with wife and home care if needed per his wife  OTHER IMPORTANT INFO:  Hx of right  hydronephrosis and JJ stents with recent hospitalization. Urology saw pt on previous shift.

## 2021-08-08 NOTE — PROGRESS NOTES
[facility]    Medicine Progress Note - Hospitalist Service       Date of Admission:  8/6/2021    Assessment & Plan           Mr. Shilo Menchaca is a very pleasant 81-year-old gentleman with a past medical history of chronic severe right sided hydronephrosis with a right hydroureter, status post recent right ureteral stent placement and then removal, history of urinary retention, phimosis status post dorsal slit of phimosis, history of hypertension, recent pulmonary embolism, on Xarelto, bilateral lower extremity edema and BPH, who presented for evaluation of generalized weakness, fall and fever.     Complicated urinary tract infection associated with a recent cystoscopy.  Sepsis, resolving  As mentioned above, he had a recent cystoscopy by Dr. Man on 08/04/2021 when he had ureteral stent removal.  He was prescribed Keflex at the time of the discharge.  It seems that 8/5/21 he started having generalized weakness and fever.  In the ER, he had a fever of 100.8, white blood cells is elevated at 11.5.  His lactic acid is 1.4.  He was tachycardic, but blood pressure was stable.  He was noted to have urinary retention in ER.  Alonzo catheter was placed.  A urinalysis from catheterized specimen is grossly abnormal with white blood cells, more than 182, large leukocyte esterase, large blood.  Urine culture with <10CFU of urogenital edison. He was quite ill/symptomatic on presentation though, so concern for possible bacterial translocation to blood.   - continue zosyn  - Transition to oral abx 8/9 pending blood cultures  - appreciate urology consultation   - follow labs and fever   - follow blood cultures    Severe right sided hydronephrosis with marked right hydroureter  Urinary retention  As mentioned above, the patient has history of a right sided hydronephrosis and a right hydroureter.  He was admitted to [facility] in  06/2021.  At that time, he underwent cystoscopy with right JJ  stent placement.  He also had urinary retention and had a Jessica catheter placed by Urology as well.  He followed up with Urology on 08/04/2021 when he had the Jessica catheter removed and the ureteral stent removed as well.  As per the note, he was supposed to have a home RN visiting him in one week to monitor for urinary retention and also they are talking about to repeat a CT urogram in one month to assess for any return of hydronephrosis.  A CT of the abdomen and pelvis done 8/5/21 shows interval increase in the right sided hydronephrosis and hydroureter with a 1.5 cm ovoid soft tissue density that could be related to edema at the level of right UVJ.  There is evidence of multiple calculi present within the bladder lumen and nonobstructing calculi in the left kidney.  In ER, he was noted to have urinary retention again of 800 mL.   - continue jessica at discharge. Will plan to continue until follow up with urology and final plan for bladder outlet procedure.  - appreciate urology consultation.  - follow up with urology outpatient.    Acute kidney injury, postobstructive uropathy, resolving  His creatinine on admission is 1.66.  His creatinine in June was 0.9.    - continue jessica until BPH surgery  - monitor crt  - hold svaau-sl-uvudrxktp, benazepril, and torsemide, likely resume 8/8    Hypertension.    His blood pressure seems to be a reasonable controlled at this time. - Hold benazepril and torsemide given ALALN.    - continue amlodipine  - Hydralazine IV p.r.n. had been ordered for elevated blood pressures.    History of pulmonary embolism Xarelto.   - resume PTA rivaroxaban   - patient plans to stop this on 9/7/21 per his discussion with thrombosis MD    BPH.  Resume his epnvp-ii-yzdbfevsl Flomax and Avodart.    Generalized weakness, likely related to his infection.    - Fall precautions in place.   - PT and OT evaluation ordered.    Bilateral lower extremity edema, which seems to be chronic.   It had been noted  during his prior hospitalization. He had an echocardiogram at that time on 06/07/2021 which was a difficult study due to body habitus, but it seems that EF was 55 to 60%.    - hold torsemide  - monitor labs    Left rib pain  Related to being lifted up. Focused low anterolateral left ribs. No obvious fracture on CXR or CT.   - lidocaine patch  - tylenol prn  - spirometry  - patient declines further imaging.     Diet: Regular Diet Adult  DVT Prophylaxis: Pneumatic Compression Devices  Alonzo Catheter: PRESENT, indication: Retention  Central Lines: None  Code Status: No CPR- Do NOT Intubate      Disposition Plan   Expected discharge: 8/8 at the earliest recommended to prior living arrangement once antibiotic plan established, renal function improved and safe disposition plan/ TCU bed available.     The patient's care was discussed with the Bedside Nurse, Patient and Patient's Family.    Zoran Richardson MD  Hospitalist Service  [facility]  Securely message with the Vocera Web Console (learn more here)  Text page via [facility] Paging/Directory      Clinically Significant Risk Factors Present on Admission              35 min spent today in discussion with patient re management of sepsis, abx plan, crt.  ______________________________________________________________________    Interval History   Some rigors the previous night at around 0200. No corresponding temp taken. Sitting up in chair, eating well. Labs resolved. Having more left rib pain where he was lifted by EMS and police prior to presentation. Discussed no clear fracture on CT or CXR, offered repeat imaging dedicated to eval. Patient declined this. Discussed at length with patient and wife.    Data reviewed today: I reviewed all medications, new labs and imaging results over the last 24 hours. I personally reviewed no images or EKG's today.    Physical Exam   Vital Signs: Temp: 98.2  F (36.8  C) Temp src: Oral BP: (!) 166/80 Pulse: 94    Resp: 16 SpO2: 95 % O2 Device: None (Room air)    Weight: 202 lbs 12.8 oz  Constitutional: Awake, alert, cooperative, no apparent distress  Respiratory: Clear to auscultation bilaterally, no crackles or wheezing  Cardiovascular: Regular rate and rhythm, normal S1 and S2, and no murmur noted  GI: Normal bowel sounds, soft, non-distended, non-tender  Skin/Integumen: No rashes, no cyanosis, no edema  Other:       Data   Recent Labs   Lab 08/08/21  1213 08/07/21  1051 08/06/21  0213 08/06/21  0212   WBC 10.7 8.1 11.5*  --    HGB 11.1* 11.0* 10.9*  --    MCV 99 98 95  --     289 306  --    INR  --   --   --  1.87*    137  --  128*   POTASSIUM 3.6 3.8  --  5.0   CHLORIDE 103 107  --  98   CO2 28 26  --  25   BUN 23 20  --  41*   CR 1.17 1.13  --  1.66*   ANIONGAP 2* 4  --  5   SABRINA 8.1* 7.9*  --  8.4*   * 205*  --  126*   ALBUMIN  --   --   --  2.3*   PROTTOTAL  --   --   --  6.7*   BILITOTAL  --   --   --  0.3   ALKPHOS  --   --   --  178*   ALT  --   --   --  72*   AST  --   --   --  37     No results found for this or any previous visit (from the past 24 hour(s)).  Medications     - MEDICATION INSTRUCTIONS -         amLODIPine  5 mg Oral Daily     cetirizine  10 mg Oral Daily     dutasteride  0.5 mg Oral Daily     lidocaine  1 patch Transdermal Q24H     lidocaine   Transdermal Q8H     piperacillin-tazobactam  3.375 g Intravenous Q6H     rivaroxaban ANTICOAGULANT  20 mg Oral Daily with supper     sodium chloride (PF)  3 mL Intracatheter Q8H     tamsulosin  0.4 mg Oral Daily

## 2021-08-08 NOTE — PLAN OF CARE
Summary:     DATE & TIME: 8/8/21 7143-7833  Cognitive Concerns/ Orientation : A&O x4, calm and cooperative; [place]  BEHAVIOR & AGGRESSION TOOL COLOR: Green  CIWA SCORE: N/A  ABNL VS/O2:  BP elevated 166/80, on RA  MOBILITY: Assist of 1 with gait belt and walker  PAIN MANAGMENT: Lt rib cage pain, Lidocaine patch in place. Abdominal binder ordered. Tylenol given x1.  DIET: Regular  BOWEL/BLADDER: Alonzo in place. Up to bedside commode for BMs, 2 BMs this shift  ABNL LAB/BG: Blood and urine cultures pending  DRAIN/DEVICES: PIV SL with int ABX  TELEMETRY RHYTHM: N/A  SKIN: Blancheable redness to coccyx, +3 edema on BLE  TESTS/PROCEDURES: None  D/C DAY/GOALS/PLACE: Discharge tomorrow with home care   OTHER IMPORTANT INFO:  Hx of right  hydronephrosis and JJ stents with recent hospitalization. Urology signed off. IS use encouraged.

## 2021-08-09 ENCOUNTER — TELEPHONE (OUTPATIENT)
Dept: UROLOGY | Facility: CLINIC | Age: 81
End: 2021-08-09

## 2021-08-09 ENCOUNTER — PREP FOR PROCEDURE (OUTPATIENT)
Dept: UROLOGY | Facility: CLINIC | Age: 81
End: 2021-08-09

## 2021-08-09 VITALS
OXYGEN SATURATION: 96 % | HEART RATE: 102 BPM | TEMPERATURE: 98.1 F | BODY MASS INDEX: 32.61 KG/M2 | SYSTOLIC BLOOD PRESSURE: 146 MMHG | WEIGHT: 202.9 LBS | DIASTOLIC BLOOD PRESSURE: 90 MMHG | HEIGHT: 66 IN | RESPIRATION RATE: 18 BRPM

## 2021-08-09 DIAGNOSIS — R33.9 URINARY RETENTION: ICD-10-CM

## 2021-08-09 DIAGNOSIS — N13.8 BPH WITH OBSTRUCTION/LOWER URINARY TRACT SYMPTOMS: Primary | ICD-10-CM

## 2021-08-09 DIAGNOSIS — N40.1 BPH WITH OBSTRUCTION/LOWER URINARY TRACT SYMPTOMS: Primary | ICD-10-CM

## 2021-08-09 PROCEDURE — 99239 HOSP IP/OBS DSCHRG MGMT >30: CPT | Performed by: STUDENT IN AN ORGANIZED HEALTH CARE EDUCATION/TRAINING PROGRAM

## 2021-08-09 PROCEDURE — 250N000013 HC RX MED GY IP 250 OP 250 PS 637: Performed by: STUDENT IN AN ORGANIZED HEALTH CARE EDUCATION/TRAINING PROGRAM

## 2021-08-09 PROCEDURE — 250N000011 HC RX IP 250 OP 636: Performed by: INTERNAL MEDICINE

## 2021-08-09 PROCEDURE — 250N000013 HC RX MED GY IP 250 OP 250 PS 637: Performed by: INTERNAL MEDICINE

## 2021-08-09 PROCEDURE — 99232 SBSQ HOSP IP/OBS MODERATE 35: CPT | Performed by: UROLOGY

## 2021-08-09 RX ADMIN — DUTASTERIDE 0.5 MG: 0.5 CAPSULE ORAL at 08:42

## 2021-08-09 RX ADMIN — PIPERACILLIN SODIUM AND TAZOBACTAM SODIUM 3.38 G: 3; .375 INJECTION, POWDER, LYOPHILIZED, FOR SOLUTION INTRAVENOUS at 00:14

## 2021-08-09 RX ADMIN — PIPERACILLIN SODIUM AND TAZOBACTAM SODIUM 3.38 G: 3; .375 INJECTION, POWDER, LYOPHILIZED, FOR SOLUTION INTRAVENOUS at 06:19

## 2021-08-09 RX ADMIN — AMLODIPINE BESYLATE 5 MG: 5 TABLET ORAL at 08:42

## 2021-08-09 RX ADMIN — TAMSULOSIN HYDROCHLORIDE 0.4 MG: 0.4 CAPSULE ORAL at 08:43

## 2021-08-09 RX ADMIN — CETIRIZINE HYDROCHLORIDE 10 MG: 10 TABLET, FILM COATED ORAL at 08:42

## 2021-08-09 RX ADMIN — TORSEMIDE 10 MG: 10 TABLET ORAL at 08:43

## 2021-08-09 ASSESSMENT — ACTIVITIES OF DAILY LIVING (ADL)
ADLS_ACUITY_SCORE: 18

## 2021-08-09 ASSESSMENT — MIFFLIN-ST. JEOR: SCORE: 1558.1

## 2021-08-09 NOTE — DISCHARGE SUMMARY
[facility]  Hospitalist Discharge Summary      Date of Admission:  8/6/2021  Date of Discharge:  8/9/2021  Discharging Provider: Zoran Richardson MD      Discharge Diagnoses   Complicated urinary tract infection associated with a recent cystoscopy, possible  Sepsis, resolved  Severe right sided hydronephrosis with marked right hydroureter  Urinary retention  Acute kidney injury, postobstructive uropathy, resolving  Hypertension  History of pulmonary embolism Xarelto  BPH  Generalized weakness, likely related to his infection, improving  Bilateral lower extremity edema, which seems to be chronic  Left rib pain    Follow-ups Needed After Discharge   Follow-up Appointments     Follow-up and recommended labs and tests       Follow up with primary care provider, Gretel Malhotra, within 7 days for   hospital follow- up.  The following labs/tests are recommended: bmp.    Follow up with Dr. Man , at (location with clinic name or city) urology   clinic, within 1-3 weeks  for hospital follow- up. No follow up labs or   test are needed.           Unresulted Labs Ordered in the Past 30 Days of this Admission     Date and Time Order Name Status Description    8/6/2021  1:33 AM Blood Culture Arm, Left Preliminary     8/6/2021  1:33 AM Blood Culture Hand, Left Preliminary       These results will be followed up by Dr. Richardson    Discharge Disposition   Discharged to home  Condition at discharge: Stable      Hospital Course   Shilo Menchaca is a very pleasant 81-year-old gentleman with a past medical history of chronic severe right sided hydronephrosis with a right hydroureter, status post recent right ureteral stent placement and then removal on 8/4/21, history of urinary retention, phimosis status post dorsal slit of phimosis, history of hypertension, recent pulmonary embolism on Xarelto, bilateral lower extremity edema and BPH, who was admitted 8/6/21 with sepsis, presumed due to urinary tract  infection caused by recent instrumentation, and urinary retention. He was treated with zosyn during his hospital stay and had improvement of symptoms. He will plan to discharge on 5 days of augmentin to complete his course.    He will keep a jessica in place until follow up with urology and final plan for bladder outlet procedure.    He had a mild ALLAN related to obstruction. He will plan to continue on his PTA blood pressure meds and follow up with PCP for BMP in the coming 1-2 weeks.    He did have some left rib pain where he was hoisted by EMS and police prior to presentation. No clear fracture seen on CT or XR. He will plan to wear a binder, continue incentive spirometry, and follow up if pain is worsening.     Consultations This Hospital Stay   OCCUPATIONAL THERAPY ADULT IP CONSULT  PHYSICAL THERAPY ADULT IP CONSULT  UROLOGY IP CONSULT    Code Status   No CPR- Do NOT Intubate    Time Spent on this Encounter   IZoran MD, personally saw the patient today and spent greater than 30 minutes discharging this patient.       Zoran Richardson MD  [place] [address] [address]  [facility] [place] [address]  Phone: [phone]  ______________________________________________________________________    Physical Exam   Vital Signs: Temp: 98.1  F (36.7  C) Temp src: Oral BP: (!) 146/90 Pulse: 102   Resp: 18 SpO2: 96 % O2 Device: None (Room air)    Weight: 202 lbs 14.4 oz  Constitutional: Awake, alert, cooperative, no apparent cardiopulmonary distress.  Eyes: Conjunctiva and pupils examined and normal.  HEENT: Moist mucous membranes, normal dentition.  Respiratory: Clear to auscultation bilaterally, no crackles or wheezing.  Cardiovascular: Regular rate and rhythm, normal S1 and S2, and no murmur noted.  GI: Soft, non-distended, non-tender, normal bowel sounds.  Skin: No rashes, no cyanosis, no edema noted on exposed skin.  Musculoskeletal: No joint swelling, erythema or  tenderness. No gross bony abnormalities  Neurologic: Cranial nerves 2-12 grossly intact, normal strength and sensation.  Psychiatric: Alert, oriented to person, place and time, no obvious anxiety or depression.         Primary Care Physician   Gretel Malhotra    Discharge Orders      Home Care PT Referral for Hospital Discharge      Home Care OT Referral for Hospital Discharge      Home care nursing referral      MD face to face encounter    Documentation of Face to Face and Certification for Home Health Services    I certify that patient: Shilo Menchaca is under my care and that I, or a nurse practitioner or physician's assistant working with me, had a face-to-face encounter that meets the physician face-to-face encounter requirements with this patient on: 8/8/2021.    This encounter with the patient was in whole, or in part, for the following medical condition, which is the primary reason for home health care: deconditioning, sepsis.    I certify that, based on my findings, the following services are medically necessary home health services: Nursing, Occupational Therapy, and Physical Therapy.    My clinical findings support the need for the above services because: Nurse is needed: To assess jessica, self care, home saftey after changes in medications or other medical regimen.., Occupational Therapy Services are needed to assess and treat cognitive ability and address ADL safety due to impairment in self care., and Physical Therapy Services are needed to assess and treat the following functional impairments: ambulation.    Further, I certify that my clinical findings support that this patient is homebound (i.e. absences from home require considerable and taxing effort and are for medical reasons or [employer] services or infrequently or of short duration when for other reasons) because: Requires assistance of another person or specialized equipment to access medical services because patient: Is unable to operate  assistive equipment on their own...    Based on the above findings. I certify that this patient is confined to the home and needs intermittent skilled nursing care, physical therapy and/or speech therapy.  The patient is under my care, and I have initiated the establishment of the plan of care.  This patient will be followed by a physician who will periodically review the plan of care.  Physician/Provider to provide follow up care: Gretel Malhotra    Resume prior home care orders.    Attending hospital physician (the Medicare certified [place] provider): Zoran Richardson MD  Physician Signature: See electronic signature associated with these discharge orders.  Date: 8/8/2021     Reason for your hospital stay    You were in the hospital likely due to an infection in your urine causing a systemic response in your body. You will need to antibiotics for 5 more days.     Follow-up and recommended labs and tests     Follow up with primary care provider, Gretel Malhotra, within 7 days for hospital follow- up.  The following labs/tests are recommended: bmp.    Follow up with Dr. Man , at (location with clinic name or city) urology clinic, within 1-3 weeks  for hospital follow- up. No follow up labs or test are needed.     Activity    Your activity upon discharge: activity as tolerated     Diet    Follow this diet upon discharge: Orders Placed This Encounter      Regular Diet Adult       Significant Results and Procedures   Most Recent 3 CBC's:Recent Labs   Lab Test 08/08/21  1213 08/07/21  1051 08/06/21  0213   WBC 10.7 8.1 11.5*   HGB 11.1* 11.0* 10.9*   MCV 99 98 95    289 306     Most Recent 3 BMP's:Recent Labs   Lab Test 08/08/21  1213 08/07/21  1051 08/06/21  0212    137 128*   POTASSIUM 3.6 3.8 5.0   CHLORIDE 103 107 98   CO2 28 26 25   BUN 23 20 41*   CR 1.17 1.13 1.66*   ANIONGAP 2* 4 5   SABRINA 8.1* 7.9* 8.4*   * 205* 126*     Most Recent 6 Bacteria Isolates From Any Culture (See EPIC Reports  for Culture Details):Recent Labs   Lab Test 06/05/21  0018 06/04/21  2357 06/04/21  2324   CULT No growth >100,000 colonies/mL  Escherichia coli  *  <10,000 colonies/mL  urogenital edison  Susceptibility testing not routinely done   Cultured on the 2nd day of incubation:  Escherichia coli  *  Critical Value/Significant Value, preliminary result only, called to and read back by  GEE SHER RN 06/07/21 1249 EH.    (Note)  POSITIVE for E.COLI by Verigene multiplex nucleic acid test. Final  identification and antimicrobial susceptibility testing will be  verified by standard methods. Verigene test will not distinguish  E.coli from Shigella species including S.dysenteriae, S.flexneri,  S.boydii, and S.sonnei. Specimens containing Shigella species or  E.coli will be reported as Positive for E.coli.    Specimen tested with Verigene multiplex, gram-negative blood culture  nucleic acid test for the following targets: Acinetobacter sp.,  Citrobacter sp., Enterobacter sp., Proteus sp., E. coli, K.  pneumoniae/oxytoca, P. aeruginosa, and the following resistance  markers: CTXM, KPC, NDM, VIM, IMP and OXA.    Critical Value/Significant Value called to and read back by BRET DIXON RN 06/07/21 1504 EH.         Most Recent Urinalysis:Recent Labs   Lab Test 08/06/21  0240   COLOR Brown*   APPEARANCE Cloudy*   URINEGLC Negative   URINEBILI Negative   URINEKETONE Negative   SG 1.010   UBLD Large*   URINEPH 6.0   PROTEIN 100 *   NITRITE Negative   LEUKEST Large*   RBCU 20*   WBCU >182*   ,   Results for orders placed or performed during the hospital encounter of 08/06/21   XR Chest Port 1 View    Narrative    EXAM: CHEST SINGLE VIEW PORTABLE  LOCATION: [facility]  DATE/TIME: 8/6/2021 1:21 AM    INDICATION: Fever.    COMPARISON: None.    FINDINGS: No convincing pulmonary opacities. Normal size cardiac silhouette. Atherosclerotic calcification in the thoracic aorta.      Impression    IMPRESSION: No  convincing evidence of active cardiopulmonary disease.    CT Abdomen Pelvis w/o Contrast    Narrative    EXAM: CT ABDOMEN PELVIS W/O CONTRAST  LOCATION: [facility]  DATE/TIME: 8/6/2021 1:54 AM    INDICATION: Recent removal of right ureteral stent with new onset fever, weakness, and blood in urine. History of obstruction of the right kidney with marked hydronephrosis shown on CT 6/7/2021.    COMPARISON: CT urogram 6/7/2021.    TECHNIQUE: CT scan of the abdomen and pelvis was performed without IV contrast. Multiplanar reformats were obtained. Dose reduction techniques were used.    CONTRAST: None.    FINDINGS:   LOWER CHEST: Partially visualized coronary artery calcification. No infiltrates or effusions.    HEPATOBILIARY: Stable peripherally calcified low-density lesion in the liver. No biliary dilatation.    PANCREAS: Normal.    SPLEEN: Normal.    ADRENAL GLANDS: Normal.    KIDNEYS/BLADDER: Interval increase in marked right-sided hydronephrosis since prior study. Marked right ureteral dilatation extending down to the level of the right UVJ where there is focal increased area of soft tissue density (series 3, image 164)   measuring 1.5 cm. No obstructing ureteral calculi. Nonobstructing calculi measuring 2 mm left upper renal pole and 3 mm in the left lower renal pole. Multiple bladder calculi present. Diffuse bladder wall thickening with perivesical edema.    BOWEL: Minimal sliding esophageal hiatal hernia. No bowel dilatation or inflammatory change.    LYMPH NODES: No lymphadenopathy.    VASCULATURE: Normal caliber abdominal aorta. Minimal aortic calcification.    PELVIC ORGANS: Moderate prostate enlargement. No free fluid.    MUSCULOSKELETAL: Minimal degenerative changes in the spine.      Impression    IMPRESSION:   1.  Interval increase in right-sided hydronephrosis with marked right-sided hydronephrosis and marked right ureteral dilatation down to the level of the right UVJ where there  is a 1.5 cm ovoid soft tissue density at the right UVJ. No obstructing right   ureteral calculi. The soft tissue density could be due to edema at the level of the right UVJ.    2.  Multiple calculi present within the bladder lumen with nonobstructing calculi in the left kidney.    3.  Diffuse bladder wall thickening with perivesical edema.    4.  Remainder of findings unchanged as detailed above.           Discharge Medications   Current Discharge Medication List      START taking these medications    Details   amoxicillin-clavulanate (AUGMENTIN) 875-125 MG tablet Take 1 tablet by mouth 2 times daily  Qty: 10 tablet, Refills: 0    Associated Diagnoses: Acute cystitis with hematuria         CONTINUE these medications which have NOT CHANGED    Details   amLODIPine-benazepril (LOTREL) 5-10 MG capsule Take 1 capsule by mouth daily      cetirizine (ZYRTEC) 10 MG tablet Take 10 mg by mouth daily      dutasteride (AVODART) 0.5 MG capsule Take 0.5 mg by mouth daily      miconazole (MICATIN) 2 % external powder Apply topically 2 times daily  Qty: 90 g, Refills: 0    Comments: Future refills by PCP Dr. Gretel Malhotra with phone number [phone].  Associated Diagnoses: Acute cystitis with hematuria; Hydronephrosis of right kidney      Multiple Vitamins-Minerals (PRESERVISION AREDS) CAPS Take 1 capsule by mouth 2 times daily       multivitamin, therapeutic (THERA-VIT) TABS tablet Take 1 tablet by mouth daily      Rivaroxaban ANTICOAGULANT 15 & 20 MG TBPK 15 mg po BID for 21 days then 20 mg po daily .  Qty: 51 each, Refills: 0    Comments: 15 mg po BID for 21 days then 20 mg po daily . Future refills by PCP Dr. Gretel Malhotra with phone number [phone].  Associated Diagnoses: Acute cystitis with hematuria; Hydronephrosis of right kidney; Pulmonary embolism without acute cor pulmonale, unspecified chronicity, unspecified pulmonary embolism type (H)      tamsulosin (FLOMAX) 0.4 MG capsule Take 1 capsule (0.4 mg) by mouth  daily  Qty: 90 capsule, Refills: 3    Associated Diagnoses: Acute cystitis with hematuria; Hydronephrosis of right kidney      torsemide (DEMADEX) 10 MG tablet Take 10 mg by mouth daily      triamcinolone (KENALOG) 0.1 % external ointment Apply topically 2 times daily as needed for irritation (to back)       vitamin D3 (CHOLECALCIFEROL) 10 MCG (400 UNIT) capsule Take 1 capsule by mouth daily      Zinc Gluconate 15 MG TABS Take 15 mg by mouth daily       bacitracin-neomycin-polymyxin (NEOSPORIN) 400-5-5000 external ointment Apply to the effected area 2-3 tikes a day  Qty: 28 g, Refills: 0    Comments: Future refills by PCP Dr. Gretel Malhotra with phone number [phone].  Associated Diagnoses: Acute cystitis with hematuria; Weakness; Hydronephrosis of right kidney         STOP taking these medications       cephALEXin (KEFLEX) 500 MG capsule Comments:   Reason for Stopping:             Allergies   Allergies   Allergen Reactions     Azithromycin      PN: LW Reaction: UNKNOWN

## 2021-08-09 NOTE — TELEPHONE ENCOUNTER
[facility] Call Center    Phone Message    May a detailed message be left on voicemail: yes     Reason for Call: Other: arun calling from [facility] to see if  is wanting to see moses once in the hospital prior to discharge? please give arun a call to discuss at [phone]. thank you!     Action Taken: Message routed to:  Other:  clinic    Travel Screening: Not Applicable

## 2021-08-09 NOTE — PLAN OF CARE
Summary:     DATE & TIME: 8/8/21 [id number]  Cognitive Concerns/ Orientation : A&O x4, calm and cooperative; [place]  BEHAVIOR & AGGRESSION TOOL COLOR: Green  CIWA SCORE: N/A  ABNL VS/O2:   VSS on RA  MOBILITY: Assist of 1 with gait belt and walker  PAIN MANAGMENT: No complaints of pain overnight, abdominal binder in place over rib cage. Tylenol available   DIET: Regular  BOWEL/BLADDER: Jessica in place. No BM overnight.  ABNL LAB/BG:   DRAIN/DEVICES: PIV SL with int ABX  TELEMETRY RHYTHM: N/A  SKIN: Blancheable redness to coccyx, +3 edema on BLE  TESTS/PROCEDURES: None  D/C DAY/GOALS/PLACE: Possibly discharge home today with homecare  OTHER IMPORTANT INFO:  Hx of right  hydronephrosis and JJ stents with recent hospitalization. Urology following. Will discharge with jessica.

## 2021-08-09 NOTE — PLAN OF CARE
Physical Therapy Discharge Summary    Reason for therapy discharge:    Discharged to home with home therapy.    Progress towards therapy goal(s). See goals on Care Plan in [facility] electronic health record for goal details.  Goals partially met.  Barriers to achieving goals:   discharge from facility.    Therapy recommendation(s):    Continued therapy is recommended.  Rationale/Recommendations:   .

## 2021-08-09 NOTE — PLAN OF CARE
Discharge    Patient discharged to home with homecare via private transportation with his spouse.    Care plan note: Patient denied pain/N/V this morning. LS diminished; CHAVEZ; O2sats 90's RA. HR tachycardic at times to low 100's; otherwise, VSS. Alonzo is patnet and drained 650cc of cloudy, yellow urine this shift. Patient denies new complaints. Discharge instructions and prescriptions were provided. Patient and spouse verbalized understanding of all information received.    Listed belongings gathered and returned to patient. Yes  Belongings returned to patient from security/pharmacy  Yes  Care Plan and Patient education resolved: Yes  Prescriptions if needed, hard copies sent with patient  Yes  Home and hospital acquired medications returned to patient: NA  Medication Bin checked and emptied on discharge Yes  Follow up appointment made for patient: No - spouse stated she will make the follow-up appointments.

## 2021-08-09 NOTE — TELEPHONE ENCOUNTER
[facility] Call Center    Phone Message    May a detailed message be left on voicemail: yes     Reason for Call: Other: PT spouse Aziza called on behalf of PT Sugey Ervin PT was hospitalized on Friday 8/6 and catheterized, Dr. Man saw them inpatient earlier today. Per Aziza asking about cancelling nurse appt for tomorrow, and possibly PVR for 9/8, in favor of new surgical schedule. Per protocol referring to clinic for review and rescheduling according to revised care plan. Pls contact Aziza at [phone] to discuss as soon as possible, she is concerned about being charged for a visit tomorrow that is now not necessary.    Action Taken: Other: UA URO    Travel Screening: Not Applicable

## 2021-08-09 NOTE — PLAN OF CARE
Occupational Therapy Discharge Summary    Reason for therapy discharge:    Discharged to home with home therapy.    Progress towards therapy goal(s). See goals on Care Plan in [facility] electronic health record for goal details.  Goals not met.  Barriers to achieving goals:   discharge from facility.    Therapy recommendation(s):    Continued therapy is recommended.  Rationale/Recommendations:  home therapies.

## 2021-08-09 NOTE — PLAN OF CARE
Summary:     DATE & TIME: 8/8/21   Cognitive Concerns/ Orientation : A&O x4, calm and cooperative; [place]  BEHAVIOR & AGGRESSION TOOL COLOR: Green  CIWA SCORE: N/A  ABNL VS/O2:   VSS on RA  MOBILITY: Assist of 1 with gait belt and walker  PAIN MANAGMENT: Lt rib cage pain, Lidocaine patch in place., removed at HS.  Abdominal binder ordered. Tylenol given x2.  DIET: Regular  BOWEL/BLADDER: Alonzo in place. Up to bedside commode for BM x1 this shift  ABNL LAB/BG: Blood and urine cultures pending  DRAIN/DEVICES: PIV SL with int ABX  TELEMETRY RHYTHM: N/A  SKIN: Blancheable redness to coccyx, +3 edema on BLE  TESTS/PROCEDURES: None  D/C DAY/GOALS/PLACE: Possibly discharge home  tomorrow. with homecare  OTHER IMPORTANT INFO:  Hx of right  hydronephrosis and JJ stents with recent hospitalization. Urology saw pt on previous shift.

## 2021-08-09 NOTE — PROGRESS NOTES
"UROLOGY CONSULT PROGRESS NOTE:    Feeling well today, ambulating well, tolerating catheter  Long discussion with him and his wife    Exam:  Blood pressure (!) 146/90, pulse 102, temperature 98.1  F (36.7  C), temperature source Oral, resp. rate 18, height 1.676 m (5' 6\"), weight 92 kg (202 lb 14.4 oz), SpO2 96 %.    : Penis with well-healed dorsal slit, Alonzo draining clear yellow urine    Creatinine   Date Value Ref Range Status   08/08/2021 1.17 0.66 - 1.25 mg/dL Final   08/07/2021 1.13 0.66 - 1.25 mg/dL Final   08/06/2021 1.66 (H) 0.66 - 1.25 mg/dL Final   06/08/2021 1.00 0.66 - 1.25 mg/dL Final   06/07/2021 0.92 0.66 - 1.25 mg/dL Final     WBC Count   Date Value Ref Range Status   08/08/2021 10.7 4.0 - 11.0 10e3/uL Final   08/07/2021 8.1 4.0 - 11.0 10e3/uL Final   08/06/2021 11.5 (H) 4.0 - 11.0 10e3/uL Final     A/P:  81-year-old man with history of urinary retention and right hydronephrosis status post a dorsal slit, right ureteral stent placement, and right diagnostic ureteroscopy on with incidental pulmonary embolism now on anticoagulation.  Status post right ureteral stent removal on 8/4/2021 subsequent development of urinary retention    Urinary retention  -He is tolerating a Alonzo catheter well and will plan to maintain this on discharge  -His serum creatinine is down trended well following Alonzo catheter placement and given his recent diagnostic ureteroscopy with no evidence of traction indication for ureteral stent placement at this time  -Discussed treatment options, specifically we discussed a Greenlight photovaporization of the prostate (PVP) and REZUM procedure.  Discussed risks and benefits of each of these procedures and ultimately we will plan to proceed with a Greenlight photovaporization of the prostate (PVP) in about 1 month when we can safely stop the anticoagulation.  -Okay for discharge today with Alonzo catheter in place  -I will arrange for Alonzo catheter change in 3 to 4 weeks in my " office prior to his bladder outlet surgery  -I agree with transition to Karen Man M.D.  [phone]

## 2021-08-11 LAB
BACTERIA BLD CULT: NO GROWTH
BACTERIA BLD CULT: NO GROWTH

## 2021-08-22 ENCOUNTER — HEALTH MAINTENANCE LETTER (OUTPATIENT)
Age: 81
End: 2021-08-22

## 2021-08-22 DIAGNOSIS — Z11.59 ENCOUNTER FOR SCREENING FOR OTHER VIRAL DISEASES: ICD-10-CM

## 2021-09-03 ENCOUNTER — ALLIED HEALTH/NURSE VISIT (OUTPATIENT)
Dept: UROLOGY | Facility: CLINIC | Age: 81
End: 2021-09-03
Payer: MEDICARE

## 2021-09-03 DIAGNOSIS — Z79.2 PROPHYLACTIC ANTIBIOTIC: Primary | ICD-10-CM

## 2021-09-03 DIAGNOSIS — R33.9 URINARY RETENTION: ICD-10-CM

## 2021-09-03 PROCEDURE — 51702 INSERT TEMP BLADDER CATH: CPT

## 2021-09-03 RX ORDER — LIDOCAINE HYDROCHLORIDE 20 MG/ML
JELLY TOPICAL ONCE
Status: COMPLETED | OUTPATIENT
Start: 2021-09-03 | End: 2021-09-03

## 2021-09-03 RX ORDER — CIPROFLOXACIN 500 MG/1
500 TABLET, FILM COATED ORAL ONCE
Qty: 1 TABLET | Refills: 0 | Status: SHIPPED | OUTPATIENT
Start: 2021-09-03 | End: 2021-09-03

## 2021-09-03 RX ADMIN — LIDOCAINE HYDROCHLORIDE: 20 JELLY TOPICAL at 16:20

## 2021-09-03 NOTE — PROGRESS NOTES
Chief Complaint   Patient presents with     Urinary Retention     Patient here today for Alonzo Catheter change       There were no vitals taken for this visit. There is no height or weight on file to calculate BMI.    Patient Active Problem List   Diagnosis     Weakness     Acute cystitis with hematuria     Hydronephrosis of right kidney     Phimosis     Urinary retention     BPH with obstruction/lower urinary tract symptoms       Allergies   Allergen Reactions     Azithromycin      PN: LW Reaction: UNKNOWN       Current Outpatient Medications   Medication Sig Dispense Refill     amLODIPine-benazepril (LOTREL) 5-10 MG capsule Take 1 capsule by mouth daily       amoxicillin-clavulanate (AUGMENTIN) 875-125 MG tablet Take 1 tablet by mouth 2 times daily 10 tablet 0     bacitracin-neomycin-polymyxin (NEOSPORIN) 400-5-5000 external ointment Apply to the effected area 2-3 tikes a day 28 g 0     cetirizine (ZYRTEC) 10 MG tablet Take 10 mg by mouth daily       ciprofloxacin (CIPRO) 500 MG tablet Take 1 tablet (500 mg) by mouth once for 1 dose 1 tablet 0     dutasteride (AVODART) 0.5 MG capsule Take 0.5 mg by mouth daily       miconazole (MICATIN) 2 % external powder Apply topically 2 times daily 90 g 0     Multiple Vitamins-Minerals (PRESERVISION AREDS) CAPS Take 1 capsule by mouth 2 times daily        multivitamin, therapeutic (THERA-VIT) TABS tablet Take 1 tablet by mouth daily       Rivaroxaban ANTICOAGULANT 15 & 20 MG TBPK 15 mg po BID for 21 days then 20 mg po daily . 51 each 0     tamsulosin (FLOMAX) 0.4 MG capsule Take 1 capsule (0.4 mg) by mouth daily 90 capsule 3     torsemide (DEMADEX) 10 MG tablet Take 10 mg by mouth daily       triamcinolone (KENALOG) 0.1 % external ointment Apply topically 2 times daily as needed for irritation (to back)        vitamin D3 (CHOLECALCIFEROL) 10 MCG (400 UNIT) capsule Take 1 capsule by mouth daily       Zinc Gluconate 15 MG TABS Take 15 mg by mouth daily          Social History      Tobacco Use     Smoking status: Former Smoker     Types: Cigars     Smokeless tobacco: Never Used   Substance Use Topics     Alcohol use: Yes     Comment: 1 glass of wine per week     Drug use: Never           Catheter removal documentation on 9/3/2021:    Shilo Menchaca presents to the clinic for catheter removal.  Reason for removal: urinary retention  Order has been verified. Yes  Catheter successfully removed at 3:45 PM without immediate complication.  200 cc's of urine present in the catheter bag.  Urethral meatus is free of secretions and encrustation.  The patie is afebrile.  The patient tolerated the procedure and was instructed to return or call for pain, fever, leakage or decreased urine flow and watch for signs of infection    The patient is here for a Alonzo catheter change.  Patient's catheter was disconnected from the leg bag and a sterile syringe was attached to the catheter with 30cc of sterile H2O instilled into the bladder via gravity with ease. The catheter was removed with no complaints offered by the patient and the procedure was tolerated well.      Using sterile field technique a sterile, well lubricated 16fr coude Bulgarian Alonzo  catheter was gently inserted with ease x 1 attempt.  The balloon was filled with 30 ml sterile water.  No discomfort was voiced by the patient.  Clear, yellow urine return was noted with 35ml drained from the catheter.  Sterile tubing and drainage bag were attached to the catheter and secured to the right thigh.  No specimen was ordered to be collected or sent to the lab for examination.  Patient was instructed on proper hygiene and catheter care.  Patient to follow up in with his Surgery as planned.      Catheter insertion documentation on 9/3/2021:    Shilo Menchaca presents to the clinic for catheter insertion.  Reason for insertion: urinary retention  Order has been verified. YES  Catheter successfully inserted into the urethral meatus in the usual sterile fashion  without immediate complication.  Type of catheter placed: 16 Khmer Coude catheter  Urine is yellow in color.  30 cc's of urine output returned.  Balloon was filled with 8cc's of normal saline.  Securement device placed for the catheter.  The patient tolerated the procedure and was instructed to monitor for pain or discomfort, return or call for pain, fever, leakage or decreased urine flow, watch for signs of infection and follow up at the hospital for his Surgery on 09/17/2021.    Shilo Menchaca comes into clinic today at the request of Dr Patel Ordering Provider for Catheter Change and Catheter Insertion.        This service provided today was under the supervising provider of the day Dr Man, who was available if needed.    5mL 2% lidocaine hydrochloride Urojet instilled into urethra.    NDC# [address]-6  Lot #: [id number]  Expiration Date:  03/23      [facility]  9/3/2021  4:11 PM

## 2021-09-08 ENCOUNTER — OFFICE VISIT (OUTPATIENT)
Dept: UROLOGY | Facility: CLINIC | Age: 81
End: 2021-09-08
Payer: MEDICARE

## 2021-09-08 VITALS
BODY MASS INDEX: 31.5 KG/M2 | HEIGHT: 66 IN | WEIGHT: 196 LBS | HEART RATE: 110 BPM | DIASTOLIC BLOOD PRESSURE: 70 MMHG | SYSTOLIC BLOOD PRESSURE: 130 MMHG | OXYGEN SATURATION: 97 %

## 2021-09-08 DIAGNOSIS — Z79.2 PROPHYLACTIC ANTIBIOTIC: ICD-10-CM

## 2021-09-08 DIAGNOSIS — N13.8 BPH WITH OBSTRUCTION/LOWER URINARY TRACT SYMPTOMS: ICD-10-CM

## 2021-09-08 DIAGNOSIS — N40.1 BPH WITH OBSTRUCTION/LOWER URINARY TRACT SYMPTOMS: ICD-10-CM

## 2021-09-08 DIAGNOSIS — R33.9 URINARY RETENTION: Primary | ICD-10-CM

## 2021-09-08 DIAGNOSIS — N13.30 HYDRONEPHROSIS OF RIGHT KIDNEY: ICD-10-CM

## 2021-09-08 PROCEDURE — 99214 OFFICE O/P EST MOD 30 MIN: CPT | Performed by: UROLOGY

## 2021-09-08 RX ORDER — SULFAMETHOXAZOLE/TRIMETHOPRIM 800-160 MG
1 TABLET ORAL 2 TIMES DAILY
Qty: 14 TABLET | Refills: 0 | Status: SHIPPED | OUTPATIENT
Start: 2021-09-14 | End: 2021-09-21

## 2021-09-08 ASSESSMENT — MIFFLIN-ST. JEOR: SCORE: 1526.8

## 2021-09-08 ASSESSMENT — PAIN SCALES - GENERAL: PAINLEVEL: NO PAIN (0)

## 2021-09-08 NOTE — PROGRESS NOTES
"[facility]  CHIEF COMPLAINT   It was my pleasure to see Shilo Menchaca who is a 81 year old male for follow-up of Retention, right hydronephrosis.      HPI  Shilo Menchaca is a 81 year old male with significant past medical history of hypertension, a history of BPH with an episode of retention about 20 years ago for which she has been on Avodart for the last 20 years.  He was recently hospitalized at [facility] with a UTI.  He underwent an outpatient CT on 6/4/2021 which demonstrated a significantly distended bladder, bladder stones, and right hydroureteronephrosis with no clear evidence of obstruction.  He was then admitted through the emergency room on 6/5/2021 and noted to have significant leukocytosis to 17.4 and urinalysis concerning for infection.  Alonzo catheter placement was complicated by his significant phimosis which required bedside cystoscopy and placement of a catheter over a wire.  A CT urogram was then obtained on 6/7/2021 which demonstrated persistent right hydronephrosis and an incidental pulmonary embolism and he was started on anticoagulation.  On 6/9/2021 he underwent a dorsal slit of his phimosis, cystoscopy and right retrograde pyelogram, diagnostic ureteroscopy, and right ureteral stent placement.  He returns today for a trial of void.    8/4/21:  He returns today for follow-up for ureteral stent removal  He notes that he has been having significant urinary frequency with small volume voids and urine leakage especially overnight  He notes intermittent sensation of incomplete bladder emptying    TODAY 9/8/21:  He has been able stopped the Xarelto on Monday 9/7/21  He has noted some dizziness with the tamsulosin  He was very eager to have surgery done get rid of the catheter  His foreskin has healed well from the dorsal slit, though there is still some small amount of smegma    PHYSICAL EXAM  Patient is a 81 year old  male   Vitals: Blood pressure 130/70, pulse 110, height 1.676 m (5' 6\"), " weight 88.9 kg (196 lb), SpO2 97 %.  Body mass index is 31.64 kg/m .  General Appearance Adult:   Alert, no acute distress, oriented  HENT: throat/mouth:normal, good dentition  Lungs: no respiratory distress, or pursed lip breathing  Heart: No obvious jugular venous distension present  Abdomen: soft, nontender, no organomegaly or masses  Musculoskeltal: extremities normal, no peripheral edema  Skin: no suspicious lesions or rashes  Neuro: Alert, oriented, speech and mentation normal  Psych: affect and mood normal  : Well-healed dorsal slit with a small amount of smegma.  Urine draining clear yellow urine with only a small amount of sediment    Creatinine   Date Value Ref Range Status   08/08/2021 1.17 0.66 - 1.25 mg/dL Final   06/08/2021 1.00 0.66 - 1.25 mg/dL Final      IMAGING  All pertinent imaging reviewed:    All imaging studies reviewed by me.  I personally reviewed these imaging films.  A formal report from radiology will follow.    CT UROGRAM 8/6/21:  FINDINGS:   LOWER CHEST: Partially visualized coronary artery calcification. No infiltrates or effusions.     HEPATOBILIARY: Stable peripherally calcified low-density lesion in the liver. No biliary dilatation.     PANCREAS: Normal.     SPLEEN: Normal.     ADRENAL GLANDS: Normal.     KIDNEYS/BLADDER: Interval increase in marked right-sided hydronephrosis since prior study. Marked right ureteral dilatation extending down to the level of the right UVJ where there is focal increased area of soft tissue density (series 3, image 164)   measuring 1.5 cm. No obstructing ureteral calculi. Nonobstructing calculi measuring 2 mm left upper renal pole and 3 mm in the left lower renal pole. Multiple bladder calculi present. Diffuse bladder wall thickening with perivesical edema.     BOWEL: Minimal sliding esophageal hiatal hernia. No bowel dilatation or inflammatory change.     LYMPH NODES: No lymphadenopathy.     VASCULATURE: Normal caliber abdominal aorta. Minimal  aortic calcification.     PELVIC ORGANS: Moderate prostate enlargement. No free fluid.     MUSCULOSKELETAL: Minimal degenerative changes in the spine.                                                  IMPRESSION:   1.  Interval increase in right-sided hydronephrosis with marked right-sided hydronephrosis and marked right ureteral dilatation down to the level of the right UVJ where there is a 1.5 cm ovoid soft tissue density at the right UVJ. No obstructing right   ureteral calculi. The soft tissue density could be due to edema at the level of the right UVJ.     2.  Multiple calculi present within the bladder lumen with nonobstructing calculi in the left kidney.     3.  Diffuse bladder wall thickening with perivesical edema.     4.  Remainder of findings unchanged as detailed above.  ASSESSMENT and PLAN  81-year-old man with recent hospitalization for urinary retention and urinary tract infection with significant phimosis and right hydroureteronephrosis now status post a dorsal slit and a right ureteral stent placement on 6/9/2021.  Hospital course complicated by an incidental finding of a pulmonary embolism for which she is now on anticoagulation.     Urinary retention  -He has failed several trial of voids and is scheduled for a PVP on 9/17/2021  -We discussed that he may stop his tamsulosin given the dizziness and lightheadedness although I would like him to continue on his Avodart until he is finished with his current supply  -For surgery will plan for same-day discharge with a return on 9/20/2021 for an RN visit for trial of void  -Prescription for 7 days of Bactrim twice daily to start on 9/14/2021    Right hydroureteronephrosis  -At his diagnostic ureteroscopy there was no evidence of ureteral obstruction  -At the time of his surgery next week I will assess the bladder again and may need to replace a stent    Time spent: 30 minutes spent on the date of the encounter doing chart review, history and exam,  documentation and further activities as noted above.     Shadi Man MD   Urology  [facility]  [facility] Phone: [phone]  [facility] Phone: [phone]

## 2021-09-08 NOTE — LETTER
9/8/2021       RE: Shilo Menchaca  [address]  [place] [address]     Dear Colleague,    Thank you for referring your patient, Shilo Menchaca, to the [facility]. Please see a copy of my visit note below.    [place]  CHIEF COMPLAINT   It was my pleasure to see Shilo Menchaca who is a 81 year old male for follow-up of Retention, right hydronephrosis.      HPI  Shilo Menchaca is a 81 year old male with significant past medical history of hypertension, a history of BPH with an episode of retention about 20 years ago for which she has been on Avodart for the last 20 years.  He was recently hospitalized at [facility] with a UTI.  He underwent an outpatient CT on 6/4/2021 which demonstrated a significantly distended bladder, bladder stones, and right hydroureteronephrosis with no clear evidence of obstruction.  He was then admitted through the emergency room on 6/5/2021 and noted to have significant leukocytosis to 17.4 and urinalysis concerning for infection.  Alonzo catheter placement was complicated by his significant phimosis which required bedside cystoscopy and placement of a catheter over a wire.  A CT urogram was then obtained on 6/7/2021 which demonstrated persistent right hydronephrosis and an incidental pulmonary embolism and he was started on anticoagulation.  On 6/9/2021 he underwent a dorsal slit of his phimosis, cystoscopy and right retrograde pyelogram, diagnostic ureteroscopy, and right ureteral stent placement.  He returns today for a trial of void.    8/4/21:  He returns today for follow-up for ureteral stent removal  He notes that he has been having significant urinary frequency with small volume voids and urine leakage especially overnight  He notes intermittent sensation of incomplete bladder emptying    TODAY 9/8/21:  He has been able stopped the Xarelto on Monday 9/7/21  He has noted some  "dizziness with the tamsulosin  He was very eager to have surgery done get rid of the catheter  His foreskin has healed well from the dorsal slit, though there is still some small amount of smegma    PHYSICAL EXAM  Patient is a 81 year old  male   Vitals: Blood pressure 130/70, pulse 110, height 1.676 m (5' 6\"), weight 88.9 kg (196 lb), SpO2 97 %.  Body mass index is 31.64 kg/m .  General Appearance Adult:   Alert, no acute distress, oriented  HENT: throat/mouth:normal, good dentition  Lungs: no respiratory distress, or pursed lip breathing  Heart: No obvious jugular venous distension present  Abdomen: soft, nontender, no organomegaly or masses  Musculoskeltal: extremities normal, no peripheral edema  Skin: no suspicious lesions or rashes  Neuro: Alert, oriented, speech and mentation normal  Psych: affect and mood normal  : Well-healed dorsal slit with a small amount of smegma.  Urine draining clear yellow urine with only a small amount of sediment    Creatinine   Date Value Ref Range Status   08/08/2021 1.17 0.66 - 1.25 mg/dL Final   06/08/2021 1.00 0.66 - 1.25 mg/dL Final      IMAGING  All pertinent imaging reviewed:    All imaging studies reviewed by me.  I personally reviewed these imaging films.  A formal report from radiology will follow.    CT UROGRAM 8/6/21:  FINDINGS:   LOWER CHEST: Partially visualized coronary artery calcification. No infiltrates or effusions.     HEPATOBILIARY: Stable peripherally calcified low-density lesion in the liver. No biliary dilatation.     PANCREAS: Normal.     SPLEEN: Normal.     ADRENAL GLANDS: Normal.     KIDNEYS/BLADDER: Interval increase in marked right-sided hydronephrosis since prior study. Marked right ureteral dilatation extending down to the level of the right UVJ where there is focal increased area of soft tissue density (series 3, image 164)   measuring 1.5 cm. No obstructing ureteral calculi. Nonobstructing calculi measuring 2 mm left upper renal pole and 3 mm in " the left lower renal pole. Multiple bladder calculi present. Diffuse bladder wall thickening with perivesical edema.     BOWEL: Minimal sliding esophageal hiatal hernia. No bowel dilatation or inflammatory change.     LYMPH NODES: No lymphadenopathy.     VASCULATURE: Normal caliber abdominal aorta. Minimal aortic calcification.     PELVIC ORGANS: Moderate prostate enlargement. No free fluid.     MUSCULOSKELETAL: Minimal degenerative changes in the spine.                                                  IMPRESSION:   1.  Interval increase in right-sided hydronephrosis with marked right-sided hydronephrosis and marked right ureteral dilatation down to the level of the right UVJ where there is a 1.5 cm ovoid soft tissue density at the right UVJ. No obstructing right   ureteral calculi. The soft tissue density could be due to edema at the level of the right UVJ.     2.  Multiple calculi present within the bladder lumen with nonobstructing calculi in the left kidney.     3.  Diffuse bladder wall thickening with perivesical edema.     4.  Remainder of findings unchanged as detailed above.  ASSESSMENT and PLAN  81-year-old man with recent hospitalization for urinary retention and urinary tract infection with significant phimosis and right hydroureteronephrosis now status post a dorsal slit and a right ureteral stent placement on 6/9/2021.  Hospital course complicated by an incidental finding of a pulmonary embolism for which she is now on anticoagulation.     Urinary retention  -He has failed several trial of voids and is scheduled for a PVP on 9/17/2021  -We discussed that he may stop his tamsulosin given the dizziness and lightheadedness although I would like him to continue on his Avodart until he is finished with his current supply  -For surgery will plan for same-day discharge with a return on 9/20/2021 for an RN visit for trial of void  -Prescription for 7 days of Bactrim twice daily to start on 9/14/2021    Right  hydroureteronephrosis  -At his diagnostic ureteroscopy there was no evidence of ureteral obstruction  -At the time of his surgery next week I will assess the bladder again and may need to replace a stent    Time spent: 30 minutes spent on the date of the encounter doing chart review, history and exam, documentation and further activities as noted above.     Shadi Man MD   Urology  [facility]  [facility] Phone: [phone]  [facility] Phone: [phone]

## 2021-09-14 ENCOUNTER — TRANSFERRED RECORDS (OUTPATIENT)
Dept: HEALTH INFORMATION MANAGEMENT | Facility: CLINIC | Age: 81
End: 2021-09-14

## 2021-09-16 RX ORDER — CERAMIDE 1,3,6-II/SALICYLIC/B3
CLEANSER (ML) TOPICAL 2 TIMES DAILY PRN
COMMUNITY

## 2021-09-16 RX ORDER — ACETAMINOPHEN 325 MG/1
325-650 TABLET ORAL EVERY 6 HOURS PRN
COMMUNITY

## 2021-09-16 NOTE — PROGRESS NOTES
PTA medications updated by Medication Scribe prior to surgery via phone call with patient (last doses completed by Nurse)     Medication history sources: Patient, Surescripts and H&P  In the past week, patient estimated taking medication this percent of the time: Greater than 90%  Adherence assessment: N/A Not Observed    Significant changes made to the medication list:  Patient reports no longer taking the following meds (med scribe removed from PTA med list): Rivaroxaban (Xarelto) - pt states discontinued on 9/6/21      Additional medication history information:   pt bringing supply of Bactrim DS (7 day course)    Medication reconciliation completed by provider prior to medication history? No    Time spent in this activity: 20 minutes    The information provided in this note is only as accurate as the sources available at the time of update(s)      Prior to Admission medications    Medication Sig Last Dose Taking? Auth Provider   acetaminophen (TYLENOL) 325 MG tablet Take 325-650 mg by mouth every 6 hours as needed for mild pain  at PRN Yes Reported, Patient   amLODIPine-benazepril (LOTREL) 5-10 MG capsule Take 1 capsule by mouth daily 9/16/2021 at AM Yes Unknown, Entered By History   cetirizine (ZYRTEC) 10 MG tablet Take 10 mg by mouth every evening   at PM Yes Unknown, Entered By History   dutasteride (AVODART) 0.5 MG capsule Take 0.5 mg by mouth daily 9/16/2021 at AM Yes Unknown, Entered By History   Multiple Vitamins-Minerals (PRESERVISION AREDS) CAPS Take 1 capsule by mouth 2 times daily   Yes Unknown, Entered By History   multivitamin, therapeutic (THERA-VIT) TABS tablet Take 1 tablet by mouth daily 9/16/2021 at AM Yes Unknown, Entered By History   Pramoxine HCl (CERAVE ITCH RELIEF) 1 % CREA Externally apply topically 2 times daily as needed  Yes Reported, Patient   sulfamethoxazole-trimethoprim (BACTRIM DS) 800-160 MG tablet Take 1 tablet by mouth 2 times daily for 7 days  Patient taking differently: Take 1  tablet by mouth 2 times daily Started 9/14/21 for 7 days  Yes Shadi Man MD   torsemide (DEMADEX) 10 MG tablet Take 10 mg by mouth daily 9/16/2021 at AM Yes Unknown, Entered By History   vitamin D3 (CHOLECALCIFEROL) 10 MCG (400 UNIT) capsule Take 1 capsule by mouth daily 9/16/2021 at AM Yes Unknown, Entered By History   Zinc Gluconate 15 MG TABS Take 15 mg by mouth daily  9/16/2021 at AM Yes Unknown, Entered By History

## 2021-09-17 ENCOUNTER — ANESTHESIA EVENT (OUTPATIENT)
Dept: SURGERY | Facility: CLINIC | Age: 81
End: 2021-09-17
Payer: MEDICARE

## 2021-09-17 ENCOUNTER — ANESTHESIA (OUTPATIENT)
Dept: SURGERY | Facility: CLINIC | Age: 81
End: 2021-09-17
Payer: MEDICARE

## 2021-09-17 ENCOUNTER — HOSPITAL ENCOUNTER (OUTPATIENT)
Facility: CLINIC | Age: 81
Discharge: HOME | End: 2021-09-17
Attending: UROLOGY | Admitting: UROLOGY
Payer: MEDICARE

## 2021-09-17 VITALS
SYSTOLIC BLOOD PRESSURE: 134 MMHG | DIASTOLIC BLOOD PRESSURE: 77 MMHG | HEIGHT: 65 IN | TEMPERATURE: 97.3 F | RESPIRATION RATE: 17 BRPM | BODY MASS INDEX: 32.62 KG/M2 | WEIGHT: 195.8 LBS | OXYGEN SATURATION: 94 % | HEART RATE: 96 BPM

## 2021-09-17 DIAGNOSIS — N13.8 BPH WITH OBSTRUCTION/LOWER URINARY TRACT SYMPTOMS: Primary | ICD-10-CM

## 2021-09-17 DIAGNOSIS — N21.0 BLADDER STONES: ICD-10-CM

## 2021-09-17 DIAGNOSIS — N42.0: ICD-10-CM

## 2021-09-17 DIAGNOSIS — N40.1 BPH WITH OBSTRUCTION/LOWER URINARY TRACT SYMPTOMS: Primary | ICD-10-CM

## 2021-09-17 DIAGNOSIS — R33.9 URINARY RETENTION: ICD-10-CM

## 2021-09-17 LAB — POTASSIUM BLD-SCNC: 3.9 MMOL/L (ref 3.4–5.3)

## 2021-09-17 PROCEDURE — 999N000141 HC STATISTIC PRE-PROCEDURE NURSING ASSESSMENT: Performed by: UROLOGY

## 2021-09-17 PROCEDURE — 82365 CALCULUS SPECTROSCOPY: CPT | Performed by: UROLOGY

## 2021-09-17 PROCEDURE — 250N000011 HC RX IP 250 OP 636: Performed by: UROLOGY

## 2021-09-17 PROCEDURE — 710N000012 HC RECOVERY PHASE 2, PER MINUTE: Performed by: UROLOGY

## 2021-09-17 PROCEDURE — 370N000017 HC ANESTHESIA TECHNICAL FEE, PER MIN: Performed by: UROLOGY

## 2021-09-17 PROCEDURE — 36415 COLL VENOUS BLD VENIPUNCTURE: CPT | Performed by: ANESTHESIOLOGY

## 2021-09-17 PROCEDURE — 52648 LASER SURGERY OF PROSTATE: CPT | Mod: 22 | Performed by: UROLOGY

## 2021-09-17 PROCEDURE — 710N000009 HC RECOVERY PHASE 1, LEVEL 1, PER MIN: Performed by: UROLOGY

## 2021-09-17 PROCEDURE — 272N000001 HC OR GENERAL SUPPLY STERILE: Performed by: UROLOGY

## 2021-09-17 PROCEDURE — 360N000079 HC SURGERY LEVEL 6, PER MIN: Performed by: UROLOGY

## 2021-09-17 PROCEDURE — 250N000011 HC RX IP 250 OP 636: Performed by: NURSE ANESTHETIST, CERTIFIED REGISTERED

## 2021-09-17 PROCEDURE — 84132 ASSAY OF SERUM POTASSIUM: CPT | Performed by: ANESTHESIOLOGY

## 2021-09-17 PROCEDURE — 250N000009 HC RX 250: Performed by: NURSE ANESTHETIST, CERTIFIED REGISTERED

## 2021-09-17 PROCEDURE — 250N000009 HC RX 250: Performed by: UROLOGY

## 2021-09-17 PROCEDURE — 52317 REMOVE BLADDER STONE: CPT | Mod: 51 | Performed by: UROLOGY

## 2021-09-17 PROCEDURE — 258N000003 HC RX IP 258 OP 636: Performed by: ANESTHESIOLOGY

## 2021-09-17 PROCEDURE — 258N000003 HC RX IP 258 OP 636: Performed by: NURSE ANESTHETIST, CERTIFIED REGISTERED

## 2021-09-17 PROCEDURE — 250N000025 HC SEVOFLURANE, PER MIN: Performed by: UROLOGY

## 2021-09-17 PROCEDURE — 258N000001 HC RX 258: Performed by: UROLOGY

## 2021-09-17 RX ORDER — FENTANYL CITRATE 0.05 MG/ML
50 INJECTION, SOLUTION INTRAMUSCULAR; INTRAVENOUS
Status: DISCONTINUED | OUTPATIENT
Start: 2021-09-17 | End: 2021-09-17 | Stop reason: HOSPADM

## 2021-09-17 RX ORDER — ATROPA BELLADONNA AND OPIUM 16.2; 3 MG/1; MG/1
SUPPOSITORY RECTAL PRN
Status: DISCONTINUED | OUTPATIENT
Start: 2021-09-17 | End: 2021-09-17 | Stop reason: HOSPADM

## 2021-09-17 RX ORDER — SODIUM CHLORIDE, SODIUM LACTATE, POTASSIUM CHLORIDE, CALCIUM CHLORIDE 600; 310; 30; 20 MG/100ML; MG/100ML; MG/100ML; MG/100ML
INJECTION, SOLUTION INTRAVENOUS CONTINUOUS
Status: DISCONTINUED | OUTPATIENT
Start: 2021-09-17 | End: 2021-09-17 | Stop reason: HOSPADM

## 2021-09-17 RX ORDER — FUROSEMIDE 10 MG/ML
INJECTION INTRAMUSCULAR; INTRAVENOUS PRN
Status: DISCONTINUED | OUTPATIENT
Start: 2021-09-17 | End: 2021-09-17

## 2021-09-17 RX ORDER — HYDROMORPHONE HCL IN WATER/PF 6 MG/30 ML
0.2 PATIENT CONTROLLED ANALGESIA SYRINGE INTRAVENOUS EVERY 5 MIN PRN
Status: DISCONTINUED | OUTPATIENT
Start: 2021-09-17 | End: 2021-09-17 | Stop reason: HOSPADM

## 2021-09-17 RX ORDER — FENTANYL CITRATE 0.05 MG/ML
25 INJECTION, SOLUTION INTRAMUSCULAR; INTRAVENOUS EVERY 5 MIN PRN
Status: DISCONTINUED | OUTPATIENT
Start: 2021-09-17 | End: 2021-09-17 | Stop reason: HOSPADM

## 2021-09-17 RX ORDER — CEFAZOLIN SODIUM 2 G/100ML
2 INJECTION, SOLUTION INTRAVENOUS SEE ADMIN INSTRUCTIONS
Status: DISCONTINUED | OUTPATIENT
Start: 2021-09-17 | End: 2021-09-17 | Stop reason: HOSPADM

## 2021-09-17 RX ORDER — MAGNESIUM HYDROXIDE 1200 MG/15ML
LIQUID ORAL PRN
Status: DISCONTINUED | OUTPATIENT
Start: 2021-09-17 | End: 2021-09-17 | Stop reason: HOSPADM

## 2021-09-17 RX ORDER — ONDANSETRON 2 MG/ML
INJECTION INTRAMUSCULAR; INTRAVENOUS PRN
Status: DISCONTINUED | OUTPATIENT
Start: 2021-09-17 | End: 2021-09-17

## 2021-09-17 RX ORDER — PROPOFOL 10 MG/ML
INJECTION, EMULSION INTRAVENOUS PRN
Status: DISCONTINUED | OUTPATIENT
Start: 2021-09-17 | End: 2021-09-17

## 2021-09-17 RX ORDER — MEPERIDINE HYDROCHLORIDE 25 MG/ML
12.5 INJECTION INTRAMUSCULAR; INTRAVENOUS; SUBCUTANEOUS
Status: DISCONTINUED | OUTPATIENT
Start: 2021-09-17 | End: 2021-09-17 | Stop reason: HOSPADM

## 2021-09-17 RX ORDER — FENTANYL CITRATE 50 UG/ML
INJECTION, SOLUTION INTRAMUSCULAR; INTRAVENOUS PRN
Status: DISCONTINUED | OUTPATIENT
Start: 2021-09-17 | End: 2021-09-17

## 2021-09-17 RX ORDER — DEXAMETHASONE SODIUM PHOSPHATE 4 MG/ML
INJECTION, SOLUTION INTRA-ARTICULAR; INTRALESIONAL; INTRAMUSCULAR; INTRAVENOUS; SOFT TISSUE PRN
Status: DISCONTINUED | OUTPATIENT
Start: 2021-09-17 | End: 2021-09-17

## 2021-09-17 RX ORDER — ONDANSETRON 4 MG/1
4 TABLET, ORALLY DISINTEGRATING ORAL EVERY 30 MIN PRN
Status: DISCONTINUED | OUTPATIENT
Start: 2021-09-17 | End: 2021-09-17 | Stop reason: HOSPADM

## 2021-09-17 RX ORDER — CEFAZOLIN SODIUM 2 G/100ML
2 INJECTION, SOLUTION INTRAVENOUS
Status: COMPLETED | OUTPATIENT
Start: 2021-09-17 | End: 2021-09-17

## 2021-09-17 RX ORDER — LIDOCAINE HYDROCHLORIDE 20 MG/ML
INJECTION, SOLUTION INFILTRATION; PERINEURAL PRN
Status: DISCONTINUED | OUTPATIENT
Start: 2021-09-17 | End: 2021-09-17

## 2021-09-17 RX ORDER — OXYCODONE HYDROCHLORIDE 5 MG/1
5 TABLET ORAL EVERY 4 HOURS PRN
Status: DISCONTINUED | OUTPATIENT
Start: 2021-09-17 | End: 2021-09-17 | Stop reason: HOSPADM

## 2021-09-17 RX ORDER — ONDANSETRON 2 MG/ML
4 INJECTION INTRAMUSCULAR; INTRAVENOUS EVERY 30 MIN PRN
Status: DISCONTINUED | OUTPATIENT
Start: 2021-09-17 | End: 2021-09-17 | Stop reason: HOSPADM

## 2021-09-17 RX ORDER — ONDANSETRON 2 MG/ML
4 INJECTION INTRAMUSCULAR; INTRAVENOUS
Status: DISCONTINUED | OUTPATIENT
Start: 2021-09-17 | End: 2021-09-17 | Stop reason: HOSPADM

## 2021-09-17 RX ADMIN — DEXAMETHASONE SODIUM PHOSPHATE 4 MG: 4 INJECTION, SOLUTION INTRA-ARTICULAR; INTRALESIONAL; INTRAMUSCULAR; INTRAVENOUS; SOFT TISSUE at 16:45

## 2021-09-17 RX ADMIN — PHENYLEPHRINE HYDROCHLORIDE 100 MCG: 10 INJECTION INTRAVENOUS at 18:19

## 2021-09-17 RX ADMIN — DEXMEDETOMIDINE 4 MCG: 100 INJECTION, SOLUTION, CONCENTRATE INTRAVENOUS at 17:17

## 2021-09-17 RX ADMIN — ONDANSETRON 4 MG: 2 INJECTION INTRAMUSCULAR; INTRAVENOUS at 17:38

## 2021-09-17 RX ADMIN — LIDOCAINE HYDROCHLORIDE 100 MG: 20 INJECTION, SOLUTION INFILTRATION; PERINEURAL at 16:45

## 2021-09-17 RX ADMIN — FENTANYL CITRATE 50 MCG: 50 INJECTION, SOLUTION INTRAMUSCULAR; INTRAVENOUS at 16:58

## 2021-09-17 RX ADMIN — PHENYLEPHRINE HYDROCHLORIDE 100 MCG: 10 INJECTION INTRAVENOUS at 18:12

## 2021-09-17 RX ADMIN — CEFAZOLIN SODIUM 2 G: 2 INJECTION, SOLUTION INTRAVENOUS at 16:42

## 2021-09-17 RX ADMIN — SODIUM CHLORIDE, POTASSIUM CHLORIDE, SODIUM LACTATE AND CALCIUM CHLORIDE: 600; 310; 30; 20 INJECTION, SOLUTION INTRAVENOUS at 16:48

## 2021-09-17 RX ADMIN — PROPOFOL 170 MG: 10 INJECTION, EMULSION INTRAVENOUS at 16:45

## 2021-09-17 RX ADMIN — PHENYLEPHRINE HYDROCHLORIDE 100 MCG: 10 INJECTION INTRAVENOUS at 17:13

## 2021-09-17 RX ADMIN — FENTANYL CITRATE 25 MCG: 50 INJECTION, SOLUTION INTRAMUSCULAR; INTRAVENOUS at 18:31

## 2021-09-17 RX ADMIN — FUROSEMIDE 10 MG: 10 INJECTION, SOLUTION INTRAVENOUS at 18:21

## 2021-09-17 RX ADMIN — FENTANYL CITRATE 25 MCG: 50 INJECTION, SOLUTION INTRAMUSCULAR; INTRAVENOUS at 17:38

## 2021-09-17 RX ADMIN — PHENYLEPHRINE HYDROCHLORIDE 100 MCG: 10 INJECTION INTRAVENOUS at 17:05

## 2021-09-17 RX ADMIN — DEXMEDETOMIDINE 8 MCG: 100 INJECTION, SOLUTION, CONCENTRATE INTRAVENOUS at 17:21

## 2021-09-17 RX ADMIN — DEXMEDETOMIDINE 4 MCG: 100 INJECTION, SOLUTION, CONCENTRATE INTRAVENOUS at 17:31

## 2021-09-17 RX ADMIN — PROPOFOL 30 MG: 10 INJECTION, EMULSION INTRAVENOUS at 16:58

## 2021-09-17 RX ADMIN — DEXMEDETOMIDINE 4 MCG: 100 INJECTION, SOLUTION, CONCENTRATE INTRAVENOUS at 17:24

## 2021-09-17 RX ADMIN — PHENYLEPHRINE HYDROCHLORIDE 100 MCG: 10 INJECTION INTRAVENOUS at 17:10

## 2021-09-17 RX ADMIN — SODIUM CHLORIDE, POTASSIUM CHLORIDE, SODIUM LACTATE AND CALCIUM CHLORIDE: 600; 310; 30; 20 INJECTION, SOLUTION INTRAVENOUS at 18:10

## 2021-09-17 RX ADMIN — FENTANYL CITRATE 50 MCG: 50 INJECTION, SOLUTION INTRAMUSCULAR; INTRAVENOUS at 16:45

## 2021-09-17 RX ADMIN — FENTANYL CITRATE 25 MCG: 50 INJECTION, SOLUTION INTRAMUSCULAR; INTRAVENOUS at 17:58

## 2021-09-17 RX ADMIN — FENTANYL CITRATE 25 MCG: 50 INJECTION, SOLUTION INTRAMUSCULAR; INTRAVENOUS at 17:25

## 2021-09-17 ASSESSMENT — LIFESTYLE VARIABLES: TOBACCO_USE: 0

## 2021-09-17 ASSESSMENT — MIFFLIN-ST. JEOR: SCORE: 1510.02

## 2021-09-17 ASSESSMENT — ENCOUNTER SYMPTOMS
ORTHOPNEA: 0
DYSRHYTHMIAS: 0
SEIZURES: 0

## 2021-09-17 NOTE — ANESTHESIA CARE TRANSFER NOTE
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    Anesthesia Type:   General     Note:    Oropharynx: oropharynx clear of all foreign objects  Level of Consciousness: awake  Oxygen Supplementation: face mask  Level of Supplemental Oxygen (L/min / FiO2): 8  Independent Airway: airway patency satisfactory and stable  Dentition: dentition unchanged  Vital Signs Stable: post-procedure vital signs reviewed and stable  Report to RN Given: handoff report given  Patient transferred to: PACU    Handoff Report: Identifed the Patient, Identified the Reponsible Provider, Reviewed the pertinent medical history, Discussed the surgical course, Reviewed Intra-OP anesthesia mangement and issues during anesthesia, Set expectations for post-procedure period and Allowed opportunity for questions and acknowledgement of understanding      Vitals:  Vitals Value Taken Time   BP     Temp     Pulse 88 09/17/21 1838   Resp 0 09/17/21 1838   SpO2 99 % 09/17/21 1838   Vitals shown include unvalidated device data.    Electronically Signed By: GARY Crespo CRNA  September 17, 2021  6:38 PM

## 2021-09-17 NOTE — ANESTHESIA PROCEDURE NOTES
Airway       Patient location during procedure: OR       Procedure Start/Stop Times: 9/17/2021 4:47 PM  Staff -        Anesthesiologist:  Mil Dotson MD       CRNA: Viktoriya Reyna APRN CRNA       Performed By: CRNAIndications and Patient Condition       Indications for airway management: tyron-procedural       Induction type:intravenous       Mask difficulty assessment: 0 - not attempted    Final Airway Details       Final airway type: supraglottic airway    Supraglottic Airway Details        Type: LMA       Brand: Ambu AuraGain       LMA size: 5    Post intubation assessment        Placement verified by: capnometry, equal breath sounds and chest rise        Number of attempts at approach: 1       Number of other approaches attempted: 0       Secured with: pink tape       Ease of procedure: easy       Dentition: Intact and Unchanged

## 2021-09-17 NOTE — OP NOTE
OPERATIVE REPORT  DATE OF SURGERY: 09/17/21  LOCATION OF SURGERY: [facility]  PREOPERATIVE DIAGNOSIS:  (N40.1,  N13.8) BPH with obstruction/lower urinary tract symptoms  (primary encounter diagnosis)  (R33.9) Urinary retention  (N21.0) Bladder stones  (N42.0) Calculus of prostatic fossa  POSTOPERATIVE DIAGNOSIS: (N40.1,  N13.8) BPH with obstruction/lower urinary tract symptoms  (primary encounter diagnosis)  (R33.9) Urinary retention  (N21.0) Bladder stones  (N42.0) Calculus of prostatic fossa     START TIME: 4:57 PM  END TIME: 6:21 PM    PROCEDURE PERFORMED:   1. Greenlight photovaporization of the prostate (PVP) - modifer 22 for large median lobe and challenging anatomy  2. Cystolitholapaxy with bladder stone fragmentation and removal  3. Removal of prostatic urethral stones     STAFF SURGEON: Shadi Man MD  ANESTHESIA: General.   ESTIMATED BLOOD LOSS: 5 mL.   DRAINS AND TUBES: 20fr coude tip 2-way catheter with 25cc in the balloon   COMPLICATIONS: None.   DISPOSITION: PACU.   SPECIMENS OBTAINED:   ID Type Source Tests Collected by Time Destination   A : BLADDER STONES Calculus/Stone Urinary Bladder STONE ANALYSIS Shadi Man MD 9/17/2021  6:22 PM      SIGNIFICANT FINDINGS: Cystoscopy with significant trilobar hypertrophy, several bladder stones, and prostatic urethral stones. Prostate well vaporized and stones crushed and removed.      HISTORY OF PRESENT ILLNESS: Shilo Menchaca is a 81 year old man who initially presented in early June with acute urinary retention and UTI with significant phimosis and right hydroureteronephrosis now status post a dorsal slit and a right ureteral stent placement on 6/9/2021.  Hospital course complicated by an incidental finding of a pulmonary embolism for which he was treated with 3 months of anticoagulation. He failed Trial of void and has been managed with indwelling jessica catheter. He presents for the above surgery.     OPERATION PERFORMED:   Informed consent was  obtained and the patient was brought to the operating room where general anesthesia was induced. The patient was given appropriate preoperative antibiotics and positioned supine. The patient was then repositioned in dorsal lithotomy with all pressure points padded. We then performed a timeout, verifying the correct patient's site and procedure to be performed.    A 24 Yakut continuous flow scope was inserted atraumatically into the bladder.  Cystoscopy was performed which revealed significant tri-lobar prostatic hypertrophy, bilateral UOs in normal position, and moderate/severe trabeculation of the bladder.  there was also a collection of bladder stones encountered measuring 5-15mm. Vaporization was started at the bladder neck and the median lobe which was taken down entirety and the bladder neck was opened. Vaporization was then carried back to the level of the verumontanum care to avoid vaporization distal to this point to ensure an intact external sphincter.  Lateral lobes were well vaporized to the capsule. There was a pocket of stones encountered at the apex which were irrigated into the bladder.  Hemostasis was excellent.  Total laser energy - 321,319 Joules. Total laser time - 40 min 15 sec. This was a challenging vaporization due to the large median lobe and required significant time - modifier 22. The 25fr lithotrite scope was inserted and the stones were crushed and irrigated. Scope was removed and a 20 Yakut two-way coudé tip catheter was placed with 25 cc in the balloon.  This was placed on gentle traction.  A B/O suppository was administered and he received 10 mg of Lasix.  He was emerged from anesthesia and taken to the PACU in stable condition.    Shadi Man MD   Urology  [facility]  Clinic Phone [phone]

## 2021-09-17 NOTE — ANESTHESIA PREPROCEDURE EVALUATION
Anesthesia Pre-Procedure Evaluation    Patient: Shilo Menchaca   MRN: [id number] : 1940        Preoperative Diagnosis: Urinary retention [R33.9]  BPH with obstruction/lower urinary tract symptoms [N40.1, N13.8]   Procedure : Procedure(s):  CYSTOSCOPY, PHOTOVAPORIZATION OF THE PROSTATE USING THE GREENLIGHT LASER     Past Medical History:   Diagnosis Date     BPH with obstruction/lower urinary tract symptoms      Cancer (H)     skin     Hx pulmonary embolism      Hydronephrosis of right kidney      Hypertension      Hypokalemia      Lower extremity edema      Obesity (BMI 30.0-34.9)      Spinal stenosis       Past Surgical History:   Procedure Laterality Date     CHOLECYSTECTOMY       CIRCUMCISION N/A 2021    Procedure: DORSAL SLIT;  Surgeon: Shadi Man MD;  Location:  OR     COMBINED CYSTOSCOPY, RETROGRADES, EXCHANGE STENT URETER(S) Right 2021    Procedure: CYSTOSCOPY, WITH RIGHT RETROGRADE PYELOGRAM, RIGHT DIAGNOSTIC  URETEROSCOPY  AND URETERAL STENT REPLACEMENT AND PENILE BLOCK;  Surgeon: Shadi Man MD;  Location: [facility]     CYSTOSCOPY       ENT SURGERY       HERNIA REPAIR        Allergies   Allergen Reactions     Azithromycin      PN: LW Reaction: UNKNOWN     Cefpodoxime Itching      Social History     Tobacco Use     Smoking status: Former Smoker     Types: Cigars     Smokeless tobacco: Never Used   Substance Use Topics     Alcohol use: Yes     Comment: 1 glass of wine per week      Wt Readings from Last 1 Encounters:   21 88.8 kg (195 lb 12.8 oz)        Prior to Admission medications    Medication Sig Start Date End Date Taking? Authorizing Provider   acetaminophen (TYLENOL) 325 MG tablet Take 325-650 mg by mouth every 6 hours as needed for mild pain   Yes Reported, Patient   amLODIPine-benazepril (LOTREL) 5-10 MG capsule Take 1 capsule by mouth daily 21  Yes Unknown, Entered By History   cetirizine (ZYRTEC) 10 MG tablet Take 10 mg by mouth every evening    Yes Unknown, Entered  "By History   dutasteride (AVODART) 0.5 MG capsule Take 0.5 mg by mouth daily   Yes Unknown, Entered By History   Multiple Vitamins-Minerals (PRESERVISION AREDS) CAPS Take 1 capsule by mouth 2 times daily    Yes Unknown, Entered By History   multivitamin, therapeutic (THERA-VIT) TABS tablet Take 1 tablet by mouth daily   Yes Unknown, Entered By History   Pramoxine HCl (CERAVE ITCH RELIEF) 1 % CREA Externally apply topically 2 times daily as needed   Yes Reported, Patient   sulfamethoxazole-trimethoprim (BACTRIM DS) 800-160 MG tablet Take 1 tablet by mouth 2 times daily for 7 days  Patient taking differently: Take 1 tablet by mouth 2 times daily Started 9/14/21 for 7 days 9/14/21 9/21/21 Yes Shadi Man MD   torsemide (DEMADEX) 10 MG tablet Take 10 mg by mouth daily   Yes Unknown, Entered By History   vitamin D3 (CHOLECALCIFEROL) 10 MCG (400 UNIT) capsule Take 1 capsule by mouth daily   Yes Unknown, Entered By History   Zinc Gluconate 15 MG TABS Take 15 mg by mouth daily    Yes Unknown, Entered By History     Anesthesia Evaluation   Pt has had prior anesthetic. Type: General.    No history of anesthetic complications       ROS/MED HX  ENT/Pulmonary:     (+) allergic rhinitis,  (-) tobacco use, asthma and sleep apnea   Neurologic: Comment: Generalized Weakness   (-) no seizures and no CVA   Cardiovascular:     (+) hypertension-----Irregular Heartbeat/Palpitations (PVCs noted on rhythm strip in preop), Previous cardiac testing   Echo: Date: Results:  Technically difficult study limited views obtained due to body habitus  Very frequent PVC's make wall motion and doppler analysis difficult  There is mild concentric left ventricular hypertrophy.  Left ventricular systolic function is normal.  Normal left ventricular wall motion  \"Septal bounce\" seen- this can be seen with bundle branch block, constrictive  physiology, abnormal RV loading-none of these are classically seen on this  echo however  RV not well seen, " grossly normal size/function  Right ventricular systolic pressure could not be approximated due to  inadequate tricuspid regurgitation.  IVC diameter and respiratory changes fall into an intermediate range  suggesting an RA pressure of 8 mmHg.  The rhythm was sinus tachycardia.  The patient exhibited frequent PVCs.  Stress Test: Date: Results:    ECG Reviewed: Date: Results:    Cath: Date: Results:   (-) angina, CAD, orthopnea/PND, syncope, arrhythmias, angina, murmur and wheezes   METS/Exercise Tolerance:     Hematologic: Comments: Recent PEs noted on CT scan - anticoagulation started      Musculoskeletal:       GI/Hepatic:    (-) GERD and liver disease   Renal/Genitourinary:     (+) renal disease, Nephrolithiasis , BPH,     Endo:    (-) Type II DM, thyroid disease and chronic steroid usage   Psychiatric/Substance Use:       Infectious Disease:       Malignancy:       Other:            Physical Exam    Airway        Mallampati: III   TM distance: > 3 FB   Neck ROM: full   Mouth opening: > 3 cm    Respiratory Devices and Support         Dental       (+) caps      Cardiovascular          Rhythm and rate: regular and normal (-) no murmur    Pulmonary           breath sounds clear to auscultation   (-) no rhonchi and no wheezes        OUTSIDE LABS:  CBC:   Lab Results   Component Value Date    WBC 10.7 08/08/2021    WBC 8.1 08/07/2021    HGB 11.1 (L) 08/08/2021    HGB 11.0 (L) 08/07/2021    HCT 35.4 (L) 08/08/2021    HCT 34.4 (L) 08/07/2021     08/08/2021     08/07/2021     BMP:   Lab Results   Component Value Date     08/08/2021     08/07/2021    POTASSIUM 3.9 09/17/2021    POTASSIUM 3.6 08/08/2021    CHLORIDE 103 08/08/2021    CHLORIDE 107 08/07/2021    CO2 28 08/08/2021    CO2 26 08/07/2021    BUN 23 08/08/2021    BUN 20 08/07/2021    CR 1.17 08/08/2021    CR 1.13 08/07/2021     (H) 08/08/2021     (H) 08/07/2021     COAGS:   Lab Results   Component Value Date    PTT 62 (H)  06/10/2021    INR 1.87 (H) 08/06/2021     POC:   Lab Results   Component Value Date     (H) 06/05/2021     HEPATIC:   Lab Results   Component Value Date    ALBUMIN 2.3 (L) 08/06/2021    PROTTOTAL 6.7 (L) 08/06/2021    ALT 72 (H) 08/06/2021    AST 37 08/06/2021    ALKPHOS 178 (H) 08/06/2021    BILITOTAL 0.3 08/06/2021     OTHER:   Lab Results   Component Value Date    PH 7.48 (H) 08/06/2021    LACT 1.2 08/06/2021    SABRINA 8.1 (L) 08/08/2021       Anesthesia Plan    ASA Status:  3   NPO Status:  NPO Appropriate    Anesthesia Type: General.     - Airway: LMA   Induction: Propofol, Intravenous.   Maintenance: Balanced.        Consents    Anesthesia Plan(s) and associated risks, benefits, and realistic alternatives discussed. Questions answered and patient/representative(s) expressed understanding.     - Discussed with:  Patient         Postoperative Care    Pain management: Multi-modal analgesia.   PONV prophylaxis: Ondansetron (or other 5HT-3), Dexamethasone or Solumedrol     Comments:    Avoid Versed             Mil Dotson MD

## 2021-09-17 NOTE — DISCHARGE INSTRUCTIONS
Same Day Surgery Discharge Instructions for  Sedation and General Anesthesia       It's not unusual to feel dizzy, light-headed or faint for up to 24 hours after surgery or while taking pain medication.  If you have these symptoms: sit for a few minutes before standing and have someone assist you when you get up to walk or use the bathroom.      You should rest and relax for the next 24 hours. We recommend you make arrangements to have an adult stay with you for at least 24 hours after your discharge.  Avoid hazardous and strenuous activity.      DO NOT DRIVE any vehicle or operate mechanical equipment for 24 hours following the end of your surgery.  Even though you may feel normal, your reactions may be affected by the medication you have received.      Do not drink alcoholic beverages for 24 hours following surgery.       Slowly progress to your regular diet as you feel able. It's not unusual to feel nauseated and/or vomit after receiving anesthesia.  If you develop these symptoms, drink clear liquids (apple juice, ginger ale, broth, 7-up, etc. ) until you feel better.  If your nausea and vomiting persists for 24 hours, please notify your surgeon.        All narcotic pain medications, along with inactivity and anesthesia, can cause constipation. Drinking plenty of liquids and increasing fiber intake will help.      For any questions of a medical nature, call your surgeon.      Do not make important decisions for 24 hours.      If you had general anesthesia, you may have a sore throat for a couple of days related to the breathing tube used during surgery.  You may use Cepacol lozenges to help with this discomfort.  If it worsens or if you develop a fever, contact your surgeon.       If you feel your pain is not well managed with the pain medications prescribed by your surgeon, please contact your surgeon's office to let them know so they can address your concerns.       CoVid 19 Information    We want to give you  information regarding Covid. Please consult your primary care provider with any questions you might have.     Patient who have symptoms (cough, fever, or shortness of breath), need to isolate for 7 days from when symptoms started OR 72 hours after fever resolves (without fever reducing medications) AND improvement of respiratory symptoms (whichever is longer).      Isolate yourself at home (in own room/own bathroom if possible)    Do Not allow any visitors    Do Not go to work or school    Do Not go to [employer],  centers, shopping, or other public places.    Do Not shake hands.    Avoid close and intimate contact with others (hugging, kissing).    Follow CDC recommendations for household cleaning of frequently touched services.     After the initial 7 days, continue to isolate yourself from household members as much as possible. To continue decrease the risk of community spread and exposure, you and any members of your household should limit activities in public for 14 days after starting home isolation.     You can reference the following CDC link for helpful home isolation/care tips:  https://www.cdc.gov/coronavirus/2019-ncov/downloads/10Things.pdf    Protect Others:    Cover Your Mouth and Nose with a mask, disposable tissue or wash cloth to avoid spreading germs to others.    Wash your hands and face frequently with soap and water    Call Your Primary Doctor If: Breathing difficulty develops or you become worse.    For more information about COVID19 and options for caring for yourself at home, please visit the CDC website at https://www.cdc.gov/coronavirus/2019-ncov/about/steps-when-sick.html  For more options for care at [facility], please visit our website at [email or web address]        POSTOPERATIVE INSTRUCTIONS    Diagnosis-------------------------------   BPH with LUTS    Procedure-------------------------------  Procedure(s) (LRB):  CYSTOSCOPY, PHOTOVAPORIZATION  OF THE PROSTATE USING THE GREENLIGHT LASER (N/A)  Cystoscopy Bladder With Stone Extraction      Findings--------------------------------  Cystoscopy with significant trilobar hypertrophy, several bladder stones, and prostatic urethral stones. Prostate well vaporized and stones crushed and removed.     Home-going instructions-----------------    MCKEON CATHETER  - You will go home with the catheter. You will be instructed on catheter care  - You are schedule for a Trial of void on Monday.         Activity Limitation:     - No driving or operating heavy machinery while on narcotic pain medication.     FOLLOW THESE INSTRUCTIONS AS INDICATED BELOW:  - Observe operative area for signs of excessive bleeding.  - You may shower.  - Increase fluid intake to promote clear urine.  - Resume usual diet as tolerated    What to expect while recovering-----------    For the first few weeks after your procedure, you may notice that your urine is cloudy. Or you may have blood or blood clots in your urine. This is normal while your body rids itself of the treated tissue. These symptoms may begin to get better during the first few weeks. But it may take a few months before they go away. Your provider can tell you when you can have sex again and when you can go back to work.    You also may be told to avoid lifting anything over 10 pounds or bending over to lift things from the ground.    You should drink plenty of fluids to help flush out your bladder.    You may experience some intermittent bleeding that makes your urine pink or cherry colored. This is normal.    However, if you are unable to urinate, passing large amount of clots, have klaey blood in your urine, or have a temperature >101 degrees, call the urology nurse on call, or present to your nearest emergency department.      When to call your healthcare provider  Contact your healthcare provider right away if:    You have a fever of 100.4 F (38 C) or higher, or as directed by  your provider    You have excessive bleeding    You have pain not relieved by medicines    You notice that no urine is draining from the catheter or the catheter falls out    You have a frequent or very strong urge to urinate    You re not able to urinate, or notice a decrease in urine flow    Discharge Medications/instructions:     - Take Tylenol 1000mg every 6 hours for pain    - Take an over the counter stool softener to promote soft bowel movements      Questions/concerns------------------------  [facility]: [phone]    Future appointments  You will follow up with Dr. Man in 3 months at his [facility] office.      Shadi Man MD     DISCHARGE INSTRUCTIONS FOR   CATHETER CARE AT HOME      .      Basic Catheter Care  1. Always wash hands before and after handling your catheter.  2. Use soap and water to wash the area around your catheter.  3. Do this procedure twice a day.  4. Proper cleansing will help keep the area from becoming irritated or infected.    Leg Bag  This is a small plastic bag that collects urine draining from your catheter and then strapped around your thigh. It will need to be emptied when the bag is 1/2 to 3/4 full.     Large Drainage Bag  1. This bag is larger than the leg bag and holds more urine.  It is to be used while at home, especially at night.    2. Before you go to bed, change the leg bag to the large drainage bag.  3. Pinch off the catheter with your fingers and swab the connection between the catheter and leg bag with alcohol sponge.  4. Disconnect the leg bag and connect the large drainage bag to your catheter.  5. When you get into bed, arrange the drainage tubing so that it doesn t kink.  6. Be sure to keep the bag below the level of your bladder and allow enough slack for turning.    Cleaning Your Drainage Bags    1. Wash hands.  2. Using funnel or syringe, fill the bag half full with a solution of 1/2  vinegar and 1/2 water.  3. Shake bag, allowing  mixture to cleanse inside of bag.  4. Empty out all vinegar and water mixture from your bag.  5. Hang bag to dry when not in use.  6. Clean your bags anytime you change them.      Helpful Hints  1. Always keep drainage bags below bladder level to insure adequate drainage.  2. Drink 4-6 glasses of water daily along with other fluids you normally drink to keep urine free of infection and / or clots.  3. If you notice no urine in your bag for 2 to 4 hours or you develop extreme discomfort in bladder area, your catheter maybe plugged.  Notify your doctor.  4. If you notice your urine becomes foul smelling and cloudy, notify your doctor.  Also notify your doctor if you develop fever or chills.  5. If you notice urine leaking around the outside of the catheter, check to be sure catheter or tubing is not kinked.  6. Don t use leg bag while in bed.    **If you have questions or concerns about your procedure,   call Dr. Man at [phone]**

## 2021-09-18 NOTE — ANESTHESIA POSTPROCEDURE EVALUATION
Patient: Shilo Menchaca    Procedure(s):  CYSTOSCOPY, PHOTOVAPORIZATION OF THE PROSTATE USING THE GREENLIGHT LASER  Cystoscopy Bladder With Stone Extraction    Diagnosis:Urinary retention [R33.9]  BPH with obstruction/lower urinary tract symptoms [N40.1, N13.8]  Diagnosis Additional Information: No value filed.    Anesthesia Type:  General    Note:     Postop Pain Control: Uneventful            Sign Out: Well controlled pain   PONV: No   Neuro/Psych: Uneventful            Sign Out: Acceptable/Baseline neuro status   Airway/Respiratory: Uneventful            Sign Out: Acceptable/Baseline resp. status   CV/Hemodynamics: Uneventful            Sign Out: Acceptable CV status; No obvious hypovolemia; No obvious fluid overload   Other NRE: NONE   DID A NON-ROUTINE EVENT OCCUR? No           Last vitals:  Vitals Value Taken Time   /93 09/17/21 1900   Temp 36.3  C (97.3  F) 09/17/21 1900   Pulse 89 09/17/21 1900   Resp 10 09/17/21 1900   SpO2 96 % 09/17/21 1900       Electronically Signed By: Oneil Sullivan MD  September 17, 2021  9:23 PM

## 2021-09-20 ENCOUNTER — ALLIED HEALTH/NURSE VISIT (OUTPATIENT)
Dept: UROLOGY | Facility: CLINIC | Age: 81
End: 2021-09-20
Payer: MEDICARE

## 2021-09-20 DIAGNOSIS — R33.9 URINARY RETENTION: Primary | ICD-10-CM

## 2021-09-20 PROCEDURE — 99207 PR NO CHARGE NURSE ONLY: CPT

## 2021-09-20 NOTE — PROGRESS NOTES
Shilo Menchaca comes into clinic today at the request of  Ordering Provider for TOV (trial of void).  This service provided today was under the supervising provider of the day , who was available if needed.300 ml of sterile water instilled via catheter. Catheter detached from leg-bag and urine noted to be light yellow in color. 25 cc removed from catheter balloon. 20Fr. Alonzo Catheter removed from bladder with ease. No discomfort voiced by patient. Pt. Voided 150 ml  in office .  Pt. instructed to drink plenty of water, at least 6-8 glasses daily. Pt. Instructed to call during office hours if unable to urinate, otherwise was instructed to go to ER. Patient verbalized understanding of this and will follow up with MD as planned.   Janna King LPN

## 2021-09-22 LAB
APPEARANCE STONE: NORMAL
COMPN STONE: NORMAL
NUMBER STONE: NORMAL
SIZE STONE: NORMAL MM
SPECIMEN WT: NORMAL MG

## 2021-10-17 ENCOUNTER — HEALTH MAINTENANCE LETTER (OUTPATIENT)
Age: 81
End: 2021-10-17

## 2021-12-15 ENCOUNTER — OFFICE VISIT (OUTPATIENT)
Dept: UROLOGY | Facility: CLINIC | Age: 81
End: 2021-12-15
Payer: MEDICARE

## 2021-12-15 VITALS
BODY MASS INDEX: 32.49 KG/M2 | WEIGHT: 195 LBS | HEIGHT: 65 IN | DIASTOLIC BLOOD PRESSURE: 70 MMHG | SYSTOLIC BLOOD PRESSURE: 130 MMHG

## 2021-12-15 DIAGNOSIS — N40.0 BPH WITHOUT OBSTRUCTION/LOWER URINARY TRACT SYMPTOMS: Primary | ICD-10-CM

## 2021-12-15 PROCEDURE — 99024 POSTOP FOLLOW-UP VISIT: CPT | Performed by: UROLOGY

## 2021-12-15 ASSESSMENT — MIFFLIN-ST. JEOR: SCORE: 1506.39

## 2021-12-15 ASSESSMENT — PAIN SCALES - GENERAL: PAINLEVEL: NO PAIN (0)

## 2021-12-15 NOTE — LETTER
12/15/2021       RE: Shilo Menchaca  [address]  [place] [address]     Dear Colleague,    Thank you for referring your patient, Shilo Menchaca, to the [facility] at [facility]. Please see a copy of my visit note below.    [facility]  CHIEF COMPLAINT   It was my pleasure to see Shilo Menchaca who is a 81 year old male for follow-up of Retention, right hydronephrosis.      HPI  Shilo Menchaca is a 81 year old male with significant past medical history of hypertension, a history of BPH with an episode of retention about 20 years ago for which she has been on Avodart for the last 20 years.  He was recently hospitalized at [facility] with a UTI.  He underwent an outpatient CT on 6/4/2021 which demonstrated a significantly distended bladder, bladder stones, and right hydroureteronephrosis with no clear evidence of obstruction.  He was then admitted through the emergency room on 6/5/2021 and noted to have significant leukocytosis to 17.4 and urinalysis concerning for infection.  Alonzo catheter placement was complicated by his significant phimosis which required bedside cystoscopy and placement of a catheter over a wire.  A CT urogram was then obtained on 6/7/2021 which demonstrated persistent right hydronephrosis and an incidental pulmonary embolism and he was started on anticoagulation.  On 6/9/2021 he underwent a dorsal slit of his phimosis, cystoscopy and right retrograde pyelogram, diagnostic ureteroscopy, and right ureteral stent placement.  He returns today for a trial of void.    8/4/21:  He returns today for follow-up for ureteral stent removal  He notes that he has been having significant urinary frequency with small volume voids and urine leakage especially overnight  He notes intermittent sensation of incomplete bladder emptying    9/8/21:  He has been able stopped the Xarelto on Monday 9/7/21  He has noted some dizziness  "with the tamsulosin  He was very eager to have surgery done get rid of the catheter  His foreskin has healed well from the dorsal slit, though there is still some small amount of smegma    9/17/21:  Greenlight photovaporization of the prostate (PVP), Cystolitholapaxy with bladder stone fragmentation and removal    TODAY 12/15/21:  Follow-up today after his PVP  He has been doing strongly well since surgery and is very happy with the outcome  He reports that he is \"never had plumbing like this before \"  He notes periodic groin and perineal itching for which he has been using topical metronidazole powder, no active issue at this time    PHYSICAL EXAM  Patient is a 81 year old  male   Vitals: Blood pressure 130/70, height 1.651 m (5' 5\"), weight 88.5 kg (195 lb).  Body mass index is 32.45 kg/m .  General Appearance Adult:   Alert, no acute distress, oriented  HENT: throat/mouth:normal, good dentition  Lungs: no respiratory distress, or pursed lip breathing  Heart: No obvious jugular venous distension present  Abdomen: soft, nontender, no organomegaly or masses  Musculoskeltal: extremities normal, no peripheral edema  Skin: no suspicious lesions or rashes  Neuro: Alert, oriented, speech and mentation normal  Psych: affect and mood normal    Creatinine   Date Value Ref Range Status   08/08/2021 1.17 0.66 - 1.25 mg/dL Final   06/08/2021 1.00 0.66 - 1.25 mg/dL Final      IMAGING  All pertinent imaging reviewed:    All imaging studies reviewed by me.  I personally reviewed these imaging films.  A formal report from radiology will follow.    CT UROGRAM 8/6/21:  FINDINGS:   LOWER CHEST: Partially visualized coronary artery calcification. No infiltrates or effusions.     HEPATOBILIARY: Stable peripherally calcified low-density lesion in the liver. No biliary dilatation.     PANCREAS: Normal.     SPLEEN: Normal.     ADRENAL GLANDS: Normal.     KIDNEYS/BLADDER: Interval increase in marked right-sided hydronephrosis since prior " study. Marked right ureteral dilatation extending down to the level of the right UVJ where there is focal increased area of soft tissue density (series 3, image 164)   measuring 1.5 cm. No obstructing ureteral calculi. Nonobstructing calculi measuring 2 mm left upper renal pole and 3 mm in the left lower renal pole. Multiple bladder calculi present. Diffuse bladder wall thickening with perivesical edema.     BOWEL: Minimal sliding esophageal hiatal hernia. No bowel dilatation or inflammatory change.     LYMPH NODES: No lymphadenopathy.     VASCULATURE: Normal caliber abdominal aorta. Minimal aortic calcification.     PELVIC ORGANS: Moderate prostate enlargement. No free fluid.     MUSCULOSKELETAL: Minimal degenerative changes in the spine.                                                  IMPRESSION:   1.  Interval increase in right-sided hydronephrosis with marked right-sided hydronephrosis and marked right ureteral dilatation down to the level of the right UVJ where there is a 1.5 cm ovoid soft tissue density at the right UVJ. No obstructing right   ureteral calculi. The soft tissue density could be due to edema at the level of the right UVJ.     2.  Multiple calculi present within the bladder lumen with nonobstructing calculi in the left kidney.     3.  Diffuse bladder wall thickening with perivesical edema.     4.  Remainder of findings unchanged as detailed above.      ASSESSMENT and PLAN  81-year-old man with recent hospitalization for urinary retention and urinary tract infection with significant phimosis and right hydroureteronephrosis now status post a dorsal slit and a right ureteral stent placement on 6/9/2021.  Hospital course complicated by an incidental finding of a pulmonary embolism for which she is now on anticoagulation.     Urinary retention  -He is now status post a PVP on 9/17/2021.  This also included removal of bladder and prostatic urethral stones  -He is doing extremely well with resolution  of his urinary symptoms  -We discussed that he may follow-up with me on a as needed basis    Groin itching  -I recommend he touch base with his dermatologist to see if there is any additional treatment recommendation    Time spent: 20 minutes spent on the date of the encounter doing chart review, history and exam, documentation and further activities as noted above.     Shadi Man MD   Urology  [facility]  [facility] Phone: [phone]  [facility] Phone: [phone]

## 2021-12-15 NOTE — PROGRESS NOTES
[facility]  CHIEF COMPLAINT   It was my pleasure to see Shilo Menchaca who is a 81 year old male for follow-up of Retention, right hydronephrosis.      HPI  Shilo Menchaca is a 81 year old male with significant past medical history of hypertension, a history of BPH with an episode of retention about 20 years ago for which she has been on Avodart for the last 20 years.  He was recently hospitalized at [facility] with a UTI.  He underwent an outpatient CT on 6/4/2021 which demonstrated a significantly distended bladder, bladder stones, and right hydroureteronephrosis with no clear evidence of obstruction.  He was then admitted through the emergency room on 6/5/2021 and noted to have significant leukocytosis to 17.4 and urinalysis concerning for infection.  Alonzo catheter placement was complicated by his significant phimosis which required bedside cystoscopy and placement of a catheter over a wire.  A CT urogram was then obtained on 6/7/2021 which demonstrated persistent right hydronephrosis and an incidental pulmonary embolism and he was started on anticoagulation.  On 6/9/2021 he underwent a dorsal slit of his phimosis, cystoscopy and right retrograde pyelogram, diagnostic ureteroscopy, and right ureteral stent placement.  He returns today for a trial of void.    8/4/21:  He returns today for follow-up for ureteral stent removal  He notes that he has been having significant urinary frequency with small volume voids and urine leakage especially overnight  He notes intermittent sensation of incomplete bladder emptying    9/8/21:  He has been able stopped the Xarelto on Monday 9/7/21  He has noted some dizziness with the tamsulosin  He was very eager to have surgery done get rid of the catheter  His foreskin has healed well from the dorsal slit, though there is still some small amount of smegma    9/17/21:  Greenlight photovaporization of the prostate (PVP), Cystolitholapaxy with bladder stone fragmentation and  "removal    TODAY 12/15/21:  Follow-up today after his PVP  He has been doing strongly well since surgery and is very happy with the outcome  He reports that he is \"never had plumbing like this before \"  He notes periodic groin and perineal itching for which he has been using topical metronidazole powder, no active issue at this time    PHYSICAL EXAM  Patient is a 81 year old  male   Vitals: Blood pressure 130/70, height 1.651 m (5' 5\"), weight 88.5 kg (195 lb).  Body mass index is 32.45 kg/m .  General Appearance Adult:   Alert, no acute distress, oriented  HENT: throat/mouth:normal, good dentition  Lungs: no respiratory distress, or pursed lip breathing  Heart: No obvious jugular venous distension present  Abdomen: soft, nontender, no organomegaly or masses  Musculoskeltal: extremities normal, no peripheral edema  Skin: no suspicious lesions or rashes  Neuro: Alert, oriented, speech and mentation normal  Psych: affect and mood normal    Creatinine   Date Value Ref Range Status   08/08/2021 1.17 0.66 - 1.25 mg/dL Final   06/08/2021 1.00 0.66 - 1.25 mg/dL Final      IMAGING  All pertinent imaging reviewed:    All imaging studies reviewed by me.  I personally reviewed these imaging films.  A formal report from radiology will follow.    CT UROGRAM 8/6/21:  FINDINGS:   LOWER CHEST: Partially visualized coronary artery calcification. No infiltrates or effusions.     HEPATOBILIARY: Stable peripherally calcified low-density lesion in the liver. No biliary dilatation.     PANCREAS: Normal.     SPLEEN: Normal.     ADRENAL GLANDS: Normal.     KIDNEYS/BLADDER: Interval increase in marked right-sided hydronephrosis since prior study. Marked right ureteral dilatation extending down to the level of the right UVJ where there is focal increased area of soft tissue density (series 3, image 164)   measuring 1.5 cm. No obstructing ureteral calculi. Nonobstructing calculi measuring 2 mm left upper renal pole and 3 mm in the left lower " renal pole. Multiple bladder calculi present. Diffuse bladder wall thickening with perivesical edema.     BOWEL: Minimal sliding esophageal hiatal hernia. No bowel dilatation or inflammatory change.     LYMPH NODES: No lymphadenopathy.     VASCULATURE: Normal caliber abdominal aorta. Minimal aortic calcification.     PELVIC ORGANS: Moderate prostate enlargement. No free fluid.     MUSCULOSKELETAL: Minimal degenerative changes in the spine.                                                  IMPRESSION:   1.  Interval increase in right-sided hydronephrosis with marked right-sided hydronephrosis and marked right ureteral dilatation down to the level of the right UVJ where there is a 1.5 cm ovoid soft tissue density at the right UVJ. No obstructing right   ureteral calculi. The soft tissue density could be due to edema at the level of the right UVJ.     2.  Multiple calculi present within the bladder lumen with nonobstructing calculi in the left kidney.     3.  Diffuse bladder wall thickening with perivesical edema.     4.  Remainder of findings unchanged as detailed above.      ASSESSMENT and PLAN  81-year-old man with recent hospitalization for urinary retention and urinary tract infection with significant phimosis and right hydroureteronephrosis now status post a dorsal slit and a right ureteral stent placement on 6/9/2021.  Hospital course complicated by an incidental finding of a pulmonary embolism for which she is now on anticoagulation.     Urinary retention  -He is now status post a PVP on 9/17/2021.  This also included removal of bladder and prostatic urethral stones  -He is doing extremely well with resolution of his urinary symptoms  -We discussed that he may follow-up with me on a as needed basis    Groin itching  -I recommend he touch base with his dermatologist to see if there is any additional treatment recommendation    Time spent: 20 minutes spent on the date of the encounter doing chart review, history  and exam, documentation and further activities as noted above.     Shadi Man MD   Urology  [facility]  [facility] Phone: [phone]  [facility] Phone: [phone]

## 2022-10-03 ENCOUNTER — HEALTH MAINTENANCE LETTER (OUTPATIENT)
Age: 82
End: 2022-10-03

## 2023-10-21 ENCOUNTER — HEALTH MAINTENANCE LETTER (OUTPATIENT)
Age: 83
End: 2023-10-21

## 2024-12-14 ENCOUNTER — HEALTH MAINTENANCE LETTER (OUTPATIENT)
Age: 84
End: 2024-12-14

## 2025-01-01 ENCOUNTER — RESULTS FOLLOW-UP (OUTPATIENT)
Dept: SURGERY | Facility: CLINIC | Age: 85
End: 2025-01-01

## 2025-05-15 ENCOUNTER — HOSPITAL ENCOUNTER (INPATIENT)
Facility: CLINIC | Age: 85
End: 2025-05-15
Attending: EMERGENCY MEDICINE | Admitting: INTERNAL MEDICINE
Payer: MEDICARE

## 2025-05-15 DIAGNOSIS — R53.1 GENERALIZED WEAKNESS: ICD-10-CM

## 2025-05-15 DIAGNOSIS — M54.9 BACK PAIN, UNSPECIFIED BACK LOCATION, UNSPECIFIED BACK PAIN LATERALITY, UNSPECIFIED CHRONICITY: ICD-10-CM

## 2025-05-15 DIAGNOSIS — N32.89 BLADDER MASS: ICD-10-CM

## 2025-05-15 DIAGNOSIS — U07.1 COVID-19 VIRUS INFECTION: ICD-10-CM

## 2025-05-15 DIAGNOSIS — R79.89 ELEVATED LACTIC ACID LEVEL: ICD-10-CM

## 2025-05-15 DIAGNOSIS — R74.8 ELEVATED CK: ICD-10-CM

## 2025-05-15 DIAGNOSIS — L30.4 INTERTRIGINOUS DERMATITIS ASSOCIATED WITH MOISTURE: Primary | ICD-10-CM

## 2025-05-15 PROCEDURE — 93005 ELECTROCARDIOGRAM TRACING: CPT

## 2025-05-15 PROCEDURE — 99285 EMERGENCY DEPT VISIT HI MDM: CPT | Mod: 25

## 2025-05-15 PROCEDURE — 93005 ELECTROCARDIOGRAM TRACING: CPT | Mod: 76

## 2025-05-15 RX ORDER — ACETAMINOPHEN 500 MG
1000 TABLET ORAL ONCE
Status: COMPLETED | OUTPATIENT
Start: 2025-05-16 | End: 2025-05-16

## 2025-05-16 ENCOUNTER — APPOINTMENT (OUTPATIENT)
Dept: GENERAL RADIOLOGY | Facility: CLINIC | Age: 85
End: 2025-05-16
Attending: EMERGENCY MEDICINE
Payer: MEDICARE

## 2025-05-16 ENCOUNTER — APPOINTMENT (OUTPATIENT)
Dept: PHYSICAL THERAPY | Facility: CLINIC | Age: 85
End: 2025-05-16
Attending: INTERNAL MEDICINE
Payer: MEDICARE

## 2025-05-16 ENCOUNTER — APPOINTMENT (OUTPATIENT)
Dept: CT IMAGING | Facility: CLINIC | Age: 85
End: 2025-05-16
Attending: EMERGENCY MEDICINE
Payer: MEDICARE

## 2025-05-16 PROBLEM — R74.8 ELEVATED CK: Status: ACTIVE | Noted: 2025-05-16

## 2025-05-16 PROBLEM — R53.1 GENERALIZED WEAKNESS: Status: ACTIVE | Noted: 2025-05-16

## 2025-05-16 PROBLEM — R79.89 ELEVATED LACTIC ACID LEVEL: Status: ACTIVE | Noted: 2025-05-16

## 2025-05-16 PROBLEM — U07.1 COVID-19 VIRUS INFECTION: Status: ACTIVE | Noted: 2025-05-16

## 2025-05-16 LAB
ALBUMIN SERPL BCG-MCNC: 3.5 G/DL (ref 3.5–5.2)
ALBUMIN UR-MCNC: 30 MG/DL
ALP SERPL-CCNC: 157 U/L (ref 40–150)
ALT SERPL W P-5'-P-CCNC: 42 U/L (ref 0–70)
ANION GAP SERPL CALCULATED.3IONS-SCNC: 13 MMOL/L (ref 7–15)
APPEARANCE UR: CLEAR
AST SERPL W P-5'-P-CCNC: 118 U/L (ref 0–45)
ATRIAL RATE - MUSE: NORMAL BPM
BASE EXCESS BLDV CALC-SCNC: 3 MMOL/L (ref -3–3)
BASE EXCESS BLDV CALC-SCNC: 4 MMOL/L (ref -3–3)
BASOPHILS # BLD AUTO: 0 10E3/UL (ref 0–0.2)
BASOPHILS NFR BLD AUTO: 0 %
BILIRUB SERPL-MCNC: 0.5 MG/DL
BILIRUB UR QL STRIP: NEGATIVE
BUN SERPL-MCNC: 28.4 MG/DL (ref 8–23)
CALCIUM SERPL-MCNC: 8.7 MG/DL (ref 8.8–10.4)
CHLORIDE SERPL-SCNC: 100 MMOL/L (ref 98–107)
CK SERPL-CCNC: 4176 U/L (ref 39–308)
CK SERPL-CCNC: 5076 U/L (ref 39–308)
COLOR UR AUTO: ABNORMAL
CREAT SERPL-MCNC: 1.19 MG/DL (ref 0.67–1.17)
DIASTOLIC BLOOD PRESSURE - MUSE: NORMAL MMHG
EGFRCR SERPLBLD CKD-EPI 2021: 59 ML/MIN/1.73M2
EOSINOPHIL # BLD AUTO: 0 10E3/UL (ref 0–0.7)
EOSINOPHIL NFR BLD AUTO: 0 %
ERYTHROCYTE [DISTWIDTH] IN BLOOD BY AUTOMATED COUNT: 13.3 % (ref 10–15)
FLUAV RNA SPEC QL NAA+PROBE: NEGATIVE
FLUBV RNA RESP QL NAA+PROBE: NEGATIVE
GLUCOSE SERPL-MCNC: 174 MG/DL (ref 70–99)
GLUCOSE UR STRIP-MCNC: NEGATIVE MG/DL
HCO3 BLDV-SCNC: 27 MMOL/L (ref 21–28)
HCO3 BLDV-SCNC: 28 MMOL/L (ref 21–28)
HCO3 SERPL-SCNC: 24 MMOL/L (ref 22–29)
HCT VFR BLD AUTO: 44.2 % (ref 40–53)
HGB BLD-MCNC: 14.8 G/DL (ref 13.3–17.7)
HGB UR QL STRIP: ABNORMAL
HYALINE CASTS: 3 /LPF
IMM GRANULOCYTES # BLD: 0.1 10E3/UL
IMM GRANULOCYTES NFR BLD: 1 %
INTERPRETATION ECG - MUSE: NORMAL
KETONES UR STRIP-MCNC: NEGATIVE MG/DL
LACTATE BLD-SCNC: 2.1 MMOL/L (ref 0.7–2)
LACTATE BLD-SCNC: 4 MMOL/L (ref 0.7–2)
LACTATE SERPL-SCNC: 1.7 MMOL/L (ref 0.7–2)
LEUKOCYTE ESTERASE UR QL STRIP: NEGATIVE
LYMPHOCYTES # BLD AUTO: 0.5 10E3/UL (ref 0.8–5.3)
LYMPHOCYTES NFR BLD AUTO: 3 %
MCH RBC QN AUTO: 33.3 PG (ref 26.5–33)
MCHC RBC AUTO-ENTMCNC: 33.5 G/DL (ref 31.5–36.5)
MCV RBC AUTO: 99 FL (ref 78–100)
MONOCYTES # BLD AUTO: 1.4 10E3/UL (ref 0–1.3)
MONOCYTES NFR BLD AUTO: 11 %
MUCOUS THREADS #/AREA URNS LPF: PRESENT /LPF
NEUTROPHILS # BLD AUTO: 11.2 10E3/UL (ref 1.6–8.3)
NEUTROPHILS NFR BLD AUTO: 85 %
NITRATE UR QL: NEGATIVE
NRBC # BLD AUTO: 0 10E3/UL
NRBC BLD AUTO-RTO: 0 /100
P AXIS - MUSE: NORMAL DEGREES
PCO2 BLDV: 38 MM HG (ref 40–50)
PCO2 BLDV: 41 MM HG (ref 40–50)
PH BLDV: 7.43 [PH] (ref 7.32–7.43)
PH BLDV: 7.48 [PH] (ref 7.32–7.43)
PH UR STRIP: 6.5 [PH] (ref 5–7)
PLATELET # BLD AUTO: 148 10E3/UL (ref 150–450)
PO2 BLDV: 39 MM HG (ref 25–47)
PO2 BLDV: 40 MM HG (ref 25–47)
POTASSIUM SERPL-SCNC: 3.9 MMOL/L (ref 3.4–5.3)
PR INTERVAL - MUSE: NORMAL MS
PROT SERPL-MCNC: 6.3 G/DL (ref 6.4–8.3)
QRS DURATION - MUSE: 84 MS
QT - MUSE: 324 MS
QTC - MUSE: 465 MS
R AXIS - MUSE: -34 DEGREES
RBC # BLD AUTO: 4.45 10E6/UL (ref 4.4–5.9)
RBC URINE: 48 /HPF
RSV RNA SPEC NAA+PROBE: NEGATIVE
SAO2 % BLDV: 75 % (ref 70–75)
SAO2 % BLDV: 79 % (ref 70–75)
SARS-COV-2 RNA RESP QL NAA+PROBE: POSITIVE
SODIUM SERPL-SCNC: 137 MMOL/L (ref 135–145)
SP GR UR STRIP: 1.01 (ref 1–1.03)
SQUAMOUS EPITHELIAL: <1 /HPF
SYSTOLIC BLOOD PRESSURE - MUSE: NORMAL MMHG
T AXIS - MUSE: 107 DEGREES
UROBILINOGEN UR STRIP-MCNC: NORMAL MG/DL
VENTRICULAR RATE- MUSE: 124 BPM
WBC # BLD AUTO: 13.1 10E3/UL (ref 4–11)
WBC URINE: 13 /HPF

## 2025-05-16 PROCEDURE — 250N000011 HC RX IP 250 OP 636: Performed by: EMERGENCY MEDICINE

## 2025-05-16 PROCEDURE — 83605 ASSAY OF LACTIC ACID: CPT

## 2025-05-16 PROCEDURE — 96361 HYDRATE IV INFUSION ADD-ON: CPT

## 2025-05-16 PROCEDURE — 120N000001 HC R&B MED SURG/OB

## 2025-05-16 PROCEDURE — 97161 PT EVAL LOW COMPLEX 20 MIN: CPT | Mod: GP

## 2025-05-16 PROCEDURE — 250N000013 HC RX MED GY IP 250 OP 250 PS 637: Performed by: INTERNAL MEDICINE

## 2025-05-16 PROCEDURE — 36415 COLL VENOUS BLD VENIPUNCTURE: CPT | Performed by: INTERNAL MEDICINE

## 2025-05-16 PROCEDURE — 97530 THERAPEUTIC ACTIVITIES: CPT | Mod: GP

## 2025-05-16 PROCEDURE — 96365 THER/PROPH/DIAG IV INF INIT: CPT

## 2025-05-16 PROCEDURE — 87040 BLOOD CULTURE FOR BACTERIA: CPT | Performed by: EMERGENCY MEDICINE

## 2025-05-16 PROCEDURE — 87637 SARSCOV2&INF A&B&RSV AMP PRB: CPT | Performed by: EMERGENCY MEDICINE

## 2025-05-16 PROCEDURE — 71046 X-RAY EXAM CHEST 2 VIEWS: CPT

## 2025-05-16 PROCEDURE — 99221 1ST HOSP IP/OBS SF/LOW 40: CPT | Mod: GC

## 2025-05-16 PROCEDURE — 250N000011 HC RX IP 250 OP 636: Mod: JZ | Performed by: INTERNAL MEDICINE

## 2025-05-16 PROCEDURE — 258N000003 HC RX IP 258 OP 636: Performed by: INTERNAL MEDICINE

## 2025-05-16 PROCEDURE — 80053 COMPREHEN METABOLIC PANEL: CPT | Performed by: EMERGENCY MEDICINE

## 2025-05-16 PROCEDURE — 36415 COLL VENOUS BLD VENIPUNCTURE: CPT

## 2025-05-16 PROCEDURE — 99207 PR NO CHARGE LOS: CPT | Performed by: INTERNAL MEDICINE

## 2025-05-16 PROCEDURE — 85004 AUTOMATED DIFF WBC COUNT: CPT | Performed by: EMERGENCY MEDICINE

## 2025-05-16 PROCEDURE — 99223 1ST HOSP IP/OBS HIGH 75: CPT | Mod: AI | Performed by: INTERNAL MEDICINE

## 2025-05-16 PROCEDURE — G0463 HOSPITAL OUTPT CLINIC VISIT: HCPCS

## 2025-05-16 PROCEDURE — XW033E5 INTRODUCTION OF REMDESIVIR ANTI-INFECTIVE INTO PERIPHERAL VEIN, PERCUTANEOUS APPROACH, NEW TECHNOLOGY GROUP 5: ICD-10-PCS | Performed by: INTERNAL MEDICINE

## 2025-05-16 PROCEDURE — 74176 CT ABD & PELVIS W/O CONTRAST: CPT

## 2025-05-16 PROCEDURE — 87086 URINE CULTURE/COLONY COUNT: CPT | Performed by: EMERGENCY MEDICINE

## 2025-05-16 PROCEDURE — 82550 ASSAY OF CK (CPK): CPT | Performed by: INTERNAL MEDICINE

## 2025-05-16 PROCEDURE — 82550 ASSAY OF CK (CPK): CPT | Performed by: EMERGENCY MEDICINE

## 2025-05-16 PROCEDURE — 81001 URINALYSIS AUTO W/SCOPE: CPT | Performed by: EMERGENCY MEDICINE

## 2025-05-16 PROCEDURE — 258N000003 HC RX IP 258 OP 636: Performed by: EMERGENCY MEDICINE

## 2025-05-16 PROCEDURE — 36415 COLL VENOUS BLD VENIPUNCTURE: CPT | Performed by: EMERGENCY MEDICINE

## 2025-05-16 PROCEDURE — 250N000013 HC RX MED GY IP 250 OP 250 PS 637: Performed by: EMERGENCY MEDICINE

## 2025-05-16 RX ORDER — ONDANSETRON 4 MG/1
4 TABLET, ORALLY DISINTEGRATING ORAL EVERY 6 HOURS PRN
Status: DISCONTINUED | OUTPATIENT
Start: 2025-05-16 | End: 2025-05-25 | Stop reason: HOSPADM

## 2025-05-16 RX ORDER — HYDROMORPHONE HCL IN WATER/PF 6 MG/30 ML
0.4 PATIENT CONTROLLED ANALGESIA SYRINGE INTRAVENOUS
Status: DISCONTINUED | OUTPATIENT
Start: 2025-05-16 | End: 2025-05-25 | Stop reason: HOSPADM

## 2025-05-16 RX ORDER — PROCHLORPERAZINE MALEATE 5 MG/1
5 TABLET ORAL EVERY 6 HOURS PRN
Status: DISCONTINUED | OUTPATIENT
Start: 2025-05-16 | End: 2025-05-25 | Stop reason: HOSPADM

## 2025-05-16 RX ORDER — POLYETHYLENE GLYCOL 3350 17 G/17G
1 POWDER, FOR SOLUTION ORAL DAILY
COMMUNITY

## 2025-05-16 RX ORDER — POLYETHYLENE GLYCOL 3350 17 G/17G
17 POWDER, FOR SOLUTION ORAL 2 TIMES DAILY PRN
Status: DISCONTINUED | OUTPATIENT
Start: 2025-05-16 | End: 2025-05-25 | Stop reason: HOSPADM

## 2025-05-16 RX ORDER — NALOXONE HYDROCHLORIDE 0.4 MG/ML
0.4 INJECTION, SOLUTION INTRAMUSCULAR; INTRAVENOUS; SUBCUTANEOUS
Status: DISCONTINUED | OUTPATIENT
Start: 2025-05-16 | End: 2025-05-25 | Stop reason: HOSPADM

## 2025-05-16 RX ORDER — ENOXAPARIN SODIUM 100 MG/ML
0.5 INJECTION SUBCUTANEOUS 2 TIMES DAILY
Status: DISCONTINUED | OUTPATIENT
Start: 2025-05-16 | End: 2025-05-23

## 2025-05-16 RX ORDER — LACTOBACILLUS RHAMNOSUS GG 10B CELL
1 CAPSULE ORAL 2 TIMES DAILY
Status: DISCONTINUED | OUTPATIENT
Start: 2025-05-16 | End: 2025-05-25 | Stop reason: HOSPADM

## 2025-05-16 RX ORDER — AMOXICILLIN 250 MG
2 CAPSULE ORAL 2 TIMES DAILY PRN
Status: DISCONTINUED | OUTPATIENT
Start: 2025-05-16 | End: 2025-05-25 | Stop reason: HOSPADM

## 2025-05-16 RX ORDER — LACTOBACILLUS RHAMNOSUS GG 10B CELL
1 CAPSULE ORAL 2 TIMES DAILY
COMMUNITY

## 2025-05-16 RX ORDER — PIPERACILLIN SODIUM, TAZOBACTAM SODIUM 4; .5 G/20ML; G/20ML
4.5 INJECTION, POWDER, LYOPHILIZED, FOR SOLUTION INTRAVENOUS ONCE
Status: COMPLETED | OUTPATIENT
Start: 2025-05-16 | End: 2025-05-16

## 2025-05-16 RX ORDER — AMOXICILLIN 250 MG
1 CAPSULE ORAL 2 TIMES DAILY PRN
Status: DISCONTINUED | OUTPATIENT
Start: 2025-05-16 | End: 2025-05-25 | Stop reason: HOSPADM

## 2025-05-16 RX ORDER — ACETAMINOPHEN 325 MG/1
650 TABLET ORAL EVERY 4 HOURS PRN
Status: DISCONTINUED | OUTPATIENT
Start: 2025-05-16 | End: 2025-05-19

## 2025-05-16 RX ORDER — POLYETHYLENE GLYCOL 3350 17 G/17G
17 POWDER, FOR SOLUTION ORAL DAILY
Status: DISCONTINUED | OUTPATIENT
Start: 2025-05-17 | End: 2025-05-25 | Stop reason: HOSPADM

## 2025-05-16 RX ORDER — NALOXONE HYDROCHLORIDE 0.4 MG/ML
0.2 INJECTION, SOLUTION INTRAMUSCULAR; INTRAVENOUS; SUBCUTANEOUS
Status: DISCONTINUED | OUTPATIENT
Start: 2025-05-16 | End: 2025-05-25 | Stop reason: HOSPADM

## 2025-05-16 RX ORDER — SALIVA STIMULANT COMB. NO.3
2 SPRAY, NON-AEROSOL (ML) MUCOUS MEMBRANE
Status: DISCONTINUED | OUTPATIENT
Start: 2025-05-16 | End: 2025-05-25 | Stop reason: HOSPADM

## 2025-05-16 RX ORDER — PIPERACILLIN SODIUM, TAZOBACTAM SODIUM 4; .5 G/20ML; G/20ML
4.5 INJECTION, POWDER, LYOPHILIZED, FOR SOLUTION INTRAVENOUS EVERY 6 HOURS
Status: DISCONTINUED | OUTPATIENT
Start: 2025-05-16 | End: 2025-05-17

## 2025-05-16 RX ORDER — CALCIUM CARBONATE 500 MG/1
1000 TABLET, CHEWABLE ORAL 4 TIMES DAILY PRN
Status: DISCONTINUED | OUTPATIENT
Start: 2025-05-16 | End: 2025-05-25 | Stop reason: HOSPADM

## 2025-05-16 RX ORDER — LISINOPRIL 10 MG/1
10 TABLET ORAL DAILY
Status: DISCONTINUED | OUTPATIENT
Start: 2025-05-16 | End: 2025-05-25

## 2025-05-16 RX ORDER — ONDANSETRON 2 MG/ML
4 INJECTION INTRAMUSCULAR; INTRAVENOUS EVERY 6 HOURS PRN
Status: DISCONTINUED | OUTPATIENT
Start: 2025-05-16 | End: 2025-05-25 | Stop reason: HOSPADM

## 2025-05-16 RX ORDER — BISACODYL 10 MG
10 SUPPOSITORY, RECTAL RECTAL DAILY PRN
Status: DISCONTINUED | OUTPATIENT
Start: 2025-05-16 | End: 2025-05-25 | Stop reason: HOSPADM

## 2025-05-16 RX ORDER — ACETAMINOPHEN 650 MG/1
650 SUPPOSITORY RECTAL EVERY 4 HOURS PRN
Status: DISCONTINUED | OUTPATIENT
Start: 2025-05-16 | End: 2025-05-19

## 2025-05-16 RX ORDER — HYDROMORPHONE HCL IN WATER/PF 6 MG/30 ML
0.2 PATIENT CONTROLLED ANALGESIA SYRINGE INTRAVENOUS
Status: DISCONTINUED | OUTPATIENT
Start: 2025-05-16 | End: 2025-05-25 | Stop reason: HOSPADM

## 2025-05-16 RX ORDER — SODIUM CHLORIDE 9 MG/ML
INJECTION, SOLUTION INTRAVENOUS CONTINUOUS
Status: DISCONTINUED | OUTPATIENT
Start: 2025-05-16 | End: 2025-05-16

## 2025-05-16 RX ORDER — LIDOCAINE 40 MG/G
CREAM TOPICAL
Status: DISCONTINUED | OUTPATIENT
Start: 2025-05-16 | End: 2025-05-25 | Stop reason: HOSPADM

## 2025-05-16 RX ORDER — OXYCODONE HYDROCHLORIDE 5 MG/1
5 TABLET ORAL EVERY 4 HOURS PRN
Status: DISCONTINUED | OUTPATIENT
Start: 2025-05-16 | End: 2025-05-25 | Stop reason: HOSPADM

## 2025-05-16 RX ORDER — CETIRIZINE HYDROCHLORIDE 10 MG/1
10 TABLET ORAL EVERY EVENING
Status: DISCONTINUED | OUTPATIENT
Start: 2025-05-16 | End: 2025-05-25 | Stop reason: HOSPADM

## 2025-05-16 RX ORDER — AMLODIPINE BESYLATE 5 MG/1
5 TABLET ORAL DAILY
Status: DISCONTINUED | OUTPATIENT
Start: 2025-05-16 | End: 2025-05-25 | Stop reason: HOSPADM

## 2025-05-16 RX ORDER — TORSEMIDE 10 MG/1
10 TABLET ORAL DAILY
Status: DISCONTINUED | OUTPATIENT
Start: 2025-05-16 | End: 2025-05-17

## 2025-05-16 RX ADMIN — CETIRIZINE HYDROCHLORIDE 10 MG: 10 TABLET, FILM COATED ORAL at 21:02

## 2025-05-16 RX ADMIN — ACETAMINOPHEN 650 MG: 325 TABLET, FILM COATED ORAL at 21:14

## 2025-05-16 RX ADMIN — SODIUM CHLORIDE: 0.9 INJECTION, SOLUTION INTRAVENOUS at 11:34

## 2025-05-16 RX ADMIN — PIPERACILLIN AND TAZOBACTAM 4.5 G: 4; .5 INJECTION, POWDER, FOR SOLUTION INTRAVENOUS at 21:07

## 2025-05-16 RX ADMIN — ACETAMINOPHEN 1000 MG: 500 TABLET ORAL at 00:37

## 2025-05-16 RX ADMIN — ENOXAPARIN SODIUM 50 MG: 60 INJECTION SUBCUTANEOUS at 21:04

## 2025-05-16 RX ADMIN — PIPERACILLIN AND TAZOBACTAM 4.5 G: 4; .5 INJECTION, POWDER, FOR SOLUTION INTRAVENOUS at 00:54

## 2025-05-16 RX ADMIN — MICONAZOLE NITRATE: 2 POWDER TOPICAL at 21:03

## 2025-05-16 RX ADMIN — PIPERACILLIN AND TAZOBACTAM 4.5 G: 4; .5 INJECTION, POWDER, FOR SOLUTION INTRAVENOUS at 09:31

## 2025-05-16 RX ADMIN — REMDESIVIR 200 MG: 100 INJECTION, POWDER, LYOPHILIZED, FOR SOLUTION INTRAVENOUS at 07:43

## 2025-05-16 RX ADMIN — LISINOPRIL 10 MG: 10 TABLET ORAL at 18:00

## 2025-05-16 RX ADMIN — ENOXAPARIN SODIUM 50 MG: 60 INJECTION SUBCUTANEOUS at 07:44

## 2025-05-16 RX ADMIN — SODIUM CHLORIDE 1000 ML: 0.9 INJECTION, SOLUTION INTRAVENOUS at 00:37

## 2025-05-16 RX ADMIN — AMLODIPINE BESYLATE 5 MG: 5 TABLET ORAL at 18:00

## 2025-05-16 RX ADMIN — PIPERACILLIN AND TAZOBACTAM 4.5 G: 4; .5 INJECTION, POWDER, FOR SOLUTION INTRAVENOUS at 14:32

## 2025-05-16 RX ADMIN — SODIUM CHLORIDE 50 ML: 9 INJECTION, SOLUTION INTRAVENOUS at 08:13

## 2025-05-16 RX ADMIN — Medication 1 CAPSULE: at 21:02

## 2025-05-16 RX ADMIN — SODIUM CHLORIDE: 0.9 INJECTION, SOLUTION INTRAVENOUS at 02:22

## 2025-05-16 ASSESSMENT — ACTIVITIES OF DAILY LIVING (ADL)
ADLS_ACUITY_SCORE: 52
ADLS_ACUITY_SCORE: 55
ADLS_ACUITY_SCORE: 55
ADLS_ACUITY_SCORE: 52
ADLS_ACUITY_SCORE: 64
ADLS_ACUITY_SCORE: 58
ADLS_ACUITY_SCORE: 52
ADLS_ACUITY_SCORE: 58
ADLS_ACUITY_SCORE: 55
ADLS_ACUITY_SCORE: 52
ADLS_ACUITY_SCORE: 58
ADLS_ACUITY_SCORE: 55
ADLS_ACUITY_SCORE: 52
ADLS_ACUITY_SCORE: 58
ADLS_ACUITY_SCORE: 52
DEPENDENT_IADLS:: INDEPENDENT
ADLS_ACUITY_SCORE: 52
ADLS_ACUITY_SCORE: 52
ADLS_ACUITY_SCORE: 55
ADLS_ACUITY_SCORE: 58

## 2025-05-16 NOTE — PROGRESS NOTES
Notified provider about indwelling jessica catheter discussed removal or continued need.    Did provider choose to remove indwelling jessica catheter? NO    Provider's jessica indication for keeping indwelling jessica catheter: Indication for continued use: Retention    Is there an order for indwelling jessica catheter? YES    *If there is a plan to keep jessica catheter in place at discharge daily notification with provider is not necessary, but please add a notation in the treatment team sticky note that the patient will be discharging with the catheter.

## 2025-05-16 NOTE — CONSULTS
Care Management Initial Consult    General Information  Assessment completed with: Patient, Spouse or significant other, Aziza North  Type of CM/SW Visit: Initial Assessment    Primary Care Provider verified and updated as needed: Yes   Readmission within the last 30 days: no previous admission in last 30 days      Reason for Consult: discharge planning  Advance Care Planning: Advance Care Planning Reviewed: no concerns identified          Communication Assessment  Patient's communication style: spoken language (English or Bilingual)             Cognitive  Cognitive/Neuro/Behavioral: WDL                      Living Environment:   People in home: spouse  Can Ervin  Current living Arrangements: house      Able to return to prior arrangements: yes       Family/Social Support:  Care provided by: self, spouse/significant other  Provides care for: no one  Marital Status:   Support system: Children, Wife  Aziza       Description of Support System: Involved, Supportive    Support Assessment: Adequate family and caregiver support    Current Resources:   Patient receiving home care services: No        Community Resources: None  Equipment currently used at home: grab bar, tub/shower, shower chair, walker, rolling  Supplies currently used at home: None    Employment/Financial:  Employment Status: retired        Financial Concerns: none   Referral to Financial Worker: No       Does the patient's insurance plan have a 3 day qualifying hospital stay waiver?  No    Lifestyle & Psychosocial Needs:  Social Drivers of Health     Food Insecurity: Not on file   Depression: Not at risk (11/18/2024)    Received from [employer]    PHQ-2     PHQ-2 Score: 1   Housing Stability: Not on file   Tobacco Use: Medium Risk (3/3/2025)    Received from [employer]    Patient History     Smoking Tobacco Use: Former     Smokeless Tobacco Use: Never     Passive Exposure: Not on file   Financial Resource Strain: Not on file   Alcohol Use:  "Not At Risk (6/23/2021)    AUDIT-C     Frequency of Alcohol Consumption: Monthly or less     Average Number of Drinks: 1 or 2     Frequency of Binge Drinking: Never   Transportation Needs: Not on file   Physical Activity: Not on file   Interpersonal Safety: Not on file   Stress: Not on file   Social Connections: Not on file   Health Literacy: Not on file       Functional Status:  Prior to admission patient needed assistance:   Dependent ADLs:: Ambulation-walker  Dependent IADLs:: Independent  Assesssment of Functional Status: Not at  functional baseline    Mental Health Status:          Chemical Dependency Status:                Values/Beliefs:  Spiritual, Cultural Beliefs, [employer] Practices, Values that affect care: no               Discussed  Partnership in Safe Discharge Planning  document with patient/family: No    Additional Information:  Per care management consult, chart was reviewed. H&P states pt is a \" 85 year old male with a history of BPH, generalized weakness, obstructive sleep apnea, severe, not on therapy, history of pulmonary embolism admitted on 5/15/2025.\"    SW met with pt and pt's wife, Aziza, at bedside and introduced self and role. Pt lives at home with Aziza and all needs are met on one level. Pt has 3 stairs to enter and there is a railing in the garage to help pt enter. Pt uses a walker at baseline. They have a shower chair and grab bars in the shower. Aziza helps pt dry off the lower part of his body after bathing. Pt is otherwise independent. Pt is retired and gets social security and shelter.     Next Steps: Follow for discharge recs.     HARLEY Nicole  [facility]  Inpatient Care Management   "

## 2025-05-16 NOTE — PROGRESS NOTES
[facility]    Hospitalist Progress Note    Date of Service (when I saw the patient): 05/16/2025  Admit date: 5/15/2025    Interval History   Chart check today. Discussed with RN.   No issues today, other than unable to pee, and 900 ml in bladder. Straight cath placed but due to blood in the catheter, jessica catheter placed without resistance and drained urine. Jessica remained in place per patient's request and urine clear pink. Jessica flushing without resistance.   No clinical concerns re: patient.     Assessment & Plan   Shilo Menchaca is a 85 year old male with a history of BPH, generalized weakness, obstructive sleep apnea, severe, not on therapy, history of pulmonary embolism admitted on 5/15/2025. He presents to the emergency department with acute on chronic weakness 5/15/2025 resulting in a fall in his bathroom when his legs gave out beneath him.  Found to be febrile with lactic acid elevated in the 4 range and diagnosed with acute COVID-19.    T 99.9, P 124, /78, 15, O2 94% on RA  Na 137, K 3.9, Cr 1.19 (stable), alk phos 157, , CK 5,076, glucose 174.   Lactic acid 2.1 > 1.7.   WBC 13.1, hgb 14.8, plt 148  UA WBC 13, RBC 48   BCx, UCx pending.   Covid + RSV, flu negative  CXR clear  CT abdomen/pelvis: focal mass like thickening of the bladder wall on th R 3.1 cm, concerning for neoplasm.  Tiny nonobstructing stones in the bladder and left renal collecting system no hydronephrosis.   Advanced coronary artery calcification.    Acute COVID-19 with associated severe sepsis: Lactic acid elevated to 4.0, leukocytosis at 13.1, febrile to 100.6 with EMS, tachycardic to 124 bpm on arrival.  Wife has cough and cold symptoms.  Infrequent cough noted with patient as well, though he was unaware of this.  Pyuria, with bladder tumor (see below)   No respiratory symptoms, not hypoxic and requiring no supplemental oxygen.  Remdesivir x 3 days ordered given increased risk of  decompensation with advanced age and obesity.  Paxlovid no longer available on formulary  Oxygen PRN  Special precautions  Subcutaneous Lovenox prophylaxis noting history of pulmonary embolism in 2021  Placed on IV zosyn for potential UTI.   Follow blood and urine cultures. NGTD    Gross hematuria  Bladder stones  Right sided bladder mass: 3 cm soft tissue mass in bladder suspicious for malignancy.  Note that on historical imaging he did have an adjacent iliac chain or perivesicular lymph node in this region.  Pyuria   Renal retention, jessica placed on 05/16/25    BPH - Status post greenlight photo vaporization and cystoscopy for bladder stone extraction September 2021 w/ Dr Man.  CT abdomen/pelvis 05/16/25: focal mass like thickening of the bladder wall on th R 3.1 cm, concerning for neoplasm.  Tiny nonobstructing stones in the bladder and left renal collecting system no hydronephrosis.   [facility] consulted for anticipated future cystoscopy.  Discussed bladder mass finding with patient as well as wife at bedside  Continue IV Zosyn for now with urine culture pending and severe sepsis    History of pulmonary embolism: Diagnosed with pulmonary embolism after incidental finding on CT urogram with right upper and lower lobe pulmonary emboli June 2021. Had a brief hospitalization for weakness around that time, so I believe was thought to be provoked. Completed ~3 months of anticoagulation  Subcutaneous Lovenox prophylaxis    Generalized weakness and physical deconditioning: Ambulates with a walker at home at baseline, but weakness and deconditioning has been a longstanding issue.  Note that patient had been following with outpatient physical therapy for generalized weakness summer 2022, through the first half of 2023, and was rereferred to physical therapy in 2024, completing outpatient therapy in December.   Care management consultation for disposition planning.  Anticipate TCU discharge, and discussed this  with patient and wife at time of admission.  Physical therapy consulted  Fall precautions  Ambulate with assistance 4 times daily.  Utilizes a walker at baseline.  Patient noted acute weakness following shingles vaccine in 2018 which has since improved.  Admitting doctor was concerned about Guillain-Barré and subsequent CIDP.  Review in [employer] shows that minimal increase risk of GBS: 3 excess cases per million of vaccine doses and no reported links to CIPD. Therefore, I do not think further investigation would be beneficial, especially this far out.     Coccyx wound, bilateral knee abrasions, intertrigo present at admission  [facility] nurse consulted.    Severe obstructive sleep apnea significant nocturnal hypoxemia: Seen through [facility] sleep medicine clinic and was recommended CPAP early March 2025.  Patient declined, was interested in potentially trialing oral dental device, which his pulmonology team did not feel would treat his degree of sleep apnea.  Possible that nocturnal hypoxia is contributing to weakness and certainly contributing to daytime fatigue.  - Oximetry, oxygen as needed     Mild CK elevation: CK elevated to the 5000 range.  Compartments are soft with no report of pain other than knee abrasions, but was down on the bathroom floor for approximately 1.5 hours.  CK could also be elevated secondary to viral inflammation.  Lower extremity edema  CKD, stable baseline Cr 1.2.   Compression stockings to bilateral lower extremities  Received 1 L normal saline bolus in the emergency department and is currently on 125 mL/h normal saline.  Need to be cautious with fluid as patient already with weeping edema in his lower extremities  Stop IVF, given CK just above threshold level to be renal toxic.   Recheck CK. Notify me if repeat level > 5000 and I will resume IVF  Intake and output, daily weights  Repeat CK in a.m., trend to significant improvement.  Holding prior to admission torsemide    "  Hypertension:  Resume PTA amlodipine-benazepril  Holding PTA torsemide as above.    Clinically Significant Risk Factors Present on Admission                   # Hypertension: Home medication list includes antihypertensive(s)           # Obesity: Estimated body mass index is 32.28 kg/m  as calculated from the following:    Height as of this encounter: 1.676 m (5' 6\").    Weight as of this encounter: 90.7 kg (200 lb).         # Financial/Environmental Concerns: none           Diet: Orders Placed This Encounter      Combination Diet Regular Diet Adult     IVF: NS @ 125 ml/n > stopped  DVT Prophylaxis: enoxaparin  Alonzo Catheter: Not present    No CPR- Do NOT Intubate  Disposition:  Medically Ready for Discharge: Anticipated in 2-4 Days     PT/OT TCU recommended   Communication: Discussed with RN on 05/16/25    Libra Stokes MD    Hospitalist Service  [facility]  Securely message with the Vocera Web Console (learn more here)  Text page via [employer] Paging/Directory      -Data reviewed today: I reviewed all new labs and imaging results over the last 24 hours. I personally reviewed no images or EKG's today.    Physical Exam   Temp: 98.1  F (36.7  C) Temp src: Oral BP: (!) 147/82 Pulse: 94   Resp: 18 SpO2: 100 % O2 Device: None (Room air) Oxygen Delivery: 2 LPM  Vitals:    05/15/25 2337   Weight: 90.7 kg (200 lb)     Vital Signs with Ranges  Temp:  [98.1  F (36.7  C)-99.9  F (37.7  C)] 98.1  F (36.7  C)  Pulse:  [] 94  Resp:  [12-34] 18  BP: (115-148)/() 147/82  SpO2:  [89 %-100 %] 100 %  I/O last 3 completed shifts:  In: 891.66 [I.V.:891.66]  Out: 1725 [Urine:1725]    Today's Exam  Chart check    Medications   All medications reviewed on 05/16/25    Current Facility-Administered Medications   Medication Dose Route Frequency Provider Last Rate Last Admin    Patient is already receiving anticoagulation with heparin, enoxaparin (LOVENOX), warfarin (COUMADIN)  or other anticoagulant " medication   Does not apply Continuous PRN Tunde, Mil Dial MD        sodium chloride 0.9 % infusion   Intravenous Continuous Griffiths, Mil Dial  mL/hr at 05/16/25 1134 New Bag at 05/16/25 1134     Current Facility-Administered Medications   Medication Dose Route Frequency Provider Last Rate Last Admin    enoxaparin ANTICOAGULANT (LOVENOX) injection 50 mg  0.5 mg/kg Subcutaneous BID Tunde, Mil Dial MD   50 mg at 05/16/25 0744    miconazole (MICATIN) 2 % powder   Topical BID Libra Stokes MD        piperacillin-tazobactam (ZOSYN) 4.5 g vial to attach to  mL bag  4.5 g Intravenous Q6H Griffiths, Mil Dial MD   4.5 g at 05/16/25 1432    [START ON 5/17/2025] remdesivir 100 mg in sodium chloride 0.9 % 100 mL intermittent infusion  100 mg Intravenous Q24H Tunde, Mil Dial MD        And    [START ON 5/17/2025] sodium chloride 0.9% BOLUS 50 mL  50 mL Intravenous Q24H Mil Griffiths MD        sodium chloride (PF) 0.9% PF flush 3 mL  3 mL Intracatheter Q8H [facility] Mil Griffiths MD        sodium chloride (PF) 0.9% PF flush 3 mL  3 mL Intracatheter Q8H Griffiths, Mil Dial MD   3 mL at 05/16/25 0223     PRN Meds:   Current Facility-Administered Medications   Medication Dose Route Frequency Provider Last Rate Last Admin    acetaminophen (TYLENOL) tablet 650 mg  650 mg Oral Q4H PRN Tunde, Mil Dial MD        Or    acetaminophen (TYLENOL) Suppository 650 mg  650 mg Rectal Q4H PRN Tunde, Mil Dial MD        artificial saliva (BIOTENE MT) solution 2 spray  2 spray Mouth/Throat Q1H PRN Tunde, Mil Dial MD        bisacodyl (DULCOLAX) suppository 10 mg  10 mg Rectal Daily PRN Tunde, Mil Dial MD        calcium carbonate (TUMS) chewable tablet 1,000 mg  1,000 mg Oral 4x Daily PRN Tunde, Mil Dial MD        HYDROmorphone (DILAUDID) injection 0.2 mg  0.2 mg Intravenous Q2H PRN Tunde, Mil Dial MD        HYDROmorphone (DILAUDID) injection 0.4 mg  0.4 mg Intravenous Q2H PRN Tunde, Mil Dial MD         lidocaine (LMX4) cream   Topical Q1H PRN Griffiths, Mil Dial MD        lidocaine 1 % 0.1-1 mL  0.1-1 mL Other Q1H PRN Griffiths, Mil Dial MD        melatonin tablet 1 mg  1 mg Oral At Bedtime PRN Griffiths, Mil Dial MD        naloxone (NARCAN) injection 0.2 mg  0.2 mg Intravenous Q2 Min PRN Griffiths, Mil Dial MD        Or    naloxone (NARCAN) injection 0.4 mg  0.4 mg Intravenous Q2 Min PRN Griffiths, Mil Dial MD        Or    naloxone (NARCAN) injection 0.2 mg  0.2 mg Intramuscular Q2 Min PRN Griffiths, Mil Dial MD        Or    naloxone (NARCAN) injection 0.4 mg  0.4 mg Intramuscular Q2 Min PRN Griffiths, Mil Dial MD        ondansetron (ZOFRAN ODT) ODT tab 4 mg  4 mg Oral Q6H PRN Griffiths, Mil Dial MD        Or    ondansetron (ZOFRAN) injection 4 mg  4 mg Intravenous Q6H PRN Griffiths, Mil Dial MD        oxyCODONE (ROXICODONE) tablet 5 mg  5 mg Oral Q4H PRN Griffiths, Mil Dial MD        oxyCODONE IR (ROXICODONE) half-tab 2.5 mg  2.5 mg Oral Q4H PRN Griffiths, Mil Dial MD        Patient is already receiving anticoagulation with heparin, enoxaparin (LOVENOX), warfarin (COUMADIN)  or other anticoagulant medication   Does not apply Continuous PRN Griffiths, Mil Dial MD        polyethylene glycol (MIRALAX) Packet 17 g  17 g Oral BID PRN Griffiths, Mil Dial MD        prochlorperazine (COMPAZINE) injection 5 mg  5 mg Intravenous Q6H PRN Griffiths, Mil Dial MD        Or    prochlorperazine (COMPAZINE) tablet 5 mg  5 mg Oral Q6H PRN Griffiths, Mil Dial MD        senna-docusate (SENOKOT-S/PERICOLACE) 8.6-50 MG per tablet 1 tablet  1 tablet Oral BID PRN Griffiths, Mil Dial MD        Or    senna-docusate (SENOKOT-S/PERICOLACE) 8.6-50 MG per tablet 2 tablet  2 tablet Oral BID PRN Griffiths, Mil Dial MD        sodium chloride (PF) 0.9% PF flush 3 mL  3 mL Intracatheter q1 min prn Mil Griffiths MD        sodium chloride (PF) 0.9% PF flush 3 mL  3 mL Intracatheter q1 min prn Mil Griffiths MD           Data   Recent Labs   Lab  05/16/25  0003   WBC 13.1*   HGB 14.8   MCV 99   *      POTASSIUM 3.9   CHLORIDE 100   CO2 24   BUN 28.4*   CR 1.19*   ANIONGAP 13   SABRINA 8.7*   *   ALBUMIN 3.5   PROTTOTAL 6.3*   BILITOTAL 0.5   ALKPHOS 157*   ALT 42   *       Recent Results (from the past 24 hours)   XR Chest 2 Views    Narrative    EXAM: XR CHEST 2 VIEWS  LOCATION: [facility]  DATE: 5/16/2025    INDICATION: Fever  COMPARISON: 8/6/2021.    FINDINGS: The heart size is normal. The thoracic aorta is calcified and mildly tortuous. The right hemidiaphragm is elevated and there is mild right basilar atelectasis. The lungs are otherwise clear. No pneumothorax or pleural effusion.      Impression    IMPRESSION: No acute abnormality.   CT Abdomen Pelvis w/o Contrast    Narrative    EXAM: CT ABDOMEN PELVIS W/O CONTRAST  LOCATION: [facility]  DATE: 5/16/2025    INDICATION: evaluate for stone. has elevated lactic acid, fever  COMPARISON: Noncontrast CT of the abdomen and pelvis 8/6/2021.  TECHNIQUE: CT scan of the abdomen and pelvis was performed without IV contrast. Multiplanar reformats were obtained. Dose reduction techniques were used.  CONTRAST: None.    FINDINGS:   LOWER CHEST: Mild bibasilar atelectasis. Advanced multivessel coronary artery calcification.    HEPATOBILIARY: Stable 2.2 cm benign appearing rim calcified observation in the liver. Absent gallbladder. No biliary dilatation.    PANCREAS: Normal.    SPLEEN: Normal.    ADRENAL GLANDS: Normal.    KIDNEYS/BLADDER: Focal wall thickening of the urinary bladder on the right near the right ureterovesical junction concerning for possible neoplasm measures 3.1 x 1.6 cm on axial images (image 172 of series 4). No hydronephrosis. 2 mm stone in the   bladder. 2 mm nonobstructing left intrarenal stone. Chronic cortical thinning of the kidneys. Tiny benign cysts of the kidneys not warranting follow-up.    BOWEL: Mild colonic  diverticulosis. No bowel obstruction or inflammation. No evidence for appendicitis.    LYMPH NODES: Normal.    VASCULATURE: Mild aortoiliac atherosclerosis. No aneurysm.    PELVIC ORGANS: Normal.    MUSCULOSKELETAL: Tiny fat-containing umbilical hernia. Degenerative changes of the spine.      Impression    IMPRESSION:   1.  Focal masslike thickening of the urinary bladder wall on the right measuring up to 3.1 cm concerning for possible neoplasm. Urology follow-up recommended.  2.  Tiny nonobstructing stones in the bladder and left renal collecting system. No hydronephrosis.  3.  Advanced coronary artery calcification.

## 2025-05-16 NOTE — PLAN OF CARE
Goal Outcome Evaluation:      Plan of Care Reviewed With: patient    Overall Patient Progress: no changeOverall Patient Progress: no change    Outcome Evaluation: Pt will discharge home pending d/c recs

## 2025-05-16 NOTE — PROGRESS NOTES
RECEIVING UNIT ED HANDOFF REVIEW    ED Nurse Handoff Report was reviewed by: Marli Landry RN on May 16, 2025 at 9:58 AM

## 2025-05-16 NOTE — ED TRIAGE NOTES
BIBA from his home slid from his chair  due to increasing  generalized weakness ,fever.  Tachycardic as per EMS,no neuro deficit,glucose 236 mg/dl,on lasix.

## 2025-05-16 NOTE — ED NOTES
According to the wife-patient lost his balance in the bathroom-stayed for 1 1/2 hour on the floor ,denied hitting his head,but had difficulty in getting up.

## 2025-05-16 NOTE — ED NOTES
Bed: ED13  Expected date: 5/15/25  Expected time: 11:35 PM  Means of arrival: Ambulance  Comments:  HEMS 421 85M weakness

## 2025-05-16 NOTE — PLAN OF CARE
Mental Status: A&O x4, [place], pt has own hearing aid  Activity/dangle: Not OOB  Diet: Regular diet  Pain: [facility] pain  Jessica/Voiding: Bladder scanned for 902 at 1405, put in a jessica, patent  Tele/Restraints/Iso: Special precaution  02/LDA: Room air. NS infusing at 125 mL/hr  D/C Date: TBD  Other Info: Hematuria when placing jessica catheter, Dr. Stokes notified at 1444. Bilateral knee dressing changed by WOC RN. Intra dry applied under abdominal folds.

## 2025-05-16 NOTE — PHARMACY-ADMISSION MEDICATION HISTORY
Pharmacist Admission Medication History    Admission medication history is complete. The information provided in this note is only as accurate as the sources available at the time of the update.    Information Source(s): Patient, Family member, and CareEverywhere/SureScripts via in-person    Pertinent Information: n/a    Changes made to PTA medication list:  Added: miralax, probiotic  Deleted: APAP, dutasteride  Changed: D3 to 2000 units, zinc to 50 mg daily    Allergies reviewed with patient and updates made in EHR: yes    Medication History Completed By: NAKIA SINCLAIR [facility] 5/16/2025 10:11 AM    PTA Med List   Medication Sig Last Dose/Taking    amLODIPine-benazepril (LOTREL) 5-10 MG capsule Take 1 capsule by mouth daily 5/15/2025 Morning    cetirizine (ZYRTEC) 10 MG tablet Take 10 mg by mouth every evening  5/15/2025 Evening    lactobacillus rhamnosus, GG, (CULTURELLE KIDS) packet Take 1 packet by mouth 2 times daily. 5/15/2025 Evening    Multiple Vitamins-Minerals (PRESERVISION AREDS) CAPS Take 1 capsule by mouth 2 times daily  5/15/2025 Evening    multivitamin, therapeutic (THERA-VIT) TABS tablet Take 1 tablet by mouth daily 5/15/2025 Morning    polyethylene glycol (MIRALAX) 17 g packet Take 1 packet by mouth daily. 5/15/2025 Morning    Pramoxine HCl (CERAVE ITCH RELIEF) 1 % CREA Externally apply topically 2 times daily as needed 5/15/2025 Evening    torsemide (DEMADEX) 10 MG tablet Take 10 mg by mouth daily 5/15/2025 Morning    Vitamin D (Cholecalciferol) 50 MCG (2000 UT) CAPS Take 1 capsule by mouth daily. 5/15/2025 Morning    zinc gluconate 50 MG tablet Take 50 mg by mouth daily. 5/15/2025 Morning

## 2025-05-16 NOTE — CONSULTS
"[facility]  WO Nurse Inpatient Assessment     Consulted for: Wound bilateral knee abrasions     Summary: patient with POA knee abrasions bilaterally, initial wo assessment 5/16    WO nurse follow-up plan: weekly    Patient History (according to provider note(s):      \"85 year old male with a history of BPH, generalized weakness, obstructive sleep apnea, severe, not on therapy, history of pulmonary embolism admitted on 5/15/2025. He presents to the emergency department with acute on chronic weakness 5/15/2025 resulting in a fall in his bathroom when his legs gave out beneath him.  Found to be febrile with lactic acid elevated in the 4 range and diagnosed with acute COVID-19. \"    Assessment:      Areas visualized during today's visit: Focused: and Lower extremities     Wound location: bilat knees     Right anterior view    Left anterior view     Last photo: 5/16  Wound due to: Trauma fall PTA   Wound history/plan of care: found open to air   Wound base:  Dermis,      Palpation of the wound bed: normal      Drainage: scant     Description of drainage: serous and serosanguinous     Measurements (length x width x depth, in cm): largest right grouping of 10  x 3  x  0.1 cm      Tunneling: N/A     Undermining: N/A  Periwound skin: Intact      Color: normal and consistent with surrounding tissue      Temperature: normal   Odor: none  Pain: mild, intermittent, sharp, and tender  Pain interventions prior to dressing change: soaking, slow and gentle cares , and distraction  Treatment goal: Heal  and Infection control/prevention  STATUS: initial assessment  Supplies ordered: discussed with patient  applied to patient        Treatment Plan:     05/16/25 1149  Wound care  Start:  05/17/25 0600,   DAILY,   Routine        Comments: Location: bilateral knees  Care: provided daily by primary RN  1. Continue cleansing bilateral knees daily with wound cleanser and gauze  2. Apply adaptic gauze to open " areas, then ABD pads, wrap with kerlix, secure with tape          Orders: Written    RECOMMEND PRIMARY TEAM ORDER: None, at this time  Education provided: plan of care  Discussed plan of care with: Patient, Family, and Nurse  Notify [facility] if wound(s) deteriorate.  Nursing to notify the Provider(s) and re-consult the [facility] Nurse if new skin concern.    DATA:     Current support surface: Standard  ED cart  Containment of urine/stool: Incontinence Protocol and Incontinent pad in bed  BMI: Body mass index is 32.28 kg/m .   Active diet order: Orders Placed This Encounter      Combination Diet Regular Diet Adult     Output: I/O last 3 completed shifts:  In: -   Out: 725 [Urine:725]     Labs:   Recent Labs   Lab 05/16/25  0003   ALBUMIN 3.5   HGB 14.8   WBC 13.1*     Pressure injury risk assessment:                          Lizette FRENCHOCSHAWN   1st choice: Securely message with [employer] [facility] ([facility])   (2nd option: [facility] Office Phone [phone], messages checked periodically Mon-Fri 8a-4p)

## 2025-05-16 NOTE — ED PROVIDER NOTES
"  Emergency Department Note      History of Present Illness     Chief Complaint   Generalized Weakness      HPI   Shilo Menchaca is a 85 year old male with a history of hypertension, pulmonary embolism who presents with general weakness. Tonight the patient developed increasing weakness in his legs that is worse than normal. He usually uses a walker, he was trying to get out of his chair tonight when he couldn't stand, sliding down the chair onto the floor. He was thought to be on the floor for around a 1.5 hour before discovered. EMS endorses a temperature of 100.6 on the scene. He mentions having some rhinorrhea that started recently along with urinary troubles that have been present for a while. He denies any vomiting, diarrhea, cough, abdominal pain, chest pain, shortness of breath.     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   Cancer  Pulmonary embolism   Hydronephrosis of right kidney    Hypertension   Hypokalemia   Lower extremity edema  Obesity   KEZIA  Spinal stenosis     Medications   Lotrel   Avodart   Demadex  Lasix     Surgical History   Cholecystectomy   Appendectomy   Prostate surgery   Circumcision   ENT surgery   Cystoscopy   Hernia repair   Laser     Physical Exam     Patient Vitals for the past 24 hrs:   BP Temp Temp src Pulse Resp SpO2 Height Weight   05/16/25 0220 -- -- -- 99 18 93 % -- --   05/16/25 0215 -- -- -- 99 20 93 % -- --   05/16/25 0210 -- -- -- 99 16 96 % -- --   05/16/25 0202 117/68 -- -- 100 17 96 % -- --   05/16/25 0145 120/66 -- -- 101 12 96 % -- --   05/16/25 0122 -- -- -- 105 24 95 % -- --   05/16/25 0100 (!) 137/100 -- -- 111 25 97 % -- --   05/16/25 0041 -- -- -- 108 (!) 34 (!) 89 % -- --   05/16/25 0030 (!) 140/90 -- -- 112 19 93 % -- --   05/16/25 0000 (!) 141/100 -- -- 120 20 93 % -- --   05/15/25 2337 (!) 143/78 99.9  F (37.7  C) Temporal (!) 124 15 94 % 1.676 m (5' 6\") 90.7 " kg (200 lb)     Physical Exam  General: Sitting up in bed  Eyes:  The pupils are equal and round    Conjunctivae and sclerae are normal  ENT:    Atraumatic face  Neck:  Normal range of motion  CV:  Tachycardic rate,  regular rhythm     Skin warm and well perfused   Resp:  Non labored breathing on room air    No tachypnea    No cough heard    Lungs clear bilaterally  GI:  Abdomen is soft, there is no rigidity    No distension    No rebound tenderness     No abdominal tenderness  MS:  Non tender knees  Skin:  Abrasions on bilateral knees  Neuro:   Awake, alert.      Speech is normal and fluent.    Face is symmetric.     Moves all extremities equally  Psych: Normal affect.  Appropriate interactions.    Diagnostics     Lab Results   Labs Ordered and Resulted from Time of ED Arrival to Time of ED Departure   COMPREHENSIVE METABOLIC PANEL - Abnormal       Result Value    Sodium 137      Potassium 3.9      Carbon Dioxide (CO2) 24      Anion Gap 13      Urea Nitrogen 28.4 (*)     Creatinine 1.19 (*)     GFR Estimate 59 (*)     Calcium 8.7 (*)     Chloride 100      Glucose 174 (*)     Alkaline Phosphatase 157 (*)      (*)     ALT 42      Protein Total 6.3 (*)     Albumin 3.5      Bilirubin Total 0.5     ROUTINE UA WITH MICROSCOPIC REFLEX TO CULTURE - Abnormal    Color Urine Light Yellow      Appearance Urine Clear      Glucose Urine Negative      Bilirubin Urine Negative      Ketones Urine Negative      Specific Gravity Urine 1.012      Blood Urine Large (*)     pH Urine 6.5      Protein Albumin Urine 30 (*)     Urobilinogen Urine Normal      Nitrite Urine Negative      Leukocyte Esterase Urine Negative      Mucus Urine Present (*)     RBC Urine 48 (*)     WBC Urine 13 (*)     Squamous Epithelials Urine <1      Hyaline Casts Urine 3 (*)    INFLUENZA A/B, RSV AND SARS-COV2 PCR - Abnormal    Influenza A PCR Negative      Influenza B PCR Negative      RSV PCR Negative      SARS CoV2 PCR Positive (*)    CBC WITH PLATELETS  AND DIFFERENTIAL - Abnormal    WBC Count 13.1 (*)     RBC Count 4.45      Hemoglobin 14.8      Hematocrit 44.2      MCV 99      MCH 33.3 (*)     MCHC 33.5      RDW 13.3      Platelet Count 148 (*)     % Neutrophils 85      % Lymphocytes 3      % Monocytes 11      % Eosinophils 0      % Basophils 0      % Immature Granulocytes 1      NRBCs per 100 WBC 0      Absolute Neutrophils 11.2 (*)     Absolute Lymphocytes 0.5 (*)     Absolute Monocytes 1.4 (*)     Absolute Eosinophils 0.0      Absolute Basophils 0.0      Absolute Immature Granulocytes 0.1      Absolute NRBCs 0.0     ISTAT GASES LACTATE VENOUS POCT - Abnormal    Lactic Acid POCT 4.0 (*)     Bicarbonate Venous POCT 28      O2 Sat, Venous POCT 79 (*)     pCO2 Venous POCT 38 (*)     pH Venous POCT 7.48 (*)     pO2 Venous POCT 40      Base Excess/Deficit (+/-) POCT 4.0 (*)    CK TOTAL - Abnormal    CK 5,076 (*)    ISTAT GASES LACTATE VENOUS POCT - Abnormal    Lactic Acid POCT 2.1 (*)     Bicarbonate Venous POCT 27      O2 Sat, Venous POCT 75      pCO2 Venous POCT 41      pH Venous POCT 7.43      pO2 Venous POCT 39      Base Excess/Deficit (+/-) POCT 3.0     LACTIC ACID WHOLE BLOOD - Normal    Lactic Acid 1.7     BLOOD CULTURE   BLOOD CULTURE   URINE CULTURE     Imaging   CT Abdomen Pelvis w/o Contrast   Final Result   IMPRESSION:    1.  Focal masslike thickening of the urinary bladder wall on the right measuring up to 3.1 cm concerning for possible neoplasm. Urology follow-up recommended.   2.  Tiny nonobstructing stones in the bladder and left renal collecting system. No hydronephrosis.   3.  Advanced coronary artery calcification.         XR Chest 2 Views   Final Result   IMPRESSION: No acute abnormality.        EKG #1  ECG taken at 2353, ECG read at 2357  Accelerated Junctional rhythm with retrograde conduction with occasional premature ventricular complexes.  Left axis deviation    Left ventricular hypertrophy with repolarization abnormality (R in aVL, [place]  product)  Abnormal ECG  No significant changes as compared to prior, dated 8/6/2021.  Rate 124 bpm. LA interval * ms. QRS duration 84 ms. QT/QTc 324/465 ms. P-R-T axes * -34 107.    EKG #2  ECG taken at 2353, ECG read at 2357  Accelerated Junctional rhythm with retrograde conduction   Left axis deviation   Left ventricular hypertrophy with repolarization abnormality (R in aVL, [place] product)  Abnormal ECG   No significant changes as compared to prior, dated 8/6/2021.   Rate 123 bpm. LA interval * ms. QRS duration 86 ms. QT/QTc 332/475 ms. P-R-T axes * -34 103.    Independent Interpretation   CXR: No pneumothorax.    ED Course      Medications Administered   Medications   sodium chloride (PF) 0.9% PF flush 3 mL (has no administration in time range)   sodium chloride (PF) 0.9% PF flush 3 mL (3 mLs Intracatheter $Given 5/16/25 0223)   sodium chloride 0.9 % infusion ( Intravenous $New Bag 5/16/25 0222)   acetaminophen (TYLENOL) tablet 1,000 mg (1,000 mg Oral $Given 5/16/25 0037)   sodium chloride 0.9% BOLUS 1,000 mL (1,000 mLs Intravenous $New Bag 5/16/25 0037)   piperacillin-tazobactam (ZOSYN) 4.5 g vial to attach to  mL bag (4.5 g Intravenous $New Bag 5/16/25 0054)     Procedures   Procedures     Discussion of Management   Admitting Hospitalist, Dr. Griffiths    ED Course   ED Course as of 05/16/25 0226   Thu May 15, 2025   2344 I initially assessed the patient and obtained the above history and physical exam.    Fri May 16, 2025   0122 I rechecked the patient    0211 I rechecked the patient who states his wife has been sick with rhinorrhea, cough.    0225 I talked with Dr. Griffiths about the patient and plan of care     Additional Documentation  None    Medical Decision Making / Diagnosis     CMS Diagnoses: The patient has signs of sepsis  Sepsis ED evaluation   The patient has signs of sepsis as evidenced by:  1. Presence of 2 SIRS criteria, suspected infection, AND  2. Organ dysfunction: Lactic Acidosis with value  >2.0 due to sepsis    Sepsis Care Initiation: Starting at 1219 AM on 05/16/25, until specified. Prior to this documentation, sepsis, severe sepsis, or septic shock was NOT thought to be a significant cause of illness. This order represents the first time infection was seriously considered to be affecting the patient.    Lactic Acid Results:  Recent Labs   Lab Test 05/16/25  0627 05/16/25  0155 05/16/25  0001   LACT 1.7 2.1* 4.0*       3 Hour Bundle 6 Hour Bundle (Reassessment)   Blood Cultures before IV Antibiotics: Yes  Antibiotics given: see below  Prehospital fluid volume (mL):                     Total fluids given (ED +Pre-hospital):  The patient responded to a lesser volume of IV fluids. The initial volume ordered was 1000 mL.    Repeat Lactic Acid Level: Ordered by reflex for 2 hours after initial lactic acid collection.  Vasopressors: MAP>65 after initial IVF bolus, will continue to monitor fluid status and vital signs.  Repeat perfusion exam: I attest to having performed a repeat sepsis exam and assessment of perfusion at 0155 AM.   BMI Readings from Last 1 Encounters:   05/15/25 32.28 kg/m        Anti-infectives (From admission through now)      Start     Dose/Rate Route Frequency Ordered Stop    05/16/25 0035  piperacillin-tazobactam (ZOSYN) 4.5 g vial to attach to  mL bag         4.5 g  over 30 Minutes Intravenous ONCE 05/16/25 0032 05/16/25 0135                  [facility]   Shilo Menchaca is a 85 year old male who presented to the emergency department with a fall.  Has abrasions on knee but nontender knees and x-ray is not indicated.  Denies hitting his head or acute pain at this time from the fall.  Blood work was obtained that showed an elevated lactic acid.  CK was also elevated likely from being on the ground for some time.  This sounds most consistent with mechanical fall when he slid out of chair.  Patient is lactic acid improved with IV fluids.  Given elevated lactic acid, patient was given  antibiotics while pursuing further testing and workup for possible source of bacterial illness.  No source of bacterial illness found.  COVID infection found which is likely source of fever.  Wife with recent URI symptoms and has pending COVID test.  Patient was discussed with hospitalist for admission.  In discussion with hospitalist, CT abdomen pelvis obtained to rule out ureteral stones which was negative for this.  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  Nonfocal exam and doubt stroke.  Suspect generalized weakness is from COVID infection.    Disposition   The patient was admitted to the hospital.     Diagnosis     ICD-10-CM    1. COVID-19 virus infection  U07.1       2. Elevated CK  R74.8       3. Generalized weakness  R53.1       4. Elevated lactic acid level  R79.89          Scribe Disclosure:  I, Sridhar Bates, am serving as a scribe at 11:43 PM on 5/15/2025 to document services personally performed by Denise Womack MD based on my observations and the provider's statements to me.        Denise Womack MD  05/16/25 0657

## 2025-05-16 NOTE — CONSULTS
"[facility]  WO Nurse Inpatient Assessment     Consulted for: Wound bilateral knee abrasions; coccyx & intertrigo     Summary: Patient seen earlier today for POA knee abrasions bilaterally, initial wo assessment 5/16. Now with consult for assessment of coccyx and intertrigo.   Moisture associated skin damage to the gluteal cleft  Intertriginous dermatitis to the abdominal folds  Bilateral knee abrasions (POA) - last assessed 5/16 AM    WOC nurse follow-up plan: weekly    Patient History (according to provider note(s):      \"85 year old male with a history of BPH, generalized weakness, obstructive sleep apnea, severe, not on therapy, history of pulmonary embolism admitted on 5/15/2025. He presents to the emergency department with acute on chronic weakness 5/15/2025 resulting in a fall in his bathroom when his legs gave out beneath him.  Found to be febrile with lactic acid elevated in the 4 range and diagnosed with acute COVID-19. \"    Assessment:      Areas visualized during today's visit: Focused:, Sacrum/coccyx, and Abdomen    Wound location: gluteal cleft    Last photo: 5/16/25    Wound due to: Moisture Associated Skin Damage (MASD)  Wound history/plan of care: TRACE on assessment. Incontinence brief removed. Small, linear partial-thickness wound to the gluteal cleft. Education provided to patient and spouse on the importance of repositioning to prevent pressure injuries, moisture management. Recommending sacral mepilex dressing to protect from friction/shear.  Wound base: 100 % Moist and Pink     Palpation of the wound bed: normal      Drainage: none     Description of drainage: none     Measurements (length x width x depth, in cm): 0.7  x 0.1  x  0.1 cm      Tunneling: N/A     Undermining: N/A  Periwound skin: Intact and Erythema- blanchable      Color: normal and consistent with surrounding tissue      Temperature: normal   Odor: none  Pain: denies , none  Pain interventions prior to " dressing change: patient tolerated well, no significant pain present , and slow and gentle cares   Treatment goal: Heal , Decrease moisture, and Protection  STATUS: initial assessment  Supplies ordered: supplies stored on unit, discussed with RN, and discussed with patient     Skin Injury Location: abdominal fold     Last photo: 5/16/25    Skin injury due to: Intertrigo  Skin history and plan of care: Interdry in use. Abdominal folds moist, with erythema.   Affected area:      Skin assessment: Intact and Erythema     Measurements (length x width x depth, in cm) none     Color: normal and consistent with surrounding tissue     Temperature  normal      Drainage: none .      Color: none      Odor: none  Pain: denies , none  Pain interventions prior to dressing change: patient tolerated well, no significant pain present , and slow and gentle cares   Treatment goal: Heal , Decrease moisture, and Protection  STATUS: initial assessment  Supplies ordered: at bedside, supplies stored on unit, discussed with RN, and discussed with patient      Not assessed this visit, copied for continuity - Wound location: bilat knees     Right anterior view    Left anterior view     Last photo: 5/16  Wound due to: Trauma fall PTA   Wound history/plan of care: found open to air   Wound base:  Dermis,      Palpation of the wound bed: normal      Drainage: scant     Description of drainage: serous and serosanguinous     Measurements (length x width x depth, in cm): largest right grouping of 10  x 3  x  0.1 cm      Tunneling: N/A     Undermining: N/A  Periwound skin: Intact      Color: normal and consistent with surrounding tissue      Temperature: normal   Odor: none  Pain: mild, intermittent, sharp, and tender  Pain interventions prior to dressing change: soaking, slow and gentle cares , and distraction  Treatment goal: Heal  and Infection control/prevention  STATUS: initial assessment  Supplies ordered: discussed with patient  applied to  "patient        Treatment Plan:     05/16/25 1149  Wound care  Start:  05/17/25 0600,   DAILY,   Routine        Comments: Location: bilateral knees  Care: provided daily by primary RN  1. Continue cleansing bilateral knees daily with wound cleanser and gauze  2. Apply adaptic gauze to open areas, then ABD pads, wrap with kerlix, secure with tape        Interdry(order#[id number]): apply to skin folds   1.  Wash skin gently. Pat, do not rub.  2.  With clean scissors, cut enough fabric to cover the affected area, allowing for a minimum of 2 inches to extend beyond the skin fold for moisture evaporation.  3.  Lay a single layer of fabric in the skin fold, placing one edge into the base of the fold. Gently smooth the rest of the fabric over the skin, keeping it flat.  4.  Leave at least 2 inches of fabric exposed outside the skin fold.  5.  Secure the fabric in one of several ways: with the skin fold, with a small amount of skin-friendly tape, or tucked under clothing.  6.  Remove the fabric before bathing and reuse it when finished. When removing, gently separate the skin fold and lift away.  Helpful Hints  1. InterDry  can be written on with a ballpoint pen. It may be helpful to label each piece of InterDry  with the date you started using it.  2.  Each piece of InterDry  may be used up to 5 days, depending on fabric soiling, odor, amount of moisture and general skin condition. Replace InterDry  if it becomes soiled with blood, urine or stool.  3.  Do not use creams, ointments, or powders with InterDry  as it may interfere with product efficacy.     Pressure Injury Prevention (PIP) Plan:  -If patient is declining pressure injury prevention interventions: Explore reason why and address patient's concerns, Educate on pressure injury risk and prevention intervention(s), If patient is still declining, document \"informed refusal\" , and Ensure Care team is aware ( provider, charge nurse, etc)  -Mattress: Add LEAH pump to mattress " PRN moisture issues  -HOB: Maintain at or below 30 degrees, unless contraindicated  -Repositioning in bed: Every 1-2 hours  and Raise foot of bed prior to raising head of bed, to reduce patient sliding down (shear)  -Heels: Keep elevated off mattress and Pillows under calves  -Protective Dressing: Sacral Mepilex for prevention (#499162),  especially for the agitated patient   -Positioning Equipment: None  -Chair positioning: Chair cushion (#073414) , Assist patient to reposition hourly, and Do NOT use a donut for sitting (this increases pressure to smaller area and creates a higher potential for injury)    -Moisture Management: Perineal cleansing /protection: Follow Incontinence Protocol, Avoid brief in bed, Clean and dry skin folds with bathing , Consider InterDry (#548389) between folds, and Moisturize dry skin  -Under Devices: Inspect skin under all medical devices during skin inspection , Ensure tubes are stabilized without tension, and Ensure patient is not lying on medical devices or equipment when repositioned    Coccyx wound: every other day and prn for soilage  Cleanse with MicroKlen[employer] wound cleanser. Pat dry.    Apply a Sacral mepilex dressing.   Please attach a low air loss pump to mattress for improved moisture control.   Follow PIP orders. Assist patient to reposition.     Orders: Reviewed and Updated    RECOMMEND PRIMARY TEAM ORDER: None, at this time  Education provided: plan of care  Discussed plan of care with: Patient, Family, and Nurse  Notify [facility] if wound(s) deteriorate.  Nursing to notify the Provider(s) and re-consult the WOC Nurse if new skin concern.    DATA:     Current support surface: Standard  Standard gel mattress (Isoflex)  Containment of urine/stool: Incontinence Protocol and Incontinent pad in bed  BMI: Body mass index is 32.28 kg/m .   Active diet order: Orders Placed This Encounter      Combination Diet Regular Diet Adult     Output: I/O last 3 completed shifts:  In: -   Out: 725  "[Urine:725]     Labs:   Recent Labs   Lab 05/16/25  0003   ALBUMIN 3.5   HGB 14.8   WBC 13.1*     Pressure injury risk assessment:   Sensory Perception: 3-->slightly limited  Moisture: 3-->occasionally moist  Activity: 3-->walks occasionally  Mobility: 3-->slightly limited  Nutrition: 3-->adequate  Friction and Shear: 2-->potential problem  Hunter Score: 17    Janet Edmond RN, CWON  Please contact via [email or web address] at group \"[facility] nurse\"- M-F 8A-4P    "

## 2025-05-16 NOTE — DISCHARGE INSTRUCTIONS
WOUND CARES  Continue cleansing bilateral knees daily with wound cleanser and gauze  Apply adaptic gauze to open areas, then ABD pads, wrap with kerlix, secure with tape     Buttocks wounds: every other day  Cleanse gluteal cleft with wound cleanser or mild soap and water. Pat dry.   2. Apply a sacral mepilex dressing for protection.

## 2025-05-16 NOTE — ED NOTES
"   [facility]  ED Nurse Handoff Report    ED Chief complaint: Generalized Weakness      ED Diagnosis:   Final diagnoses:   COVID-19 virus infection   Elevated CK   Generalized weakness   Elevated lactic acid level       Code Status: to be addressed    Allergies:   Allergies   Allergen Reactions    Azithromycin      PN: LW Reaction: UNKNOWN    Cefpodoxime Itching       Patient Story: presented in id via ems due to generalized weakness,lost his balance inside the bathroom with difficulty getting up and stayed for 1 1/2 hour .  Focused Assessment:  desaturates to 89% ,nc 2l/min applied,improved oxygen,    Treatments and/or interventions provided: ***  Patient's response to treatments and/or interventions: ***    To be done/followed up on inpatient unit:  ***    Does this patient have any cognitive concerns?: {[facility]:360049}    Activity level - Baseline/Home:  {[facility]:441868}  Activity Level - Current:   {[facility]:255878}    Patient's Preferred language: English   Needed?: {NO OR YES:747210}    Isolation: {ISOLATION[facility] ED:911846::\"None\"}  Infection: {INFECTION OPTIONS:194119}  Patient tested for COVID 19 prior to admission: {Yes/No:324793}  Bariatric?: {NO OR YES:630090}    Vital Signs:   Vitals:    05/16/25 0202 05/16/25 0210 05/16/25 0215 05/16/25 0220   BP: 117/68      Pulse: 100 99 99 99   Resp: 17 16 20 18   Temp:       TempSrc:       SpO2: 96% 96% 93% 93%   Weight:       Height:           Cardiac Rhythm:Cardiac Rhythm: Sinus tachycardia    Was the PSS-3 completed:   {.:590163}  What interventions are required if any?               Family Comments: ***  OBS brochure/video discussed/provided to patient/family: {Yes No NA:186423}              Name of person given brochure if not patient: ***              Relationship to patient: ***    For the majority of the shift this patient's behavior was {traffic light colors:196383}.   Behavioral interventions " performed were ***.    ED NURSE PHONE NUMBER: ***

## 2025-05-16 NOTE — PROVIDER NOTIFICATION
MD Notification    Notified Person: MD    Notified Person Name: iLbra Stokes    Notification Date/Time: 5/16/25 6114    Notification Interaction: [employer] messaging    Purpose of Notification: bladder scanned pt for 902, there was no straight catheter kit on floor, so I used jessica catheter, draining normal urine but now there is blood coming out. Pt does not want me to remove the jessica. Should I leave the jessica catheter in place, if so, can you please put in a jessica order. Thank you.    Orders Received: waiting for order.    Comments:

## 2025-05-16 NOTE — H&P
[facility]    History and Physical - Hospitalist Service       Date of Admission:  5/15/2025    Assessment & Plan      Shilo Menchaca is a 85 year old male with a history of BPH, generalized weakness, obstructive sleep apnea, severe, not on therapy, history of pulmonary embolism admitted on 5/15/2025. He presents to the emergency department with acute on chronic weakness 5/15/2025 resulting in a fall in his bathroom when his legs gave out beneath him.  Found to be febrile with lactic acid elevated in the 4 range and diagnosed with acute COVID-19.    Acute COVID-19 with associated severe sepsis: Lactic acid elevated to 4.0, leukocytosis at 13.1, febrile to 100.6 with EMS, tachycardic to 124 bpm on arrival.  Wife has cough and cold symptoms.  Infrequent cough noted with patient as well, though he was unaware of this.  - No respiratory symptoms, not hypoxic and requiring no supplemental oxygen.  - Remdesivir x 3 days ordered given increased risk of decompensation with advanced age and obesity.  Paxlovid no longer available on formulary  - Oximetry, oxygen as needed  - Special precautions  - Subcutaneous Lovenox prophylaxis noting history of pulmonary embolism in 2021    Microscopic hematuria: History of bladder stones as well as ureteral obstruction in the past.  Given severe sepsis with lactic acidosis, noncontrast CT abdomen pelvis ordered and pending.  Would want to rule out obstructive uropathy as cause for his sepsis even with positive COVID test.  Minimal respiratory symptoms if any.  Right sided bladder mass: 3 cm soft tissue mass in bladder suspicious for malignancy.  Note that on historical imaging he did have an adjacent iliac chain or perivesicular lymph node in this region.  Pyuria:   - [place] [facility] consulted for anticipated future cystoscopy.  Discussed bladder mass finding with patient as well as wife at bedside  - Continue IV Zosyn for now with urine culture pending and  severe sepsis    History of pulmonary embolism: Diagnosed with pulmonary embolism after incidental finding on CT urogram with right upper and lower lobe pulmonary emboli June 2021. Had a brief hospitalization for weakness around that time, so I believe was thought to be provoked. Completed ~3 months of anticoagulation  - Subcutaneous Lovenox prophylaxis    BPH: Status post greenlight photo vaporization and cystoscopy for bladder stone extraction September 2021 w/ Dr Man.  - [place] [facility] consulted as above    Generalized weakness and physical deconditioning: Ambulates with a walker at home at baseline, but weakness and deconditioning has been a longstanding issue.  Note that patient had been following with outpatient physical therapy for generalized weakness summer 2022, through the first half of 2023, and was rereferred to physical therapy in 2024, completing outpatient therapy in December.   - Care management consultation for disposition planning.  Anticipate TCU discharge, and discussed this with patient and wife at time of admission.  - Physical therapy consulted  - Fall precautions  - Ambulate with assistance 4 times daily.  Utilizes a walker at baseline.  - Wound nurse consult for bilateral knee abrasions  - Patient's wife describes somewhat acute onset weakness after his second shingles vaccine (2018) and which has not improved since then.  Certainly generalized weakness in an 85-year-old would not be uncommon, but description of acute weakness after vaccination which has persisted might raise suspicion for Guillain-Barré and subsequent chronic inflammatory demyelinating polyneuropathy.  Consider outpatient EMG testing and possible lumbar puncture for further evaluation pending improvement or lack thereof with resolution of his acute infectious illness.    Severe obstructive sleep apnea significant nocturnal hypoxemia: Seen through [facility] sleep medicine clinic and was recommended CPAP early March  "2025.  Patient declined, was interested in potentially trialing oral dental device, which his pulmonology team did not feel would treat his degree of sleep apnea.  Possible that nocturnal hypoxia is contributing to weakness and certainly contributing to daytime fatigue.  - Oximetry, oxygen as needed    Elevated CK: CK elevated to the 5000 range.  Compartments are soft with no report of pain other than knee abrasions, but was down on the bathroom floor for approximately 1.5 hours.  CK could also be elevated secondary to viral inflammation.  - Compression stockings to bilateral lower extremities  - Received 1 L normal saline bolus in the emergency department and is currently on 125 mL/h normal saline.  Need to be cautious with fluid as patient already with weeping edema in his lower extremities  - Intake and output, daily weights  - Repeat CK in a.m., trend to significant improvement.  - Holding prior to admission torsemide    Hypertension:  - Can resume prior to admission amlodipine when reconciled by pharmacy.  Currently normotensive at a blood pressure of 118 systolic.  - Holding prior to admission torsemide with elevated CK and lactic acid.          Diet:  Regular adult diet  DVT Prophylaxis: Subcutaneous Lovenox prophylaxis  Alonzo Catheter: Not present  Lines: None     Cardiac Monitoring: None  Code Status:  DNR/DNI.  Confirmed with patient on admission.  Wife present at bedside for this discussion.  Encouraged them to review their prior advanced directives through [facility] system and ensure this matches, update documents and daughter if it does not.    Clinically Significant Risk Factors Present on Admission                   # Hypertension: Home medication list includes antihypertensive(s)           # Obesity: Estimated body mass index is 32.28 kg/m  as calculated from the following:    Height as of this encounter: 1.676 m (5' 6\").    Weight as of this encounter: 90.7 kg (200 lb).              Disposition " Plan     Medically Ready for Discharge: Anticipated in 2-4 Days pending improvement in weakness, resolution of lactic acidosis.           Mil Griffiths MD  Hospitalist Service  [facility]  Securely message with [employer] (more info)  Text page via [employer] Paging/Directory     ______________________________________________________________________    Chief Complaint   Weakness, fever    History is obtained from the patient, chart review, discussion w/ Dr Womack in the emergency department.    History of Present Illness   Shilo Menchaca is a 85 year old male with a history of generalized weakness, BPH, nephrolithiasis and bladder stones, obstructive sleep apnea not on therapy, hypertension who presents to the emergency department after a fall at home.    Patient lives with his wife and is generally weak.  Has followed with physical therapy as an outpatient for the past several years because of his generalized weakness.  Ambulates with a walker at baseline and is good about using his walker.      His wife is much more active and mobile.  She recently returned from a trip on Monday and subsequently developed a cold.  She went into clinic today to be tested for COVID-19 as she has been having a cough and some respiratory symptoms.    While his wife was at the clinic, patient went to the bathroom and fell.  He was using his walker, but tells me that his legs gave out from under him.  Was on the floor for approximately 1-1/2 hours and unable to get up on his own until his wife returned and contacted EMS.    EMS found patient to have a fever of 100.6.  He denies any respiratory symptoms or cough, but does have a few brief episodes of coughing which is nonproductive during interview in the emergency department.  He reports no pain other than some abrasions over his bilateral knees.  No hypoxia.    No urinary symptoms such as urinary frequency or pain with urination, but did have microscopic  hematuria on urinalysis.  Given fevers and significant weakness with lactic acid of 4.0 on arrival requested CT of abdomen and pelvis to rule out obstructive uropathy with febrile illness.  This returned with some nonobstructive bladder and kidney stones, but also with a 3 cm right bladder wall mass.  Note that in the past on imaging he has had some perivesicular lymphadenopathy on the right which appeared somewhat concerning in 2021.  Discussed this result with patient and his wife, and have requested urology consultation.  Anticipate cystoscopy in the outpatient setting.    Slight pyuria on urinalysis and currently on Zosyn given presentation with sepsis.    In terms of his generalized weakness, this appears to be a longstanding issue.  His wife tells me that he actually became somewhat acutely weak after receiving his second shingles vaccine in 2018, and has never recovered since that time.  Uncertain to what degree his weakness was evaluated, but acute onset after vaccination somewhat raises my suspicion for Guillain Barré or now chronic demyelinating polyneuropathy.  Both he and his wife are aware that he will likely be recommended for TCU placement at discharge.    Elevated CK in the 5000 range which may be secondary to COVID-19 or related to his fall and being on the ground for the hour and a half prior to his wife's arrival.  He has again no reported pain, and his muscular compartments are soft.      Past Medical History    Past Medical History:   Diagnosis Date    BPH with obstruction/lower urinary tract symptoms     Cancer (H)     skin    Hx pulmonary embolism     Hydronephrosis of right kidney     Hypertension     Hypokalemia     Lower extremity edema     Obesity (BMI 30.0-34.9)     Spinal stenosis        Past Surgical History   Past Surgical History:   Procedure Laterality Date    CHOLECYSTECTOMY      CIRCUMCISION N/A 6/9/2021    Procedure: DORSAL SLIT;  Surgeon: Shadi Man MD;  Location:  OR     COMBINED CYSTOSCOPY, RETROGRADES, EXCHANGE STENT URETER(S) Right 6/9/2021    Procedure: CYSTOSCOPY, WITH RIGHT RETROGRADE PYELOGRAM, RIGHT DIAGNOSTIC  URETEROSCOPY  AND URETERAL STENT REPLACEMENT AND PENILE BLOCK;  Surgeon: Shadi Man MD;  Location:  OR    CYSTOSCOPY      CYSTOSCOPY BLADDER WITH STONE EXTRACTION  9/17/2021    Procedure: Cystoscopy Bladder With Stone Extraction;  Surgeon: Shadi Man MD;  Location:  OR    ENT SURGERY      HERNIA REPAIR      LASER KTP GREEN LIGHT PHOTOSELECTIVE VAPORIZATION PROSTATE N/A 9/17/2021    Procedure: CYSTOSCOPY, PHOTOVAPORIZATION OF THE PROSTATE USING THE GREENLIGHT LASER;  Surgeon: Shadi Man MD;  Location:  OR       Prior to Admission Medications   Prior to Admission Medications   Prescriptions Last Dose Informant Patient Reported? Taking?   Multiple Vitamins-Minerals (PRESERVISION AREDS) CAPS  Self Yes No   Sig: Take 1 capsule by mouth 2 times daily    Pramoxine HCl (CERAVE ITCH RELIEF) 1 % CREA  Self Yes No   Sig: Externally apply topically 2 times daily as needed   Zinc Gluconate 15 MG TABS  Self Yes No   Sig: Take 15 mg by mouth daily    acetaminophen (TYLENOL) 325 MG tablet   Yes No   Sig: Take 325-650 mg by mouth every 6 hours as needed for mild pain   Patient not taking: Reported on 12/15/2021   amLODIPine-benazepril (LOTREL) 5-10 MG capsule  Self Yes No   Sig: Take 1 capsule by mouth daily   cetirizine (ZYRTEC) 10 MG tablet  Self Yes No   Sig: Take 10 mg by mouth every evening    dutasteride (AVODART) 0.5 MG capsule  Self Yes No   Sig: Take 0.5 mg by mouth daily   Patient not taking: Reported on 12/15/2021   multivitamin, therapeutic (THERA-VIT) TABS tablet  Self Yes No   Sig: Take 1 tablet by mouth daily   torsemide (DEMADEX) 10 MG tablet  Self Yes No   Sig: Take 10 mg by mouth daily   vitamin D3 (CHOLECALCIFEROL) 10 MCG (400 UNIT) capsule  Self Yes No   Sig: Take 1 capsule by mouth daily      Facility-Administered Medications: None            Physical Exam   Vital Signs: Temp: 99.9  F (37.7  C) Temp src: Temporal BP: 117/68 Pulse: 99   Resp: 18 SpO2: 93 % O2 Device: None (Room air) Oxygen Delivery: 2 LPM  Weight: 200 lbs 0 oz    General Appearance: Obese, fatigued appearing 85-year-old male laying in bed.  Hard of hearing.  Eyes: No scleral icterus or injection  HEENT: Normocephalic and atraumatic  Respiratory: Breath sounds are clear bilateral auscultation.  Somewhat decreased air movement throughout lung fields.  Occasional weak cough.  Cardiovascular: Mild tachycardia in the 100 range.  Heart sounds are distant.  Some weeping edema of distal right lower extremity, but 2+ bilaterally  GI: Abdomen obese, soft, nontender to palpation.  Skin: Well-healed scar from distant history of cholecystectomy in 1984 right of midline.  Bilateral knees with skin tears/abrasions from his fall.  Musculoskeletal: Muscular tone and bulk with noted decrease in bilateral lower extremities.  Neurologic: Alert and conversant.  Appropriate in conversation.  Psychiatric: Pleasant, normal affect.    Medical Decision Making       75 MINUTES SPENT BY ME on the date of service doing chart review, history, exam, documentation & further activities per the note.      Data     I have personally reviewed the following data over the past 24 hrs:    13.1 (H)  \   14.8   / 148 (L)     137 100 28.4 (H) /  174 (H)   3.9 24 1.19 (H) \     ALT: 42 AST: 118 (H) AP: 157 (H) TBILI: 0.5   ALB: 3.5 TOT PROTEIN: 6.3 (L) LIPASE: N/A     Procal: N/A CRP: N/A Lactic Acid: 1.7         Imaging results reviewed over the past 24 hrs:   Recent Results (from the past 24 hours)   XR Chest 2 Views    Narrative    EXAM: XR CHEST 2 VIEWS  LOCATION: [facility]  DATE: 5/16/2025    INDICATION: Fever  COMPARISON: 8/6/2021.    FINDINGS: The heart size is normal. The thoracic aorta is calcified and mildly tortuous. The right hemidiaphragm is elevated and there is mild right basilar  atelectasis. The lungs are otherwise clear. No pneumothorax or pleural effusion.      Impression    IMPRESSION: No acute abnormality.   CT Abdomen Pelvis w/o Contrast    Narrative    EXAM: CT ABDOMEN PELVIS W/O CONTRAST  LOCATION: [facility]  DATE: 5/16/2025    INDICATION: evaluate for stone. has elevated lactic acid, fever  COMPARISON: Noncontrast CT of the abdomen and pelvis 8/6/2021.  TECHNIQUE: CT scan of the abdomen and pelvis was performed without IV contrast. Multiplanar reformats were obtained. Dose reduction techniques were used.  CONTRAST: None.    FINDINGS:   LOWER CHEST: Mild bibasilar atelectasis. Advanced multivessel coronary artery calcification.    HEPATOBILIARY: Stable 2.2 cm benign appearing rim calcified observation in the liver. Absent gallbladder. No biliary dilatation.    PANCREAS: Normal.    SPLEEN: Normal.    ADRENAL GLANDS: Normal.    KIDNEYS/BLADDER: Focal wall thickening of the urinary bladder on the right near the right ureterovesical junction concerning for possible neoplasm measures 3.1 x 1.6 cm on axial images (image 172 of series 4). No hydronephrosis. 2 mm stone in the   bladder. 2 mm nonobstructing left intrarenal stone. Chronic cortical thinning of the kidneys. Tiny benign cysts of the kidneys not warranting follow-up.    BOWEL: Mild colonic diverticulosis. No bowel obstruction or inflammation. No evidence for appendicitis.    LYMPH NODES: Normal.    VASCULATURE: Mild aortoiliac atherosclerosis. No aneurysm.    PELVIC ORGANS: Normal.    MUSCULOSKELETAL: Tiny fat-containing umbilical hernia. Degenerative changes of the spine.      Impression    IMPRESSION:   1.  Focal masslike thickening of the urinary bladder wall on the right measuring up to 3.1 cm concerning for possible neoplasm. Urology follow-up recommended.  2.  Tiny nonobstructing stones in the bladder and left renal collecting system. No hydronephrosis.  3.  Advanced coronary artery  calcification.

## 2025-05-16 NOTE — PROGRESS NOTES
05/16/25 1515   Appointment Info   Signing Clinician's Name / Credentials (PT) Von Gong, PT, DPT   Living Environment   People in Home spouse   Current Living Arrangements house   Home Accessibility stairs to enter home   Number of Stairs, Main Entrance 3   Stair Railings, Main Entrance railing on right side (ascending)   Transportation Anticipated family or friend will provide   Living Environment Comments Patient lives with spouse in house, has 3 steps to enter with unilateral handrail. Walk-in shower with shower chair and grab bars.   Self-Care   Usual Activity Tolerance moderate   Current Activity Tolerance fair   Equipment Currently Used at Home grab bar, tub/shower;shower chair;walker, rolling   Fall history within last six months yes   Number of times patient has fallen within last six months 3   Activity/Exercise/Self-Care Comment Spouse assist with socks and LE dressing, cooking, cleaning. supervision for showering. Patient completes all other mobility and cares with walker.   General Information   Onset of Illness/Injury or Date of Surgery 05/15/25   Referring Physician Griffiths, Mil Dial MD   Patient/Family Therapy Goals Statement (PT) Patient would like to get better   Pertinent History of Current Problem (include personal factors and/or comorbidities that impact the POC) 85 year old male with a history of BPH, generalized weakness, obstructive sleep apnea, severe, not on therapy, history of pulmonary embolism admitted on 5/15/2025. He presents to the emergency department with acute on chronic weakness 5/15/2025 resulting in a fall in his bathroom when his legs gave out beneath him.  Found to be febrile with lactic acid elevated in the 4 range and diagnosed with acute COVID-19.   Existing Precautions/Restrictions fall   Cognition   Affect/Mental Status (Cognition) WFL   Orientation Status (Cognition) oriented x 4   Follows Commands (Cognition) WFL   Pain Assessment   Patient Currently in Pain No    Strength (Manual Muscle Testing)   Strength (Manual Muscle Testing) Deficits observed during functional mobility   Bed Mobility   Comment, (Bed Mobility) mod-max assist x1 for bed mobility   Transfers   Comment, (Transfers) sit<>stands with min assist x1 and FWW   Gait/Stairs (Locomotion)   Comment, (Gait/Stairs) sidestepping EOB with CGA and FWW   Balance   Balance Comments impaired balance, requires BUE support on walker.   Sensory Examination   Sensory Perception Comments reports mild numbness and tingling in feet that is not baseline. No sensation deficits with testing.   Clinical Impression   Criteria for Skilled Therapeutic Intervention Yes, treatment indicated   PT Diagnosis (PT) impaired mobility   Influenced by the following impairments weakness, reduced activity tolerance, impaired balance   Functional limitations due to impairments impaired functional mobility, impaired gait, transfers, bed mobility, stair navigation   Clinical Presentation (PT Evaluation Complexity) stable   Clinical Presentation Rationale clinical judgment   Clinical Decision Making (Complexity) low complexity   Planned Therapy Interventions (PT) balance training;bed mobility training;gait training;motor coordination training;neuromuscular re-education;patient/family education;stair training;strengthening;transfer training;progressive activity/exercise   Risk & Benefits of therapy have been explained evaluation/treatment results reviewed;care plan/treatment goals reviewed;risks/benefits reviewed;participants voiced agreement with care plan;current/potential barriers reviewed;participants included;patient;spouse/significant other;daughter   PT Total Evaluation Time   PT Eval, Low Complexity Minutes (20654) 7   Physical Therapy Goals   PT Frequency 5x/week   PT Predicted Duration/Target Date for Goal Attainment 05/26/25   PT Goals Bed Mobility;Transfers;Gait;Stairs   PT: Bed Mobility Supervision/stand-by assist;Supine to/from sit   PT:  Transfers Supervision/stand-by assist;Sit to/from stand;Assistive device   PT: Gait Supervision/stand-by assist;Rolling walker;100 feet   PT: Stairs Supervision/stand-by assist;3 stairs;Rail on right   Interventions   Interventions Quick Adds Gait Training;Therapeutic Activity   Therapeutic Activity   Therapeutic Activities: dynamic activities to improve functional performance Minutes (89230) 25   Symptoms Noted During/After Treatment Fatigue   Treatment Detail/Skilled Intervention Patient supine in bed at beginning of session, agreeable to participate in PT. SOB noted with mobility, O2 sats >95% throughout. Patient performing supine to sit transfer with mod assist x1, HOB elevated, and use of bedrail. Cues for sequencing bed mobility and scooting hips to EOB, mod assist x1 to scoot hips to EOB. Patient seated EOB with SBA. Performing sit<>stand from EOB with min assist x1 and FWW, cues for hand placement and anterior weight shift. Patient sidestepping at EOB for 4' with FWW and CGA. Cues for walker management and stepping. PAtient performing sit to supine transfer with max assist x1. Bed in reverse trendelenburg position, repo sheet and assist x1 for boosting towards HOB. PAtient supine in bed at end of session with call light next to him and bed alarm on. Discussion with patient and patient's family regarding discharge recommendations, patient and family agreeable to TCU At this time. Discussed getting up to recliner with nursing staff 3x/day for meals,  updated whiteboard.   Gait Training   Symptoms Noted During/After Treatment (Gait Training) fatigue   Treatment Detail/Skilled Intervention see in therapeutic activity section   Distance in Feet sidestepping EOB x4'   PT Discharge Planning   PT Plan progress gait distance as able, repeated sit<>stands, progress IND with bed mobility   PT Discharge Recommendation (DC Rec) Transitional Care Facility   PT Rationale for DC Rec Patient below baseline functional  mobility. Presents with weakness, reduced activity tolerance, and impaired balance resulting in the need for assist x1-2 and walker to safely mobilize. Lives in house with spouse, 3 steps to enter. At this time, recommend discharge to TCU for improvement of strength, activity tolerance, balance, and independence with functional mobility.   PT Brief overview of current status assist x2 for mobility with walker for nursing staff . Goals of therapy will be to address safe mobility and make recs for d/c to next level of care. Pt and RN will continue to follow all falls risk precautions as documented by RN staff while hospitalized.   PT Total Distance Amb During Session (feet) 4   Physical Therapy Time and Intention   Timed Code Treatment Minutes 25   Total Session Time (sum of timed and untimed services) 32

## 2025-05-16 NOTE — ED NOTES
[facility]  ED Nurse Handoff Report    ED Chief complaint: Generalized Weakness      ED Diagnosis:   Final diagnoses:   COVID-19 virus infection   Elevated CK   Generalized weakness   Elevated lactic acid level   Bladder mass       Code Status: pending discussion with admitting MD    Allergies:   Allergies   Allergen Reactions    Azithromycin      PN: LW Reaction: UNKNOWN    Cefpodoxime Itching       Patient Story: BIBA from his home slid from his chair  due to increasing  generalized weakness ,fever.  Tachycardic as per EMS,no neuro deficit,glucose 236 mg/dl,on lasix.     Focused Assessment:  alert, oriented. Afebrile. VSS. Room air. Generalized weakness. Unable to help turn in bed. Unable to stand. Continent x1, used bed pan.     Treatments and/or interventions provided: labs, imaging, fluids, abx  Patient's response to treatments and/or interventions: tolerated well    To be done/followed up on inpatient unit:  pending    Does this patient have any cognitive concerns?: Forgetful    Activity level - Baseline/Home:  Unknown  Activity Level - Current:   Total Care    Patient's Preferred language: English   Needed?: No    Isolation: None  Infection: Not Applicable  Patient tested for COVID 19 prior to admission: YES  Bariatric?: No    Vital Signs:   Vitals:    05/16/25 0500 05/16/25 0530 05/16/25 0600 05/16/25 0630   BP: 129/72 133/78 138/75 (!) 148/86   Pulse: 82 81 78 81   Resp: 20 18 19 20   Temp:       TempSrc:       SpO2: 95% 95% 95% 97%   Weight:       Height:           Cardiac Rhythm:Cardiac Rhythm: Sinus tachycardia    Was the PSS-3 completed:   Yes  What interventions are required if any?               Family Comments:   OBS brochure/video discussed/provided to patient/family: N/A              Name of person given brochure if not patient:               Relationship to patient:     For the majority of the shift this patient's behavior was Green.   Behavioral interventions performed  were .    ED NURSE PHONE NUMBER: [phone]

## 2025-05-16 NOTE — ED NOTES
Paged -informed that no urine output since last starlight cath insertion at 0000 and bladder scan yielded 512 ml,  No blood  from the meatus a- bit traumatized upon insertion last night.    Try to use commode and if unsuccessful- proceed straight cath as per hospitalist.

## 2025-05-16 NOTE — CONSULTS
[facility] Urology Consultation    Shilo Menchaca MRN# [id number]   Age: 85 year old YOB: 1940     Date of Admission:  5/15/2025    Date of Consult:   05/16/2025           Assessment and Plan:   Assessment:   Shilo Menchaca is a 85 year old male with a history of BPH s/p PVP with Dr. Man in 2021, nephrolithiasis and bladder stones, obstructive sleep apnea, prior PE on anticoagulation, hypertension who presents to the emergency department after a fall at home and was found to have acute COVID as well as microscopic hematuria and a possible bladder mass on CT imaging.     The mass was not seen on prior imaging and does seem distinct from the prostate. It is limited by not being a contrast enhanced scan but is convincing enough to proceed with a cystoscopy for diagnostic purposes. Given his acute illness with COVID this would not happen this admission but should happen in the outpatient setting. He had 48 RBC/HPF on his urinalysis which is not unexpected in the setting of a bladder mass and the hematuria would only become a problem if it became gross hematuria resulting in clots and retention.     For now we will set him up with outpatient cystoscopy with Dr. Man given his ill acute inpatient status.               Plan:   - No urologic intervention  - Should have a complete workup including CT urogram (can be done outpatient) as well as a cytology and cystoscopy in the outpatient setting with Dr. Man     Discussed with staff, Dr. Goode            Chief Complaint:   Hematuria/bladder mass         History of Present Illness:   Shilo Menchaca is a 85 year old male with a history of BPH s/p PVP with Dr. Man in 2021, nephrolithiasis and bladder stones, obstructive sleep apnea, prior PE on anticoagulation, hypertension who presents to the emergency department after a fall at home and was found to have acute COVID as well as microscopic hematuria and a possible bladder mass on CT  imaging.     He smoked occasional cigars in the past. 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 He was never deployed or in the . He had gross hematuria around the time he had bladder stones but not since.            Past Medical History:     Past Medical History:   Diagnosis Date    BPH with obstruction/lower urinary tract symptoms     Cancer (H)     skin    Hx pulmonary embolism     Hydronephrosis of right kidney     Hypertension     Hypokalemia     Lower extremity edema     Obesity (BMI 30.0-34.9)     Spinal stenosis              Past Surgical History:     Past Surgical History:   Procedure Laterality Date    CHOLECYSTECTOMY      CIRCUMCISION N/A 6/9/2021    Procedure: DORSAL SLIT;  Surgeon: Shadi Man MD;  Location:  OR    COMBINED CYSTOSCOPY, RETROGRADES, EXCHANGE STENT URETER(S) Right 6/9/2021    Procedure: CYSTOSCOPY, WITH RIGHT RETROGRADE PYELOGRAM, RIGHT DIAGNOSTIC  URETEROSCOPY  AND URETERAL STENT REPLACEMENT AND PENILE BLOCK;  Surgeon: Shadi Man MD;  Location:  OR    CYSTOSCOPY      CYSTOSCOPY BLADDER WITH STONE EXTRACTION  9/17/2021    Procedure: Cystoscopy Bladder With Stone Extraction;  Surgeon: Shadi Man MD;  Location:  OR    ENT SURGERY      HERNIA REPAIR      LASER KTP GREEN LIGHT PHOTOSELECTIVE VAPORIZATION PROSTATE N/A 9/17/2021    Procedure: CYSTOSCOPY, PHOTOVAPORIZATION OF THE PROSTATE USING THE GREENLIGHT LASER;  Surgeon: Shadi Man MD;  Location:  OR             Social History:     Social History     Tobacco Use    Smoking status: Former     Types: Cigars    Smokeless tobacco: Never   Substance Use Topics    Alcohol use: Yes     Comment: 1 glass of wine per week             Family History:   No family history on file.             Allergies:     Allergies   Allergen Reactions    Azithromycin      PN: LW Reaction: UNKNOWN    Cefpodoxime Itching             Medications:     Current Facility-Administered Medications    Medication Dose Route Frequency Provider Last Rate Last Admin    acetaminophen (TYLENOL) tablet 650 mg  650 mg Oral Q4H PRN Griffiths, Mil Dial MD        Or    acetaminophen (TYLENOL) Suppository 650 mg  650 mg Rectal Q4H PRN Griffiths, iMl Dial MD        artificial saliva (BIOTENE MT) solution 2 spray  2 spray Mouth/Throat Q1H PRN Griffiths, Mil Dial MD        bisacodyl (DULCOLAX) suppository 10 mg  10 mg Rectal Daily PRN Griffiths, Mil Dial MD        calcium carbonate (TUMS) chewable tablet 1,000 mg  1,000 mg Oral 4x Daily PRN Griffiths, Mil Dial MD        enoxaparin ANTICOAGULANT (LOVENOX) injection 50 mg  0.5 mg/kg Subcutaneous BID Griffiths, Mil Dial MD   50 mg at 05/16/25 0744    HYDROmorphone (DILAUDID) injection 0.2 mg  0.2 mg Intravenous Q2H PRN Griffiths, Mil Dial MD        HYDROmorphone (DILAUDID) injection 0.4 mg  0.4 mg Intravenous Q2H PRN Griffiths, Mil Dial MD        melatonin tablet 1 mg  1 mg Oral At Bedtime PRN Griffiths, Mil Dial MD        ondansetron (ZOFRAN ODT) ODT tab 4 mg  4 mg Oral Q6H PRN Griffiths, Mil Dial MD        Or    ondansetron (ZOFRAN) injection 4 mg  4 mg Intravenous Q6H PRN Griffiths, Mil Dial MD        oxyCODONE (ROXICODONE) tablet 5 mg  5 mg Oral Q4H PRN Griffiths, Mil Dial MD        oxyCODONE IR (ROXICODONE) half-tab 2.5 mg  2.5 mg Oral Q4H PRN Griffiths, Mil Dial MD        piperacillin-tazobactam (ZOSYN) 4.5 g vial to attach to  mL bag  4.5 g Intravenous Q6H Griffiths, Mil Dial MD   4.5 g at 05/16/25 0931    polyethylene glycol (MIRALAX) Packet 17 g  17 g Oral BID PRN Griffiths, Mil Dial MD        prochlorperazine (COMPAZINE) injection 5 mg  5 mg Intravenous Q6H PRN Griffiths, Mil Dial MD        Or    prochlorperazine (COMPAZINE) tablet 5 mg  5 mg Oral Q6H PRN Griffiths, Mil Jayro, MD        [START ON 5/17/2025] remdesivir 100 mg in sodium chloride 0.9 % 100 mL intermittent infusion  100 mg Intravenous Q24H Tunde, Mil Dial MD        And    [START ON 5/17/2025] sodium chloride 0.9%  BOLUS 50 mL  50 mL Intravenous Q24H Mil Griffiths MD        senna-docusate (SENOKOT-S/PERICOLACE) 8.6-50 MG per tablet 1 tablet  1 tablet Oral BID PRN Tunde, Mil Dial MD        Or    senna-docusate (SENOKOT-S/PERICOLACE) 8.6-50 MG per tablet 2 tablet  2 tablet Oral BID PRN Tunde, Mil Dial MD        sodium chloride (PF) 0.9% PF flush 3 mL  3 mL Intracatheter q1 min prn Denise Womack MD        sodium chloride (PF) 0.9% PF flush 3 mL  3 mL Intracatheter Q8H Denise Womack MD   3 mL at 05/16/25 0223    sodium chloride 0.9 % infusion   Intravenous Continuous Griffiths, Mil Dial  mL/hr at 05/16/25 0930 Rate Verify at 05/16/25 0930     Current Outpatient Medications   Medication Sig Dispense Refill    amLODIPine-benazepril (LOTREL) 5-10 MG capsule Take 1 capsule by mouth daily      cetirizine (ZYRTEC) 10 MG tablet Take 10 mg by mouth every evening       lactobacillus rhamnosus, GG, (CULTURELLE KIDS) packet Take 1 packet by mouth 2 times daily.      Multiple Vitamins-Minerals (PRESERVISION AREDS) CAPS Take 1 capsule by mouth 2 times daily       multivitamin, therapeutic (THERA-VIT) TABS tablet Take 1 tablet by mouth daily      polyethylene glycol (MIRALAX) 17 g packet Take 1 packet by mouth daily.      Pramoxine HCl (CERAVE ITCH RELIEF) 1 % CREA Externally apply topically 2 times daily as needed      torsemide (DEMADEX) 10 MG tablet Take 10 mg by mouth daily      Vitamin D (Cholecalciferol) 50 MCG (2000 UT) CAPS Take 1 capsule by mouth daily.      zinc gluconate 50 MG tablet Take 50 mg by mouth daily.                 Review of Systems:   Positive findings in BOLD, otherwise negative   Constitutional: chills, fatigue, fever, weight loss  Respiratory: cough and shortness of breath  Cardiovascular: chest pain, palpitations, racing heart beat   Gastrointestinal: abdominal pain, constipation, diarrhea, nausea, vomiting  Genitourinary: Per HPI   Skin: rash  Neuro: weakness, confusion, focal  deficits   All other systems reviewed and are negative          Physical Exam:   All vitals have been reviewed  Temp:  [98.1  F (36.7  C)-99.9  F (37.7  C)] 98.1  F (36.7  C)  Pulse:  [] 81  Resp:  [12-34] 20  BP: (115-148)/() 148/86  SpO2:  [89 %-97 %] 97 %    Intake/Output Summary (Last 24 hours) at 5/16/2025 1023  Last data filed at 5/16/2025 0930  Gross per 24 hour   Intake 891.66 ml   Output 725 ml   Net 166.66 ml     Physical Exam:  General:Ill appearing  HEENT: Normocephalic and atraumatic.   CV: Regular rate, non-cyanotic in appearance   Pulm: Normal respiratory effort supplemented by nasal cannula O2  Abdomen: Abdomen soft, non-tender and non-distended.   Neuro: CNII-XII grossly intact          Data:   All laboratory data reviewed    Results:  BMP  Recent Labs   Lab 05/16/25  0003      POTASSIUM 3.9   CHLORIDE 100   CO2 24   BUN 28.4*   CR 1.19*   *     CBC  Recent Labs   Lab 05/16/25  0003   WBC 13.1*   HGB 14.8   *     LFT  Recent Labs   Lab 05/16/25  0003   *   ALT 42   ALKPHOS 157*   BILITOTAL 0.5   ALBUMIN 3.5     Recent Labs   Lab 05/16/25  0003   *       Imaging:  CT May 16, 2025:  Soft tissue thickening, limited by non-contrast, does appear to be a mass distinct from the rest of the bladder and prostate          Yannick Bhat MD

## 2025-05-17 LAB
BACTERIA UR CULT: NO GROWTH
CK SERPL-CCNC: 2193 U/L (ref 39–308)
CREAT SERPL-MCNC: 1.09 MG/DL (ref 0.67–1.17)
EGFRCR SERPLBLD CKD-EPI 2021: 66 ML/MIN/1.73M2
ERYTHROCYTE [DISTWIDTH] IN BLOOD BY AUTOMATED COUNT: 13.6 % (ref 10–15)
HCT VFR BLD AUTO: 41.2 % (ref 40–53)
HGB BLD-MCNC: 13.4 G/DL (ref 13.3–17.7)
MCH RBC QN AUTO: 32.3 PG (ref 26.5–33)
MCHC RBC AUTO-ENTMCNC: 32.5 G/DL (ref 31.5–36.5)
MCV RBC AUTO: 99 FL (ref 78–100)
PLATELET # BLD AUTO: 125 10E3/UL (ref 150–450)
RBC # BLD AUTO: 4.15 10E6/UL (ref 4.4–5.9)
WBC # BLD AUTO: 7 10E3/UL (ref 4–11)

## 2025-05-17 PROCEDURE — 82550 ASSAY OF CK (CPK): CPT | Performed by: INTERNAL MEDICINE

## 2025-05-17 PROCEDURE — 36415 COLL VENOUS BLD VENIPUNCTURE: CPT | Performed by: INTERNAL MEDICINE

## 2025-05-17 PROCEDURE — 250N000011 HC RX IP 250 OP 636: Performed by: INTERNAL MEDICINE

## 2025-05-17 PROCEDURE — 94660 CPAP INITIATION&MGMT: CPT

## 2025-05-17 PROCEDURE — 250N000013 HC RX MED GY IP 250 OP 250 PS 637: Performed by: INTERNAL MEDICINE

## 2025-05-17 PROCEDURE — 99232 SBSQ HOSP IP/OBS MODERATE 35: CPT | Performed by: INTERNAL MEDICINE

## 2025-05-17 PROCEDURE — 82565 ASSAY OF CREATININE: CPT | Performed by: INTERNAL MEDICINE

## 2025-05-17 PROCEDURE — 85041 AUTOMATED RBC COUNT: CPT | Performed by: INTERNAL MEDICINE

## 2025-05-17 PROCEDURE — 999N000157 HC STATISTIC RCP TIME EA 10 MIN

## 2025-05-17 PROCEDURE — 258N000003 HC RX IP 258 OP 636: Performed by: INTERNAL MEDICINE

## 2025-05-17 PROCEDURE — 120N000001 HC R&B MED SURG/OB

## 2025-05-17 RX ORDER — HYDRALAZINE HYDROCHLORIDE 10 MG/1
10 TABLET, FILM COATED ORAL 4 TIMES DAILY PRN
Status: DISCONTINUED | OUTPATIENT
Start: 2025-05-17 | End: 2025-05-25 | Stop reason: HOSPADM

## 2025-05-17 RX ORDER — TORSEMIDE 10 MG/1
10 TABLET ORAL DAILY
Status: DISCONTINUED | OUTPATIENT
Start: 2025-05-17 | End: 2025-05-25 | Stop reason: HOSPADM

## 2025-05-17 RX ADMIN — MICONAZOLE NITRATE: 2 POWDER TOPICAL at 07:54

## 2025-05-17 RX ADMIN — CETIRIZINE HYDROCHLORIDE 10 MG: 10 TABLET, FILM COATED ORAL at 20:25

## 2025-05-17 RX ADMIN — AMLODIPINE BESYLATE 5 MG: 5 TABLET ORAL at 07:52

## 2025-05-17 RX ADMIN — PIPERACILLIN AND TAZOBACTAM 4.5 G: 4; .5 INJECTION, POWDER, FOR SOLUTION INTRAVENOUS at 07:54

## 2025-05-17 RX ADMIN — LISINOPRIL 10 MG: 10 TABLET ORAL at 07:52

## 2025-05-17 RX ADMIN — ENOXAPARIN SODIUM 50 MG: 60 INJECTION SUBCUTANEOUS at 20:27

## 2025-05-17 RX ADMIN — PIPERACILLIN AND TAZOBACTAM 4.5 G: 4; .5 INJECTION, POWDER, FOR SOLUTION INTRAVENOUS at 14:06

## 2025-05-17 RX ADMIN — POLYETHYLENE GLYCOL 3350 17 G: 17 POWDER, FOR SOLUTION ORAL at 07:51

## 2025-05-17 RX ADMIN — TORSEMIDE 10 MG: 10 TABLET ORAL at 18:15

## 2025-05-17 RX ADMIN — Medication 1 CAPSULE: at 07:51

## 2025-05-17 RX ADMIN — PIPERACILLIN AND TAZOBACTAM 4.5 G: 4; .5 INJECTION, POWDER, FOR SOLUTION INTRAVENOUS at 01:10

## 2025-05-17 RX ADMIN — ENOXAPARIN SODIUM 50 MG: 60 INJECTION SUBCUTANEOUS at 07:55

## 2025-05-17 RX ADMIN — MICONAZOLE NITRATE: 2 POWDER TOPICAL at 20:28

## 2025-05-17 RX ADMIN — REMDESIVIR 100 MG: 100 INJECTION, POWDER, LYOPHILIZED, FOR SOLUTION INTRAVENOUS at 07:47

## 2025-05-17 RX ADMIN — Medication 1 CAPSULE: at 20:25

## 2025-05-17 RX ADMIN — SODIUM CHLORIDE 50 ML: 0.9 INJECTION, SOLUTION INTRAVENOUS at 06:38

## 2025-05-17 ASSESSMENT — ACTIVITIES OF DAILY LIVING (ADL)
ADLS_ACUITY_SCORE: 71
ADLS_ACUITY_SCORE: 66
ADLS_ACUITY_SCORE: 72
ADLS_ACUITY_SCORE: 71
ADLS_ACUITY_SCORE: 71
ADLS_ACUITY_SCORE: 66
ADLS_ACUITY_SCORE: 66
ADLS_ACUITY_SCORE: 71
ADLS_ACUITY_SCORE: 64
ADLS_ACUITY_SCORE: 66
ADLS_ACUITY_SCORE: 64
ADLS_ACUITY_SCORE: 72
ADLS_ACUITY_SCORE: 66
ADLS_ACUITY_SCORE: 72
ADLS_ACUITY_SCORE: 71
ADLS_ACUITY_SCORE: 66
ADLS_ACUITY_SCORE: 66
ADLS_ACUITY_SCORE: 71
ADLS_ACUITY_SCORE: 72
ADLS_ACUITY_SCORE: 71
ADLS_ACUITY_SCORE: 71
ADLS_ACUITY_SCORE: 64
ADLS_ACUITY_SCORE: 64

## 2025-05-17 NOTE — PLAN OF CARE
Diagnosis: Gen weakness, COVID  Mental Status:A/O x4, [place], hearing aid  Activity/dangle Ax2 w/ GB + walker per pt, refused to get OOB during this shift.   Diet: Regular diet  Pain: Denies  Jessica/Voiding: Jessica, pink tinged urine  Tele/Restraints/Iso: N/A  02/LDA: Room air, L and R IV SL  D/C Date: TBD  Other Info: Interdry under abdominal folds, keep jessica for 48 hours. Bilateral knee dressing done by WOC. Redness on coccyx, open to air.

## 2025-05-17 NOTE — PLAN OF CARE
Goal Outcome Evaluation:  1900-0730     Diagnosis: Acute on chronic generalized weakness, fall, COVID-19   Orientation/Cognitive: A&OX4, [place]- hearing aids in use  Mobility Level/Assist Equipment: Ax2 GB/W  Pain Management: denies  Tele/VS/O2: VSS@RA  Diet: Regular  Bowel/Bladder: Alonzo in place, patent  Skin Concerns: BLE/knees wounds/dressing intact, gluteal cleft   Drains/Devices: PIV SL,   ISO/TYPE: COVID-19, special precaution  Other info: spouse slept at beds side  D/ C date: TBD

## 2025-05-17 NOTE — PLAN OF CARE
Mental Status: A&O x4, [place], pt has own hearing aid  Activity/dangle: Not OOB  Diet: Regular diet  Pain: [place] pain  Alonzo/Voiding:  Alonzo in place, patent  Tele/Restraints/Iso: Special precaution maintained  02/LDA: Room air. IV saline locked  D/C Date: TBD  Other Info: Bilateral knee dressing changed per order. Intra dry applied under abdominal folds.

## 2025-05-17 NOTE — PLAN OF CARE
Diagnosis: Gen weakness, mechanical fall, COVID-19  Mental Status: A/O x4, [place], hearing aids in place  Activity/dangle: Ax 1-2 w/ GB and walker, pivot from bed to chair  Diet: Regular  Pain: Denies pain  Alonzo/Voiding: Alonzo in place  Tele/Restraints/Iso: special precautions for  COVID-19  02/LDA: Room air, L and R IV SL  D/C Date: TBD  Other Info: BLE knee wound, dressing CDI, mepilex on coccyx, CDI. Spouse at bedside.

## 2025-05-17 NOTE — PROGRESS NOTES
[facility]    Hospitalist Progress Note    Date of Service (when I saw the patient): 05/17/2025  Admit date: 5/15/2025    Interval History   Patient is afebrile hemodynamically stable.  Pulses morning is 102 for the most part has been the 80s to 90s.  Respiration 20.  Oxygen 95% on room air.  CK has come down nicely to 2193.  IV fluids were stopped yesterday.  WBC is normalized at 7.  Platelets 125 (148 yesterday) hemoglobin stable.  Cr 1 this AM, CK down to 2,193.    He feels more weak today than yesterday. Otherwise, no localizing symptoms. He is in no distress, just feeling weak and tired.   Answered all questions from daughter, spending 10 minutes in one to one counseling.      Assessment & Plan   Shilo Menchaca is a 85 year old male with a history of BPH, generalized weakness, obstructive sleep apnea, severe, not on therapy, history of pulmonary embolism admitted on 5/15/2025. He presents to the emergency department with acute on chronic weakness 5/15/2025 resulting in a fall in his bathroom when his legs gave out beneath him.  Found to be febrile with lactic acid elevated in the 4 range and diagnosed with acute COVID-19.    T 99.9, P 124, /78, 15, O2 94% on RA  Na 137, K 3.9, Cr 1.19 (stable), alk phos 157, , CK 5,076, glucose 174.   Lactic acid 2.1 > 1.7.   WBC 13.1, hgb 14.8, plt 148  UA WBC 13, RBC 48   BCx, UCx pending.   Covid + RSV, flu negative  CXR clear  CT abdomen/pelvis: focal mass like thickening of the bladder wall on th R 3.1 cm, concerning for neoplasm.  Tiny nonobstructing stones in the bladder and left renal collecting system no hydronephrosis.   Advanced coronary artery calcification.    Acute COVID-19 with associated severe sepsis: Lactic acid elevated to 4.0, leukocytosis at 13.1, febrile to 100.6 with EMS, tachycardic to 124 bpm on arrival.  Wife has cough and cold symptoms.  Infrequent cough noted with patient as well, though he was unaware of  this.  Pyuria, with bladder tumor (see below)   No respiratory symptoms, not hypoxic and requiring no supplemental oxygen.  Remdesivir x 3 days ordered given increased risk of decompensation with advanced age and obesity.  Paxlovid no longer available on formulary  Oxygen PRN  Special precautions  Subcutaneous Lovenox prophylaxis noting history of pulmonary embolism in 2021  Placed on IV zosyn for potential UTI.   Follow blood and urine cultures. NGTD. > stop IV zosyn.     Gross hematuria  Bladder stones  Right sided bladder mass: 3 cm soft tissue mass in bladder suspicious for malignancy.  Note that on historical imaging he did have an adjacent iliac chain or perivesicular lymph node in this region.  Pyuria   Renal retention, jessica placed on 05/16/25    BPH - Status post greenlight photo vaporization and cystoscopy for bladder stone extraction September 2021 w/ Dr Man.  CT abdomen/pelvis 05/16/25: focal mass like thickening of the bladder wall on th R 3.1 cm, concerning for neoplasm.  Tiny nonobstructing stones in the bladder and left renal collecting system no hydronephrosis.   [place] [facility] consulted for anticipated future cystoscopy.  Discussed bladder mass finding with patient as well as wife at bedside  Continue IV Zosyn for now with urine culture pending and severe sepsis  Urology recommends a CT urogram and cystoscopy as outpatient through Dr. Man. Did not comment on retention or hematuria.   On 05/17/25, urine remains pink, no obstructing clots, good flow.   Remove catheter AM of 5/18/25, with trial of void.     History of pulmonary embolism: Diagnosed with pulmonary embolism after incidental finding on CT urogram with right upper and lower lobe pulmonary emboli June 2021. Had a brief hospitalization for weakness around that time, so I believe was thought to be provoked. Completed ~3 months of anticoagulation  Subcutaneous Lovenox prophylaxis    Generalized weakness and physical deconditioning:  Ambulates with a walker at home at baseline, but weakness and deconditioning has been a longstanding issue.  Note that patient had been following with outpatient physical therapy for generalized weakness summer 2022, through the first half of 2023, and was rereferred to physical therapy in 2024, completing outpatient therapy in December.   Care management consultation for disposition planning.  Anticipate TCU discharge, and discussed this with patient and wife at time of admission.  Physical therapy consulted  Fall precautions  Ambulate with assistance 4 times daily.  Utilizes a walker at baseline.  Patient noted acute weakness following shingles vaccine in 2018 which has since improved.  Admitting doctor was concerned about Guillain-Barré and subsequent CIDP.  [facility] shows that minimal increase risk of GBS: 3 excess cases per million of vaccine doses and no reported links to CIPD. Therefore, I do not think further investigation would be beneficial, especially this far out.     Coccyx wound, bilateral knee abrasions, intertrigo present at admission  [facility] nurse consulted.    Severe obstructive sleep apnea significant nocturnal hypoxemia: Seen through [facility] sleep medicine clinic and was recommended CPAP early March 2025.  Patient declined, was interested in potentially trialing oral dental device, which his pulmonology team did not feel would treat his degree of sleep apnea.  Possible that nocturnal hypoxia is contributing to weakness and certainly contributing to daytime fatigue.  - Oximetry, oxygen as needed     Mild CK elevation: CK elevated to the 5000 range.  Compartments are soft with no report of pain other than knee abrasions, but was down on the bathroom floor for approximately 1.5 hours.  CK could also be elevated secondary to viral inflammation.  Lower extremity edema  CKD, stable baseline Cr 1.2.   Compression stockings to bilateral lower extremities  Received 1 L normal saline bolus  "in the emergency department and is currently on 125 mL/h normal saline.  Need to be cautious with fluid as patient already with weeping edema in his lower extremities  Stop IVF afternoon of 5/16/25, when CK below threshold level to be renal toxic.   Intake and output, daily weights  Repeat CK in a.m., trend to significant improvement.  Resume prior to admission torsemide on 05/17/25      Hypertension:  Resume PTA amlodipine-benazepril  BP up.   Resume PTA torsemide on 05/17/25   PO hydralazine ordered PRN for SBP over 180.     Clinically Significant Risk Factors                 # Thrombocytopenia: Lowest platelets = 125 in last 2 days, will monitor for bleeding              # Obesity: Estimated body mass index is 32.36 kg/m  as calculated from the following:    Height as of this encounter: 1.676 m (5' 6\").    Weight as of this encounter: 90.9 kg (200 lb 7.8 oz)., PRESENT ON ADMISSION       # Financial/Environmental Concerns: none           Diet: Orders Placed This Encounter      Combination Diet Regular Diet Adult     IVF: NS @ 125 ml/n > stopped  DVT Prophylaxis: enoxaparin  Alonzo Catheter: PRESENT, indication:      No CPR- Do NOT Intubate  Disposition:  Medically Ready for Discharge: Anticipated Tomorrow     PT/OT TCU recommended, family is hoping for [facility]  Communication: Discussed with RN, patient and daughter at bedside on 05/17/25     Libra Stokes MD    Hospitalist Service  [facility]  Securely message with the Vocera Web Console (learn more here)  Text page via [employer] Paging/Directory      -Data reviewed today: I reviewed all new labs and imaging results over the last 24 hours. I personally reviewed no images or EKG's today.    Physical Exam   Temp: 99.6  F (37.6  C) Temp src: Oral BP: (!) 143/90 Pulse: 102   Resp: 20 SpO2: 95 % O2 Device: None (Room air)    Vitals:    05/15/25 2337 05/17/25 0642   Weight: 90.7 kg (200 lb) 90.9 kg (200 lb 7.8 oz)     Vital Signs with " Ranges  Temp:  [98.1  F (36.7  C)-99.6  F (37.6  C)] 99.6  F (37.6  C)  Pulse:  [] 102  Resp:  [16-20] 20  BP: (134-162)/(82-98) 143/90  SpO2:  [94 %-100 %] 95 %  I/O last 3 completed shifts:  In: 1141.66 [P.O.:250; I.V.:891.66]  Out: 2200 [Urine:2200]    Today's Exam  Constitutional:  NAD, lying in bed. Appears stated age.   Neuropsyche:  alert and oriented, answers questions appropriately. Speech normal, face symmetric and moving all 4 extremities without gross focal neurological deficit.   Respiratory:  Breathing comfortably, good air exchange, no wheezes, no crackles.   Cardiovascular:  Regular rate and rhythm, no edema.  GI:  soft, NT/ND, BS normal  Skin/Integumen:  No acute rash or sign of bleeding.     Medications   All medications reviewed on 05/16/25    Current Facility-Administered Medications   Medication Dose Route Frequency Provider Last Rate Last Admin    Patient is already receiving anticoagulation with heparin, enoxaparin (LOVENOX), warfarin (COUMADIN)  or other anticoagulant medication   Does not apply Continuous PRN Mil Griffiths MD         Current Facility-Administered Medications   Medication Dose Route Frequency Provider Last Rate Last Admin    amLODIPine (NORVASC) tablet 5 mg  5 mg Oral Daily Libra Stokes MD   5 mg at 05/17/25 0752    And    lisinopril (ZESTRIL) tablet 10 mg  10 mg Oral Daily Libra Stokes MD   10 mg at 05/17/25 0752    cetirizine (zyrTEC) tablet 10 mg  10 mg Oral QPM Libra Stokes MD   10 mg at 05/16/25 2102    enoxaparin ANTICOAGULANT (LOVENOX) injection 50 mg  0.5 mg/kg Subcutaneous BID Mil Griffiths MD   50 mg at 05/17/25 0755    lactobacillus rhamnosus (GG) (CULTURELL) capsule 1 capsule  1 capsule Oral BID Libra Stokse MD   1 capsule at 05/17/25 0751    miconazole (MICATIN) 2 % powder   Topical BID Libra Stokes MD   Given at 05/17/25 0754    piperacillin-tazobactam (ZOSYN) 4.5 g vial to attach to  mL bag  4.5 g Intravenous Q6H Griffiths,  Mil Dial MD   4.5 g at 05/17/25 0754    polyethylene glycol (MIRALAX) Packet 17 g  17 g Oral Daily Libra Stokes MD   17 g at 05/17/25 0751    remdesivir 100 mg in sodium chloride 0.9 % 100 mL intermittent infusion  100 mg Intravenous Q24H Griffiths, Mil Dial  mL/hr at 05/17/25 0747 100 mg at 05/17/25 0747    And    sodium chloride 0.9% BOLUS 50 mL  50 mL Intravenous Q24H Griffiths, Mil Dial  mL/hr at 05/17/25 0638 50 mL at 05/17/25 0638    sodium chloride (PF) 0.9% PF flush 3 mL  3 mL Intracatheter Q8H [facility] Mil Griffiths MD   3 mL at 05/17/25 0640    sodium chloride (PF) 0.9% PF flush 3 mL  3 mL Intracatheter Q8H Griffiths, Mil Dial MD   3 mL at 05/17/25 0122    [Held by provider] torsemide (DEMADEX) tablet 10 mg  10 mg Oral Daily Libra Stokes MD         PRN Meds:   Current Facility-Administered Medications   Medication Dose Route Frequency Provider Last Rate Last Admin    acetaminophen (TYLENOL) tablet 650 mg  650 mg Oral Q4H PRN Tunde, Mil Dial MD   650 mg at 05/16/25 2114    Or    acetaminophen (TYLENOL) Suppository 650 mg  650 mg Rectal Q4H PRN Tunde, Mil Dial MD        artificial saliva (BIOTENE MT) solution 2 spray  2 spray Mouth/Throat Q1H PRN Tunde, Mil Dial MD        bisacodyl (DULCOLAX) suppository 10 mg  10 mg Rectal Daily PRN Tunde, Mil Dial MD        calcium carbonate (TUMS) chewable tablet 1,000 mg  1,000 mg Oral 4x Daily PRN Tunde, Mil Dial MD        HYDROmorphone (DILAUDID) injection 0.2 mg  0.2 mg Intravenous Q2H PRN Tunde, Mil Dial MD        HYDROmorphone (DILAUDID) injection 0.4 mg  0.4 mg Intravenous Q2H PRN Tunde, Mil Dial MD        lidocaine (LMX4) cream   Topical Q1H PRN Tunde, Mil Dial MD        lidocaine 1 % 0.1-1 mL  0.1-1 mL Other Q1H PRN Griffiths, Mil Dial MD        melatonin tablet 1 mg  1 mg Oral At Bedtime PRN Griffiths, Mil Dial MD        naloxone (NARCAN) injection 0.2 mg  0.2 mg Intravenous Q2 Min PRN Griffiths, Mil Dial MD        Or     naloxone (NARCAN) injection 0.4 mg  0.4 mg Intravenous Q2 Min PRN Griffiths, Mil Dial MD        Or    naloxone (NARCAN) injection 0.2 mg  0.2 mg Intramuscular Q2 Min PRN Griffiths, Mil Dial MD        Or    naloxone (NARCAN) injection 0.4 mg  0.4 mg Intramuscular Q2 Min PRN Griffiths, Mil Dial MD        ondansetron (ZOFRAN ODT) ODT tab 4 mg  4 mg Oral Q6H PRN Griffiths, Mil Dial MD        Or    ondansetron (ZOFRAN) injection 4 mg  4 mg Intravenous Q6H PRN Griffiths, Mil Dial MD        oxyCODONE (ROXICODONE) tablet 5 mg  5 mg Oral Q4H PRN Griffiths, Mil Dial MD        oxyCODONE IR (ROXICODONE) half-tab 2.5 mg  2.5 mg Oral Q4H PRN Griffiths, Mil Dial MD        Patient is already receiving anticoagulation with heparin, enoxaparin (LOVENOX), warfarin (COUMADIN)  or other anticoagulant medication   Does not apply Continuous PRN Griffiths, Mil Dial MD        polyethylene glycol (MIRALAX) Packet 17 g  17 g Oral BID PRN Griffiths, Mil Dial MD        prochlorperazine (COMPAZINE) injection 5 mg  5 mg Intravenous Q6H PRN Griffiths, Mil Dial MD        Or    prochlorperazine (COMPAZINE) tablet 5 mg  5 mg Oral Q6H PRN Griffiths, Mil Dial MD        senna-docusate (SENOKOT-S/PERICOLACE) 8.6-50 MG per tablet 1 tablet  1 tablet Oral BID PRN Griffiths, Mil Dial MD        Or    senna-docusate (SENOKOT-S/PERICOLACE) 8.6-50 MG per tablet 2 tablet  2 tablet Oral BID PRN Griffiths, Mil Dial MD        sodium chloride (PF) 0.9% PF flush 3 mL  3 mL Intracatheter q1 min prn Griffiths, Mil Dial MD        sodium chloride (PF) 0.9% PF flush 3 mL  3 mL Intracatheter q1 min prn Griffiths, Mil Dial MD           Data   Recent Labs   Lab 05/17/25  0721 05/16/25  0003   WBC 7.0 13.1*   HGB 13.4 14.8   MCV 99 99   * 148*   NA  --  137   POTASSIUM  --  3.9   CHLORIDE  --  100   CO2  --  24   BUN  --  28.4*   CR  --  1.19*   ANIONGAP  --  13   SABRINA  --  8.7*   GLC  --  174*   ALBUMIN  --  3.5   PROTTOTAL  --  6.3*   BILITOTAL  --  0.5   ALKPHOS  --  157*   ALT  --   42   AST  --  118*       No results found for this or any previous visit (from the past 24 hours).

## 2025-05-18 LAB
CK SERPL-CCNC: 1181 U/L (ref 39–308)
CREAT SERPL-MCNC: 1.03 MG/DL (ref 0.67–1.17)
EGFRCR SERPLBLD CKD-EPI 2021: 70 ML/MIN/1.73M2
ERYTHROCYTE [DISTWIDTH] IN BLOOD BY AUTOMATED COUNT: 13.7 % (ref 10–15)
HCT VFR BLD AUTO: 42.8 % (ref 40–53)
HGB BLD-MCNC: 14 G/DL (ref 13.3–17.7)
MCH RBC QN AUTO: 32.5 PG (ref 26.5–33)
MCHC RBC AUTO-ENTMCNC: 32.7 G/DL (ref 31.5–36.5)
MCV RBC AUTO: 99 FL (ref 78–100)
PLATELET # BLD AUTO: 125 10E3/UL (ref 150–450)
RBC # BLD AUTO: 4.31 10E6/UL (ref 4.4–5.9)
WBC # BLD AUTO: 5.9 10E3/UL (ref 4–11)

## 2025-05-18 PROCEDURE — 36415 COLL VENOUS BLD VENIPUNCTURE: CPT | Performed by: INTERNAL MEDICINE

## 2025-05-18 PROCEDURE — 250N000011 HC RX IP 250 OP 636: Performed by: INTERNAL MEDICINE

## 2025-05-18 PROCEDURE — 99232 SBSQ HOSP IP/OBS MODERATE 35: CPT | Performed by: INTERNAL MEDICINE

## 2025-05-18 PROCEDURE — 82550 ASSAY OF CK (CPK): CPT | Performed by: INTERNAL MEDICINE

## 2025-05-18 PROCEDURE — 82565 ASSAY OF CREATININE: CPT | Performed by: INTERNAL MEDICINE

## 2025-05-18 PROCEDURE — 120N000001 HC R&B MED SURG/OB

## 2025-05-18 PROCEDURE — 84439 ASSAY OF FREE THYROXINE: CPT | Performed by: INTERNAL MEDICINE

## 2025-05-18 PROCEDURE — 84443 ASSAY THYROID STIM HORMONE: CPT | Performed by: INTERNAL MEDICINE

## 2025-05-18 PROCEDURE — 85048 AUTOMATED LEUKOCYTE COUNT: CPT | Performed by: INTERNAL MEDICINE

## 2025-05-18 PROCEDURE — 250N000013 HC RX MED GY IP 250 OP 250 PS 637: Performed by: INTERNAL MEDICINE

## 2025-05-18 PROCEDURE — 258N000003 HC RX IP 258 OP 636: Performed by: INTERNAL MEDICINE

## 2025-05-18 RX ADMIN — REMDESIVIR 100 MG: 100 INJECTION, POWDER, LYOPHILIZED, FOR SOLUTION INTRAVENOUS at 09:41

## 2025-05-18 RX ADMIN — CETIRIZINE HYDROCHLORIDE 10 MG: 10 TABLET, FILM COATED ORAL at 19:59

## 2025-05-18 RX ADMIN — TORSEMIDE 10 MG: 10 TABLET ORAL at 09:32

## 2025-05-18 RX ADMIN — ENOXAPARIN SODIUM 50 MG: 60 INJECTION SUBCUTANEOUS at 09:33

## 2025-05-18 RX ADMIN — MICONAZOLE NITRATE: 2 POWDER TOPICAL at 09:41

## 2025-05-18 RX ADMIN — AMLODIPINE BESYLATE 5 MG: 5 TABLET ORAL at 09:33

## 2025-05-18 RX ADMIN — MICONAZOLE NITRATE: 2 POWDER TOPICAL at 19:59

## 2025-05-18 RX ADMIN — Medication 1 CAPSULE: at 19:59

## 2025-05-18 RX ADMIN — POLYETHYLENE GLYCOL 3350 17 G: 17 POWDER, FOR SOLUTION ORAL at 09:34

## 2025-05-18 RX ADMIN — LISINOPRIL 10 MG: 10 TABLET ORAL at 09:33

## 2025-05-18 RX ADMIN — ENOXAPARIN SODIUM 50 MG: 60 INJECTION SUBCUTANEOUS at 19:59

## 2025-05-18 RX ADMIN — Medication 1 CAPSULE: at 09:32

## 2025-05-18 ASSESSMENT — ACTIVITIES OF DAILY LIVING (ADL)
ADLS_ACUITY_SCORE: 72
ADLS_ACUITY_SCORE: 68
ADLS_ACUITY_SCORE: 72
ADLS_ACUITY_SCORE: 71
ADLS_ACUITY_SCORE: 72
ADLS_ACUITY_SCORE: 72
ADLS_ACUITY_SCORE: 68
ADLS_ACUITY_SCORE: 72
ADLS_ACUITY_SCORE: 68
ADLS_ACUITY_SCORE: 71
ADLS_ACUITY_SCORE: 72
ADLS_ACUITY_SCORE: 72

## 2025-05-18 NOTE — PROGRESS NOTES
Care Management Follow Up    Length of Stay (days): 2    Expected Discharge Date: 05/19/2025     Concerns to be Addressed: discharge planning     Patient plan of care discussed at interdisciplinary rounds: Yes    Anticipated Discharge Disposition: Home  Anticipated Discharge Services: None  Anticipated Discharge DME: None    Patient/family educated on Medicare website which has current facility and service quality ratings: no  Education Provided on the Discharge Plan: No  Patient/Family in Agreement with the Plan: yes    Referrals Placed by CM/SW:    Private pay costs discussed: transportation costs    Discussed  Partnership in Safe Discharge Planning  document with patient/family: No     Handoff Completed: No, handoff not indicated or clinically appropriate    Additional Information:  SW and team reviewed recommendations during Rounds- TCU is recommended.  Fiorella sent out referral to [facility] via the choice that was noted.   Writer reached out to pt's daughter Jeremy to discuss additional choices.  First choice is [facility], SANJU asked if they had any additional choices  and request 2 more in the event [facility] does not have the private room available.   SANJU sent the list of TCU choices along with the Medicare.gov ratings link to Windy via email - [email or web address], [email or web address] .    Jeremy and SANJU discussed transport and cost associated- we will evaluate how pt is doing at end of day and tomorrow morning and decide about transport at that time. If still assist of 2 likely med transport if assist of 1 family will likely transport.     Addendum 1428: SANJU received eamil back from Jeremy indicating the follow TCU in order of preference:     1. [facility]  2. [facility]  3. [facility]  4. [facility]    Next Steps: Secure TCU, Confirm transport plan Monday. Likely med transport will be needed unless pt make significant improvement in next day or two.      Zena BROWN Gauger, BSW

## 2025-05-18 NOTE — PROGRESS NOTES
[facility]    Hospitalist Progress Note    Date of Service (when I saw the patient): 05/18/2025  Admit date: 5/15/2025    Interval History   Can remains afebrile hemodynamically stable.  Blood pressures have been elevated last evening and this morning at 171/96.  Creatinine is stable at 1.03.  CBC is stable as well with normal WBC normal hemoglobin and platelets of 125.  CK down to 1181.  He states he feels better today but still is quite weak cannot lift his legs off the bed for any period of time.   His wife had multiple questions which I answered.  She expresses appreciation.     Assessment & Plan   Shilo Menchaca is a 85 year old male with a history of BPH, generalized weakness, obstructive sleep apnea, severe, not on therapy, history of pulmonary embolism admitted on 5/15/2025. He presents to the emergency department with acute on chronic weakness 5/15/2025 resulting in a fall in his bathroom when his legs gave out beneath him.  Found to be febrile with lactic acid elevated in the 4 range and diagnosed with acute COVID-19.    T 99.9, P 124, /78, 15, O2 94% on RA  Na 137, K 3.9, Cr 1.19 (stable), alk phos 157, , CK 5,076, glucose 174.   Lactic acid 2.1 > 1.7.   WBC 13.1, hgb 14.8, plt 148  UA WBC 13, RBC 48   BCx, UCx pending.   Covid + RSV, flu negative  CXR clear  CT abdomen/pelvis: focal mass like thickening of the bladder wall on th R 3.1 cm, concerning for neoplasm.  Tiny nonobstructing stones in the bladder and left renal collecting system no hydronephrosis.   Advanced coronary artery calcification.    Acute COVID-19 with associated severe sepsis: Lactic acid elevated to 4.0, leukocytosis at 13.1, febrile to 100.6 with EMS, tachycardic to 124 bpm on arrival.  Wife has cough and cold symptoms.  Infrequent cough noted with patient as well, though he was unaware of this.  Pyuria, with bladder tumor (see below)   No respiratory symptoms, not hypoxic and requiring no  supplemental oxygen.  Remdesivir x 3 days ordered given increased risk of decompensation with advanced age and obesity.  Paxlovid no longer available on formulary  Oxygen PRN  Special precautions  Subcutaneous Lovenox prophylaxis noting history of pulmonary embolism in 2021  Placed on IV zosyn for potential UTI.   Follow blood and urine cultures. NGTD. > stopped IV zosyn on 5/17/25 .     Gross hematuria  Bladder stones  Right sided bladder mass: 3 cm soft tissue mass in bladder suspicious for malignancy.  Note that on historical imaging he did have an adjacent iliac chain or perivesicular lymph node in this region.  Pyuria   Renal retention, jessica placed on 05/16/25    BPH - Status post greenlight photo vaporization and cystoscopy for bladder stone extraction September 2021 w/ Dr Man.  CT abdomen/pelvis 05/16/25: focal mass like thickening of the bladder wall on th R 3.1 cm, concerning for neoplasm.  Tiny nonobstructing stones in the bladder and left renal collecting system no hydronephrosis.   [place] [facility] consulted for anticipated future cystoscopy.  Discussed bladder mass finding with patient as well as wife at bedside  Continue IV Zosyn for now with urine culture pending and severe sepsis  Urology recommends a CT urogram and cystoscopy as outpatient through Dr. Man. Did not comment on retention or hematuria.   On 05/17/25, urine remains pink, no obstructing clots, good flow.   Remove catheter AM of 5/18/25, with trial of void.   Discussed with RN on 05/18/25.  Please place Jessica if he continues to retain > 500 as it will be difficult to do recurrent straight cath on him.      History of pulmonary embolism: Diagnosed with pulmonary embolism after incidental finding on CT urogram with right upper and lower lobe pulmonary emboli June 2021. Had a brief hospitalization for weakness around that time, so I believe was thought to be provoked. Completed ~3 months of anticoagulation  Subcutaneous Lovenox  prophylaxis    Generalized weakness and physical deconditioning: Ambulates with a walker at home at baseline, but weakness and deconditioning has been a longstanding issue.  Note that patient had been following with outpatient physical therapy for generalized weakness summer 2022, through the first half of 2023, and was rereferred to physical therapy in 2024, completing outpatient therapy in December.   Care management consultation for disposition planning.  Anticipate TCU discharge, and discussed this with patient and wife at time of admission.  Physical therapy consulted  Fall precautions  Get out of bed and attempt to ambulate with assistance 4 times daily.  Utilizes a walker at baseline, but very minimal activity. Spends most of the day in a chair.    Coccyx wound, bilateral knee abrasions, intertrigo present at admission  [facility] nurse consulted.    Severe obstructive sleep apnea significant nocturnal hypoxemia: Seen through [facility] sleep medicine clinic and was recommended CPAP early March 2025.  Patient declined, was interested in potentially trialing oral dental device, which his pulmonology team did not feel would treat his degree of sleep apnea.  Possible that nocturnal hypoxia is contributing to weakness and certainly contributing to daytime fatigue.  - Oximetry, oxygen as needed     Mild CK elevation: CK elevated to the 5000 range.  Compartments are soft with no report of pain other than knee abrasions, but was down on the bathroom floor for approximately 1.5 hours.  CK could also be elevated secondary to viral inflammation.  Lower extremity edema  CKD, stable baseline Cr 1.2.   Compression stockings to bilateral lower extremities  S/p IVF hydration, stopped on 5/16/25 when CK < 5000.   Intake and output, daily weights  Repeat CK in a.m., trend to significant improvement.  Resume prior to admission torsemide on 05/17/25      Hypertension:  Continue PTA amlodipine-benazepril  Resume PTA torsemide on  "05/17/25   Had brisk urine output of 1450 cc.    Note blood pressure still elevated on 05/18/25, but improving.   PO hydralazine ordered PRN for SBP over 180. Otherwise, monitor  for now.  Asymptomatic without chest pain, headache or shortness of breath.    Chronic constipation  With multiple loose stools on 5/18  No associated abdominal pain or cramping WBC normal.  Hold PTA MiraLAX    Clinically Significant Risk Factors                 # Thrombocytopenia: Lowest platelets = 125 in last 2 days, will monitor for bleeding              # Obesity: Estimated body mass index is 32.36 kg/m  as calculated from the following:    Height as of this encounter: 1.676 m (5' 6\").    Weight as of this encounter: 90.9 kg (200 lb 7.4 oz)., PRESENT ON ADMISSION       # Financial/Environmental Concerns: none           Diet: Orders Placed This Encounter      Combination Diet Regular Diet Adult     IVF: NS @ 125 ml/n > stopped  DVT Prophylaxis: enoxaparin  Alonzo Catheter: Not present    No CPR- Do NOT Intubate  Disposition:  Medically Ready for Discharge: Anticipated Tomorrow     PT/OT TCU recommended, family is hoping for [facility] consulted  Communication: Discussed with wife at bedside.     Libra Stokes MD    Hospitalist Service  [facility]  Securely message with the [employer] Web Console (learn more here)  Text page via [employer] Paging/Directory      -Data reviewed today: I reviewed all new labs and imaging results over the last 24 hours. I personally reviewed no images or EKG's today.    Physical Exam   Temp: 98.5  F (36.9  C) Temp src: Oral BP: (!) 158/96 Pulse: 94   Resp: 20 SpO2: 94 % O2 Device: None (Room air)    Vitals:    05/15/25 2337 05/17/25 0642 05/18/25 0659   Weight: 90.7 kg (200 lb) 90.9 kg (200 lb 7.8 oz) 90.9 kg (200 lb 7.4 oz)     Vital Signs with Ranges  Temp:  [98.4  F (36.9  C)-98.7  F (37.1  C)] 98.5  F (36.9  C)  Pulse:  [] 94  Resp:  [20] 20  BP: (151-171)/(96-99) " 158/96  SpO2:  [94 %-96 %] 94 %  I/O last 3 completed shifts:  In: -   Out: 1450 [Urine:1450]    Today's Exam  Constitutional:  NAD, lying in bed. Appears stated age.   Neuropsyche:  alert and oriented, answers questions appropriately. Speech normal, face symmetric and moving all 4 extremities without gross focal neurological deficit.   Respiratory:  Breathing comfortably, good air exchange, no wheezes, no crackles.   Cardiovascular:  Regular rate and rhythm, no edema.  GI:  soft, NT/ND, BS normal  Skin/Integumen:  No acute rash or sign of bleeding.     Medications   All medications reviewed on 05/18/25    Current Facility-Administered Medications   Medication Dose Route Frequency Provider Last Rate Last Admin    Patient is already receiving anticoagulation with heparin, enoxaparin (LOVENOX), warfarin (COUMADIN)  or other anticoagulant medication   Does not apply Continuous PRN Mil Griffiths MD         Current Facility-Administered Medications   Medication Dose Route Frequency Provider Last Rate Last Admin    amLODIPine (NORVASC) tablet 5 mg  5 mg Oral Daily Libra Stokes MD   5 mg at 05/18/25 0933    And    lisinopril (ZESTRIL) tablet 10 mg  10 mg Oral Daily Libra Stokes MD   10 mg at 05/18/25 0933    cetirizine (zyrTEC) tablet 10 mg  10 mg Oral QPM Libra Stokes MD   10 mg at 05/17/25 2025    enoxaparin ANTICOAGULANT (LOVENOX) injection 50 mg  0.5 mg/kg Subcutaneous BID Mil Griffiths MD   50 mg at 05/18/25 0933    lactobacillus rhamnosus (GG) (CULTURELL) capsule 1 capsule  1 capsule Oral BID Libra Stokes MD   1 capsule at 05/18/25 0932    miconazole (MICATIN) 2 % powder   Topical BID Libra Stokes MD   Given at 05/18/25 0941    polyethylene glycol (MIRALAX) Packet 17 g  17 g Oral Daily Libra Stokes MD   17 g at 05/18/25 0934    sodium chloride (PF) 0.9% PF flush 3 mL  3 mL Intracatheter Q8H [facility] Mil Griffiths MD   3 mL at 05/18/25 0626    sodium chloride (PF) 0.9% PF flush 3 mL   3 mL Intracatheter Q8H Griffiths, Mil Dial MD   3 mL at 05/18/25 0941    sodium chloride 0.9% BOLUS 50 mL  50 mL Intravenous Q24H Griffiths, Mil Dial  mL/hr at 05/17/25 0638 50 mL at 05/17/25 0638    torsemide (DEMADEX) tablet 10 mg  10 mg Oral Daily Libra Stokes MD   10 mg at 05/18/25 0932     PRN Meds:   Current Facility-Administered Medications   Medication Dose Route Frequency Provider Last Rate Last Admin    acetaminophen (TYLENOL) tablet 650 mg  650 mg Oral Q4H PRN Griffiths, Mil Dial MD   650 mg at 05/16/25 2114    Or    acetaminophen (TYLENOL) Suppository 650 mg  650 mg Rectal Q4H PRN Griffiths, Mil Dial MD        artificial saliva (BIOTENE MT) solution 2 spray  2 spray Mouth/Throat Q1H PRN Griffiths, Mil Dial MD        bisacodyl (DULCOLAX) suppository 10 mg  10 mg Rectal Daily PRN Griffiths, Mil Dial MD        calcium carbonate (TUMS) chewable tablet 1,000 mg  1,000 mg Oral 4x Daily PRN Griffiths, Mil Dial MD        hydrALAZINE (APRESOLINE) tablet 10 mg  10 mg Oral 4x Daily PRN Libra Stokes MD        HYDROmorphone (DILAUDID) injection 0.2 mg  0.2 mg Intravenous Q2H PRN Griffiths, Mil Dial MD        HYDROmorphone (DILAUDID) injection 0.4 mg  0.4 mg Intravenous Q2H PRN Griffiths, Mil Dial MD        lidocaine (LMX4) cream   Topical Q1H PRN Griffiths, Mil Dial MD        lidocaine 1 % 0.1-1 mL  0.1-1 mL Other Q1H PRN Griffiths, Mil Dial MD        melatonin tablet 1 mg  1 mg Oral At Bedtime PRN Griffiths, Mil Dial MD        naloxone (NARCAN) injection 0.2 mg  0.2 mg Intravenous Q2 Min PRN Griffiths, Mil Dial MD        Or    naloxone (NARCAN) injection 0.4 mg  0.4 mg Intravenous Q2 Min PRN Griffiths, Mil Dial MD        Or    naloxone (NARCAN) injection 0.2 mg  0.2 mg Intramuscular Q2 Min PRN Griffiths, Mil Dial MD        Or    naloxone (NARCAN) injection 0.4 mg  0.4 mg Intramuscular Q2 Min PRN Griffiths, Mil Dial MD        ondansetron (ZOFRAN ODT) ODT tab 4 mg  4 mg Oral Q6H PRN Griffiths, Mil Dial MD        Or     ondansetron (ZOFRAN) injection 4 mg  4 mg Intravenous Q6H PRN Griffiths, Mil Dial MD        oxyCODONE (ROXICODONE) tablet 5 mg  5 mg Oral Q4H PRN Griffiths, Mil Dial MD        oxyCODONE IR (ROXICODONE) half-tab 2.5 mg  2.5 mg Oral Q4H PRN Griffiths, Mil Dial MD        Patient is already receiving anticoagulation with heparin, enoxaparin (LOVENOX), warfarin (COUMADIN)  or other anticoagulant medication   Does not apply Continuous PRN Griffiths, Mil Dial MD        polyethylene glycol (MIRALAX) Packet 17 g  17 g Oral BID PRN Griffiths, Mil Dial MD        prochlorperazine (COMPAZINE) injection 5 mg  5 mg Intravenous Q6H PRN Griffiths, Mil Dial MD        Or    prochlorperazine (COMPAZINE) tablet 5 mg  5 mg Oral Q6H PRN Griffiths, Mil Dial MD        senna-docusate (SENOKOT-S/PERICOLACE) 8.6-50 MG per tablet 1 tablet  1 tablet Oral BID PRN Griffiths, Mil Dial MD        Or    senna-docusate (SENOKOT-S/PERICOLACE) 8.6-50 MG per tablet 2 tablet  2 tablet Oral BID PRN Griffiths, Mil Dial MD        sodium chloride (PF) 0.9% PF flush 3 mL  3 mL Intracatheter q1 min prn [place], Mil Dial MD        sodium chloride (PF) 0.9% PF flush 3 mL  3 mL Intracatheter q1 min prn Griffiths, Mil Dial MD           Data   Recent Labs   Lab 05/18/25  0759 05/17/25  0721 05/16/25  0003   WBC 5.9 7.0 13.1*   HGB 14.0 13.4 14.8   MCV 99 99 99   * 125* 148*   NA  --   --  137   POTASSIUM  --   --  3.9   CHLORIDE  --   --  100   CO2  --   --  24   BUN  --   --  28.4*   CR 1.03 1.09 1.19*   ANIONGAP  --   --  13   SABRINA  --   --  8.7*   GLC  --   --  174*   ALBUMIN  --   --  3.5   PROTTOTAL  --   --  6.3*   BILITOTAL  --   --  0.5   ALKPHOS  --   --  157*   ALT  --   --  42   AST  --   --  118*       No results found for this or any previous visit (from the past 24 hours).

## 2025-05-18 NOTE — PROGRESS NOTES
Admitting Diagnosis: Generalized weakness [R53.1]  Elevated CK [R74.8]  Bladder mass [N32.89]  Elevated lactic acid level [R79.89]  COVID-19 virus infection [U07.1]   Vitals VSS  Pain 0/10. Taking tylenol PRN.   A&Ox4  Voiding Alonzo was removed at 1430 today, was bladder scanned at 1755 for 381  Mobility Assist of 2 with a maria elena steady, generalized weakness.   Lung Sounds Clear on RA via n/a  GI Active, 3 bms today.  Dressing: Bilateral knee dressings were changed today.     Orders Placed This Encounter      Combination Diet Regular Diet Adult       Plan: Turn and repo q2 hrs, coccyx dressing is intact.

## 2025-05-18 NOTE — PROGRESS NOTES
"Date & Time: 5/17/25  [id number]  Summary: Admitted for chronic weakness r/t mechanical fall and found to have COVID-19   Orientation/Cognitive: A&O4  Mobility Level/Assist Equipment: Ax 1-2 w/ GB and walker, pivot from bed to chair   Fall Risk (Y/N): Yes  Behavior Concerns: Green  Pain Management: Denies  Tele/VS/O2: VSS on RA except HTN   ABNL Lab/BG: Plts 125, CK total 2,193  Diet: Regular  Bowel/Bladder: Alonzo catheter in place with adequate amount of output. Urine color is strawberry red without any clots.  Skin Concerns: Bilateral knee wounds and dressing CDI. Mepilex on coccyx. Erythema present to abdominal fold.  Drains/Devices: R & L PIV SL  Tests/Procedures for next shift: None  Anticipated DC date & active delays: TBD  Other important Information: According to hospitalist note from 5/17/25 Remove catheter AM of 5/18/25, with trial of void .   special precautions for COVID-19 maintained.       BP (!) 171/96 (BP Location: Right arm, Patient Position: Semi-Blake's, Cuff Size: Adult Regular)   Pulse 96   Temp 98.7  F (37.1  C) (Oral)   Resp 20   Ht 1.676 m (5' 6\")   Wt 90.9 kg (200 lb 7.8 oz)   SpO2 96%   BMI 32.36 kg/m        "

## 2025-05-18 NOTE — PROGRESS NOTES
[facility] Cover Note    May 17, 2025 8:20 PM    I was notified by RN that this patient hx of KEZIA and uses CPAP at home but no equipment.         Action taken:   -CPAP with home setting ordered        Javid Herndon MD on 5/17/2025 at 8:20 PM

## 2025-05-19 ENCOUNTER — APPOINTMENT (OUTPATIENT)
Dept: GENERAL RADIOLOGY | Facility: CLINIC | Age: 85
End: 2025-05-19
Attending: INTERNAL MEDICINE
Payer: MEDICARE

## 2025-05-19 LAB
ERYTHROCYTE [DISTWIDTH] IN BLOOD BY AUTOMATED COUNT: 13.5 % (ref 10–15)
HCT VFR BLD AUTO: 41.4 % (ref 40–53)
HGB BLD-MCNC: 13.9 G/DL (ref 13.3–17.7)
MCH RBC QN AUTO: 33 PG (ref 26.5–33)
MCHC RBC AUTO-ENTMCNC: 33.6 G/DL (ref 31.5–36.5)
MCV RBC AUTO: 98 FL (ref 78–100)
PLATELET # BLD AUTO: 155 10E3/UL (ref 150–450)
RBC # BLD AUTO: 4.21 10E6/UL (ref 4.4–5.9)
T4 FREE SERPL-MCNC: 1.36 NG/DL (ref 0.9–1.7)
TSH SERPL DL<=0.005 MIU/L-ACNC: 4.29 UIU/ML (ref 0.3–4.2)
WBC # BLD AUTO: 8.4 10E3/UL (ref 4–11)

## 2025-05-19 PROCEDURE — 71046 X-RAY EXAM CHEST 2 VIEWS: CPT

## 2025-05-19 PROCEDURE — 99232 SBSQ HOSP IP/OBS MODERATE 35: CPT | Performed by: INTERNAL MEDICINE

## 2025-05-19 PROCEDURE — 120N000001 HC R&B MED SURG/OB

## 2025-05-19 PROCEDURE — 85018 HEMOGLOBIN: CPT | Performed by: INTERNAL MEDICINE

## 2025-05-19 PROCEDURE — 250N000013 HC RX MED GY IP 250 OP 250 PS 637: Performed by: INTERNAL MEDICINE

## 2025-05-19 PROCEDURE — 36415 COLL VENOUS BLD VENIPUNCTURE: CPT | Performed by: INTERNAL MEDICINE

## 2025-05-19 PROCEDURE — 250N000011 HC RX IP 250 OP 636: Performed by: INTERNAL MEDICINE

## 2025-05-19 RX ORDER — ACETAMINOPHEN 325 MG/1
975 TABLET ORAL 3 TIMES DAILY
Status: DISCONTINUED | OUTPATIENT
Start: 2025-05-19 | End: 2025-05-25 | Stop reason: HOSPADM

## 2025-05-19 RX ADMIN — TORSEMIDE 10 MG: 10 TABLET ORAL at 08:51

## 2025-05-19 RX ADMIN — MICONAZOLE NITRATE: 2 POWDER TOPICAL at 20:19

## 2025-05-19 RX ADMIN — ENOXAPARIN SODIUM 50 MG: 60 INJECTION SUBCUTANEOUS at 20:19

## 2025-05-19 RX ADMIN — DICLOFENAC 4 G: 10 GEL TOPICAL at 18:14

## 2025-05-19 RX ADMIN — DICLOFENAC 4 G: 10 GEL TOPICAL at 21:40

## 2025-05-19 RX ADMIN — Medication 1 CAPSULE: at 08:51

## 2025-05-19 RX ADMIN — ENOXAPARIN SODIUM 50 MG: 60 INJECTION SUBCUTANEOUS at 08:52

## 2025-05-19 RX ADMIN — AMLODIPINE BESYLATE 5 MG: 5 TABLET ORAL at 08:52

## 2025-05-19 RX ADMIN — Medication 1 CAPSULE: at 20:24

## 2025-05-19 RX ADMIN — LISINOPRIL 10 MG: 10 TABLET ORAL at 08:52

## 2025-05-19 RX ADMIN — ACETAMINOPHEN 650 MG: 325 TABLET, FILM COATED ORAL at 14:20

## 2025-05-19 RX ADMIN — CETIRIZINE HYDROCHLORIDE 10 MG: 10 TABLET, FILM COATED ORAL at 20:19

## 2025-05-19 RX ADMIN — ACETAMINOPHEN 975 MG: 325 TABLET, FILM COATED ORAL at 20:18

## 2025-05-19 RX ADMIN — MICONAZOLE NITRATE: 2 POWDER TOPICAL at 08:59

## 2025-05-19 ASSESSMENT — ACTIVITIES OF DAILY LIVING (ADL)
ADLS_ACUITY_SCORE: 72
ADLS_ACUITY_SCORE: 68
ADLS_ACUITY_SCORE: 67
ADLS_ACUITY_SCORE: 68
ADLS_ACUITY_SCORE: 72
ADLS_ACUITY_SCORE: 68
ADLS_ACUITY_SCORE: 68
ADLS_ACUITY_SCORE: 72
ADLS_ACUITY_SCORE: 67
ADLS_ACUITY_SCORE: 67
ADLS_ACUITY_SCORE: 68
ADLS_ACUITY_SCORE: 72
ADLS_ACUITY_SCORE: 68
ADLS_ACUITY_SCORE: 67
ADLS_ACUITY_SCORE: 67
ADLS_ACUITY_SCORE: 72
ADLS_ACUITY_SCORE: 68
ADLS_ACUITY_SCORE: 72
ADLS_ACUITY_SCORE: 68
ADLS_ACUITY_SCORE: 72
ADLS_ACUITY_SCORE: 67

## 2025-05-19 NOTE — PLAN OF CARE
Goal Outcome Evaluation:      Plan of Care Reviewed With: patient    Overall Patient Progress: improvingOverall Patient Progress: improving     Diagnosis: Acute on chronic generalized weakness, fall, COVID-19   Orientation/Cognitive: A&OX4, [place]  Mobility Level/Assist Equipment: Ax2 GB/Bre steady  Pain Management: denies  Tele/VS/O2: VSS@RA  Diet: Regular  Bowel/Bladder:attempts BR but no void retaining, bladder scan 727, Alonzo placed per order, with output pink/red urine color  Skin Concerns: BLE/knees wounds/dressing intact, gluteal cleft   Drains/Devices: PIV SL  ISO/TYPE: COVID-19, special precaution  Other info: spouse slept at bedside  D/ C date: TBD

## 2025-05-19 NOTE — PROGRESS NOTES
[facility]    Hospitalist Progress Note    Date of Service (when I saw the patient): 05/19/2025  Admit date: 5/15/2025    Interval History   No acute events noted overnight.  Patient has been afebrile.  Blood pressure remains moderately elevated currently 159/89.  Oxygenation 95% on room air.  CBC unremarkable.   Was unable to void with 727 postvoid residual.  Therefore Alonzo was replaced.  He will have follow-up with urology.  PT was unable to  see him today which is unfortunate because he is getting more deconditioned and weak. Nurses aide were able to get him out of bed once today, with heavy assist and will be doing so once again today. Appreciate the effort!     Assessment & Plan   Shilo Menchaca is a 85 year old male with a history of BPH, generalized weakness, obstructive sleep apnea, severe, not on therapy, history of pulmonary embolism admitted on 5/15/2025. He presents to the emergency department with acute on chronic weakness 5/15/2025 resulting in a fall in his bathroom when his legs gave out beneath him.  Found to be febrile with lactic acid elevated in the 4 range and diagnosed with acute COVID-19.    T 99.9, P 124, /78, 15, O2 94% on RA  Na 137, K 3.9, Cr 1.19 (stable), alk phos 157, , CK 5,076, glucose 174.   Lactic acid 2.1 > 1.7.   WBC 13.1, hgb 14.8, plt 148  UA WBC 13, RBC 48   BCx, UCx pending.   Covid + RSV, flu negative  CXR clear  CT abdomen/pelvis: focal mass like thickening of the bladder wall on th R 3.1 cm, concerning for neoplasm.  Tiny nonobstructing stones in the bladder and left renal collecting system no hydronephrosis.   Advanced coronary artery calcification.    Acute COVID-19 with associated severe sepsis: Lactic acid elevated to 4.0, leukocytosis at 13.1, febrile to 100.6 with EMS, tachycardic to 124 bpm on arrival.  Wife has cough and cold symptoms.  Infrequent cough noted with patient as well, though he was unaware of this.  Pyuria,  with bladder tumor (see below)   No respiratory symptoms, not hypoxic and requiring no supplemental oxygen.  Completed Remdesivir x 3 days ordered given increased risk of decompensation with advanced age and obesity.  Paxlovid no longer available on formulary  Oxygen PRN  Special precautions  Subcutaneous Lovenox prophylaxis noting history of pulmonary embolism in 2021  Placed on IV zosyn for potential UTI.   Follow blood and urine cultures. NGTD. > stopped IV zosyn on 5/17/25 .     Gross hematuria  Bladder stones  Right sided bladder mass: 3 cm soft tissue mass in bladder suspicious for malignancy.  Note that on historical imaging he did have an adjacent iliac chain or perivesicular lymph node in this region.  Pyuria   Renal retention, jessica placed on 05/16/25    BPH - Status post greenlight photo vaporization and cystoscopy for bladder stone extraction September 2021 w/ Dr Man.  CT abdomen/pelvis 05/16/25: focal mass like thickening of the bladder wall on th R 3.1 cm, concerning for neoplasm.  Tiny nonobstructing stones in the bladder and left renal collecting system no hydronephrosis.   [facility] consulted for anticipated future cystoscopy.  Discussed bladder mass finding with patient as well as wife at bedside  Continue IV Zosyn for now with urine culture pending and severe sepsis  Urology recommends a CT urogram and cystoscopy as outpatient through Dr. Man. Did not comment on retention or hematuria.   On 05/17/25, urine remains pink, no obstructing clots, good flow.   Removed catheter AM of 5/18/25, failed trial of void > replaced, and will remain in place at discharge with Urology follow (CM consult placed to make sure appt within 2 weeks).     History of pulmonary embolism: Diagnosed with pulmonary embolism after incidental finding on CT urogram with right upper and lower lobe pulmonary emboli June 2021. Had a brief hospitalization for weakness around that time, so I believe was thought to be  provoked. Completed ~3 months of anticoagulation  Subcutaneous Lovenox prophylaxis    Generalized weakness and physical deconditioning: Ambulates with a walker at home at baseline, but weakness and deconditioning has been a longstanding issue.  Note that patient had been following with outpatient physical therapy for generalized weakness summer 2022, through the first half of 2023, and was rereferred to physical therapy in 2024, completing outpatient therapy in December.   Care management consultation for disposition planning.  Anticipate TCU discharge, and discussed this with patient and wife at time of admission.  Physical therapy consulted. Last seen on 5/16.   Fall precautions  Get out of bed and attempt to ambulate with assistance 3 times daily.  Utilizes a walker at baseline, but very minimal activity. Spends most of the day in a chair. Discussed with RN and NA on 05/19/25. We must do our best    Chronic mild sinus tachycardia - No associated chest pain, SOB, hypotension, fever   * On 05/19/25 reviewed prior visits going back about 1 year, HR has been in the mid-90s to low 100s.     Coccyx wound, bilateral knee abrasions, intertrigo present at admission  [facility] nurse consulted.    Severe obstructive sleep apnea significant nocturnal hypoxemia: [facility] and was recommended CPAP early March 2025.  Patient declined, was interested in potentially trialing oral dental device, which his pulmonology team did not feel would treat his degree of sleep apnea.  Possible that nocturnal hypoxia is contributing to weakness and certainly contributing to daytime fatigue.  - Oximetry, oxygen as needed     Mild CK elevation: CK elevated to the 5000 range.  Compartments are soft with no report of pain other than knee abrasions, but was down on the bathroom floor for approximately 1.5 hours.  CK could also be elevated secondary to viral inflammation.  Lower extremity edema  CKD, stable  "baseline Cr 1.2.   Compression stockings to bilateral lower extremities  S/p IVF hydration, stopped on 5/16/25 when CK < 5000.   Intake and output, daily weights  Repeat CK in a.m., trend to significant improvement.  Resume prior to admission torsemide on 05/17/25      Hypertension:  Continue PTA amlodipine-benazepril  Resume PTA torsemide on 05/17/25   Had brisk urine output of 1450 cc.    Note blood pressure still elevated on 05/18/25, but improving.   PO hydralazine ordered PRN for SBP over 180. Otherwise, monitor  for now.  Asymptomatic without chest pain, headache or shortness of breath.    Chronic constipation  With multiple loose stools on 5/18  No associated abdominal pain or cramping WBC normal.  Held PTA MiraLAX on 05/19/25 > resume on 05/19/25     Clinically Significant Risk Factors                 # Thrombocytopenia: Lowest platelets = 125 in last 2 days, will monitor for bleeding              # Obesity: Estimated body mass index is 32.36 kg/m  as calculated from the following:    Height as of this encounter: 1.676 m (5' 6\").    Weight as of this encounter: 90.9 kg (200 lb 7.4 oz)., PRESENT ON ADMISSION       # Financial/Environmental Concerns: none           Diet: Orders Placed This Encounter      Combination Diet Regular Diet Adult     IVF: NS @ 125 ml/n > stopped  DVT Prophylaxis: enoxaparin  Alonzo Catheter: PRESENT, indication: Acute retention or obstruction, Acute retention or obstruction    No CPR- Do NOT Intubate  Disposition:  Medically Ready for Discharge: Anticipated Tomorrow     PT/OT TCU recommended, family is hoping for [facility] consulted  Communication: Discussed with wife,daughter at bedside, patient, RN and NA.     Libra Stokes MD    Hospitalist Service  [facility]  Securely message with the Vocera Web Console (learn more here)  Text page via [employer] Paging/Directory      -Data reviewed today: I reviewed all new labs and imaging results over the last 24 " hours. I personally reviewed no images or EKG's today.    Physical Exam   Temp: 97.4  F (36.3  C) Temp src: Oral BP: (!) 141/84 Pulse: 107   Resp: 18 SpO2: 96 % O2 Device: None (Room air)    Vitals:    05/15/25 2337 05/17/25 0642 05/18/25 0659   Weight: 90.7 kg (200 lb) 90.9 kg (200 lb 7.8 oz) 90.9 kg (200 lb 7.4 oz)     Vital Signs with Ranges  Temp:  [97.3  F (36.3  C)-98.6  F (37  C)] 97.4  F (36.3  C)  Pulse:  [101-107] 107  Resp:  [18] 18  BP: (141-162)/(84-91) 141/84  SpO2:  [94 %-96 %] 96 %  I/O last 3 completed shifts:  In: 720 [P.O.:720]  Out: 2300 [Urine:2300]    Today's Exam  Constitutional:  NAD, lying in bed. Appears stated age.   Neuropsyche:  alert and oriented, answers questions appropriately. Speech normal, face symmetric and moving all 4 extremities without gross focal neurological deficit. Able to bend both knees and lift legs off bed for just a moment. Stronger planter and dorsiflexion today.   Respiratory:  Breathing comfortably, good air exchange, no wheezes, no crackles.   Cardiovascular:  Regular rate and rhythm, no edema.  GI:  soft, NT/ND, BS normal  Skin/Integumen:  No acute rash or sign of bleeding.     Medications   All medications reviewed on 05/19/25    Current Facility-Administered Medications   Medication Dose Route Frequency Provider Last Rate Last Admin    Patient is already receiving anticoagulation with heparin, enoxaparin (LOVENOX), warfarin (COUMADIN)  or other anticoagulant medication   Does not apply Continuous PRN Mil Griffiths MD         Current Facility-Administered Medications   Medication Dose Route Frequency Provider Last Rate Last Admin    acetaminophen (TYLENOL) tablet 975 mg  975 mg Oral TID Libra Stokes MD        amLODIPine (NORVASC) tablet 5 mg  5 mg Oral Daily Libra Stokes MD   5 mg at 05/19/25 0852    And    lisinopril (ZESTRIL) tablet 10 mg  10 mg Oral Daily Libra Stokes MD   10 mg at 05/19/25 0852    cetirizine (zyrTEC) tablet 10 mg  10 mg Oral  QPM Libra Stokes MD   10 mg at 05/18/25 1959    diclofenac (VOLTAREN) 1 % topical gel 4 g  4 g Topical 4x Daily Libra Stokes MD        enoxaparin ANTICOAGULANT (LOVENOX) injection 50 mg  0.5 mg/kg Subcutaneous BID Mil Griffiths MD   50 mg at 05/19/25 0852    lactobacillus rhamnosus (GG) (CULTURELL) capsule 1 capsule  1 capsule Oral BID Libra Stokes MD   1 capsule at 05/19/25 0851    miconazole (MICATIN) 2 % powder   Topical BID Libra Stokes MD   Given at 05/19/25 0859    polyethylene glycol (MIRALAX) Packet 17 g  17 g Oral Daily Libra Stokes MD   17 g at 05/18/25 0934    sodium chloride (PF) 0.9% PF flush 3 mL  3 mL Intracatheter Q8H MANI Mil Griffiths MD   3 mL at 05/19/25 1401    torsemide (DEMADEX) tablet 10 mg  10 mg Oral Daily Libra Stokes MD   10 mg at 05/19/25 0851     PRN Meds:   Current Facility-Administered Medications   Medication Dose Route Frequency Provider Last Rate Last Admin    artificial saliva (BIOTENE MT) solution 2 spray  2 spray Mouth/Throat Q1H PRN Tunde, Mil Dial MD        bisacodyl (DULCOLAX) suppository 10 mg  10 mg Rectal Daily PRN Tunde, Mil Dial MD        calcium carbonate (TUMS) chewable tablet 1,000 mg  1,000 mg Oral 4x Daily PRN Mil Griffiths MD        hydrALAZINE (APRESOLINE) tablet 10 mg  10 mg Oral 4x Daily PRN Libra Stokes MD        HYDROmorphone (DILAUDID) injection 0.2 mg  0.2 mg Intravenous Q2H PRN Mil Griffiths MD        HYDROmorphone (DILAUDID) injection 0.4 mg  0.4 mg Intravenous Q2H PRN Tunde, Mil Dial MD        lidocaine (LMX4) cream   Topical Q1H PRN Tunde, Mil Dial MD        lidocaine 1 % 0.1-1 mL  0.1-1 mL Other Q1H PRN Tunde, Mil Dial MD        melatonin tablet 1 mg  1 mg Oral At Bedtime PRN Griffiths, Mil Dial MD        naloxone (NARCAN) injection 0.2 mg  0.2 mg Intravenous Q2 Min PRN Griffiths, Mil Dial MD        Or    naloxone (NARCAN) injection 0.4 mg  0.4 mg Intravenous Q2 Min PRN Griffiths, Mil Dial MD         Or    naloxone (NARCAN) injection 0.2 mg  0.2 mg Intramuscular Q2 Min PRN Griffiths, Mil Dial MD        Or    naloxone (NARCAN) injection 0.4 mg  0.4 mg Intramuscular Q2 Min PRN Griffiths, Mil Dial MD        ondansetron (ZOFRAN ODT) ODT tab 4 mg  4 mg Oral Q6H PRN Griffiths, Mil Dial MD        Or    ondansetron (ZOFRAN) injection 4 mg  4 mg Intravenous Q6H PRN Griffiths, Mil Dial MD        oxyCODONE (ROXICODONE) tablet 5 mg  5 mg Oral Q4H PRN Griffiths, Mil Dial MD        oxyCODONE IR (ROXICODONE) half-tab 2.5 mg  2.5 mg Oral Q4H PRN Griffiths, Mil Dial MD        Patient is already receiving anticoagulation with heparin, enoxaparin (LOVENOX), warfarin (COUMADIN)  or other anticoagulant medication   Does not apply Continuous PRN Griffiths, Mil Dial MD        polyethylene glycol (MIRALAX) Packet 17 g  17 g Oral BID PRN Griffiths, Mil Dial MD        prochlorperazine (COMPAZINE) injection 5 mg  5 mg Intravenous Q6H PRN Griffiths, Mil Dial MD        Or    prochlorperazine (COMPAZINE) tablet 5 mg  5 mg Oral Q6H PRN Griffiths, Mil Dial MD        senna-docusate (SENOKOT-S/PERICOLACE) 8.6-50 MG per tablet 1 tablet  1 tablet Oral BID PRN Griffiths, Mil Dial MD        Or    senna-docusate (SENOKOT-S/PERICOLACE) 8.6-50 MG per tablet 2 tablet  2 tablet Oral BID PRN Griffiths, Mil Dial MD        sodium chloride (PF) 0.9% PF flush 3 mL  3 mL Intracatheter q1 min prn Griffiths, Mil Dial MD           Data   Recent Labs   Lab 05/19/25  1124 05/18/25  0759 05/17/25  0721 05/16/25  0003   WBC 8.4 5.9 7.0 13.1*   HGB 13.9 14.0 13.4 14.8   MCV 98 99 99 99    125* 125* 148*   NA  --   --   --  137   POTASSIUM  --   --   --  3.9   CHLORIDE  --   --   --  100   CO2  --   --   --  24   BUN  --   --   --  28.4*   CR  --  1.03 1.09 1.19*   ANIONGAP  --   --   --  13   SABRINA  --   --   --  8.7*   GLC  --   --   --  174*   ALBUMIN  --   --   --  3.5   PROTTOTAL  --   --   --  6.3*   BILITOTAL  --   --   --  0.5   ALKPHOS  --   --   --  157*   ALT   --   --   --  42   AST  --   --   --  118*       No results found for this or any previous visit (from the past 24 hours).

## 2025-05-20 ENCOUNTER — APPOINTMENT (OUTPATIENT)
Dept: PHYSICAL THERAPY | Facility: CLINIC | Age: 85
End: 2025-05-20
Payer: MEDICARE

## 2025-05-20 LAB
ALBUMIN SERPL BCG-MCNC: 2.7 G/DL (ref 3.5–5.2)
ALBUMIN UR-MCNC: 50 MG/DL
ALP SERPL-CCNC: 148 U/L (ref 40–150)
ALT SERPL W P-5'-P-CCNC: 58 U/L (ref 0–70)
ANION GAP SERPL CALCULATED.3IONS-SCNC: 10 MMOL/L (ref 7–15)
APPEARANCE UR: ABNORMAL
AST SERPL W P-5'-P-CCNC: 72 U/L (ref 0–45)
BILIRUB SERPL-MCNC: 0.3 MG/DL
BILIRUB UR QL STRIP: NEGATIVE
BUN SERPL-MCNC: 25.4 MG/DL (ref 8–23)
CALCIUM SERPL-MCNC: 8.1 MG/DL (ref 8.8–10.4)
CHLORIDE SERPL-SCNC: 97 MMOL/L (ref 98–107)
COLOR UR AUTO: ABNORMAL
CREAT SERPL-MCNC: 1.12 MG/DL (ref 0.67–1.17)
EGFRCR SERPLBLD CKD-EPI 2021: 64 ML/MIN/1.73M2
ERYTHROCYTE [DISTWIDTH] IN BLOOD BY AUTOMATED COUNT: 13.4 % (ref 10–15)
GLUCOSE SERPL-MCNC: 156 MG/DL (ref 70–99)
GLUCOSE UR STRIP-MCNC: NEGATIVE MG/DL
HCO3 SERPL-SCNC: 27 MMOL/L (ref 22–29)
HCT VFR BLD AUTO: 39.4 % (ref 40–53)
HGB BLD-MCNC: 12.9 G/DL (ref 13.3–17.7)
HGB UR QL STRIP: ABNORMAL
HYALINE CASTS: 7 /LPF
KETONES UR STRIP-MCNC: NEGATIVE MG/DL
LEUKOCYTE ESTERASE UR QL STRIP: ABNORMAL
MCH RBC QN AUTO: 32.2 PG (ref 26.5–33)
MCHC RBC AUTO-ENTMCNC: 32.7 G/DL (ref 31.5–36.5)
MCV RBC AUTO: 98 FL (ref 78–100)
MUCOUS THREADS #/AREA URNS LPF: PRESENT /LPF
NITRATE UR QL: NEGATIVE
PH UR STRIP: 5.5 [PH] (ref 5–7)
PLATELET # BLD AUTO: 154 10E3/UL (ref 150–450)
POTASSIUM SERPL-SCNC: 3.8 MMOL/L (ref 3.4–5.3)
PROT SERPL-MCNC: 5.3 G/DL (ref 6.4–8.3)
RBC # BLD AUTO: 4.01 10E6/UL (ref 4.4–5.9)
RBC URINE: >182 /HPF
SODIUM SERPL-SCNC: 134 MMOL/L (ref 135–145)
SP GR UR STRIP: 1.02 (ref 1–1.03)
SQUAMOUS EPITHELIAL: 1 /HPF
UROBILINOGEN UR STRIP-MCNC: NORMAL MG/DL
WBC # BLD AUTO: 9.8 10E3/UL (ref 4–11)
WBC URINE: >182 /HPF

## 2025-05-20 PROCEDURE — 87086 URINE CULTURE/COLONY COUNT: CPT | Performed by: INTERNAL MEDICINE

## 2025-05-20 PROCEDURE — 250N000011 HC RX IP 250 OP 636: Performed by: INTERNAL MEDICINE

## 2025-05-20 PROCEDURE — 120N000001 HC R&B MED SURG/OB

## 2025-05-20 PROCEDURE — 99232 SBSQ HOSP IP/OBS MODERATE 35: CPT | Performed by: INTERNAL MEDICINE

## 2025-05-20 PROCEDURE — 36415 COLL VENOUS BLD VENIPUNCTURE: CPT | Performed by: INTERNAL MEDICINE

## 2025-05-20 PROCEDURE — 81003 URINALYSIS AUTO W/O SCOPE: CPT | Performed by: INTERNAL MEDICINE

## 2025-05-20 PROCEDURE — 82374 ASSAY BLOOD CARBON DIOXIDE: CPT | Performed by: INTERNAL MEDICINE

## 2025-05-20 PROCEDURE — 87040 BLOOD CULTURE FOR BACTERIA: CPT | Performed by: INTERNAL MEDICINE

## 2025-05-20 PROCEDURE — 97530 THERAPEUTIC ACTIVITIES: CPT | Mod: GP | Performed by: PHYSICAL THERAPIST

## 2025-05-20 PROCEDURE — 250N000013 HC RX MED GY IP 250 OP 250 PS 637: Performed by: INTERNAL MEDICINE

## 2025-05-20 PROCEDURE — 85027 COMPLETE CBC AUTOMATED: CPT | Performed by: INTERNAL MEDICINE

## 2025-05-20 RX ORDER — PIPERACILLIN SODIUM, TAZOBACTAM SODIUM 3; .375 G/15ML; G/15ML
3.38 INJECTION, POWDER, LYOPHILIZED, FOR SOLUTION INTRAVENOUS EVERY 6 HOURS
Status: DISCONTINUED | OUTPATIENT
Start: 2025-05-20 | End: 2025-05-21

## 2025-05-20 RX ADMIN — DICLOFENAC 4 G: 10 GEL TOPICAL at 21:53

## 2025-05-20 RX ADMIN — DICLOFENAC 4 G: 10 GEL TOPICAL at 13:10

## 2025-05-20 RX ADMIN — MICONAZOLE NITRATE: 2 POWDER TOPICAL at 11:03

## 2025-05-20 RX ADMIN — Medication 1 CAPSULE: at 21:52

## 2025-05-20 RX ADMIN — ACETAMINOPHEN 975 MG: 325 TABLET, FILM COATED ORAL at 11:05

## 2025-05-20 RX ADMIN — CETIRIZINE HYDROCHLORIDE 10 MG: 10 TABLET, FILM COATED ORAL at 21:52

## 2025-05-20 RX ADMIN — PIPERACILLIN AND TAZOBACTAM 3.38 G: 3; .375 INJECTION, POWDER, FOR SOLUTION INTRAVENOUS at 18:49

## 2025-05-20 RX ADMIN — POLYETHYLENE GLYCOL 3350 17 G: 17 POWDER, FOR SOLUTION ORAL at 11:03

## 2025-05-20 RX ADMIN — MICONAZOLE NITRATE: 2 POWDER TOPICAL at 21:53

## 2025-05-20 RX ADMIN — Medication 1 CAPSULE: at 11:04

## 2025-05-20 RX ADMIN — TORSEMIDE 10 MG: 10 TABLET ORAL at 11:04

## 2025-05-20 RX ADMIN — ACETAMINOPHEN 975 MG: 325 TABLET, FILM COATED ORAL at 18:06

## 2025-05-20 RX ADMIN — ENOXAPARIN SODIUM 50 MG: 60 INJECTION SUBCUTANEOUS at 21:52

## 2025-05-20 RX ADMIN — DICLOFENAC 4 G: 10 GEL TOPICAL at 11:05

## 2025-05-20 RX ADMIN — AMLODIPINE BESYLATE 5 MG: 5 TABLET ORAL at 11:05

## 2025-05-20 RX ADMIN — LISINOPRIL 10 MG: 10 TABLET ORAL at 11:05

## 2025-05-20 RX ADMIN — ENOXAPARIN SODIUM 50 MG: 60 INJECTION SUBCUTANEOUS at 11:03

## 2025-05-20 ASSESSMENT — ACTIVITIES OF DAILY LIVING (ADL)
ADLS_ACUITY_SCORE: 76
ADLS_ACUITY_SCORE: 68
ADLS_ACUITY_SCORE: 84
ADLS_ACUITY_SCORE: 68
ADLS_ACUITY_SCORE: 84
ADLS_ACUITY_SCORE: 68
ADLS_ACUITY_SCORE: 84
ADLS_ACUITY_SCORE: 68
ADLS_ACUITY_SCORE: 84
ADLS_ACUITY_SCORE: 68
ADLS_ACUITY_SCORE: 84

## 2025-05-20 NOTE — PLAN OF CARE
Goal Outcome Evaluation:                    Shift 5/19/25    [id number]  Summary: Acute on chronic generalized weakness, fall, COVID-19  Orientation/Cognitive: A&OX4, [place]  Mobility Level/Assist Equipment: Ax2 GB/Bre steady  Pain Management: Scheduled Voltaren gel for pain in BLE  Tele/VS/O2: VSS  on RA  Diet: Regular  Bowel/Bladder: Alonzo catheter intact and patent with pink urine OP (MD aware)  Skin Concerns: BLE/knee wounds; Mepi in place,knee wounds/dressings intact, gluteal cleft   Drains/Devices: PIV SL  ISO/TYPE: COVID-19, special precaution maintained  Other info: spouse at bedside  D/ C date: TBD

## 2025-05-20 NOTE — PLAN OF CARE
Goal Outcome Evaluation:                    5/19/25     [id number]  Diagnosis: Acute on chronic generalized weakness, fall, COVID-19  Orientation/Cognitive: A&OX4, [place]  Mobility Level/Assist Equipment: Ax2 GB/Bre steady  Pain Management: Received PRN Tylenol and scheduled Voltaren gel for pain in BLE  Tele/VS/O2: VSS except HTNsive, on RA  Diet: Regular  Bowel/Bladder: Alonzo catheter intact and patent with adequate amts of pink urine OP (hospitalist aware)  Skin Concerns: BLE/knee wounds; woundcare performed to bilateral knee wounds/dressings intact, gluteal cleft   Drains/Devices: PIV SL  ISO/TYPE: COVID-19, special precaution  Other info: spouse at bedside  D/ C date: TBD    Had CXR this shift.

## 2025-05-20 NOTE — PROGRESS NOTES
[facility]    Hospitalist Progress Note    Date of Service (when I saw the patient): 05/20/2025  Admit date: 5/15/2025    Interval History   Patient refused CPAP overnight.  Otherwise no acute events.  Patient remains afebrile hemodynamically stable blood pressure 129/81 today.  Heart rate remains mildly elevated in the 90s to low 100s.  Review of prior clinic notes shows that this is chronic.  Oxygenation 95% on room air.  Labs had been stable for the last few days therefore no further labs this morning.  Chest x-ray yesterday revealed no developing infiltrates.     Patient states he feels chilled like he has a fever and not well.  He cannot identify any localizing symptoms.  He denies any shortness of breath wife notes he has had no cough.  No abdominal pain he ate a good breakfast.  Denies any lower abdominal pain or urinary symptoms the Alonzo remains in place with pink urine.  No new rashes.    Assessment & Plan   Shilo Menchaca is a 85 year old male with a history of BPH, generalized weakness, obstructive sleep apnea, severe, not on therapy, history of pulmonary embolism admitted on 5/15/2025. He presents to the emergency department with acute on chronic weakness 5/15/2025 resulting in a fall in his bathroom when his legs gave out beneath him.  Found to be febrile with lactic acid elevated in the 4 range and diagnosed with acute COVID-19.    T 99.9, P 124, /78, 15, O2 94% on RA  Na 137, K 3.9, Cr 1.19 (stable), alk phos 157, , CK 5,076, glucose 174.   Lactic acid 2.1 > 1.7.   WBC 13.1, hgb 14.8, plt 148  UA WBC 13, RBC 48   BCx, UCx pending.   Covid + RSV, flu negative  CXR clear  CT abdomen/pelvis: focal mass like thickening of the bladder wall on th R 3.1 cm, concerning for neoplasm.  Tiny nonobstructing stones in the bladder and left renal collecting system no hydronephrosis.   Advanced coronary artery calcification.    Acute COVID-19 with associated severe sepsis:  Lactic acid elevated to 4.0, leukocytosis at 13.1, febrile to 100.6 with EMS, tachycardic to 124 bpm on arrival.  Wife has cough and cold symptoms.  Infrequent cough noted with patient as well, though he was unaware of this.  Pyuria, with bladder tumor (see below)   No respiratory symptoms, not hypoxic and requiring no supplemental oxygen.  Completed Remdesivir x 3 days ordered given increased risk of decompensation with advanced age and obesity.  Paxlovid no longer available on formulary  Oxygen PRN  Special precautions  Subcutaneous Lovenox prophylaxis noting history of pulmonary embolism in 2021  Placed on IV zosyn for potential UTI.   Follow blood and urine cultures. NGTD. > stopped IV zosyn on 5/17/25 .     On 05/20/25, subjective fevers and chills, does not feel well today.   No localizing symptoms (see above history)   Check UA with reflect to UCx. Jessica placed 5/19 in early AM, so does not need to be replaced.   CMP, BCx ordered.   CXR yesterday showed no developing infiltrates and no signs to suggest developing pneumonia.  No TCU bed yet and evaluating as above, so recommend no discharge today so we can monitor.     Recent Labs   Lab 05/20/25  0855 05/19/25  1124 05/18/25  0759 05/17/25  0721 05/16/25  0003   WBC 9.8 8.4 5.9 7.0 13.1*   HGB 12.9* 13.9 14.0 13.4 14.8    155 125* 125* 148*      Gross hematuria  Bladder stones  Right sided bladder mass: 3 cm soft tissue mass in bladder suspicious for malignancy.  Note that on historical imaging he did have an adjacent iliac chain or perivesicular lymph node in this region.  Pyuria   Renal retention, jessica placed on 05/16/25    BPH - Status post greenlight photo vaporization and cystoscopy for bladder stone extraction September 2021 w/ Dr Man.  CT abdomen/pelvis 05/16/25: focal mass like thickening of the bladder wall on th R 3.1 cm, concerning for neoplasm.  Tiny nonobstructing stones in the bladder and left renal collecting system no hydronephrosis.    [facility] urology consulted for anticipated future cystoscopy.  Discussed bladder mass finding with patient as well as wife at bedside  Continue IV Zosyn for now with urine culture pending and severe sepsis  Urology recommends a CT urogram and cystoscopy as outpatient through Dr. Man. Did not comment on retention or hematuria.   On 05/17/25, urine remains pink, no obstructing clots, good flow.   Removed catheter AM of 5/18/25, failed trial of void > replaced, and will remain in place at discharge with Urology follow (CM consult placed to make sure appt within 2 weeks).     History of pulmonary embolism: Diagnosed with pulmonary embolism after incidental finding on CT urogram with right upper and lower lobe pulmonary emboli June 2021. Had a brief hospitalization for weakness around that time, so I believe was thought to be provoked. Completed ~3 months of anticoagulation  Subcutaneous Lovenox prophylaxis    Generalized weakness and physical deconditioning: Ambulates with a walker at home at baseline, but weakness and deconditioning has been a longstanding issue.  Note that patient had been following with outpatient physical therapy for generalized weakness summer 2022, through the first half of 2023, and was rereferred to physical therapy in 2024, completing outpatient therapy in December.   Care management consultation for disposition planning.  Anticipate TCU discharge, and discussed this with patient and wife at time of admission.  Physical therapy consulted. Last seen on 5/16.   Fall precautions  Get out of bed and attempt to ambulate with assistance 3 times daily.  Utilizes a walker at baseline, but very minimal activity. Spends most of the day in a chair. Discussed with RN and NA on 05/19/25. We must do our best    Chronic mild sinus tachycardia - No associated chest pain, SOB, hypotension, fever   * On 05/19/25 reviewed prior visits going back about 1 year, HR has been in the mid-90s to low 100s.     Coccyx  "wound, bilateral knee abrasions, intertrigo present at admission  [facility] nurse consulted.    Severe obstructive sleep apnea significant nocturnal hypoxemia: Seen through [facility] sleep medicine clinic and was recommended CPAP early March 2025.  Patient declined, was interested in potentially trialing oral dental device, which his pulmonology team did not feel would treat his degree of sleep apnea.  Possible that nocturnal hypoxia is contributing to weakness and certainly contributing to daytime fatigue.  - Oximetry, oxygen as needed     Mild CK elevation: CK elevated to the 5000 range.  Compartments are soft with no report of pain other than knee abrasions, but was down on the bathroom floor for approximately 1.5 hours.  CK could also be elevated secondary to viral inflammation.  Lower extremity edema  CKD, stable baseline Cr 1.2.   Compression stockings to bilateral lower extremities  S/p IVF hydration, stopped on 5/16/25 when CK < 5000.   Intake and output, daily weights  Repeat CK in a.m., trend to significant improvement.  Resume prior to admission torsemide on 05/17/25      Hypertension:  Continue PTA amlodipine-benazepril  Resume PTA torsemide on 05/17/25   Had brisk urine output of 1450 cc.    Note blood pressure still elevated on 05/18/25, but improving.   PO hydralazine ordered PRN for SBP over 180. Otherwise, monitor  for now.  Asymptomatic without chest pain, headache or shortness of breath.    Chronic constipation  With multiple loose stools on 5/18  No associated abdominal pain or cramping WBC normal.  Held PTA MiraLAX on 05/19/25 > resume on 05/19/25     Clinically Significant Risk Factors                              # Obesity: Estimated body mass index is 32.56 kg/m  as calculated from the following:    Height as of this encounter: 1.676 m (5' 6\").    Weight as of this encounter: 91.5 kg (201 lb 11.5 oz).        # Financial/Environmental Concerns: none           Diet: Orders Placed This " Encounter      Combination Diet Regular Diet Adult     IVF: NS @ 125 ml/n > stopped  DVT Prophylaxis: enoxaparin  Alonzo Catheter: PRESENT, indication: Acute retention or obstruction, Acute retention or obstruction    No CPR- Do NOT Intubate  Disposition:  Medically Ready for Discharge: Anticipated Tomorrow if workup unremarkable and no fevers.        PT/OT TCU recommended, family is hoping for [facility] consulted  Communication: Discussed with wife, RN, CM on 05/20/25     Libra Stokes MD    Hospitalist Service  [facility]  Securely message with the Vocera Web Console (learn more here)  Text page via [employer] Paging/Directory      -Data reviewed today: I reviewed all new labs and imaging results over the last 24 hours. I personally reviewed no images or EKG's today.    Physical Exam   Temp: 99.5  F (37.5  C) Temp src: Oral BP: 129/81 Pulse: 111   Resp: 18 SpO2: 95 % O2 Device: None (Room air)    Vitals:    05/17/25 0642 05/18/25 0659 05/20/25 0638   Weight: 90.9 kg (200 lb 7.8 oz) 90.9 kg (200 lb 7.4 oz) 91.5 kg (201 lb 11.5 oz)     Vital Signs with Ranges  Temp:  [97.1  F (36.2  C)-99.5  F (37.5  C)] 99.5  F (37.5  C)  Pulse:  [] 111  Resp:  [18] 18  BP: (129-153)/(80-84) 129/81  SpO2:  [94 %-96 %] 95 %  I/O last 3 completed shifts:  In: 720 [P.O.:720]  Out: 1800 [Urine:1800]    Today's Exam  Constitutional:  NAD, lying in bed. Eyes closed, all bundled up with multiple covers. Looks tired, mildly distressed.   Neuropsyche:  tired but oriented, answers questions appropriately. Speech normal, face symmetric   Respiratory:  Breathing comfortably, good air exchange, no wheezes, no crackles.   Cardiovascular:  Regular rate and rhythm, no edema.  GI:  soft, NT/ND, BS normal  Skin/Integumen:  No acute rash or sign of bleeding.     Medications   All medications reviewed on 05/20/25   Current Facility-Administered Medications   Medication Dose Route Frequency Provider Last Rate Last Admin     Patient is already receiving anticoagulation with heparin, enoxaparin (LOVENOX), warfarin (COUMADIN)  or other anticoagulant medication   Does not apply Continuous PRN Mil Griffiths MD         Current Facility-Administered Medications   Medication Dose Route Frequency Provider Last Rate Last Admin    acetaminophen (TYLENOL) tablet 975 mg  975 mg Oral TID Libra Stokes MD   975 mg at 05/19/25 2018    amLODIPine (NORVASC) tablet 5 mg  5 mg Oral Daily Libra Stokes MD   5 mg at 05/19/25 0852    And    lisinopril (ZESTRIL) tablet 10 mg  10 mg Oral Daily Libra Stokes MD   10 mg at 05/19/25 0852    cetirizine (zyrTEC) tablet 10 mg  10 mg Oral QPM Libra Stokes MD   10 mg at 05/19/25 2019    diclofenac (VOLTAREN) 1 % topical gel 4 g  4 g Topical 4x Daily Libra Stokes MD   4 g at 05/19/25 2140    enoxaparin ANTICOAGULANT (LOVENOX) injection 50 mg  0.5 mg/kg Subcutaneous BID Mil Griffiths MD   50 mg at 05/19/25 2019    lactobacillus rhamnosus (GG) (CULTURELL) capsule 1 capsule  1 capsule Oral BID Libra Stokes MD   1 capsule at 05/19/25 2024    miconazole (MICATIN) 2 % powder   Topical BID Libra Stokes MD   Given at 05/19/25 2019    polyethylene glycol (MIRALAX) Packet 17 g  17 g Oral Daily Libra Stokes MD   17 g at 05/18/25 0934    sodium chloride (PF) 0.9% PF flush 3 mL  3 mL Intracatheter Q8H [facility] Mil Griffiths MD   3 mL at 05/20/25 0652    torsemide (DEMADEX) tablet 10 mg  10 mg Oral Daily Libra Stokes MD   10 mg at 05/19/25 0851     PRN Meds:   Current Facility-Administered Medications   Medication Dose Route Frequency Provider Last Rate Last Admin    artificial saliva (BIOTENE MT) solution 2 spray  2 spray Mouth/Throat Q1H PRN Mil Griffiths MD        bisacodyl (DULCOLAX) suppository 10 mg  10 mg Rectal Daily PRN Mil Griffiths MD        calcium carbonate (TUMS) chewable tablet 1,000 mg  1,000 mg Oral 4x Daily PRN Tunde, Mil Dial MD        hydrALAZINE  (APRESOLINE) tablet 10 mg  10 mg Oral 4x Daily PRN Libra Stokes MD        HYDROmorphone (DILAUDID) injection 0.2 mg  0.2 mg Intravenous Q2H PRN Griffiths, Mil Dial MD        HYDROmorphone (DILAUDID) injection 0.4 mg  0.4 mg Intravenous Q2H PRN Griffiths, Mil Dial MD        lidocaine (LMX4) cream   Topical Q1H PRN Griffiths, Mil Dial MD        lidocaine 1 % 0.1-1 mL  0.1-1 mL Other Q1H PRN Griffiths, Mil Dial MD        melatonin tablet 1 mg  1 mg Oral At Bedtime PRN Griffiths, Mil Dial MD        naloxone (NARCAN) injection 0.2 mg  0.2 mg Intravenous Q2 Min PRN Griffiths, Mil Dial MD        Or    naloxone (NARCAN) injection 0.4 mg  0.4 mg Intravenous Q2 Min PRN Griffiths, Mil Dial MD        Or    naloxone (NARCAN) injection 0.2 mg  0.2 mg Intramuscular Q2 Min PRN Griffiths, Mil Dial MD        Or    naloxone (NARCAN) injection 0.4 mg  0.4 mg Intramuscular Q2 Min PRN Griffiths, Mil Dial MD        ondansetron (ZOFRAN ODT) ODT tab 4 mg  4 mg Oral Q6H PRN Griffiths, Mil Dial MD        Or    ondansetron (ZOFRAN) injection 4 mg  4 mg Intravenous Q6H PRN Griffiths, Mil Dial MD        oxyCODONE (ROXICODONE) tablet 5 mg  5 mg Oral Q4H PRN Griffiths, Mil Dial MD        oxyCODONE IR (ROXICODONE) half-tab 2.5 mg  2.5 mg Oral Q4H PRN Griffiths, Mil Dial MD        Patient is already receiving anticoagulation with heparin, enoxaparin (LOVENOX), warfarin (COUMADIN)  or other anticoagulant medication   Does not apply Continuous PRN Griffiths, Mil Dial MD        polyethylene glycol (MIRALAX) Packet 17 g  17 g Oral BID PRN Griffiths, Mil Dial MD        prochlorperazine (COMPAZINE) injection 5 mg  5 mg Intravenous Q6H PRN Griffiths, Mil Dial MD        Or    prochlorperazine (COMPAZINE) tablet 5 mg  5 mg Oral Q6H PRN Griffiths, Mil Dial MD        senna-docusate (SENOKOT-S/PERICOLACE) 8.6-50 MG per tablet 1 tablet  1 tablet Oral BID PRN Mil Griffiths MD        Or    senna-docusate (SENOKOT-S/PERICOLACE) 8.6-50 MG per tablet 2 tablet  2 tablet Oral  BID PRN Mil Griffiths MD        sodium chloride (PF) 0.9% PF flush 3 mL  3 mL Intracatheter q1 min prn Mil Griffiths MD           Data   Recent Labs   Lab 05/19/25  1124 05/18/25  0759 05/17/25  0721 05/16/25  0003   WBC 8.4 5.9 7.0 13.1*   HGB 13.9 14.0 13.4 14.8   MCV 98 99 99 99    125* 125* 148*   NA  --   --   --  137   POTASSIUM  --   --   --  3.9   CHLORIDE  --   --   --  100   CO2  --   --   --  24   BUN  --   --   --  28.4*   CR  --  1.03 1.09 1.19*   ANIONGAP  --   --   --  13   SABRINA  --   --   --  8.7*   GLC  --   --   --  174*   ALBUMIN  --   --   --  3.5   PROTTOTAL  --   --   --  6.3*   BILITOTAL  --   --   --  0.5   ALKPHOS  --   --   --  157*   ALT  --   --   --  42   AST  --   --   --  118*       Recent Results (from the past 24 hours)   XR Chest 2 Views    Narrative    EXAM: XR CHEST 2 VIEWS  LOCATION: [facility]  DATE: 5/19/2025    INDICATION: shortness of breath  COMPARISON: Chest radiograph 5/16/2025.      Impression    IMPRESSION: Heart size and pulmonary vascularity are normal. Similar mild right basilar atelectasis. No new focal consolidation, pleural effusion, or pneumothorax. Exaggerated thoracic kyphosis.

## 2025-05-20 NOTE — PLAN OF CARE
Goal Outcome Evaluation:  Date/Time: 5/19/25   [id number]  Diagnosis: acute on chronic generalized weakness, fall, Covid-19  Mental Status: A&Ox4, [place]  Activity/dangle: Ax2, gb/maria elena steady, not oob on shift  Diet: Regular  Pain: Denies  Alonzo/Voiding: Alonzo intact and patent w/ adequate amts of pink urine output (per prior shift- Hospitalist aware)  Tele/Restraints/Iso: covid-19 isolation precautions maintained  02/LDA: PIV SL  D/C Date: TBD  Other Info: spouse at bedside

## 2025-05-20 NOTE — PROGRESS NOTES
[id number]    Patient is alert and oriented x4. Vitally stable on room air. Up with Ax2 gait belt and maria elena steady. Tolerating regular diet. Pain managed with scheduled tylenol. Alonzo in place with pink urine output; MD aware. PIV SL. Covid 19 precautions. Pending discharge. Plan of care on going.

## 2025-05-21 ENCOUNTER — APPOINTMENT (OUTPATIENT)
Dept: PHYSICAL THERAPY | Facility: CLINIC | Age: 85
End: 2025-05-21
Payer: MEDICARE

## 2025-05-21 LAB
BACTERIA SPEC CULT: NO GROWTH
BACTERIA SPEC CULT: NO GROWTH
BACTERIA UR CULT: NO GROWTH
BASOPHILS # BLD AUTO: 0 10E3/UL (ref 0–0.2)
BASOPHILS NFR BLD AUTO: 0 %
CREAT SERPL-MCNC: 1.24 MG/DL (ref 0.67–1.17)
EGFRCR SERPLBLD CKD-EPI 2021: 56 ML/MIN/1.73M2
EOSINOPHIL # BLD AUTO: 0.5 10E3/UL (ref 0–0.7)
EOSINOPHIL NFR BLD AUTO: 5 %
ERYTHROCYTE [DISTWIDTH] IN BLOOD BY AUTOMATED COUNT: 13.4 % (ref 10–15)
HCT VFR BLD AUTO: 37.6 % (ref 40–53)
HGB BLD-MCNC: 12.4 G/DL (ref 13.3–17.7)
IMM GRANULOCYTES # BLD: 0 10E3/UL
IMM GRANULOCYTES NFR BLD: 0 %
LYMPHOCYTES # BLD AUTO: 1.6 10E3/UL (ref 0.8–5.3)
LYMPHOCYTES NFR BLD AUTO: 16 %
MCH RBC QN AUTO: 32.5 PG (ref 26.5–33)
MCHC RBC AUTO-ENTMCNC: 33 G/DL (ref 31.5–36.5)
MCV RBC AUTO: 99 FL (ref 78–100)
MONOCYTES # BLD AUTO: 1 10E3/UL (ref 0–1.3)
MONOCYTES NFR BLD AUTO: 10 %
NEUTROPHILS # BLD AUTO: 7 10E3/UL (ref 1.6–8.3)
NEUTROPHILS NFR BLD AUTO: 69 %
NRBC # BLD AUTO: 0 10E3/UL
NRBC BLD AUTO-RTO: 0 /100
PLATELET # BLD AUTO: 158 10E3/UL (ref 150–450)
RBC # BLD AUTO: 3.81 10E6/UL (ref 4.4–5.9)
WBC # BLD AUTO: 10 10E3/UL (ref 4–11)

## 2025-05-21 PROCEDURE — 250N000011 HC RX IP 250 OP 636: Performed by: INTERNAL MEDICINE

## 2025-05-21 PROCEDURE — 120N000001 HC R&B MED SURG/OB

## 2025-05-21 PROCEDURE — 85048 AUTOMATED LEUKOCYTE COUNT: CPT | Performed by: INTERNAL MEDICINE

## 2025-05-21 PROCEDURE — 97116 GAIT TRAINING THERAPY: CPT | Mod: GP | Performed by: PHYSICAL THERAPIST

## 2025-05-21 PROCEDURE — 36415 COLL VENOUS BLD VENIPUNCTURE: CPT | Performed by: INTERNAL MEDICINE

## 2025-05-21 PROCEDURE — 97530 THERAPEUTIC ACTIVITIES: CPT | Mod: GP | Performed by: PHYSICAL THERAPIST

## 2025-05-21 PROCEDURE — 82565 ASSAY OF CREATININE: CPT | Performed by: INTERNAL MEDICINE

## 2025-05-21 PROCEDURE — 250N000013 HC RX MED GY IP 250 OP 250 PS 637: Performed by: INTERNAL MEDICINE

## 2025-05-21 PROCEDURE — 99232 SBSQ HOSP IP/OBS MODERATE 35: CPT | Performed by: INTERNAL MEDICINE

## 2025-05-21 RX ADMIN — TORSEMIDE 10 MG: 10 TABLET ORAL at 08:28

## 2025-05-21 RX ADMIN — Medication 1 CAPSULE: at 08:28

## 2025-05-21 RX ADMIN — AMLODIPINE BESYLATE 5 MG: 5 TABLET ORAL at 08:27

## 2025-05-21 RX ADMIN — DICLOFENAC 4 G: 10 GEL TOPICAL at 08:45

## 2025-05-21 RX ADMIN — DICLOFENAC 4 G: 10 GEL TOPICAL at 21:01

## 2025-05-21 RX ADMIN — DICLOFENAC 4 G: 10 GEL TOPICAL at 18:10

## 2025-05-21 RX ADMIN — ENOXAPARIN SODIUM 50 MG: 60 INJECTION SUBCUTANEOUS at 08:32

## 2025-05-21 RX ADMIN — ACETAMINOPHEN 975 MG: 325 TABLET, FILM COATED ORAL at 00:33

## 2025-05-21 RX ADMIN — Medication 1 CAPSULE: at 20:50

## 2025-05-21 RX ADMIN — DICLOFENAC 4 G: 10 GEL TOPICAL at 13:56

## 2025-05-21 RX ADMIN — LISINOPRIL 10 MG: 10 TABLET ORAL at 08:27

## 2025-05-21 RX ADMIN — CETIRIZINE HYDROCHLORIDE 10 MG: 10 TABLET, FILM COATED ORAL at 20:50

## 2025-05-21 RX ADMIN — MICONAZOLE NITRATE: 2 POWDER TOPICAL at 08:45

## 2025-05-21 RX ADMIN — ACETAMINOPHEN 975 MG: 325 TABLET, FILM COATED ORAL at 21:01

## 2025-05-21 RX ADMIN — PIPERACILLIN AND TAZOBACTAM 3.38 G: 3; .375 INJECTION, POWDER, FOR SOLUTION INTRAVENOUS at 07:21

## 2025-05-21 RX ADMIN — ACETAMINOPHEN 975 MG: 325 TABLET, FILM COATED ORAL at 08:27

## 2025-05-21 RX ADMIN — MICONAZOLE NITRATE: 2 POWDER TOPICAL at 20:52

## 2025-05-21 RX ADMIN — PIPERACILLIN AND TAZOBACTAM 3.38 G: 3; .375 INJECTION, POWDER, FOR SOLUTION INTRAVENOUS at 13:56

## 2025-05-21 RX ADMIN — ACETAMINOPHEN 975 MG: 325 TABLET, FILM COATED ORAL at 16:32

## 2025-05-21 RX ADMIN — POLYETHYLENE GLYCOL 3350 17 G: 17 POWDER, FOR SOLUTION ORAL at 08:28

## 2025-05-21 RX ADMIN — PIPERACILLIN AND TAZOBACTAM 3.38 G: 3; .375 INJECTION, POWDER, FOR SOLUTION INTRAVENOUS at 00:32

## 2025-05-21 RX ADMIN — ENOXAPARIN SODIUM 50 MG: 60 INJECTION SUBCUTANEOUS at 20:47

## 2025-05-21 ASSESSMENT — ACTIVITIES OF DAILY LIVING (ADL)
ADLS_ACUITY_SCORE: 84
ADLS_ACUITY_SCORE: 81
ADLS_ACUITY_SCORE: 76
ADLS_ACUITY_SCORE: 81
ADLS_ACUITY_SCORE: 76
ADLS_ACUITY_SCORE: 84
ADLS_ACUITY_SCORE: 81
ADLS_ACUITY_SCORE: 76
ADLS_ACUITY_SCORE: 84
ADLS_ACUITY_SCORE: 80
ADLS_ACUITY_SCORE: 80
ADLS_ACUITY_SCORE: 84
ADLS_ACUITY_SCORE: 76
ADLS_ACUITY_SCORE: 84
ADLS_ACUITY_SCORE: 76
ADLS_ACUITY_SCORE: 84
ADLS_ACUITY_SCORE: 76
ADLS_ACUITY_SCORE: 84
ADLS_ACUITY_SCORE: 76
ADLS_ACUITY_SCORE: 81

## 2025-05-21 NOTE — PLAN OF CARE
Goal Outcome Evaluation:  Date/Time: 5/20/25   [id number]  Diagnosis: acute on chronic generalized weakness, fall, Covid-19  Mental Status: A&Ox4, [place]  Activity/dangle: Ax2, gb/maria elena steady  Diet: Regular  Pain: scheduled acetaminophen  Alonzo/Voiding: Alonzo intact and patent w/ adequate amts of pink urine output, Hospitalist aware  Tele/Restraints/Iso: covid-19 isolation precautions maintained  02/LDA: TRACEE VIERA  D/C Date: TBD  Other Info: spouse at bedside

## 2025-05-21 NOTE — PROGRESS NOTES
[facility]    Hospitalist Progress Note    Date of Service (when I saw the patient): 05/21/2025  Admit date: 5/15/2025    Interval History   Chart reviewed, discussed with bedside RN patient and his wife at bedside  Patient is resting comfortably in bed.  Denies any shortness of breath.  Currently on room air.  Complains of pain in his lower back from laying in bed.  Rates his pain only 3 out of 10.  The importance of turning in bed and getting out of bed to the chair discussed with patient and family.  Discussed with patient's wife and patient that he is hemodynamically stable and is doing well from COVID standpoint.  All the blood cultures and urine cultures remain negative.  He is medically stable for discharge whenever TCU bed is available    Assessment & Plan   Shilo Menchaca is a 85 year old male with a history of BPH, generalized weakness, obstructive sleep apnea, severe, not on therapy, history of pulmonary embolism admitted on 5/15/2025. He presents to the emergency department with acute on chronic weakness 5/15/2025 resulting in a fall in his bathroom when his legs gave out beneath him.  Found to be febrile with lactic acid elevated in the 4 range and diagnosed with acute COVID-19.    T 99.9, P 124, /78, 15, O2 94% on RA  Na 137, K 3.9, Cr 1.19 (stable), alk phos 157, , CK 5,076, glucose 174.   Lactic acid 2.1 > 1.7.   WBC 13.1, hgb 14.8, plt 148  UA WBC 13, RBC 48   BCx, UCx pending.   Covid + RSV, flu negative  CXR clear  CT abdomen/pelvis: focal mass like thickening of the bladder wall on th R 3.1 cm, concerning for neoplasm.  Tiny nonobstructing stones in the bladder and left renal collecting system no hydronephrosis.   Advanced coronary artery calcification.    Acute COVID-19 with associated severe sepsis: Lactic acid elevated to 4.0, leukocytosis at 13.1, febrile to 100.6 with EMS, tachycardic to 124 bpm on arrival.  Wife has cough and cold symptoms.   Infrequent cough noted with patient as well, though he was unaware of this.  Pyuria, with bladder tumor (see below)   No respiratory symptoms, not hypoxic and requiring no supplemental oxygen.  Completed Remdesivir x 3 days ordered given increased risk of decompensation with advanced age and obesity.  Paxlovid no longer available on formulary  Oxygen PRN  Special precautions  Subcutaneous Lovenox prophylaxis noting history of pulmonary embolism in 2021  Placed on IV zosyn for potential UTI.   Follow blood and urine cultures. NGTD. > stopped IV zosyn on 5/17/25 .     On 05/20/25, subjective fevers and chills, does not feel well today.   No localizing symptoms (see above history)   Check UA with reflect to UCx. Jessica placed 5/19 in early AM, so does not need to be replaced.   CMP, BCx ordered.   CXR yesterday showed no developing infiltrates and no signs to suggest developing pneumonia.  Patient is medically ready for discharge.  Needs TCU placement    Recent Labs   Lab 05/21/25  0912 05/20/25  0855 05/19/25  1124 05/18/25  0759 05/17/25  0721 05/16/25  0003   WBC 10.0 9.8 8.4 5.9 7.0 13.1*   HGB 12.4* 12.9* 13.9 14.0 13.4 14.8    154 155 125* 125* 148*      Gross hematuria  Bladder stones  Right sided bladder mass: 3 cm soft tissue mass in bladder suspicious for malignancy.  Note that on historical imaging he did have an adjacent iliac chain or perivesicular lymph node in this region.  Pyuria   Renal retention, jessica placed on 05/16/25    BPH - Status post greenlight photo vaporization and cystoscopy for bladder stone extraction September 2021 w/ Dr Man.  CT abdomen/pelvis 05/16/25: focal mass like thickening of the bladder wall on th R 3.1 cm, concerning for neoplasm.  Tiny nonobstructing stones in the bladder and left renal collecting system no hydronephrosis.   [place] [facility] consulted for anticipated future cystoscopy.  Discussed bladder mass finding with patient as well as wife at bedside  Continue IV  Zosyn for now with urine culture pending and severe sepsis  Urology recommends a CT urogram and cystoscopy as outpatient through Dr. Man. Did not comment on retention or hematuria.   On 05/17/25, urine remains pink, no obstructing clots, good flow.   Removed catheter AM of 5/18/25, failed trial of void > replaced, and will remain in place at discharge with Urology follow (CM consult placed to make sure appt within 2 weeks).     Will discharge with Alonzo to TCU as noted above    History of pulmonary embolism: Diagnosed with pulmonary embolism after incidental finding on CT urogram with right upper and lower lobe pulmonary emboli June 2021. Had a brief hospitalization for weakness around that time, so I believe was thought to be provoked. Completed ~3 months of anticoagulation  Subcutaneous Lovenox prophylaxis    Generalized weakness and physical deconditioning: Ambulates with a walker at home at baseline, but weakness and deconditioning has been a longstanding issue.  Note that patient had been following with outpatient physical therapy for generalized weakness summer 2022, through the first half of 2023, and was rereferred to physical therapy in 2024, completing outpatient therapy in December.   Care management consultation for disposition planning.  Anticipate TCU discharge, and discussed this with patient and wife at time of admission.  Physical therapy consulted. Last seen on 5/16.   Fall precautions  Get out of bed and attempt to ambulate with assistance 3 times daily.  Utilizes a walker at baseline, but very minimal activity. Spends most of the day in a chair. Discussed with RN and NA on 05/19/25. We must do our best    Chronic mild sinus tachycardia - No associated chest pain, SOB, hypotension, fever   * On 05/19/25 reviewed prior visits going back about 1 year, HR has been in the mid-90s to low 100s.     Coccyx wound, bilateral knee abrasions, intertrigo present at admission  [facility] nurse consulted.    Severe  obstructive sleep apnea significant nocturnal hypoxemia: Seen through [facility] sleep medicine clinic and was recommended CPAP early March 2025.  Patient declined, was interested in potentially trialing oral dental device, which his pulmonology team did not feel would treat his degree of sleep apnea.  Possible that nocturnal hypoxia is contributing to weakness and certainly contributing to daytime fatigue.  - Oximetry, oxygen as needed     Mild CK elevation: CK elevated to the 5000 range.  Compartments are soft with no report of pain other than knee abrasions, but was down on the bathroom floor for approximately 1.5 hours.  CK could also be elevated secondary to viral inflammation.  Lower extremity edema  CKD, stable baseline Cr 1.2.   Compression stockings to bilateral lower extremities  S/p IVF hydration, stopped on 5/16/25 when CK < 5000.   Intake and output, daily weights  Repeat CK in a.m., trend to significant improvement.  Resume prior to admission torsemide on 05/17/25      Hypertension:  Continue PTA amlodipine-benazepril  Resume PTA torsemide on 05/17/25   Had brisk urine output of 1450 cc.    Note blood pressure still elevated on 05/18/25, but improving.   PO hydralazine ordered PRN for SBP over 180. Otherwise, monitor  for now.  Asymptomatic without chest pain, headache or shortness of breath.    Chronic constipation  With multiple loose stools on 5/18  No associated abdominal pain or cramping WBC normal.  Held PTA MiraLAX on 05/19/25 > resume on 05/19/25     Clinically Significant Risk Factors         # Hyponatremia: Lowest Na = 134 mmol/L in last 2 days, will monitor as appropriate  # Hypochloremia: Lowest Cl = 97 mmol/L in last 2 days, will monitor as appropriate      # Hypoalbuminemia: Lowest albumin = 2.7 g/dL at 5/20/2025  2:24 PM, will monitor as appropriate                # Obesity: Estimated body mass index is 33.13 kg/m  as calculated from the following:    Height as of this encounter:  "1.676 m (5' 6\").    Weight as of this encounter: 93.1 kg (205 lb 4 oz).        # Financial/Environmental Concerns: none           Diet: Orders Placed This Encounter      Combination Diet Regular Diet Adult     IVF: None currently  DVT Prophylaxis: enoxaparin  Alonzo Catheter: PRESENT, indication: Acute retention or obstruction, Acute retention or obstruction    No CPR- Do NOT Intubate    PT/OT TCU recommended, family is hoping for CYNDEE Mir consulted  Communication: Discussed with wife, RN, CYNDEE on 05/21/25     Medically Ready for Discharge: Ready Now      Jessenia Bates MD    Hospitalist Service  [facility]  Securely message with the Vocera Web Console (learn more here)  Text page via [employer] Paging/Directory      -Data reviewed today: I reviewed all new labs and imaging results over the last 24 hours. I personally reviewed no images or EKG's today.    Physical Exam   Temp: 98.1  F (36.7  C) Temp src: Oral BP: 124/77 Pulse: 87   Resp: 18 SpO2: 98 % O2 Device: None (Room air)    Vitals:    05/18/25 0659 05/20/25 0638 05/21/25 0702   Weight: 90.9 kg (200 lb 7.4 oz) 91.5 kg (201 lb 11.5 oz) 93.1 kg (205 lb 4 oz)     Vital Signs with Ranges  Temp:  [97.2  F (36.2  C)-98.6  F (37  C)] 98.1  F (36.7  C)  Pulse:  [] 87  Resp:  [18] 18  BP: (124-142)/(76-86) 124/77  SpO2:  [95 %-98 %] 98 %  I/O last 3 completed shifts:  In: 900 [P.O.:900]  Out: 850 [Urine:850]    Today's Exam  Constitutional: Patient is resting comfortably in bed, not in acute distress, appears fatigued  Neuropsyche:  tired but oriented, answers questions appropriately. Speech normal, face symmetric   Respiratory:  Breathing comfortably, good air exchange, no wheezes, no crackles.   Cardiovascular:  Regular rate and rhythm, no edema.  GI:  soft, NT/ND, BS normal  Skin/Integumen:  No acute rash or sign of bleeding.     Medications   All medications reviewed on 05/20/25   Current Facility-Administered Medications   Medication " Dose Route Frequency Provider Last Rate Last Admin    Patient is already receiving anticoagulation with heparin, enoxaparin (LOVENOX), warfarin (COUMADIN)  or other anticoagulant medication   Does not apply Continuous PRN Mil Griffiths MD         Current Facility-Administered Medications   Medication Dose Route Frequency Provider Last Rate Last Admin    acetaminophen (TYLENOL) tablet 975 mg  975 mg Oral TID Libra Stokes MD   975 mg at 05/21/25 0827    amLODIPine (NORVASC) tablet 5 mg  5 mg Oral Daily Libra Stokes MD   5 mg at 05/21/25 0827    And    lisinopril (ZESTRIL) tablet 10 mg  10 mg Oral Daily Libra Stokes MD   10 mg at 05/21/25 0827    cetirizine (zyrTEC) tablet 10 mg  10 mg Oral QPM Libra Stokes MD   10 mg at 05/20/25 2152    diclofenac (VOLTAREN) 1 % topical gel 4 g  4 g Topical 4x Daily Libra Stokes MD   4 g at 05/21/25 0845    enoxaparin ANTICOAGULANT (LOVENOX) injection 50 mg  0.5 mg/kg Subcutaneous BID Mil Griffiths MD   50 mg at 05/21/25 0832    lactobacillus rhamnosus (GG) (CULTURELL) capsule 1 capsule  1 capsule Oral BID Libra Stokes MD   1 capsule at 05/21/25 0828    miconazole (MICATIN) 2 % powder   Topical BID Libra Stokes MD   Given at 05/21/25 0845    piperacillin-tazobactam (ZOSYN) 3.375 g vial to attach to  mL bag  3.375 g Intravenous Q6H Libra Stokes MD   3.375 g at 05/21/25 0721    polyethylene glycol (MIRALAX) Packet 17 g  17 g Oral Daily Libra Stokes MD   17 g at 05/21/25 0828    sodium chloride (PF) 0.9% PF flush 3 mL  3 mL Intracatheter Q8H [facility] Mil Griffiths MD   3 mL at 05/20/25 1942    torsemide (DEMADEX) tablet 10 mg  10 mg Oral Daily Libra Stokes MD   10 mg at 05/21/25 0828     PRN Meds:   Current Facility-Administered Medications   Medication Dose Route Frequency Provider Last Rate Last Admin    artificial saliva (BIOTENE MT) solution 2 spray  2 spray Mouth/Throat Q1H PRN Mil Griffiths MD        bisacodyl (DULCOLAX)  suppository 10 mg  10 mg Rectal Daily PRN Griffiths, Mil Dial MD        calcium carbonate (TUMS) chewable tablet 1,000 mg  1,000 mg Oral 4x Daily PRN Griffiths, Mil Dial MD        hydrALAZINE (APRESOLINE) tablet 10 mg  10 mg Oral 4x Daily PRN Libra Stokes MD        HYDROmorphone (DILAUDID) injection 0.2 mg  0.2 mg Intravenous Q2H PRN Griffiths, Mil Dial MD        HYDROmorphone (DILAUDID) injection 0.4 mg  0.4 mg Intravenous Q2H PRN Griffiths, Mil Dial MD        lidocaine (LMX4) cream   Topical Q1H PRN Griffiths, Mil iDal MD        lidocaine 1 % 0.1-1 mL  0.1-1 mL Other Q1H PRN Griffiths, Mil Dial MD        melatonin tablet 1 mg  1 mg Oral At Bedtime PRN Griffiths, Mil Dial MD        naloxone (NARCAN) injection 0.2 mg  0.2 mg Intravenous Q2 Min PRN Griffiths, Mil Dial MD        Or    naloxone (NARCAN) injection 0.4 mg  0.4 mg Intravenous Q2 Min PRN Griffiths, Mil Dial MD        Or    naloxone (NARCAN) injection 0.2 mg  0.2 mg Intramuscular Q2 Min PRN Griffiths, Mil Dial MD        Or    naloxone (NARCAN) injection 0.4 mg  0.4 mg Intramuscular Q2 Min PRN Griffiths, Mil Dial MD        ondansetron (ZOFRAN ODT) ODT tab 4 mg  4 mg Oral Q6H PRN Griffiths, Mil Dial MD        Or    ondansetron (ZOFRAN) injection 4 mg  4 mg Intravenous Q6H PRN Griffiths, Mil Dial MD        oxyCODONE (ROXICODONE) tablet 5 mg  5 mg Oral Q4H PRN Griffiths, Mil Dial MD        oxyCODONE IR (ROXICODONE) half-tab 2.5 mg  2.5 mg Oral Q4H PRN Griffiths, Mil Dial MD        Patient is already receiving anticoagulation with heparin, enoxaparin (LOVENOX), warfarin (COUMADIN)  or other anticoagulant medication   Does not apply Continuous PRN Griffiths, Mil Dial MD        polyethylene glycol (MIRALAX) Packet 17 g  17 g Oral BID PRN Griffiths, Mil Dial MD        prochlorperazine (COMPAZINE) injection 5 mg  5 mg Intravenous Q6H PRN Tunde, Mil Dial MD        Or    prochlorperazine (COMPAZINE) tablet 5 mg  5 mg Oral Q6H PRN Tunde, Mil Dial MD        senna-docusate  (SENOKOT-S/PERICOLACE) 8.6-50 MG per tablet 1 tablet  1 tablet Oral BID PRN Mil Griffiths MD        Or    senna-docusate (SENOKOT-S/PERICOLACE) 8.6-50 MG per tablet 2 tablet  2 tablet Oral BID PRN Mil Griffiths MD        sodium chloride (PF) 0.9% PF flush 3 mL  3 mL Intracatheter q1 min prn Mil Griffiths MD   3 mL at 05/21/25 0840       Data   Recent Labs   Lab 05/21/25  0912 05/20/25  1424 05/20/25  0855 05/19/25  1124 05/18/25  0759 05/17/25  0721 05/16/25  0003   WBC 10.0  --  9.8 8.4 5.9   < > 13.1*   HGB 12.4*  --  12.9* 13.9 14.0   < > 14.8   MCV 99  --  98 98 99   < > 99     --  154 155 125*   < > 148*   NA  --  134*  --   --   --   --  137   POTASSIUM  --  3.8  --   --   --   --  3.9   CHLORIDE  --  97*  --   --   --   --  100   CO2  --  27  --   --   --   --  24   BUN  --  25.4*  --   --   --   --  28.4*   CR 1.24* 1.12  --   --  1.03   < > 1.19*   ANIONGAP  --  10  --   --   --   --  13   SABRINA  --  8.1*  --   --   --   --  8.7*   GLC  --  156*  --   --   --   --  174*   ALBUMIN  --  2.7*  --   --   --   --  3.5   PROTTOTAL  --  5.3*  --   --   --   --  6.3*   BILITOTAL  --  0.3  --   --   --   --  0.5   ALKPHOS  --  148  --   --   --   --  157*   ALT  --  58  --   --   --   --  42   AST  --  72*  --   --   --   --  118*    < > = values in this interval not displayed.       No results found for this or any previous visit (from the past 24 hours).

## 2025-05-21 NOTE — PLAN OF CARE
Goal Outcome Evaluation:      Diagnosis:COVID-19, weakness.    Mental Status: A & O x 4   Activity/dangle: Up with 2, gait belt and walker  Diet:Regular  Pain:Tylenol  Alonzo/Voiding:Alonzo in place, urine output is pink tinged.  Tele/Restraints/Iso:Iso- COVID-19  02/LDA:MAXIMILIANO DAVID  D/C Date:Pending TCU placement  Other Info: VSS. Mepilex on coccyx. Dressings on knees and right lower leg changed. Wife here with pt. Had 2 loose BMs today.

## 2025-05-22 ENCOUNTER — APPOINTMENT (OUTPATIENT)
Dept: PHYSICAL THERAPY | Facility: CLINIC | Age: 85
End: 2025-05-22
Payer: MEDICARE

## 2025-05-22 VITALS
HEART RATE: 87 BPM | HEIGHT: 66 IN | SYSTOLIC BLOOD PRESSURE: 117 MMHG | BODY MASS INDEX: 33.77 KG/M2 | RESPIRATION RATE: 16 BRPM | WEIGHT: 210.1 LBS | TEMPERATURE: 97.2 F | DIASTOLIC BLOOD PRESSURE: 65 MMHG | OXYGEN SATURATION: 97 %

## 2025-05-22 LAB
ANION GAP SERPL CALCULATED.3IONS-SCNC: 8 MMOL/L (ref 7–15)
BACTERIA SPEC CULT: NORMAL
BACTERIA SPEC CULT: NORMAL
BUN SERPL-MCNC: 23.6 MG/DL (ref 8–23)
CALCIUM SERPL-MCNC: 8.2 MG/DL (ref 8.8–10.4)
CHLORIDE SERPL-SCNC: 101 MMOL/L (ref 98–107)
CREAT SERPL-MCNC: 1.14 MG/DL (ref 0.67–1.17)
EGFRCR SERPLBLD CKD-EPI 2021: 62 ML/MIN/1.73M2
GLUCOSE SERPL-MCNC: 110 MG/DL (ref 70–99)
HCO3 SERPL-SCNC: 27 MMOL/L (ref 22–29)
POTASSIUM SERPL-SCNC: 3.9 MMOL/L (ref 3.4–5.3)
SODIUM SERPL-SCNC: 136 MMOL/L (ref 135–145)

## 2025-05-22 PROCEDURE — 80048 BASIC METABOLIC PNL TOTAL CA: CPT | Performed by: INTERNAL MEDICINE

## 2025-05-22 PROCEDURE — 999N000157 HC STATISTIC RCP TIME EA 10 MIN

## 2025-05-22 PROCEDURE — 250N000011 HC RX IP 250 OP 636: Performed by: INTERNAL MEDICINE

## 2025-05-22 PROCEDURE — 250N000013 HC RX MED GY IP 250 OP 250 PS 637: Performed by: INTERNAL MEDICINE

## 2025-05-22 PROCEDURE — 97530 THERAPEUTIC ACTIVITIES: CPT | Mod: GP

## 2025-05-22 PROCEDURE — 120N000001 HC R&B MED SURG/OB

## 2025-05-22 PROCEDURE — 99232 SBSQ HOSP IP/OBS MODERATE 35: CPT | Performed by: INTERNAL MEDICINE

## 2025-05-22 PROCEDURE — 36415 COLL VENOUS BLD VENIPUNCTURE: CPT | Performed by: INTERNAL MEDICINE

## 2025-05-22 RX ORDER — ACETAMINOPHEN 325 MG/1
975 TABLET ORAL 3 TIMES DAILY
DISCHARGE
Start: 2025-05-22

## 2025-05-22 RX ADMIN — MICONAZOLE NITRATE: 2 POWDER TOPICAL at 20:33

## 2025-05-22 RX ADMIN — POLYETHYLENE GLYCOL 3350 17 G: 17 POWDER, FOR SOLUTION ORAL at 09:14

## 2025-05-22 RX ADMIN — AMLODIPINE BESYLATE 5 MG: 5 TABLET ORAL at 09:14

## 2025-05-22 RX ADMIN — DICLOFENAC 4 G: 10 GEL TOPICAL at 11:23

## 2025-05-22 RX ADMIN — ACETAMINOPHEN 975 MG: 325 TABLET, FILM COATED ORAL at 09:14

## 2025-05-22 RX ADMIN — Medication 1 CAPSULE: at 20:23

## 2025-05-22 RX ADMIN — ENOXAPARIN SODIUM 50 MG: 60 INJECTION SUBCUTANEOUS at 20:25

## 2025-05-22 RX ADMIN — MICONAZOLE NITRATE: 2 POWDER TOPICAL at 11:23

## 2025-05-22 RX ADMIN — ACETAMINOPHEN 975 MG: 325 TABLET, FILM COATED ORAL at 23:05

## 2025-05-22 RX ADMIN — ACETAMINOPHEN 975 MG: 325 TABLET, FILM COATED ORAL at 17:49

## 2025-05-22 RX ADMIN — LISINOPRIL 10 MG: 10 TABLET ORAL at 12:43

## 2025-05-22 RX ADMIN — CETIRIZINE HYDROCHLORIDE 10 MG: 10 TABLET, FILM COATED ORAL at 20:23

## 2025-05-22 RX ADMIN — Medication 1 CAPSULE: at 09:14

## 2025-05-22 RX ADMIN — ENOXAPARIN SODIUM 50 MG: 60 INJECTION SUBCUTANEOUS at 09:15

## 2025-05-22 RX ADMIN — TORSEMIDE 10 MG: 10 TABLET ORAL at 12:43

## 2025-05-22 ASSESSMENT — ACTIVITIES OF DAILY LIVING (ADL)
ADLS_ACUITY_SCORE: 76
ADLS_ACUITY_SCORE: 81
ADLS_ACUITY_SCORE: 76
ADLS_ACUITY_SCORE: 71
ADLS_ACUITY_SCORE: 76
ADLS_ACUITY_SCORE: 76
ADLS_ACUITY_SCORE: 81
ADLS_ACUITY_SCORE: 76

## 2025-05-22 NOTE — PROGRESS NOTES
"CLINICAL NUTRITION SERVICES - ASSESSMENT NOTE    RECOMMENDATIONS FOR MDs/PROVIDERS TO ORDER:  ***    Registered Dietitian Interventions:  ***    Future/Additional Recommendations:  ***     REASON FOR ASSESSMENT  LOS    INFORMATION OBTAINED  {RDNSubjectiveinformation:012161}    NUTRITION HISTORY  ***  Home nutrition support plan: ***    CURRENT NUTRITION ORDERS  Diet: {RDNDietorder:559867}  Snacks/Supplements: {KK Oral Supp:595804}      CURRENT INTAKE/TOLERANCE  ***    LABS  Nutrition-relevant labs: {RDNNewfindingsrelevantlabsmeds:765315}    MEDICATIONS  Nutrition-relevant medications: {RDNNewfindingsrelevantlabsmeds:397437}    ANTHROPOMETRICS  Height: 167.6 cm (5' 6\")  Admission Weight: 90.7 kg (200 lb) (05/15/25 2337)   Most Recent Weight: 95.3 kg (210 lb 1.6 oz) (05/22/25 0649)  IBW: *** kg  BMI: Body mass index is 33.91 kg/m .   Weight History: ***  Dosing Weight: *** kg, based on {RDNDosingweightjustification:132081}    ASSESSED NUTRITION NEEDS  Estimated Energy Needs: *** kcals/day ({RDNEstimatedenergyneeds:943141})  Justification: {RDNEstimatedenergyneedsjustification:079548}  Estimated Protein Needs: *** grams protein/day ({RDNEstimatedproteinneeds:818993})  Justification: {RDNEstimatedproteinneedsjustification:882033}  Estimated Fluid Needs: *** mL/day ({RDNEstimatedfluidneeds:300827})  Justification: {RDNEstimatedfluidneedsjustification:754606}    SYSTEM AND PHYSICAL FINDINGS    {RDNPhysicalfindings:574355}  GI symptoms: {RDNSystemandphysicalfindingsskinwoundsgisymptoms:843731}  Skin/wounds: {RDNSystemandphysicalfindingsskinwoundsgisymptoms:543229}    MALNUTRITION  % Intake: {RDNMalnutrition%intake:677079}  % Weight Loss: {RDNMalnutrition%weightloss:163376}  Subcutaneous Fat Loss: {RDNMalnutritionsubcutaneousfatloss:331860}  Muscle Loss: {RDNMalnutritionmuscleloss:713687}  Fluid Accumulation/Edema: {RDNMalnutritionfluidaccumulationedema:527190}  Malnutrition Diagnosis: " {RDNMalnutritiondiagnosis:363886}  Malnutrition Present on Admission: {RDNMalnutritionpresentonadmit:674186}    NUTRITION DIAGNOSIS  {RDNNutritiondiagnosis:605405} related to *** as evidenced by ***    INTERVENTIONS  {RDNInterventions:528744}    GOALS  {RDNGoals:455750}     MONITORING/EVALUATION  Progress toward goals will be monitored and evaluated per policy.

## 2025-05-22 NOTE — PLAN OF CARE
Diagnosis: Acute on chronic generalized weakness, fall, COVID-19   Orientation/Cognitive: A&OX4, [place]  Mobility Level/Assist Equipment: Ax2 GB/Bre steady  Pain Management: Tylenol, voltaren (declined this shift)  Tele/VS/O2: slightly hypertensive this morning prior to morning meds, otherwise VSS@RA  Diet: Regular  Bowel/Bladder: jessica in place patent with light red to dark red urine. Continent of BM. LBM 5/22/25  Skin Concerns: BLE/knees wounds/dressing and gluteal cleft dressing changed today. Powder placed to abdominal folds   Drains/Devices: PIV SL BUE  ISO/TYPE: COVID-19, special precaution  Other info: spouse at bedside  D/ C date: 5/23/25 to [facility], pending ride time.

## 2025-05-22 NOTE — PROGRESS NOTES
[facility]    Hospitalist Progress Note    Date of Service (when I saw the patient): 05/22/2025  Admit date: 5/15/2025    Interval History   Chart reviewed, discussed with bedside RN patient and his wife at bedside  Patient is resting comfortably in bed.  Denies any shortness of breath.  Currently on room air.  Complaints of feeling weak overall, non other acute issues     Assessment & Plan   Shilo Menchaca is a 85 year old male with a history of BPH, generalized weakness, obstructive sleep apnea, severe, not on therapy, history of pulmonary embolism admitted on 5/15/2025. He presents to the emergency department with acute on chronic weakness 5/15/2025 resulting in a fall in his bathroom when his legs gave out beneath him.  Found to be febrile with lactic acid elevated in the 4 range and diagnosed with acute COVID-19.    T 99.9, P 124, /78, 15, O2 94% on RA  Na 137, K 3.9, Cr 1.19 (stable), alk phos 157, , CK 5,076, glucose 174.   Lactic acid 2.1 > 1.7.   WBC 13.1, hgb 14.8, plt 148  UA WBC 13, RBC 48   BCx, UCx pending.   Covid + RSV, flu negative  CXR clear  CT abdomen/pelvis: focal mass like thickening of the bladder wall on th R 3.1 cm, concerning for neoplasm.  Tiny nonobstructing stones in the bladder and left renal collecting system no hydronephrosis.   Advanced coronary artery calcification.    Acute COVID-19 with associated severe sepsis: Lactic acid elevated to 4.0, leukocytosis at 13.1, febrile to 100.6 with EMS, tachycardic to 124 bpm on arrival.  Wife has cough and cold symptoms.  Infrequent cough noted with patient as well, though he was unaware of this.  Pyuria, with bladder tumor (see below)   No respiratory symptoms, not hypoxic and requiring no supplemental oxygen.  Completed Remdesivir x 3 days ordered given increased risk of decompensation with advanced age and obesity.  Paxlovid no longer available on formulary  Oxygen PRN  Special  precautions  Subcutaneous Lovenox prophylaxis noting history of pulmonary embolism in 2021  Placed on IV zosyn for potential UTI.   Follow blood and urine cultures. NGTD. > stopped IV zosyn on 5/17/25 .     On 05/20/25, subjective fevers and chills, does not feel well today.   No localizing symptoms (see above history)   Check UA with reflect to UCx. Jessica placed 5/19 in early AM, so does not need to be replaced.   CMP, BCx ordered.   CXR yesterday showed no developing infiltrates and no signs to suggest developing pneumonia.  Patient is medically ready for discharge.  Needs TCU placement    Recent Labs   Lab 05/21/25  0912 05/20/25  0855 05/19/25  1124 05/18/25  0759 05/17/25  0721 05/16/25  0003   WBC 10.0 9.8 8.4 5.9 7.0 13.1*   HGB 12.4* 12.9* 13.9 14.0 13.4 14.8    154 155 125* 125* 148*      Gross hematuria  Bladder stones  Right sided bladder mass: 3 cm soft tissue mass in bladder suspicious for malignancy.  Note that on historical imaging he did have an adjacent iliac chain or perivesicular lymph node in this region.  Pyuria   Renal retention, jessica placed on 05/16/25    BPH - Status post greenlight photo vaporization and cystoscopy for bladder stone extraction September 2021 w/ Dr Man.  CT abdomen/pelvis 05/16/25: focal mass like thickening of the bladder wall on th R 3.1 cm, concerning for neoplasm.  Tiny nonobstructing stones in the bladder and left renal collecting system no hydronephrosis.   [facility] consulted for anticipated future cystoscopy.  Discussed bladder mass finding with patient as well as wife at bedside  Continue IV Zosyn for now with urine culture pending and severe sepsis  Urology recommends a CT urogram and cystoscopy as outpatient through Dr. Man. Did not comment on retention or hematuria.   On 05/17/25, urine remains pink, no obstructing clots, good flow.   Removed catheter AM of 5/18/25, failed trial of void > replaced, and will remain in place at discharge with  Urology follow (CM consult placed to make sure appt within 2 weeks).     Will discharge with Alonzo to TCU as noted above    History of pulmonary embolism: Diagnosed with pulmonary embolism after incidental finding on CT urogram with right upper and lower lobe pulmonary emboli June 2021. Had a brief hospitalization for weakness around that time, so I believe was thought to be provoked. Completed ~3 months of anticoagulation  Subcutaneous Lovenox prophylaxis    Generalized weakness and physical deconditioning: Ambulates with a walker at home at baseline, but weakness and deconditioning has been a longstanding issue.  Note that patient had been following with outpatient physical therapy for generalized weakness summer 2022, through the first half of 2023, and was rereferred to physical therapy in 2024, completing outpatient therapy in December.   Care management consultation for disposition planning.  Anticipate TCU discharge, and discussed this with patient and wife at time of admission.  Physical therapy consulted. Last seen on 5/16.   Fall precautions  Get out of bed and attempt to ambulate with assistance 3 times daily.  Utilizes a walker at baseline, but very minimal activity. Spends most of the day in a chair. Discussed with RN and NA on 05/19/25. We must do our best    Chronic mild sinus tachycardia - No associated chest pain, SOB, hypotension, fever   * On 05/19/25 reviewed prior visits going back about 1 year, HR has been in the mid-90s to low 100s.     Coccyx wound, bilateral knee abrasions, intertrigo present at admission  [facility] nurse consulted.    Severe obstructive sleep apnea significant nocturnal hypoxemia: Seen through [facility] sleep medicine clinic and was recommended CPAP early March 2025.  Patient declined, was interested in potentially trialing oral dental device, which his pulmonology team did not feel would treat his degree of sleep apnea.  Possible that nocturnal hypoxia is contributing to  "weakness and certainly contributing to daytime fatigue.  - Oximetry, oxygen as needed     Mild CK elevation: CK elevated to the 5000 range.  Compartments are soft with no report of pain other than knee abrasions, but was down on the bathroom floor for approximately 1.5 hours.  CK could also be elevated secondary to viral inflammation.  Lower extremity edema  CKD, stable baseline Cr 1.2.   Compression stockings to bilateral lower extremities  S/p IVF hydration, stopped on 5/16/25 when CK < 5000.   Intake and output, daily weights  Repeat CK in a.m., trend to significant improvement.  Resume prior to admission torsemide on 05/17/25      Hypertension:  Continue PTA amlodipine-benazepril  Resume PTA torsemide on 05/17/25   Had brisk urine output of 1450 cc.    Note blood pressure still elevated on 05/18/25, but improving.   PO hydralazine ordered PRN for SBP over 180. Otherwise, monitor  for now.  Asymptomatic without chest pain, headache or shortness of breath.    Chronic constipation  With multiple loose stools on 5/18  No associated abdominal pain or cramping WBC normal.  Held PTA MiraLAX on 05/19/25 > resume on 05/19/25     Clinically Significant Risk Factors         # Hyponatremia: Lowest Na = 134 mmol/L in last 2 days, will monitor as appropriate  # Hypochloremia: Lowest Cl = 97 mmol/L in last 2 days, will monitor as appropriate      # Hypoalbuminemia: Lowest albumin = 2.7 g/dL at 5/20/2025  2:24 PM, will monitor as appropriate                # Obesity: Estimated body mass index is 33.91 kg/m  as calculated from the following:    Height as of this encounter: 1.676 m (5' 6\").    Weight as of this encounter: 95.3 kg (210 lb 1.6 oz).        # Financial/Environmental Concerns: none           Diet: Orders Placed This Encounter      Combination Diet Regular Diet Adult      Diet     IVF: None currently  DVT Prophylaxis: enoxaparin  Alonzo Catheter: PRESENT, indication: Acute retention or obstruction, Acute retention or " obstruction    No CPR- Do NOT Intubate    PT/OT TCU recommended, family is hoping for [facility] consulted  Communication: Discussed with wife, RN, CM on 05/21/25     Medically Ready for Discharge: Ready Now      Jessenia Bates MD    Hospitalist Service  [facility]  Securely message with the Vocera Web Console (learn more here)  Text page via [employer] Paging/Directory      -Data reviewed today: I reviewed all new labs and imaging results over the last 24 hours. I personally reviewed no images or EKG's today.    Physical Exam   Temp: 98.4  F (36.9  C) Temp src: Oral BP: (!) 141/75 Pulse: 103   Resp: 18 SpO2: 95 % O2 Device: None (Room air)    Vitals:    05/20/25 0638 05/21/25 0702 05/22/25 0649   Weight: 91.5 kg (201 lb 11.5 oz) 93.1 kg (205 lb 4 oz) 95.3 kg (210 lb 1.6 oz)     Vital Signs with Ranges  Temp:  [98  F (36.7  C)-98.5  F (36.9  C)] 98.4  F (36.9  C)  Pulse:  [] 103  Resp:  [16-18] 18  BP: (118-141)/(69-75) 141/75  SpO2:  [94 %-97 %] 95 %  I/O last 3 completed shifts:  In: -   Out: 1000 [Urine:1000]    Today's Exam  Constitutional: Patient is resting comfortably in bed, not in acute distress, appears fatigued  Neuropsyche:  tired but oriented, answers questions appropriately. Speech normal, face symmetric   Respiratory:  Breathing comfortably, good air exchange, no wheezes, no crackles.   Cardiovascular:  Regular rate and rhythm, no edema.  GI:  soft, NT/ND, BS normal  Skin/Integumen:  No acute rash or sign of bleeding.     Medications   All medications reviewed on 05/20/25   Current Facility-Administered Medications   Medication Dose Route Frequency Provider Last Rate Last Admin    Patient is already receiving anticoagulation with heparin, enoxaparin (LOVENOX), warfarin (COUMADIN)  or other anticoagulant medication   Does not apply Continuous PRN Griffiths, Mil Dial MD         Current Facility-Administered Medications   Medication Dose Route Frequency Provider Last Rate Last  Admin    acetaminophen (TYLENOL) tablet 975 mg  975 mg Oral TID Libra Stokes MD   975 mg at 05/22/25 0914    amLODIPine (NORVASC) tablet 5 mg  5 mg Oral Daily Libra Stokes MD   5 mg at 05/22/25 0914    And    lisinopril (ZESTRIL) tablet 10 mg  10 mg Oral Daily Libra Stokes MD   10 mg at 05/22/25 1243    cetirizine (zyrTEC) tablet 10 mg  10 mg Oral QPM Libra Stokes MD   10 mg at 05/21/25 2050    diclofenac (VOLTAREN) 1 % topical gel 4 g  4 g Topical 4x Daily Libra Stokes MD   4 g at 05/22/25 1123    enoxaparin ANTICOAGULANT (LOVENOX) injection 50 mg  0.5 mg/kg Subcutaneous BID Mil Griffiths MD   50 mg at 05/22/25 0915    lactobacillus rhamnosus (GG) (CULTURELL) capsule 1 capsule  1 capsule Oral BID Libra Stokes MD   1 capsule at 05/22/25 0914    miconazole (MICATIN) 2 % powder   Topical BID Libra Stokes MD   Given at 05/22/25 1123    polyethylene glycol (MIRALAX) Packet 17 g  17 g Oral Daily Libra Stokes MD   17 g at 05/22/25 0914    sodium chloride (PF) 0.9% PF flush 3 mL  3 mL Intracatheter Q8H [facility] Mil Griffiths MD   3 mL at 05/22/25 0520    torsemide (DEMADEX) tablet 10 mg  10 mg Oral Daily Libra Stokes MD   10 mg at 05/22/25 1243     PRN Meds:   Current Facility-Administered Medications   Medication Dose Route Frequency Provider Last Rate Last Admin    artificial saliva (BIOTENE MT) solution 2 spray  2 spray Mouth/Throat Q1H PRN Mil Griffiths MD        bisacodyl (DULCOLAX) suppository 10 mg  10 mg Rectal Daily PRN Mil Griffiths MD        calcium carbonate (TUMS) chewable tablet 1,000 mg  1,000 mg Oral 4x Daily PRN Mil Griffiths MD        hydrALAZINE (APRESOLINE) tablet 10 mg  10 mg Oral 4x Daily PRN Libra Stokes MD        HYDROmorphone (DILAUDID) injection 0.2 mg  0.2 mg Intravenous Q2H PRN Mil Griffiths MD        HYDROmorphone (DILAUDID) injection 0.4 mg  0.4 mg Intravenous Q2H PRN Mil Griffiths MD        lidocaine (LMX4) cream   Topical  Q1H PRN Griffiths, Mil Dial MD        lidocaine 1 % 0.1-1 mL  0.1-1 mL Other Q1H PRN Griffiths, Mil Dial MD        melatonin tablet 1 mg  1 mg Oral At Bedtime PRN Griffiths, Mil Dial MD        naloxone (NARCAN) injection 0.2 mg  0.2 mg Intravenous Q2 Min PRN Griffiths, Mil Dial MD        Or    naloxone (NARCAN) injection 0.4 mg  0.4 mg Intravenous Q2 Min PRN Griffiths, Mil Dial MD        Or    naloxone (NARCAN) injection 0.2 mg  0.2 mg Intramuscular Q2 Min PRN Griffiths, Mil Dial MD        Or    naloxone (NARCAN) injection 0.4 mg  0.4 mg Intramuscular Q2 Min PRN Griffiths, Mil Dial MD        ondansetron (ZOFRAN ODT) ODT tab 4 mg  4 mg Oral Q6H PRN Griffiths, Mil Dial MD        Or    ondansetron (ZOFRAN) injection 4 mg  4 mg Intravenous Q6H PRN Griffiths, Mil Dial MD        oxyCODONE (ROXICODONE) tablet 5 mg  5 mg Oral Q4H PRN Griffiths, Mil Dial MD        oxyCODONE IR (ROXICODONE) half-tab 2.5 mg  2.5 mg Oral Q4H PRN Griffiths, Mil Dial MD        Patient is already receiving anticoagulation with heparin, enoxaparin (LOVENOX), warfarin (COUMADIN)  or other anticoagulant medication   Does not apply Continuous PRN Griffiths, Mil Dial MD        polyethylene glycol (MIRALAX) Packet 17 g  17 g Oral BID PRN Griffiths, Mil Dial MD        prochlorperazine (COMPAZINE) injection 5 mg  5 mg Intravenous Q6H PRN Griffiths, Mil Dial MD        Or    prochlorperazine (COMPAZINE) tablet 5 mg  5 mg Oral Q6H PRN Griffiths, Mil Dial MD        senna-docusate (SENOKOT-S/PERICOLACE) 8.6-50 MG per tablet 1 tablet  1 tablet Oral BID PRN Griffiths, Mil Dial MD        Or    senna-docusate (SENOKOT-S/PERICOLACE) 8.6-50 MG per tablet 2 tablet  2 tablet Oral BID PRN Griffiths, Mil Dial MD        sodium chloride (PF) 0.9% PF flush 3 mL  3 mL Intracatheter q1 min prn Griffiths, Mil Jayro, MD   3 mL at 05/21/25 0840       Data   Recent Labs   Lab 05/22/25  0820 05/21/25  0912 05/20/25  1424 05/20/25  0855 05/19/25  1124 05/17/25  0721 05/16/25  0003   WBC  --  10.0  --   9.8 8.4   < > 13.1*   HGB  --  12.4*  --  12.9* 13.9   < > 14.8   MCV  --  99  --  98 98   < > 99   PLT  --  158  --  154 155   < > 148*     --  134*  --   --   --  137   POTASSIUM 3.9  --  3.8  --   --   --  3.9   CHLORIDE 101  --  97*  --   --   --  100   CO2 27  --  27  --   --   --  24   BUN 23.6*  --  25.4*  --   --   --  28.4*   CR 1.14 1.24* 1.12  --   --    < > 1.19*   ANIONGAP 8  --  10  --   --   --  13   SABRINA 8.2*  --  8.1*  --   --   --  8.7*   *  --  156*  --   --   --  174*   ALBUMIN  --   --  2.7*  --   --   --  3.5   PROTTOTAL  --   --  5.3*  --   --   --  6.3*   BILITOTAL  --   --  0.3  --   --   --  0.5   ALKPHOS  --   --  148  --   --   --  157*   ALT  --   --  58  --   --   --  42   AST  --   --  72*  --   --   --  118*    < > = values in this interval not displayed.       No results found for this or any previous visit (from the past 24 hours).

## 2025-05-22 NOTE — PROGRESS NOTES
Care Management Follow Up    Length of Stay (days): 6    Expected Discharge Date: 05/23/2025     Concerns to be Addressed: discharge planning     Patient plan of care discussed at interdisciplinary rounds: Yes    Anticipated Discharge Disposition: Home              Anticipated Discharge Services: None  Anticipated Discharge DME: None    Patient/family educated on Medicare website which has current facility and service quality ratings: no  Education Provided on the Discharge Plan: No  Patient/Family in Agreement with the Plan: yes    Referrals Placed by CM/SW:    Private pay costs discussed: transportation costs    Discussed  Partnership in Safe Discharge Planning  document with patient/family: No     Handoff Completed: No, handoff not indicated or clinically appropriate    Additional Information:    SANJU called pt's wife Aziza to get more TCU choices. Aziza said she would like referrals sent to [facility] and [facility]. SANJU sent referrals. SANJU called [facility] who states they have no rooms until tomorrow. Patient was accepted to [facility], patient could arrive tomorrow at 2pm in a private room with no extra fee. SANJU called Aziza to update. Aziza asked about [facility]. SANJU explained that SW can send referral. Aziza asked if referrals could be sent to [facility] or the [facility], SANJU explained those are not TCU's. SANJU inquired about [facility].     SANJU sent referral. SANJU explained that patient is medically ready to discharge and does have an accepting bed at [facility]. SANJU stated that since pt does not have a medical need to stay in the hospital, [facility] may have to be the disposition. Aziza stated understanding.     Add: Aziza called SANJU to ask if anything had been heard from [facility] or [facility]. Patient would have to spend more days in the hospital in order to go to [facility] due to their covid policy. SANJU explained that [facility] has accepted patient tomorrow with  covid, and pt cannot continue to wait in the hospital for [facility]. Aziza asked about [facility]. ASNJU stated they have not responded to referral. Aziza asked that SANJU calls [facility]. SANJU called and was sent to . SANJU requested call back.     Add: SANJU called Aziza and explained that [facility] never responded to call or acknowledged referral. Aziza inquired about [facility], SANJU reminded her they declined. Aziza understands [facility] is the only accepting facility at this time. SANJU stated a  at [facility] could assist with a bed transfer if the opportunity came up. Aziza states understanding and is agreeable to [facility] tomorrow.     Next Steps: plan for discharge to [facility]    OFELIA Shi

## 2025-05-22 NOTE — PLAN OF CARE
Goal Outcome Evaluation:      Plan of Care Reviewed With: patient    Overall Patient Progress: improvingOverall Patient Progress: improving     1900-0730     Diagnosis: Acute on chronic generalized weakness, fall, COVID-19   Orientation/Cognitive: A&OX4, [place]  Mobility Level/Assist Equipment: Ax2 GB/Bre steady  Pain Management: Tylenol   Tele/VS/O2: VSS@RA  Diet: Regular  Bowel/Bladder: jessica in place patent  Skin Concerns: BLE/knees wounds/dressing intact, gluteal cleft   Drains/Devices: PIV SL  ISO/TYPE: COVID-19, special precaution  Other info: spouse at bedside  D/ C date: TBD

## 2025-05-23 PROCEDURE — 120N000001 HC R&B MED SURG/OB

## 2025-05-23 PROCEDURE — 999N000157 HC STATISTIC RCP TIME EA 10 MIN

## 2025-05-23 PROCEDURE — 250N000013 HC RX MED GY IP 250 OP 250 PS 637: Performed by: INTERNAL MEDICINE

## 2025-05-23 PROCEDURE — 99232 SBSQ HOSP IP/OBS MODERATE 35: CPT | Performed by: INTERNAL MEDICINE

## 2025-05-23 RX ADMIN — TORSEMIDE 10 MG: 10 TABLET ORAL at 09:31

## 2025-05-23 RX ADMIN — Medication 1 CAPSULE: at 21:15

## 2025-05-23 RX ADMIN — Medication 1 CAPSULE: at 09:31

## 2025-05-23 RX ADMIN — ACETAMINOPHEN 975 MG: 325 TABLET, FILM COATED ORAL at 17:01

## 2025-05-23 RX ADMIN — DICLOFENAC 4 G: 10 GEL TOPICAL at 14:12

## 2025-05-23 RX ADMIN — AMLODIPINE BESYLATE 5 MG: 5 TABLET ORAL at 09:31

## 2025-05-23 RX ADMIN — DICLOFENAC 4 G: 10 GEL TOPICAL at 21:15

## 2025-05-23 RX ADMIN — ACETAMINOPHEN 975 MG: 325 TABLET, FILM COATED ORAL at 21:15

## 2025-05-23 RX ADMIN — ACETAMINOPHEN 975 MG: 325 TABLET, FILM COATED ORAL at 09:30

## 2025-05-23 RX ADMIN — POLYETHYLENE GLYCOL 3350 17 G: 17 POWDER, FOR SOLUTION ORAL at 09:30

## 2025-05-23 RX ADMIN — CETIRIZINE HYDROCHLORIDE 10 MG: 10 TABLET, FILM COATED ORAL at 21:15

## 2025-05-23 RX ADMIN — MICONAZOLE NITRATE: 2 POWDER TOPICAL at 09:33

## 2025-05-23 RX ADMIN — LISINOPRIL 10 MG: 10 TABLET ORAL at 09:30

## 2025-05-23 RX ADMIN — MICONAZOLE NITRATE: 2 POWDER TOPICAL at 21:15

## 2025-05-23 RX ADMIN — DICLOFENAC 4 G: 10 GEL TOPICAL at 17:04

## 2025-05-23 ASSESSMENT — ACTIVITIES OF DAILY LIVING (ADL)
ADLS_ACUITY_SCORE: 72
ADLS_ACUITY_SCORE: 71
ADLS_ACUITY_SCORE: 72
ADLS_ACUITY_SCORE: 71
ADLS_ACUITY_SCORE: 72

## 2025-05-23 NOTE — PLAN OF CARE
Goal Outcome Evaluation:    Overall Patient Progress: improving    Date/Time 5/22/2025 [id number]    Trauma/Ortho/Medical (Choose one)     Diagnosis: Covid-19 with severe sepsis, chronic weakness, and pyuria and hematuria   POD#: N/A  Mental Status: A&O x4  Activity/dangle: Assist of 2 with GB/walker or Assist of 1 with maria elena steady  Diet: Regular  Pain: Scheduled tylenol  Alonzo/Voiding: Alonzo with light to dark red urine  Tele/Restraints/Iso: Covid precautions  02/LDA: BHUPENDRA DAVID and BAIRON PIV SL  D/C Date: Possibly  Other Info: VSS, spouse at bedside

## 2025-05-23 NOTE — PROGRESS NOTES
"Care Management Follow Up    Length of Stay (days): 7    Expected Discharge Date: 05/23/2025     Concerns to be Addressed: discharge planning     Patient plan of care discussed at interdisciplinary rounds: Yes    Anticipated Discharge Disposition: Home              Anticipated Discharge Services: None  Anticipated Discharge DME: None    Patient/family educated on Medicare website which has current facility and service quality ratings: no  Education Provided on the Discharge Plan: No  Patient/Family in Agreement with the Plan: yes    Referrals Placed by CM/SW:    Private pay costs discussed: Not applicable    Discussed  Partnership in Safe Discharge Planning  document with patient/family: No     Handoff Completed: No, handoff not indicated or clinically appropriate    Additional Information:    SANJU messaged hospitalist to confirm pt is medically ready for discharge. SANJU received and faxed orders to [facility]. SANJU called [employer] transportation and set up ride for today. SANJU notified transport has to be stretcher due to covid precautions. Ride is between 2:10-2:55pm today.     Add: Hospitalist stated due to hematuria, patient is likely not leaving today, but possibly tomorrow. SANJU called [facility] to update about change in discharge plan. Ayaan at [facility] stated they prefer not to have weekend admissions, but can arrange it for pt if he is ready Saturday or Sunday. They will not be taking admissions Monday. SANJU called [employer] transport and spoke to [place] to change stretcher ride. New ride is Saturday, 5/24, between 2:10-2:55. Ayaan provided SANJU with the following information for weekend admissions:    Weekend RN phone number: [phone]  Weekend Fax for orders: [phone]      Add: SANJU received text from pt's wife stating that since pt is unable to discharge today. SANJU \"can cancel [facility] because they'll wait for [facility]\". SANJU placed call to [facility] to see if bed would be available but noted that [facility] does not " take weekend admissions, therefor patient would not be able to discharge there until Tuesday at the earliest. SW called pt's  and explained that if patient is medically cleared to discharge tomorrow and [facility] is the only accepting facility, then that would have to be the option since family chose and agreed to that facility. Aziza states understanding.     Add: SW received call from [facility] stating they are beginning to review pt and will call back with an answer.     Next Steps: PAS / PCS    OFELIA Shi

## 2025-05-23 NOTE — PLAN OF CARE
Goal Outcome Evaluation:  Trauma/Ortho/Medical (Choose one)  medical  Diagnosis: gross hematuria/bladder stones/right sided bladder mass; COVID-19 with sepsis   Mental Status: A/Ox4  Activity/dangle 2 assist/GB/maria elena-steady  Diet: regular  Pain: denies  Jessica/Voiding: jessica  Tele/Restraints/Iso: special precautions  02/LDA: RA/SLselin  D/C Date: TCU when medically cleared  Other Info: Urology consult pending due to bloody urine.

## 2025-05-23 NOTE — PROGRESS NOTES
"[facility]  WO Nurse Inpatient Assessment     Consulted for: Wound bilateral knee abrasions; coccyx & intertrigo     Summary:   Moisture associated skin damage to the gluteal cleft now healed. Patient c/o pain to coccyx. Education provided on pressure injury prevention. WOC signing off on this area.   Intertriginous dermatitis to the abdominal folds now healed. WOC signing off on this area.   Bilateral knee abrasions (POA) - improving.     WO nurse follow-up plan: weekly for knee abrasions     Patient History (according to provider note(s):      \"85 year old male with a history of BPH, generalized weakness, obstructive sleep apnea, severe, not on therapy, history of pulmonary embolism admitted on 5/15/2025. He presents to the emergency department with acute on chronic weakness 5/15/2025 resulting in a fall in his bathroom when his legs gave out beneath him.  Found to be febrile with lactic acid elevated in the 4 range and diagnosed with acute COVID-19. \"    Assessment:      Areas visualized during today's visit: Focused:, Sacrum/coccyx, Lower extremities , Abdomen, and Bilateral    Healed - Wound location: gluteal cleft    Last photo: 5/23/25    Wound due to: Moisture Associated Skin Damage (MASD)  Wound history/plan of care: TRACE on assessment. Incontinence brief removed. Small, linear partial-thickness wound to the gluteal cleft. Education provided to patient and spouse on the importance of repositioning to prevent pressure injuries, moisture management. Recommending sacral mepilex dressing to protect from friction/shear.    5/23 - Wound now healed. Patient c/o pain to buttocks. Education reinforced on importance of repositioning to offload pressure from the area and prevent wounds. Patient & spouse state understanding. Recommending continued use of sacral mepilex for protection and following PIP measures.     Wound base: 100 % Dry and intact skin      Palpation of the wound bed: normal "      Drainage: none     Description of drainage: none     Measurements (length x width x depth, in cm): healed      Tunneling: N/A     Undermining: N/A  Periwound skin: Intact and Erythema- blanchable      Color: normal and consistent with surrounding tissue      Temperature: normal   Odor: none  Pain: mild, aching and tender  Pain interventions prior to dressing change: patient tolerated well, no significant pain present , and slow and gentle cares   Treatment goal: Protection  STATUS: healed  Supplies ordered: supplies stored on unit, discussed with RN, and discussed with patient     Healed - Skin Injury Location: abdominal fold     Last photo: 5/23/25    Skin injury due to: Intertrigo  Skin history and plan of care: Interdry in use. Intertrigo to abdominal folds now healed.   Affected area:      Skin assessment: Intact     Measurements (length x width x depth, in cm) none     Color: normal and consistent with surrounding tissue     Temperature  normal      Drainage: none .      Color: none      Odor: none  Pain: denies , none  Pain interventions prior to dressing change: patient tolerated well, no significant pain present , and slow and gentle cares   Treatment goal: Heal , Decrease moisture, and Protection  STATUS: healed  Supplies ordered: at bedside, supplies stored on unit, discussed with RN, and discussed with patient      Wound location: bilateral lower extremities    Right leg       Right knee:  proximal wound 0.8 x 1.8 x 0.1 cm; 100% dry, adherent scab  Distal wound 4 x 2.1 x 0.1 cm; 100 % pale pink, fibrin, moist       Distal right lower extremity: 100% pink, moist   Measurements: 2 x 0.4 x 0.1 cm         Left knee: 90% pale pink, 10% pale yellow/gray tissue   Measurements: 1.6 x 1.7 x 0.1 cm    Last photo: 5/23  Wound due to: Trauma fall PTA   Wound history/plan of care: POC in place. Adaptic adherent and painful with removal. Recommend application of 4x4 mepilex dressings  Wound base:  see above       Palpation of the wound bed: normal      Drainage: moderate     Description of drainage: serous     Measurements (length x width x depth, in cm): see above     Tunneling: N/A     Undermining: N/A  Periwound skin: Intact      Color: pink      Temperature: normal   Odor: none  Pain: mild and during dressing change, tender  Pain interventions prior to dressing change: soaking, slow and gentle cares , and distraction  Treatment goal: Heal  and Infection control/prevention  STATUS: improving  Supplies ordered: supplies stored on unit, discussed with RN, and discussed with patient  applied to patient        Treatment Plan:     Interdry(order#[id number]): apply to skin folds   1.  Wash skin gently. Pat, do not rub.  2.  With clean scissors, cut enough fabric to cover the affected area, allowing for a minimum of 2 inches to extend beyond the skin fold for moisture evaporation.  3.  Lay a single layer of fabric in the skin fold, placing one edge into the base of the fold. Gently smooth the rest of the fabric over the skin, keeping it flat.  4.  Leave at least 2 inches of fabric exposed outside the skin fold.  5.  Secure the fabric in one of several ways: with the skin fold, with a small amount of skin-friendly tape, or tucked under clothing.  6.  Remove the fabric before bathing and reuse it when finished. When removing, gently separate the skin fold and lift away.  Helpful Hints  1. InterDry  can be written on with a ballpoint pen. It may be helpful to label each piece of InterDry  with the date you started using it.  2.  Each piece of InterDry  may be used up to 5 days, depending on fabric soiling, odor, amount of moisture and general skin condition. Replace InterDry  if it becomes soiled with blood, urine or stool.  3.  Do not use creams, ointments, or powders with InterDry  as it may interfere with product efficacy.     Pressure Injury Prevention (PIP) Plan:  -If patient is declining pressure injury prevention interventions:  "Explore reason why and address patient's concerns, Educate on pressure injury risk and prevention intervention(s), If patient is still declining, document \"informed refusal\" , and Ensure Care team is aware ( provider, charge nurse, etc)  -Mattress: Add LEAH pump to mattress PRN moisture issues  -HOB: Maintain at or below 30 degrees, unless contraindicated  -Repositioning in bed: Every 1-2 hours  and Raise foot of bed prior to raising head of bed, to reduce patient sliding down (shear)  -Heels: Keep elevated off mattress and Pillows under calves  -Protective Dressing: Sacral Mepilex for prevention (#823716),  especially for the agitated patient   -Positioning Equipment: None  -Chair positioning: Chair cushion (#541103) , Assist patient to reposition hourly, and Do NOT use a donut for sitting (this increases pressure to smaller area and creates a higher potential for injury)    -Moisture Management: Perineal cleansing /protection: Follow Incontinence Protocol, Avoid brief in bed, Clean and dry skin folds with bathing , Consider InterDry (#456421) between folds, and Moisturize dry skin  -Under Devices: Inspect skin under all medical devices during skin inspection , Ensure tubes are stabilized without tension, and Ensure patient is not lying on medical devices or equipment when repositioned    Coccyx wound: every other day and prn for soilage  Cleanse with MicroKlenz wound cleanser. Pat dry.    Apply a Sacral mepilex dressing.   Please attach a low air loss pump to mattress for improved moisture control.   Follow PIP orders. Assist patient to reposition.     Bilateral leg wounds: every other day and prn for soilage  1. Cleanse with MicroKlenz wound cleanser. Pat dry.   2. Apply 4x4 mepilex dressings    Orders: Reviewed and Updated    RECOMMEND PRIMARY TEAM ORDER: None, at this time  Education provided: plan of care  Discussed plan of care with: Patient, Family, and Nurse  Notify WOC if wound(s) deteriorate.  Nursing to " "notify the Provider(s) and re-consult the [facility] Nurse if new skin concern.    DATA:     Current support surface: Standard  Standard gel mattress (Isoflex)  Containment of urine/stool: Incontinence Protocol and Incontinent pad in bed  BMI: Body mass index is 33.91 kg/m .   Active diet order: Orders Placed This Encounter      Combination Diet Regular Diet Adult      Diet     Output: I/O last 3 completed shifts:  In: -   Out: 1250 [Urine:1250]     Labs:   Recent Labs   Lab 05/21/25  0912 05/20/25  1424   ALBUMIN  --  2.7*   HGB 12.4*  --    WBC 10.0  --      Pressure injury risk assessment:   Sensory Perception: 4-->no impairment  Moisture: 4-->rarely moist  Activity: 2-->chairfast  Mobility: 3-->slightly limited  Nutrition: 3-->adequate  Friction and Shear: 2-->potential problem  Hunter Score: 18    Janet Edmond RN, CWON  Please contact via [employer] at group \"[facility] nurse\"- M-F [phone]    "

## 2025-05-23 NOTE — PROVIDER NOTIFICATION
05/22/25 5091   Tech Time   $Tech Time (10 minute increments) 1   Mode: CPAP/ BiPAP/ AVAPS/ AVAPS AE   CPAP/BiPAP/ AVAPS/ AVAPS AE Mode CPAP   Equipment   Device Resmed   CPAP/BiPAP/Settings   BIPAP/CPAP On Standby Standby  (pt refused)

## 2025-05-23 NOTE — PROGRESS NOTES
CLINICAL NUTRITION SERVICES - BRIEF NOTE    Reviewed nutrition risk factors for LOS. Pt is tolerating Regular diet, ordering adequately, and good/adequate oral intake per nursing documentation. Reviewed wt hx, no unintentional wt loss PTA. No pressure injuries; WOCN following for MASD, Intertrigo, and trauma wounds. No nutrition interventions at this time.     - Please formally consult should a nutrition concern arise.     Monitoring/Evaluation  Will continue to monitor and evaluate per protocol.

## 2025-05-23 NOTE — PLAN OF CARE
Goal Outcome Evaluation:    A&Ox4. VSS on RA. Ax2, lift. Pt denied pain during shift. T&Rs when in bed. Seat cushion delivered. Mepi on bottom. WOC nurse changed dressings. Alonzo in place with hematuria. Urology following. NPO at midnight. Discharge pending.

## 2025-05-23 NOTE — PROGRESS NOTES
[facility]    Hospitalist Progress Note    Date of Service (when I saw the patient): 05/23/2025  Admit date: 5/15/2025    Interval History   Chart reviewed, discussed with bedside RN patient and his wife at bedside  Patient is resting comfortably in bed.  Denies any shortness of breath.  Currently on room air.  Dannie hematuria noted in the Alonzo bag.  Urology consultation requested.  Discharge canceled.  No other acute issues    Assessment & Plan   Shilo Menchaca is a 85 year old male with a history of BPH, generalized weakness, obstructive sleep apnea, severe, not on therapy, history of pulmonary embolism admitted on 5/15/2025. He presents to the emergency department with acute on chronic weakness 5/15/2025 resulting in a fall in his bathroom when his legs gave out beneath him.  Found to be febrile with lactic acid elevated in the 4 range and diagnosed with acute COVID-19.    T 99.9, P 124, /78, 15, O2 94% on RA  Na 137, K 3.9, Cr 1.19 (stable), alk phos 157, , CK 5,076, glucose 174.   Lactic acid 2.1 > 1.7.   WBC 13.1, hgb 14.8, plt 148  UA WBC 13, RBC 48   BCx, UCx pending.   Covid + RSV, flu negative  CXR clear  CT abdomen/pelvis: focal mass like thickening of the bladder wall on th R 3.1 cm, concerning for neoplasm.  Tiny nonobstructing stones in the bladder and left renal collecting system no hydronephrosis.   Advanced coronary artery calcification.    Acute COVID-19 with associated severe sepsis: Lactic acid elevated to 4.0, leukocytosis at 13.1, febrile to 100.6 with EMS, tachycardic to 124 bpm on arrival.  Wife has cough and cold symptoms.  Infrequent cough noted with patient as well, though he was unaware of this.  Pyuria, with bladder tumor (see below)   No respiratory symptoms, not hypoxic and requiring no supplemental oxygen.  Completed Remdesivir x 3 days ordered given increased risk of decompensation with advanced age and obesity.  Paxlovid no longer  available on formulary  Oxygen PRN  Special precautions  Subcutaneous Lovenox prophylaxis noting history of pulmonary embolism in 2021  Placed on IV zosyn for potential UTI.   Follow blood and urine cultures. NGTD. > stopped IV zosyn on 5/17/25 .     On 05/20/25, subjective fevers and chills, does not feel well today.   No localizing symptoms (see above history)   Check UA with reflect to UCx. Jessica placed 5/19 in early AM, so does not need to be replaced.   CMP, BCx ordered.   CXR yesterday showed no developing infiltrates and no signs to suggest developing pneumonia.  Patient is medically ready for discharge.  Needs TCU placement    Recent Labs   Lab 05/21/25  0912 05/20/25  0855 05/19/25  1124 05/18/25  0759 05/17/25  0721   WBC 10.0 9.8 8.4 5.9 7.0   HGB 12.4* 12.9* 13.9 14.0 13.4    154 155 125* 125*      Gross hematuria  Bladder stones  Right sided bladder mass: 3 cm soft tissue mass in bladder suspicious for malignancy.  Note that on historical imaging he did have an adjacent iliac chain or perivesicular lymph node in this region.  Pyuria   Renal retention, jessica placed on 05/16/25    BPH - Status post greenlight photo vaporization and cystoscopy for bladder stone extraction September 2021 w/ Dr Man.  CT abdomen/pelvis 05/16/25: focal mass like thickening of the bladder wall on th R 3.1 cm, concerning for neoplasm.  Tiny nonobstructing stones in the bladder and left renal collecting system no hydronephrosis.   [facility] consulted for anticipated future cystoscopy.  Discussed bladder mass finding with patient as well as wife at bedside  Urine culture remain negative.  Zosyn discontinued  Urology recommends a CT urogram and cystoscopy as outpatient through Dr. Man. Did not comment on retention or hematuria.   On 05/17/25, urine remains pink, no obstructing clots, good flow.   Removed catheter AM of 5/18/25, failed trial of void > replaced, and will remain in place at discharge with Urology  follow (CM consult placed to make sure appt within 2 weeks).     Will discharge with Alonzo to TCU as noted above    Patient had kaley blood in the Alonzo bag on 523 morning.  DVT prophylaxis with Lovenox discontinued due to hematuria.  Urology reconsultation requested due to kaley hematuria on 5/23/2025    History of pulmonary embolism: Diagnosed with pulmonary embolism after incidental finding on CT urogram with right upper and lower lobe pulmonary emboli June 2021. Had a brief hospitalization for weakness around that time, so I believe was thought to be provoked. Completed ~3 months of anticoagulation  Interim subcutaneous Lovenox discontinued due to hematuria on 5/23/2025    Generalized weakness and physical deconditioning: Ambulates with a walker at home at baseline, but weakness and deconditioning has been a longstanding issue.  Note that patient had been following with outpatient physical therapy for generalized weakness summer 2022, through the first half of 2023, and was rereferred to physical therapy in 2024, completing outpatient therapy in December.   Care management consultation for disposition planning.  Anticipate TCU discharge, and discussed this with patient and wife at time of admission.  Physical therapy consulted. Last seen on 5/16.   Fall precautions  Get out of bed and attempt to ambulate with assistance 3 times daily.  Utilizes a walker at baseline, but very minimal activity. Spends most of the day in a chair. Discussed with RN and NA on 05/19/25. We must do our best    Chronic mild sinus tachycardia - No associated chest pain, SOB, hypotension, fever   * On 05/19/25 reviewed prior visits going back about 1 year, HR has been in the mid-90s to low 100s.     Coccyx wound, bilateral knee abrasions, intertrigo present at admission  [facility] nurse consulted.    Severe obstructive sleep apnea significant nocturnal hypoxemia: Seen through [facility] sleep medicine clinic and was recommended CPAP early  "March 2025.  Patient declined, was interested in potentially trialing oral dental device, which his pulmonology team did not feel would treat his degree of sleep apnea.  Possible that nocturnal hypoxia is contributing to weakness and certainly contributing to daytime fatigue.  - Oximetry, oxygen as needed     Mild CK elevation: CK elevated to the 5000 range.  Compartments are soft with no report of pain other than knee abrasions, but was down on the bathroom floor for approximately 1.5 hours.  CK could also be elevated secondary to viral inflammation.  Lower extremity edema  CKD, stable baseline Cr 1.2.   Compression stockings to bilateral lower extremities  S/p IVF hydration, stopped on 5/16/25 when CK < 5000.   Intake and output, daily weights  Repeat CK in a.m., trend to significant improvement.  Resume prior to admission torsemide on 05/17/25      Hypertension:  Continue PTA amlodipine-benazepril  Resume PTA torsemide on 05/17/25   Had brisk urine output of 1450 cc.    Note blood pressure still elevated on 05/18/25, but improving.   PO hydralazine ordered PRN for SBP over 180. Otherwise, monitor  for now.  Asymptomatic without chest pain, headache or shortness of breath.    Chronic constipation  With multiple loose stools on 5/18  No associated abdominal pain or cramping WBC normal.  Held PTA MiraLAX on 05/19/25 > resume on 05/19/25     Clinically Significant Risk Factors               # Hypoalbuminemia: Lowest albumin = 2.7 g/dL at 5/20/2025  2:24 PM, will monitor as appropriate                # Obesity: Estimated body mass index is 33.91 kg/m  as calculated from the following:    Height as of this encounter: 1.676 m (5' 6\").    Weight as of this encounter: 95.3 kg (210 lb 1.6 oz).        # Financial/Environmental Concerns: none           Diet: Orders Placed This Encounter      Combination Diet Regular Diet Adult      Diet     IVF: None currently  DVT Prophylaxis: enoxaparin discontinued due to hematuria.  " PCD's ordered  Alonzo Catheter: PRESENT, indication: Acute retention or obstruction, Acute retention or obstruction    No CPR- Do NOT Intubate    PT/OT TCU recommended, family is hoping for CYNDEE Mir consulted  Communication: Discussed with wife, RN, CYNDEE on 05/21/25     Medically Ready for Discharge: Anticipated in 2-4 Days     if hematuria improves    Jessenia Bates MD    Hospitalist Service  [facility]  Securely message with the Vocera Web Console (learn more here)  Text page via [employer] Paging/Directory      -Data reviewed today: I reviewed all new labs and imaging results over the last 24 hours. I personally reviewed no images or EKG's today.    Physical Exam   Temp: 98.5  F (36.9  C) Temp src: Oral BP: 109/61 Pulse: 102   Resp: 14 SpO2: 97 % O2 Device: None (Room air)    Vitals:    05/20/25 0638 05/21/25 0702 05/22/25 0649   Weight: 91.5 kg (201 lb 11.5 oz) 93.1 kg (205 lb 4 oz) 95.3 kg (210 lb 1.6 oz)     Vital Signs with Ranges  Temp:  [97.2  F (36.2  C)-98.5  F (36.9  C)] 98.5  F (36.9  C)  Pulse:  [] 102  Resp:  [14-18] 14  BP: (109-134)/(61-73) 109/61  SpO2:  [95 %-97 %] 97 %  I/O last 3 completed shifts:  In: -   Out: 1250 [Urine:1250]    Today's Exam  Constitutional: Patient is resting comfortably in bed, not in acute distress, appears fatigued  Neuropsyche:  tired but oriented, answers questions appropriately. Speech normal, face symmetric   Respiratory:  Breathing comfortably, good air exchange, no wheezes, no crackles.   Cardiovascular:  Regular rate and rhythm, no edema.  GI:  soft, NT/ND, BS normal  Skin/Integumen:  No acute rash or sign of bleeding.  Dannie bloody urine noted in the Alonzo bag    Medications   All medications reviewed on 05/20/25   Current Facility-Administered Medications   Medication Dose Route Frequency Provider Last Rate Last Admin    Patient is already receiving anticoagulation with heparin, enoxaparin (LOVENOX), warfarin (COUMADIN)  or other  anticoagulant medication   Does not apply Continuous PRN Mil Griffiths MD         Current Facility-Administered Medications   Medication Dose Route Frequency Provider Last Rate Last Admin    acetaminophen (TYLENOL) tablet 975 mg  975 mg Oral TID Libra Stokes MD   975 mg at 05/23/25 0930    amLODIPine (NORVASC) tablet 5 mg  5 mg Oral Daily Libra Stokes MD   5 mg at 05/23/25 0931    And    lisinopril (ZESTRIL) tablet 10 mg  10 mg Oral Daily Libra Stokes MD   10 mg at 05/23/25 0930    cetirizine (zyrTEC) tablet 10 mg  10 mg Oral QPM Libra Stokes MD   10 mg at 05/22/25 2023    diclofenac (VOLTAREN) 1 % topical gel 4 g  4 g Topical 4x Daily Libra Stokes MD   4 g at 05/22/25 1123    lactobacillus rhamnosus (GG) (CULTURELL) capsule 1 capsule  1 capsule Oral BID Libra Stokes MD   1 capsule at 05/23/25 0931    miconazole (MICATIN) 2 % powder   Topical BID Libra Stokes MD   Given at 05/23/25 0933    polyethylene glycol (MIRALAX) Packet 17 g  17 g Oral Daily Libra Stokes MD   17 g at 05/23/25 0930    sodium chloride (PF) 0.9% PF flush 3 mL  3 mL Intracatheter Q8H [facility] Mil Griffiths MD   3 mL at 05/22/25 2033    torsemide (DEMADEX) tablet 10 mg  10 mg Oral Daily Libra Stokes MD   10 mg at 05/23/25 0931     PRN Meds:   Current Facility-Administered Medications   Medication Dose Route Frequency Provider Last Rate Last Admin    artificial saliva (BIOTENE MT) solution 2 spray  2 spray Mouth/Throat Q1H PRN Mil Griffiths MD        bisacodyl (DULCOLAX) suppository 10 mg  10 mg Rectal Daily PRN Mil Griffiths MD        calcium carbonate (TUMS) chewable tablet 1,000 mg  1,000 mg Oral 4x Daily PRN Mil Griffiths MD        hydrALAZINE (APRESOLINE) tablet 10 mg  10 mg Oral 4x Daily PRN Libra Stokes MD        HYDROmorphone (DILAUDID) injection 0.2 mg  0.2 mg Intravenous Q2H PRN Mil Griffiths MD        HYDROmorphone (DILAUDID) injection 0.4 mg  0.4 mg Intravenous Q2H PRN  Griffiths, Mil Dial MD        lidocaine (LMX4) cream   Topical Q1H PRN Griffiths, Mil Dial MD        lidocaine 1 % 0.1-1 mL  0.1-1 mL Other Q1H PRN Griffiths, Mil Dial MD        melatonin tablet 1 mg  1 mg Oral At Bedtime PRN Griffiths, Mil Dial MD        naloxone (NARCAN) injection 0.2 mg  0.2 mg Intravenous Q2 Min PRN Griffiths, Mil Dial MD        Or    naloxone (NARCAN) injection 0.4 mg  0.4 mg Intravenous Q2 Min PRN Griffiths, Mil Dial MD        Or    naloxone (NARCAN) injection 0.2 mg  0.2 mg Intramuscular Q2 Min PRN Griffiths, Mil Dial MD        Or    naloxone (NARCAN) injection 0.4 mg  0.4 mg Intramuscular Q2 Min PRN Griffiths, Mil Dial MD        ondansetron (ZOFRAN ODT) ODT tab 4 mg  4 mg Oral Q6H PRN Griffiths, Mil Dial MD        Or    ondansetron (ZOFRAN) injection 4 mg  4 mg Intravenous Q6H PRN Griffiths, Mil Dial MD        oxyCODONE (ROXICODONE) tablet 5 mg  5 mg Oral Q4H PRN Griffiths, Mil Dial MD        oxyCODONE IR (ROXICODONE) half-tab 2.5 mg  2.5 mg Oral Q4H PRN Griffiths, Mil Dial MD        Patient is already receiving anticoagulation with heparin, enoxaparin (LOVENOX), warfarin (COUMADIN)  or other anticoagulant medication   Does not apply Continuous PRN Griffiths, Mil Dial MD        polyethylene glycol (MIRALAX) Packet 17 g  17 g Oral BID PRN Griffiths, Mil Dial MD        prochlorperazine (COMPAZINE) injection 5 mg  5 mg Intravenous Q6H PRN Griffiths, Mil Dial MD        Or    prochlorperazine (COMPAZINE) tablet 5 mg  5 mg Oral Q6H PRN Griffiths, Mil Dial MD        senna-docusate (SENOKOT-S/PERICOLACE) 8.6-50 MG per tablet 1 tablet  1 tablet Oral BID PRN Griffiths, Mil Dial MD        Or    senna-docusate (SENOKOT-S/PERICOLACE) 8.6-50 MG per tablet 2 tablet  2 tablet Oral BID PRN Griffiths, Mil Dial MD        sodium chloride (PF) 0.9% PF flush 3 mL  3 mL Intracatheter q1 min prn Mil Griffiths MD   3 mL at 05/21/25 0840       Data   Recent Labs   Lab 05/22/25  0820 05/21/25  0912 05/20/25  1424 05/20/25  0841  05/19/25  1124   WBC  --  10.0  --  9.8 8.4   HGB  --  12.4*  --  12.9* 13.9   MCV  --  99  --  98 98   PLT  --  158  --  154 155     --  134*  --   --    POTASSIUM 3.9  --  3.8  --   --    CHLORIDE 101  --  97*  --   --    CO2 27  --  27  --   --    BUN 23.6*  --  25.4*  --   --    CR 1.14 1.24* 1.12  --   --    ANIONGAP 8  --  10  --   --    SABRINA 8.2*  --  8.1*  --   --    *  --  156*  --   --    ALBUMIN  --   --  2.7*  --   --    PROTTOTAL  --   --  5.3*  --   --    BILITOTAL  --   --  0.3  --   --    ALKPHOS  --   --  148  --   --    ALT  --   --  58  --   --    AST  --   --  72*  --   --        No results found for this or any previous visit (from the past 24 hours).

## 2025-05-23 NOTE — PROGRESS NOTES
"  Urology Progress Note    Name: Shilo Menchaca    MRN: [id number]   YOB: 1940  Date of Admission: 5/15/2025               Impression and Plan:   Impression / Plan:   Shilo Menchaca is a 85 year old male with history of BPH s/p PVP in 2021, nephrolithiasis and bladder stones, obstructive sleep apnea, prior PE on anticoagulation, hypertension who presented to the emergency department after a fall at home and was found to be febrile with acute COVID, as well as hematuria and a possible bladder mass on CT imaging for which urology was consulted at that time. Outpatient cystoscopy, CT Urogram, and urine cytology were recommended at that time. He received course of Zosyn for possible UTI as well, stopped 5/17.  He was also found to be retaining 5/16 and had a jessica catheter placed, on 5/18 he failed TOV retaining close to 1L and jessica was replaced. This morning, 5/23, found to have kaley blood in the Jessica bag for which urology has been reconsulted. DVT prophylaxis with Lovenox has been discontinued due to hematuria.      Vital signs:  Temp: 98.5  F (36.9  C) Temp src: Oral BP: 109/61 Pulse: 102   Resp: 14 SpO2: 97 % O2 Device: None (Room air) Oxygen Delivery: 2 LPM Height: 167.6 cm (5' 6\") Weight: 95.3 kg (210 lb 1.6 oz)  Estimated body mass index is 33.91 kg/m  as calculated from the following:    Height as of this encounter: 1.676 m (5' 6\").    Weight as of this encounter: 95.3 kg (210 lb 1.6 oz).    Labs:  Cr 1.14  WBC 10  Hgb 12.4  Urine Culture with no growth  UA (5/20) with 182+ WBC/HPF, 182+ RBC/HPF, nitrite negative.    Physical Exam:  General: well-appearing  HEENT: Normocephalic and atraumatic.   CV: Regular rate, non-cyanotic in appearance   Pulm: Normal respiratory effort supplemented by nasal cannula O2  Abdomen: Abdomen soft, non-tender and non-distended.   : jessica catheter in place secured via statlock.   Neuro: CNII-XII grossly intact    Urine: Small amount of Grade IV hematuria " in jessica bag, nothing in jessica tubing.        -Hand irrigated with 1 L of NS, few small clots on return, clearing up to a light pink.   -Hand irrigate PRN for high grade hematuria/clots/obstruction.  -Will continue to monitor, NPO @ midnight for reassessment in the AM.     Discussed with Dr. Man    Thank you for the opportunity to participate in the care of Shilo Menchaca.     GARY Brand, CNP  [facility] - Department of Urology  Office: [phone]  After business hours: [phone]  Securely message with [employer]

## 2025-05-24 LAB
ERYTHROCYTE [DISTWIDTH] IN BLOOD BY AUTOMATED COUNT: 14.1 % (ref 10–15)
HCT VFR BLD AUTO: 25.3 % (ref 40–53)
HGB BLD-MCNC: 8.4 G/DL (ref 13.3–17.7)
MCH RBC QN AUTO: 33.3 PG (ref 26.5–33)
MCHC RBC AUTO-ENTMCNC: 33.2 G/DL (ref 31.5–36.5)
MCV RBC AUTO: 100 FL (ref 78–100)
PLATELET # BLD AUTO: 189 10E3/UL (ref 150–450)
RBC # BLD AUTO: 2.52 10E6/UL (ref 4.4–5.9)
WBC # BLD AUTO: 8 10E3/UL (ref 4–11)

## 2025-05-24 PROCEDURE — 99232 SBSQ HOSP IP/OBS MODERATE 35: CPT | Performed by: INTERNAL MEDICINE

## 2025-05-24 PROCEDURE — 36415 COLL VENOUS BLD VENIPUNCTURE: CPT | Performed by: INTERNAL MEDICINE

## 2025-05-24 PROCEDURE — 85018 HEMOGLOBIN: CPT | Performed by: INTERNAL MEDICINE

## 2025-05-24 PROCEDURE — 99232 SBSQ HOSP IP/OBS MODERATE 35: CPT | Performed by: UROLOGY

## 2025-05-24 PROCEDURE — 120N000001 HC R&B MED SURG/OB

## 2025-05-24 PROCEDURE — 250N000013 HC RX MED GY IP 250 OP 250 PS 637: Performed by: INTERNAL MEDICINE

## 2025-05-24 RX ADMIN — DICLOFENAC 4 G: 10 GEL TOPICAL at 13:20

## 2025-05-24 RX ADMIN — ACETAMINOPHEN 975 MG: 325 TABLET, FILM COATED ORAL at 17:35

## 2025-05-24 RX ADMIN — ACETAMINOPHEN 975 MG: 325 TABLET, FILM COATED ORAL at 11:06

## 2025-05-24 RX ADMIN — DICLOFENAC 4 G: 10 GEL TOPICAL at 11:10

## 2025-05-24 RX ADMIN — DICLOFENAC 4 G: 10 GEL TOPICAL at 17:36

## 2025-05-24 RX ADMIN — MICONAZOLE NITRATE: 2 POWDER TOPICAL at 20:34

## 2025-05-24 RX ADMIN — CETIRIZINE HYDROCHLORIDE 10 MG: 10 TABLET, FILM COATED ORAL at 20:33

## 2025-05-24 RX ADMIN — Medication 1 CAPSULE: at 11:06

## 2025-05-24 RX ADMIN — Medication 1 CAPSULE: at 20:33

## 2025-05-24 RX ADMIN — ACETAMINOPHEN 975 MG: 325 TABLET, FILM COATED ORAL at 22:35

## 2025-05-24 RX ADMIN — TORSEMIDE 10 MG: 10 TABLET ORAL at 11:06

## 2025-05-24 RX ADMIN — POLYETHYLENE GLYCOL 3350 17 G: 17 POWDER, FOR SOLUTION ORAL at 13:14

## 2025-05-24 RX ADMIN — MICONAZOLE NITRATE: 2 POWDER TOPICAL at 11:13

## 2025-05-24 RX ADMIN — AMLODIPINE BESYLATE 5 MG: 5 TABLET ORAL at 11:06

## 2025-05-24 ASSESSMENT — ACTIVITIES OF DAILY LIVING (ADL)
ADLS_ACUITY_SCORE: 72
ADLS_ACUITY_SCORE: 74
ADLS_ACUITY_SCORE: 72
ADLS_ACUITY_SCORE: 72
ADLS_ACUITY_SCORE: 74
ADLS_ACUITY_SCORE: 76
ADLS_ACUITY_SCORE: 76
ADLS_ACUITY_SCORE: 75
ADLS_ACUITY_SCORE: 72
ADLS_ACUITY_SCORE: 74
ADLS_ACUITY_SCORE: 74
ADLS_ACUITY_SCORE: 73
ADLS_ACUITY_SCORE: 72
ADLS_ACUITY_SCORE: 76
ADLS_ACUITY_SCORE: 73
ADLS_ACUITY_SCORE: 72
ADLS_ACUITY_SCORE: 74
ADLS_ACUITY_SCORE: 72
ADLS_ACUITY_SCORE: 76
ADLS_ACUITY_SCORE: 72
ADLS_ACUITY_SCORE: 72

## 2025-05-24 NOTE — PROGRESS NOTES
5/23 1900-0730     Diagnosis: Gross hematuria/bladder stones/right sided bladder mass;                   COVID-19 with sepsis     Mental Status: A&Ox4  Activity/dangle: [email or web address]  GB/maria elena-steady  Diet: NPO  Pain: denies  Jessica/Voiding: jessica in place  Tele/Restraints/Iso: special precautions  02/LDA: MAKAYLA PHIPPS  D/C Date: TCU when medically cleared  Other Info: Urology consult. Mepilex In place on buttocks, blanchable redness, turn/repo.  BLE scabs mepilex CDI.

## 2025-05-24 NOTE — PROGRESS NOTES
Care Management Follow Up    Length of Stay (days): 8    Expected Discharge Date: 05/25/2025     Concerns to be Addressed: discharge planning     Patient plan of care discussed at interdisciplinary rounds: Yes    Anticipated Discharge Disposition: TCU placement              Anticipated Discharge Services: Therapy  Anticipated Discharge DME: None    Patient/family educated on Medicare website which has current facility and service quality ratings: no  Education Provided on the Discharge Plan: No  Patient/Family in Agreement with the Plan: yes    Referrals Placed by CM/SW:  TCU referrals  Private pay costs discussed: Not applicable    Discussed  Partnership in Safe Discharge Planning  document with patient/family: No     Handoff Completed: No, handoff not indicated or clinically appropriate    Additional Information:  Received a call from patient's wife asking for a referral to be sent to [facility] as she states she no longer wants patient going to [facility] she would prefer [facility].  In reviewing the discharge navigator it appears that a referral has been sent to [facility].  Sent a message to patient's MD who states that patient has to be seen by Urology and he is NPO therefore he will not be discharge today so canceled the transport and rescheduled the ride for between 14:10-14:55 tomorrow.  At this time the destination is still [facility] as we have not confirmed a bed at [facility].  Resent the referral to [facility].  Spoke with Devorah, [phone].  She was asking questions about patient's COVID status and precautions.  Reviewed chart and updated Devorah.  She states she is unable to accept patient today, possibly tomorrow.  She is asking for a follow up after the MD sees patient.  Call placed to update patient's wife.  She states that [facility] can accept patient.  Explained what they told me.  Patient's wife is asking for the MD to call her.  Updated patient's MD with this  request.    Next Steps:   Follow up with [facility].  Complete PAS.  Complete PCS.        AUDI Aleman, [facility]    [phone]  [facility]

## 2025-05-24 NOTE — PROGRESS NOTES
[facility]    Hospitalist Progress Note    Date of Service (when I saw the patient): 05/24/2025  Admit date: 5/15/2025    Interval History   Chart reviewed, discussed with bedside RN patient and his wife at bedside  Patient is resting comfortably in bed.  Denies any shortness of breath.  Currently on room air.  Hematuria resolved now.  Having clear-colored urine in the Alonzo bag    Assessment & Plan   Shilo Menchaca is a 85 year old male with a history of BPH, generalized weakness, obstructive sleep apnea, severe, not on therapy, history of pulmonary embolism admitted on 5/15/2025. He presents to the emergency department with acute on chronic weakness 5/15/2025 resulting in a fall in his bathroom when his legs gave out beneath him.  Found to be febrile with lactic acid elevated in the 4 range and diagnosed with acute COVID-19.    T 99.9, P 124, /78, 15, O2 94% on RA  Na 137, K 3.9, Cr 1.19 (stable), alk phos 157, , CK 5,076, glucose 174.   Lactic acid 2.1 > 1.7.   WBC 13.1, hgb 14.8, plt 148  UA WBC 13, RBC 48   BCx, UCx pending.   Covid + RSV, flu negative  CXR clear  CT abdomen/pelvis: focal mass like thickening of the bladder wall on th R 3.1 cm, concerning for neoplasm.  Tiny nonobstructing stones in the bladder and left renal collecting system no hydronephrosis.   Advanced coronary artery calcification.    Acute COVID-19 with associated severe sepsis: Lactic acid elevated to 4.0, leukocytosis at 13.1, febrile to 100.6 with EMS, tachycardic to 124 bpm on arrival.  Wife has cough and cold symptoms.  Infrequent cough noted with patient as well, though he was unaware of this.  Pyuria, with bladder tumor (see below)   No respiratory symptoms, not hypoxic and requiring no supplemental oxygen.  Completed Remdesivir x 3 days ordered given increased risk of decompensation with advanced age and obesity.  Paxlovid no longer available on formulary  Oxygen PRN  Special  precautions  Subcutaneous Lovenox prophylaxis noting history of pulmonary embolism in 2021  Placed on IV zosyn for potential UTI.   Follow blood and urine cultures. NGTD. > stopped IV zosyn on 5/17/25 .     On 05/20/25, subjective fevers and chills, does not feel well today.   No localizing symptoms (see above history)   Check UA with reflect to UCx. Jessica placed 5/19 in early AM, so does not need to be replaced.   CMP, BCx ordered.   CXR yesterday showed no developing infiltrates and no signs to suggest developing pneumonia.  Patient is medically ready for discharge.  Needs TCU placement    Recent Labs   Lab 05/24/25  0627 05/21/25  0912 05/20/25  0855 05/19/25  1124 05/18/25  0759   WBC 8.0 10.0 9.8 8.4 5.9   HGB 8.4* 12.4* 12.9* 13.9 14.0    158 154 155 125*      Gross hematuria  Bladder stones  Right sided bladder mass: 3 cm soft tissue mass in bladder suspicious for malignancy.  Note that on historical imaging he did have an adjacent iliac chain or perivesicular lymph node in this region.  Pyuria   Renal retention, jessica placed on 05/16/25    Acute blood loss anemia secondary to hematuria  BPH - Status post greenlight photo vaporization and cystoscopy for bladder stone extraction September 2021 w/ Dr Man.  CT abdomen/pelvis 05/16/25: focal mass like thickening of the bladder wall on th R 3.1 cm, concerning for neoplasm.  Tiny nonobstructing stones in the bladder and left renal collecting system no hydronephrosis.   [place] [facility] consulted for anticipated future cystoscopy.  Discussed bladder mass finding with patient as well as wife at bedside  Urine culture remain negative.  Zosyn discontinued  Urology recommends a CT urogram and cystoscopy as outpatient through Dr. Man. Did not comment on retention or hematuria.   On 05/17/25, urine remains pink, no obstructing clots, good flow.   Removed catheter AM of 5/18/25, failed trial of void > replaced, and will remain in place at discharge with Urology  follow (CM consult placed to make sure appt within 2 weeks).     Will discharge with Alonzo to TCU as noted above    Patient had kaley blood in the Alonzo bag on 523 morning.  DVT prophylaxis with Lovenox discontinued due to hematuria.  Urology reconsultation requested due to kaley hematuria on 5/23/2025.  Patient's Alonzo urinary was irrigated.  Hematuria improved patient is having clear urine on 5/24/2025  Hemoglobin dropped from 12-8.4    History of pulmonary embolism: Diagnosed with pulmonary embolism after incidental finding on CT urogram with right upper and lower lobe pulmonary emboli June 2021. Had a brief hospitalization for weakness around that time, so I believe was thought to be provoked. Completed ~3 months of anticoagulation  Interim subcutaneous Lovenox discontinued due to hematuria on 5/23/2025    Generalized weakness and physical deconditioning: Ambulates with a walker at home at baseline, but weakness and deconditioning has been a longstanding issue.  Note that patient had been following with outpatient physical therapy for generalized weakness summer 2022, through the first half of 2023, and was rereferred to physical therapy in 2024, completing outpatient therapy in December.   Care management consultation for disposition planning.  Anticipate TCU discharge, and discussed this with patient and wife at time of admission.  Physical therapy consulted. Last seen on 5/16.   Fall precautions  Get out of bed and attempt to ambulate with assistance 3 times daily.  Utilizes a walker at baseline, but very minimal activity. Spends most of the day in a chair. Discussed with RN and NA on 05/19/25. We must do our best    Chronic mild sinus tachycardia - No associated chest pain, SOB, hypotension, fever   * On 05/19/25 reviewed prior visits going back about 1 year, HR has been in the mid-90s to low 100s.     Coccyx wound, bilateral knee abrasions, intertrigo present at admission  [facility] nurse consulted.    Severe  "obstructive sleep apnea significant nocturnal hypoxemia: Seen through [facility] sleep medicine clinic and was recommended CPAP early March 2025.  Patient declined, was interested in potentially trialing oral dental device, which his pulmonology team did not feel would treat his degree of sleep apnea.  Possible that nocturnal hypoxia is contributing to weakness and certainly contributing to daytime fatigue.  - Oximetry, oxygen as needed     Mild CK elevation: CK elevated to the 5000 range.  Compartments are soft with no report of pain other than knee abrasions, but was down on the bathroom floor for approximately 1.5 hours.  CK could also be elevated secondary to viral inflammation.  Lower extremity edema  CKD, stable baseline Cr 1.2.   Compression stockings to bilateral lower extremities  S/p IVF hydration, stopped on 5/16/25 when CK < 5000.   Intake and output, daily weights  Repeat CK in a.m., trend to significant improvement.  Resume prior to admission torsemide on 05/17/25      Hypertension:  Continue PTA amlodipine-benazepril  Resume PTA torsemide on 05/17/25   Had brisk urine output of 1450 cc.    Note blood pressure still elevated on 05/18/25, but improving.   PO hydralazine ordered PRN for SBP over 180. Otherwise, monitor  for now.  Asymptomatic without chest pain, headache or shortness of breath.    Chronic constipation  With multiple loose stools on 5/18  No associated abdominal pain or cramping WBC normal.  Held PTA MiraLAX on 05/19/25 > resume on 05/19/25     Clinically Significant Risk Factors               # Hypoalbuminemia: Lowest albumin = 2.7 g/dL at 5/20/2025  2:24 PM, will monitor as appropriate                # Obesity: Estimated body mass index is 33.91 kg/m  as calculated from the following:    Height as of this encounter: 1.676 m (5' 6\").    Weight as of this encounter: 95.3 kg (210 lb 1.6 oz).        # Financial/Environmental Concerns: none           Diet: Orders Placed This Encounter    "   Diet      Regular Diet Adult     IVF: None currently  DVT Prophylaxis: enoxaparin discontinued due to hematuria.  PCD's ordered  Alonzo Catheter: PRESENT, indication: Acute retention or obstruction, Acute retention or obstruction    No CPR- Do NOT Intubate    PT/OT TCU recommended, family is hoping for CYNDEE Mir consulted  Communication: Discussed with wife, RN, CYNDEE on 05/21/25     Medically Ready for Discharge: Ready Now    Needs TCU on discharge    Jessenia Bates MD    Hospitalist Service  [facility]  Securely message with the Vocera Web Console (learn more here)  Text page via [employer] Paging/Directory      -Data reviewed today: I reviewed all new labs and imaging results over the last 24 hours. I personally reviewed no images or EKG's today.    Physical Exam   Temp: 98.1  F (36.7  C) Temp src: Oral BP: 137/77 Pulse: 102   Resp: 18 SpO2: 96 % O2 Device: None (Room air)    Vitals:    05/20/25 0638 05/21/25 0702 05/22/25 0649   Weight: 91.5 kg (201 lb 11.5 oz) 93.1 kg (205 lb 4 oz) 95.3 kg (210 lb 1.6 oz)     Vital Signs with Ranges  Temp:  [97.7  F (36.5  C)-98.3  F (36.8  C)] 98.1  F (36.7  C)  Pulse:  [] 102  Resp:  [16-18] 18  BP: ()/(66-77) 137/77  SpO2:  [95 %-97 %] 96 %  I/O last 3 completed shifts:  In: 400 [P.O.:400]  Out: 1750 [Urine:1750]    Today's Exam  Constitutional: Patient is resting comfortably in bed, not in acute distress, appears fatigued  Neuropsyche:  tired but oriented, answers questions appropriately. Speech normal, face symmetric   Respiratory:  Breathing comfortably, good air exchange, no wheezes, no crackles.   Cardiovascular:  Regular rate and rhythm, no edema.  GI:  soft, NT/ND, BS normal  Skin/Integumen:  No acute rash or sign of bleeding. Yeloow  urine noted in the Alonzo bag    Medications   All medications reviewed on 05/20/25   Current Facility-Administered Medications   Medication Dose Route Frequency Provider Last Rate Last Admin    Patient  is already receiving anticoagulation with heparin, enoxaparin (LOVENOX), warfarin (COUMADIN)  or other anticoagulant medication   Does not apply Continuous PRN Mil Griffiths MD         Current Facility-Administered Medications   Medication Dose Route Frequency Provider Last Rate Last Admin    acetaminophen (TYLENOL) tablet 975 mg  975 mg Oral TID Libra Stokes MD   975 mg at 05/24/25 1106    amLODIPine (NORVASC) tablet 5 mg  5 mg Oral Daily Libra Stokes MD   5 mg at 05/24/25 1106    And    [Held by provider] lisinopril (ZESTRIL) tablet 10 mg  10 mg Oral Daily Libra Stokes MD   10 mg at 05/23/25 0930    cetirizine (zyrTEC) tablet 10 mg  10 mg Oral QPM Libra Stokes MD   10 mg at 05/23/25 2115    diclofenac (VOLTAREN) 1 % topical gel 4 g  4 g Topical 4x Daily Libra Stokes MD   4 g at 05/24/25 1320    lactobacillus rhamnosus (GG) (CULTURELL) capsule 1 capsule  1 capsule Oral BID Libra Stokes MD   1 capsule at 05/24/25 1106    miconazole (MICATIN) 2 % powder   Topical BID Libra Stokes MD   Given at 05/24/25 1113    polyethylene glycol (MIRALAX) Packet 17 g  17 g Oral Daily Libra Stokes MD   17 g at 05/24/25 1314    sodium chloride (PF) 0.9% PF flush 3 mL  3 mL Intracatheter Q8H On license of [facility] Mil Griffiths MD   3 mL at 05/24/25 1320    torsemide (DEMADEX) tablet 10 mg  10 mg Oral Daily Libra Stokes MD   10 mg at 05/24/25 1106     PRN Meds:   Current Facility-Administered Medications   Medication Dose Route Frequency Provider Last Rate Last Admin    artificial saliva (BIOTENE MT) solution 2 spray  2 spray Mouth/Throat Q1H PRN Mil Griffiths MD        bisacodyl (DULCOLAX) suppository 10 mg  10 mg Rectal Daily PRN Mil Griffiths MD        calcium carbonate (TUMS) chewable tablet 1,000 mg  1,000 mg Oral 4x Daily PRN Mil Griffiths MD        hydrALAZINE (APRESOLINE) tablet 10 mg  10 mg Oral 4x Daily PRN Libra Stokes MD        HYDROmorphone (DILAUDID) injection 0.2 mg  0.2 mg  Intravenous Q2H PRN Griffiths, Mil Dial MD        HYDROmorphone (DILAUDID) injection 0.4 mg  0.4 mg Intravenous Q2H PRN Griffiths, Mil Dial MD        lidocaine (LMX4) cream   Topical Q1H PRN Griffiths, Mil Dial MD        lidocaine 1 % 0.1-1 mL  0.1-1 mL Other Q1H PRN Griffiths, Mil Dial MD        melatonin tablet 1 mg  1 mg Oral At Bedtime PRN Griffiths, Mil Dial MD        naloxone (NARCAN) injection 0.2 mg  0.2 mg Intravenous Q2 Min PRN Griffiths, Mil Dial MD        Or    naloxone (NARCAN) injection 0.4 mg  0.4 mg Intravenous Q2 Min PRN Griffiths, Mil Dial MD        Or    naloxone (NARCAN) injection 0.2 mg  0.2 mg Intramuscular Q2 Min PRN Griffiths, Mil Dial MD        Or    naloxone (NARCAN) injection 0.4 mg  0.4 mg Intramuscular Q2 Min PRN Griffiths, Mil Dial MD        ondansetron (ZOFRAN ODT) ODT tab 4 mg  4 mg Oral Q6H PRN Griffiths, Mil Dial MD        Or    ondansetron (ZOFRAN) injection 4 mg  4 mg Intravenous Q6H PRN Griffiths, Mil Dial MD        oxyCODONE (ROXICODONE) tablet 5 mg  5 mg Oral Q4H PRN Griffiths, Mil Dial MD        oxyCODONE IR (ROXICODONE) half-tab 2.5 mg  2.5 mg Oral Q4H PRN Griffiths, Mil Dial MD        Patient is already receiving anticoagulation with heparin, enoxaparin (LOVENOX), warfarin (COUMADIN)  or other anticoagulant medication   Does not apply Continuous PRN Griffiths, Mil Dial MD        polyethylene glycol (MIRALAX) Packet 17 g  17 g Oral BID PRN Griffiths, Mil Dial MD        prochlorperazine (COMPAZINE) injection 5 mg  5 mg Intravenous Q6H PRN Griffiths, Mil Dial MD        Or    prochlorperazine (COMPAZINE) tablet 5 mg  5 mg Oral Q6H PRN Griffiths, Mil Dial MD        senna-docusate (SENOKOT-S/PERICOLACE) 8.6-50 MG per tablet 1 tablet  1 tablet Oral BID PRN Griffiths, Mil Dial MD        Or    senna-docusate (SENOKOT-S/PERICOLACE) 8.6-50 MG per tablet 2 tablet  2 tablet Oral BID PRN Mil Griffiths MD        sodium chloride (PF) 0.9% PF flush 3 mL  3 mL Intracatheter q1 min prn Mil Griffiths,  MD   3 mL at 05/21/25 0840       Data   Recent Labs   Lab 05/24/25  0627 05/22/25  0820 05/21/25  0912 05/20/25  1424 05/20/25  0855   WBC 8.0  --  10.0  --  9.8   HGB 8.4*  --  12.4*  --  12.9*     --  99  --  98     --  158  --  154   NA  --  136  --  134*  --    POTASSIUM  --  3.9  --  3.8  --    CHLORIDE  --  101  --  97*  --    CO2  --  27  --  27  --    BUN  --  23.6*  --  25.4*  --    CR  --  1.14 1.24* 1.12  --    ANIONGAP  --  8  --  10  --    SABRINA  --  8.2*  --  8.1*  --    GLC  --  110*  --  156*  --    ALBUMIN  --   --   --  2.7*  --    PROTTOTAL  --   --   --  5.3*  --    BILITOTAL  --   --   --  0.3  --    ALKPHOS  --   --   --  148  --    ALT  --   --   --  58  --    AST  --   --   --  72*  --        No results found for this or any previous visit (from the past 24 hours).

## 2025-05-24 NOTE — PROGRESS NOTES
[facility] Urology Progress Note          Assessment and Plan:     Active Problems:  Shilo Menchaca is a 85 year old male with history of BPH s/p PVP in 2021, nephrolithiasis and bladder stones, obstructive sleep apnea, prior PE on anticoagulation, hypertension who presented to the emergency department after a fall at home and was found to be febrile with acute COVID, as well as hematuria and a possible bladder mass on CT imaging for which urology was consulted at that time. Outpatient cystoscopy, CT Urogram, and urine cytology were recommended at that time. He received course of Zosyn for possible UTI as well, stopped 5/17.  He was also found to be retaining 5/16 and had a jessica catheter placed, on 5/18 he failed TOV retaining close to 1L and jessica was replaced. This morning, 5/23, found to have kaley blood in the Jsesica bag for which urology has been reconsulted. DVT prophylaxis with Lovenox has been discontinued due to hematuria.     -Urine draining clear and no plans for operative intervention today  - Okay for resumption of his diet  - Okay for discharge from urology standpoint  - He is currently scheduled for follow-up with me on 6/16/2025, but I will work with my office to see if we can move up this follow-up visit    Shadi Man MD   [facility]rology  Office: [phone]               Interval History:     Seen and examined.  Resting comfortably.  Hungry and asking about food and water intake              Review of Systems:     The 5 point Review of Systems is negative other than noted in the HPI             Medications:     Current Facility-Administered Medications   Medication Dose Route Frequency Provider Last Rate Last Admin    acetaminophen (TYLENOL) tablet 975 mg  975 mg Oral TID Libra Stokes MD   975 mg at 05/23/25 2115    amLODIPine (NORVASC) tablet 5 mg  5 mg Oral Daily Libra Stokes MD   5 mg at 05/23/25 0931    And    [Held by provider] lisinopril (ZESTRIL) tablet  10 mg  10 mg Oral Daily Libra Stokes MD   10 mg at 05/23/25 0930    artificial saliva (BIOTENE MT) solution 2 spray  2 spray Mouth/Throat Q1H PRN Griffiths, Mil Dial MD        bisacodyl (DULCOLAX) suppository 10 mg  10 mg Rectal Daily PRN Griffiths, Mil Dial MD        calcium carbonate (TUMS) chewable tablet 1,000 mg  1,000 mg Oral 4x Daily PRN Griffiths, Mil Dial MD        cetirizine (zyrTEC) tablet 10 mg  10 mg Oral QPM Libra Stokes MD   10 mg at 05/23/25 2115    diclofenac (VOLTAREN) 1 % topical gel 4 g  4 g Topical 4x Daily Libra Stokes MD   4 g at 05/23/25 2115    hydrALAZINE (APRESOLINE) tablet 10 mg  10 mg Oral 4x Daily PRN Libra Stokes MD        HYDROmorphone (DILAUDID) injection 0.2 mg  0.2 mg Intravenous Q2H PRN Griffiths, Mil Dial MD        HYDROmorphone (DILAUDID) injection 0.4 mg  0.4 mg Intravenous Q2H PRN Griffiths, Mil Dial MD        lactobacillus rhamnosus (GG) (CULTURELL) capsule 1 capsule  1 capsule Oral BID Libra Stokes MD   1 capsule at 05/23/25 2115    lidocaine (LMX4) cream   Topical Q1H PRN Griffiths, Mil Dial MD        lidocaine 1 % 0.1-1 mL  0.1-1 mL Other Q1H PRN Griffiths, Mil Dial MD        melatonin tablet 1 mg  1 mg Oral At Bedtime PRN Griffiths, Mil Dial MD        miconazole (MICATIN) 2 % powder   Topical BID Libra Stokes MD   Given at 05/23/25 2115    naloxone (NARCAN) injection 0.2 mg  0.2 mg Intravenous Q2 Min PRN Griffiths, Mil Dial MD        Or    naloxone (NARCAN) injection 0.4 mg  0.4 mg Intravenous Q2 Min PRN Griffiths, Mil Dial MD        Or    naloxone (NARCAN) injection 0.2 mg  0.2 mg Intramuscular Q2 Min PRN Griffiths, Mil Dial MD        Or    naloxone (NARCAN) injection 0.4 mg  0.4 mg Intramuscular Q2 Min PRN Griffiths, Mil Dial MD        ondansetron (ZOFRAN ODT) ODT tab 4 mg  4 mg Oral Q6H PRN Griffiths, Mil Dial MD        Or    ondansetron (ZOFRAN) injection 4 mg  4 mg Intravenous Q6H PRN Griffiths, Mil Dial MD        oxyCODONE (ROXICODONE) tablet 5 mg  5 mg  Oral Q4H PRN Griffiths, Mil Dial MD        oxyCODONE IR (ROXICODONE) half-tab 2.5 mg  2.5 mg Oral Q4H PRN Tunde, Mil Dial MD        Patient is already receiving anticoagulation with heparin, enoxaparin (LOVENOX), warfarin (COUMADIN)  or other anticoagulant medication   Does not apply Continuous PRN Tunde, Mil Dial MD        polyethylene glycol (MIRALAX) Packet 17 g  17 g Oral BID PRN Tunde, Mil Dial MD        polyethylene glycol (MIRALAX) Packet 17 g  17 g Oral Daily Libra Stokes MD   17 g at 05/23/25 0930    prochlorperazine (COMPAZINE) injection 5 mg  5 mg Intravenous Q6H PRN Tunde, Mil Dial MD        Or    prochlorperazine (COMPAZINE) tablet 5 mg  5 mg Oral Q6H PRN Griffiths, Mil Dial MD        senna-docusate (SENOKOT-S/PERICOLACE) 8.6-50 MG per tablet 1 tablet  1 tablet Oral BID PRN Tunde, Mil Dial MD        Or    senna-docusate (SENOKOT-S/PERICOLACE) 8.6-50 MG per tablet 2 tablet  2 tablet Oral BID PRN Tunde, Mil Dial MD        sodium chloride (PF) 0.9% PF flush 3 mL  3 mL Intracatheter Q8H MANI Mil Griffiths MD   3 mL at 05/24/25 0439    sodium chloride (PF) 0.9% PF flush 3 mL  3 mL Intracatheter q1 min prn Tunde, Mil Dial MD   3 mL at 05/21/25 0840    torsemide (DEMADEX) tablet 10 mg  10 mg Oral Daily Libra Stokes MD   10 mg at 05/23/25 0931                  Physical Exam:   Vitals were reviewed  Patient Vitals for the past 8 hrs:   BP Temp Temp src Pulse Resp SpO2   05/24/25 0808 137/77 98.1  F (36.7  C) Oral 102 18 96 %   05/24/25 0219 129/75 97.7  F (36.5  C) Oral 100 16 95 %     GEN: NAD, lying in bed  HEENT: EOMI  NECK: Supple  ABD: soft  EXT: No LE edema  : Alonzo draining very light ochoa urine           Data:     Lab Results   Component Value Date    CR 1.14 05/22/2025    CR 1.24 (H) 05/21/2025    CR 1.12 05/20/2025    CR 1.03 05/18/2025    CR 1.09 05/17/2025     Lab Results   Component Value Date    WBC 8.0 05/24/2025    WBC 10.0 05/21/2025    WBC 9.8 05/20/2025    HGB  8.4 (L) 05/24/2025    HGB 12.4 (L) 05/21/2025    HGB 12.9 (L) 05/20/2025    HCT 25.3 (L) 05/24/2025    HCT 37.6 (L) 05/21/2025    HCT 39.4 (L) 05/20/2025     05/24/2025    MCV 99 05/21/2025    MCV 98 05/20/2025     05/24/2025     05/21/2025     05/20/2025     Lab Results   Component Value Date    INR 1.87 (H) 08/06/2021

## 2025-05-24 NOTE — PROVIDER NOTIFICATION
05/23/25 2020   Tech Time   $Tech Time (10 minute increments) 1   Mode: CPAP/ BiPAP/ AVAPS/ AVAPS AE   CPAP/BiPAP/ AVAPS/ AVAPS AE Mode CPAP   Equipment   Device Resmed   CPAP/BiPAP/Settings   BIPAP/CPAP On Standby Standby  (pt refused)

## 2025-05-24 NOTE — PROGRESS NOTES
Shift Summary [id number]    Admitting Diagnosis: Generalized weakness [R53.1]  Elevated CK [R74.8]  Bladder mass [N32.89]  Elevated lactic acid level [R79.89]  COVID-19 virus infection [U07.1]   Vitals : WNL   Pain : DENIES PAIN.   A&Ox4  Voiding : urinary retention. Indwelling Alonzo in place.   Mobility : aX1. SERA STEDY  Tele : na   CMS : edema in BLE   Lung Sounds diminished on room air.  GI : bowel incontinence.   Dressing : leg wounds CDI. New dressing applied.     Orders Placed This Encounter      Diet      Regular Diet Adult       Plan:   Urology appears clear at this time  Urology cleared for discharge.F/up out patient.  Discharge to TCU tomorrow.

## 2025-05-25 VITALS
HEIGHT: 66 IN | BODY MASS INDEX: 33.16 KG/M2 | TEMPERATURE: 98.3 F | OXYGEN SATURATION: 95 % | WEIGHT: 206.35 LBS | HEART RATE: 97 BPM | RESPIRATION RATE: 16 BRPM | DIASTOLIC BLOOD PRESSURE: 72 MMHG | SYSTOLIC BLOOD PRESSURE: 144 MMHG

## 2025-05-25 LAB
BACTERIA SPEC CULT: NO GROWTH
BACTERIA SPEC CULT: NO GROWTH

## 2025-05-25 PROCEDURE — 99239 HOSP IP/OBS DSCHRG MGMT >30: CPT | Performed by: INTERNAL MEDICINE

## 2025-05-25 PROCEDURE — 250N000013 HC RX MED GY IP 250 OP 250 PS 637: Performed by: INTERNAL MEDICINE

## 2025-05-25 PROCEDURE — 99232 SBSQ HOSP IP/OBS MODERATE 35: CPT | Performed by: UROLOGY

## 2025-05-25 RX ORDER — LISINOPRIL 10 MG/1
10 TABLET ORAL ONCE
Status: COMPLETED | OUTPATIENT
Start: 2025-05-25 | End: 2025-05-25

## 2025-05-25 RX ADMIN — AMLODIPINE BESYLATE 5 MG: 5 TABLET ORAL at 09:27

## 2025-05-25 RX ADMIN — LISINOPRIL 10 MG: 10 TABLET ORAL at 09:27

## 2025-05-25 RX ADMIN — TORSEMIDE 10 MG: 10 TABLET ORAL at 09:27

## 2025-05-25 RX ADMIN — ACETAMINOPHEN 975 MG: 325 TABLET, FILM COATED ORAL at 09:26

## 2025-05-25 RX ADMIN — Medication 1 CAPSULE: at 09:26

## 2025-05-25 ASSESSMENT — ACTIVITIES OF DAILY LIVING (ADL)
ADLS_ACUITY_SCORE: 72
ADLS_ACUITY_SCORE: 72
ADLS_ACUITY_SCORE: 75
ADLS_ACUITY_SCORE: 72
ADLS_ACUITY_SCORE: 75
ADLS_ACUITY_SCORE: 72
ADLS_ACUITY_SCORE: 75
ADLS_ACUITY_SCORE: 75
ADLS_ACUITY_SCORE: 72
ADLS_ACUITY_SCORE: 72
ADLS_ACUITY_SCORE: 75

## 2025-05-25 NOTE — PROGRESS NOTES
Care Management Discharge Note    Discharge Date: 05/25/2025       Discharge Disposition: Transitional Care    Discharge Services:  (Therapy)    Discharge DME: None    Discharge Transportation: [facility] stretcher transport between 14:10-14:55    Private pay costs discussed: Not applicable    Does the patient's insurance plan have a 3 day qualifying hospital stay waiver?  No    PAS Confirmation Code: [id number]  Patient/family educated on Medicare website which has current facility and service quality ratings: no    Education Provided on the Discharge Plan: Yes  Persons Notified of Discharge Plans: patient's wife Aziza  Patient/Family in Agreement with the Plan: yes    Handoff Referral Completed: No, handoff not indicated or clinically appropriate    Additional Information:  Call placed to [facility] two more times to inquire as to whether they can accept patient.  Spoke with the nursing supervisor.  He states they are unable to get the meds today therefore they are unable to accept patient today.  Received a call from patient's wife stating they were expecting patient yesterday that is why they can't accept patient today.  Explained that [facility] di not accept patient yesterday due to the COVID precautions and she stated she would relook at patient for today of which I had not heard back from her.  Explained that I had never had an acceptance from [facility].  Explained that [facility] has a bed available for today.  If she doesn't want to accept the bed the only other option would be to appeal the discharge.  Patient's wife states they will go to [facility].   [facility] stretcher transport arranged for between 14:10-14:55 today as patient has COVID and he is unable to travel via wheelchair transport.  Complete the electronic PCS via Epic. Call placed to update [facility] and faxed the orders and the PAS.  Patient's wife is ultimately agreeable to discharge to [facility]  today.      PAS-RR    D: Per DHS regulation, SW completed and submitted PAS-RR to [place] [employer] Direct Connect via the Senior LinkAge Line.  PAS-RR confirmation # is : [id number].    I: SW spoke with patient's wife and they are aware a PAS-RR has been submitted.  SW reviewed with patient's wife that they may be contacted for a follow up appointment within 10 days of hospital discharge if their SNF stay is < 30 days.  Contact information for [facility] LinkAge Line was also provided.    A: Patient's wife verbalized understanding.    P: Further questions may be directed to [facility] Line at #[phone], option #4 for PAS-RR staff.         AUDI Aleman, [facility]    [phone]  [facility]

## 2025-05-25 NOTE — DISCHARGE SUMMARY
"[facility]  Hospitalist Discharge Summary      Date of Admission:  5/15/2025  Date of Discharge:  5/25/2025  Discharging Provider: Jessenia Bates MD  Discharge Service: Hospitalist Service    Discharge Diagnoses   Please see hospital course     Clinically Significant Risk Factors     # Obesity: Estimated body mass index is 33.31 kg/m  as calculated from the following:    Height as of this encounter: 1.676 m (5' 6\").    Weight as of this encounter: 93.6 kg (206 lb 5.6 oz).       Follow-ups Needed After Discharge   Follow-up Appointments       Follow Up and recommended labs and tests      Follow up with [facility] physician.  The following labs/tests are recommended: CBC, BMP in 1week   Follow up with Urology in 2weeks for voiding trial and for bladder mass evaluation .                Unresulted Labs Ordered in the Past 30 Days of this Admission       Date and Time Order Name Status Description    5/20/2025 12:43 PM Blood Culture Peripheral blood (BC) Wrist, Left Preliminary     5/20/2025 12:43 PM Blood Culture Peripheral blood (BC) Hand, Right Preliminary             Discharge Disposition   Discharged to short-term care facility  Condition at discharge: Stable    Hospital Course   Shilo Menchaca is a 85 year old male with a history of BPH, generalized weakness, obstructive sleep apnea, severe, not on therapy, history of pulmonary embolism admitted on 5/15/2025. He presents to the emergency department with acute on chronic weakness 5/15/2025 resulting in a fall in his bathroom when his legs gave out beneath him.  Found to be febrile with lactic acid elevated in the 4 range and diagnosed with acute COVID-19.     T 99.9, P 124, /78, 15, O2 94% on RA  Na 137, K 3.9, Cr 1.19 (stable), alk phos 157, , CK 5,076, glucose 174.   Lactic acid 2.1 > 1.7.   WBC 13.1, hgb 14.8, plt 148  UA WBC 13, RBC 48   BCx, UCx pending.   Covid + RSV, flu negative  CXR clear  CT abdomen/pelvis: " focal mass like thickening of the bladder wall on th R 3.1 cm, concerning for neoplasm.  Tiny nonobstructing stones in the bladder and left renal collecting system no hydronephrosis.   Advanced coronary artery calcification.     Acute COVID-19 with associated severe sepsis: Lactic acid elevated to 4.0, leukocytosis at 13.1, febrile to 100.6 with EMS, tachycardic to 124 bpm on arrival.  Wife has cough and cold symptoms.  Infrequent cough noted with patient as well, though he was unaware of this.  Pyuria, with bladder tumor (see below)   No respiratory symptoms, not hypoxic and requiring no supplemental oxygen.  Completed Remdesivir x 3 days ordered given increased risk of decompensation with advanced age and obesity.  Paxlovid no longer available on formulary  Oxygen PRN  Special precautions  Subcutaneous Lovenox prophylaxis noting history of pulmonary embolism in 2021  Placed on IV zosyn for potential UTI.   Follow blood and urine cultures. NGTD. > stopped IV zosyn on 5/17/25 .      On 05/20/25, subjective fevers and chills, does not feel well today.   No localizing symptoms (see above history)   Check UA with reflect to UCx. Jessica placed 5/19 in early AM, so does not need to be replaced.   CMP, BCx ordered.   CXR  showed no developing infiltrates and no signs to suggest developing pneumonia.  Patient is medically ready for discharge.  Needs TCU placement             Recent Labs   Lab 05/24/25  0627 05/21/25  0912 05/20/25  0855 05/19/25  1124 05/18/25  0759   WBC 8.0 10.0 9.8 8.4 5.9   HGB 8.4* 12.4* 12.9* 13.9 14.0    158 154 155 125*      Gross hematuria  Bladder stones  Right sided bladder mass: 3 cm soft tissue mass in bladder suspicious for malignancy.  Note that on historical imaging he did have an adjacent iliac chain or perivesicular lymph node in this region.  Pyuria   Renal retention, jessica placed on 05/16/25    Acute blood loss anemia secondary to hematuria  BPH - Status post greenlight photo  vaporization and cystoscopy for bladder stone extraction September 2021 w/ Dr Man.  CT abdomen/pelvis 05/16/25: focal mass like thickening of the bladder wall on th R 3.1 cm, concerning for neoplasm.  Tiny nonobstructing stones in the bladder and left renal collecting system no hydronephrosis.   [facility] consulted for anticipated future cystoscopy.  Discussed bladder mass finding with patient as well as wife at bedside  Urine culture remain negative.  Zosyn discontinued  Urology recommends a CT urogram and cystoscopy as outpatient through Dr. Man. Did not comment on retention or hematuria.   On 05/17/25, urine remains pink, no obstructing clots, good flow.   Removed catheter AM of 5/18/25, failed trial of void > replaced, and will remain in place at discharge with Urology follow (CM consult placed to make sure appt within 2 weeks).      Will discharge with Alonzo to TCU as noted above     Patient had kaley blood in the Alonzo bag on 523 morning.  DVT prophylaxis with Lovenox discontinued due to hematuria.  Urology reconsultation requested due to kaley hematuria on 5/23/2025.  Patient's Alonzo urinary was irrigated.  Hematuria improved patient is having clear urine on 5/24/2025  Hemoglobin dropped from 12-8.4  Pt will have Urology out pt appointment in first week of July per Urology      History of pulmonary embolism: Diagnosed with pulmonary embolism after incidental finding on CT urogram with right upper and lower lobe pulmonary emboli June 2021. Had a brief hospitalization for weakness around that time, so I believe was thought to be provoked. Completed ~3 months of anticoagulation   subcutaneous Lovenox discontinued due to hematuria on 5/23/2025     Generalized weakness and physical deconditioning: Ambulates with a walker at home at baseline, but weakness and deconditioning has been a longstanding issue.  Note that patient had been following with outpatient physical therapy for generalized weakness  summer 2022, through the first half of 2023, and was rereferred to physical therapy in 2024, completing outpatient therapy in December.   Care management consultation for disposition planning.  Anticipate TCU discharge, and discussed this with patient and wife at time of admission.  Physical therapy consulted. Last seen on 5/16.   Fall precautions  Get out of bed and attempt to ambulate with assistance 3 times daily.  Utilizes a walker at baseline, but very minimal activity. Spends most of the day in a chair. Discussed with RN and NA on 05/19/25. We must do our best     Chronic mild sinus tachycardia - No associated chest pain, SOB, hypotension, fever   * On 05/19/25 reviewed prior visits going back about 1 year, HR has been in the mid-90s to low 100s.      Coccyx wound, bilateral knee abrasions, intertrigo present at admission  [facility] nurse consulted.     Severe obstructive sleep apnea significant nocturnal hypoxemia: Seen through [facility] sleep medicine clinic and was recommended CPAP early March 2025.  Patient declined, was interested in potentially trialing oral dental device, which his pulmonology team did not feel would treat his degree of sleep apnea.  Possible that nocturnal hypoxia is contributing to weakness and certainly contributing to daytime fatigue.  - Oximetry, oxygen as needed     Mild CK elevation: CK elevated to the 5000 range.  Compartments are soft with no report of pain other than knee abrasions, but was down on the bathroom floor for approximately 1.5 hours.  CK could also be elevated secondary to viral inflammation.  Lower extremity edema  CKD, stable baseline Cr 1.2.   Compression stockings to bilateral lower extremities  S/p IVF hydration, stopped on 5/16/25 when CK < 5000.   Intake and output, daily weights  Resume prior to admission torsemide on 05/17/25      Hypertension:  Continue PTA amlodipine-benazepril  Resume PTA torsemide on 05/17/25   Had brisk urine output of 1450 cc.    Note  "blood pressure still elevated on 05/18/25, but improving.   PO hydralazine ordered PRN for SBP over 180. Otherwise, monitor  for now.  Asymptomatic without chest pain, headache or shortness of breath.     Chronic constipation  With multiple loose stools on 5/18  No associated abdominal pain or cramping WBC normal.  Held PTA MiraLAX on 05/19/25 > resume on 05/19/25      Clinically Significant Risk Factors               # Hypoalbuminemia: Lowest albumin = 2.7 g/dL at 5/20/2025  2:24 PM, will monitor as appropriate                 # Obesity: Estimated body mass index is 33.91 kg/m  as calculated from the following:    Height as of this encounter: 1.676 m (5' 6\").    Weight as of this encounter: 95.3 kg (210 lb 1.6 oz).         # Financial/Environmental Concerns: none             Diet: Orders Placed This Encounter      Diet      Regular Diet Adult     IVF: None currently  DVT Prophylaxis: enoxaparin discontinued due to hematuria.  PCD's ordered  Alonzo Catheter: PRESENT, indication: Acute retention or obstruction, Acute retention or obstruction     No CPR- Do NOT Intubate    Medically Ready for Discharge: Anticipated Today        Consultations This Hospital Stay   WOUND OSTOMY CONTINENCE NURSE  IP CONSULT  UROLOGY IP CONSULT  CARE MANAGEMENT / SOCIAL WORK IP CONSULT  PHYSICAL THERAPY ADULT IP CONSULT  PHYSICAL THERAPY ADULT IP CONSULT  WOUND OSTOMY CONTINENCE NURSE  IP CONSULT  PHARMACY IP CONSULT  CARE MANAGEMENT / SOCIAL WORK IP CONSULT  CARE MANAGEMENT / SOCIAL WORK IP CONSULT  PHYSICAL THERAPY ADULT IP CONSULT  OCCUPATIONAL THERAPY ADULT IP CONSULT  UROLOGY IP CONSULT    Code Status   No CPR- Do NOT Intubate    Time Spent on this Encounter   I, Jessenia Bates MD, personally saw the patient today and spent greater than 30 minutes discharging this patient.       Jessenia Bates MD  [facility]  [address] [place] [id number]  Phone: [phone]  Fax: " [phone]  ______________________________________________________________________    Physical Exam   Vital Signs: Temp: 98.3  F (36.8  C) Temp src: Oral BP: (!) 144/72 Pulse: 97   Resp: 16 SpO2: 95 % O2 Device: None (Room air)    Weight: 206 lbs 5.61 oz  Constitutional:    Patient is resting comfortably in bed, not in acute distress, appears fatigued  Neuropsyche:  tired but oriented, answers questions appropriately. Speech normal, face symmetric   Respiratory:        Breathing comfortably, good air exchange, no wheezes, no crackles.   Cardiovascular:  Regular rate and rhythm, no edema.  GI:  soft, NT/ND, BS normal  Skin/Integumen:  No acute rash or sign of bleeding.  Yellow  urine noted in the Jessica bag          Primary Care Physician   MONISHA REID    Discharge Orders      General info for SNF    Length of Stay Estimate: Short Term Care: Estimated # of Days 31-90  Condition at Discharge: Stable  Level of care:skilled   Rehabilitation Potential: Good  Admission H&P remains valid and up-to-date: Yes  Recent Chemotherapy: N/A  Use Nursing Home Standing Orders: Yes     Mantoux instructions    Give two-step Mantoux (PPD) Per Facility Policy Yes     Follow Up and recommended labs and tests    Follow up with [facility] physician.  The following labs/tests are recommended: CBC, BMP in 1week   Follow up with Urology in 2weeks for voiding trial and for bladder mass evaluation .     Tubes and Drains    Current Tubes and Drains:     Drain  Duration           Urinary Drain 05/19/25 Urethral Catheter 16 fr 3 days         To straight gravity drainage. Keep jessica in till out pt follow up with Urology in 2weeks     Reason for your hospital stay    COVID 19 infection and Urinary retention     Activity - Up with nursing assistance     No CPR- Do NOT Intubate     Physical Therapy Adult Consult    Evaluate and treat as clinically indicated.    Reason:  Covid 19 infection     Occupational Therapy Adult Consult    Evaluate and  treat as clinically indicated.    Reason:  Covid 19 infection     Airborne and Contact Isolation    Covid infection till 526/25     Fall precautions     Diet    Follow this diet upon discharge: Current Diet:Orders Placed This Encounter      Combination Diet Regular Diet Adult       Significant Results and Procedures   Most Recent 3 CBC's:  Recent Labs   Lab Test 05/24/25  0627 05/21/25  0912 05/20/25  0855   WBC 8.0 10.0 9.8   HGB 8.4* 12.4* 12.9*    99 98    158 154     Most Recent 3 BMP's:  Recent Labs   Lab Test 05/22/25  0820 05/21/25  0912 05/20/25  1424 05/17/25  0721 05/16/25  0003     --  134*  --  137   POTASSIUM 3.9  --  3.8  --  3.9   CHLORIDE 101  --  97*  --  100   CO2 27  --  27  --  24   BUN 23.6*  --  25.4*  --  28.4*   CR 1.14 1.24* 1.12   < > 1.19*   ANIONGAP 8  --  10  --  13   SABRINA 8.2*  --  8.1*  --  8.7*   *  --  156*  --  174*    < > = values in this interval not displayed.     Most Recent 2 LFT's:  Recent Labs   Lab Test 05/20/25 1424 05/16/25  0003   AST 72* 118*   ALT 58 42   ALKPHOS 148 157*   BILITOTAL 0.3 0.5     Most Recent 3 INR's:  Recent Labs   Lab Test 08/06/21  0212   INR 1.87*     Most Recent INR's and Anticoagulation Dosing History:  Anticoagulation Dose History          Latest Ref Rng & Units 8/6/2021   Recent Dosing and Labs   INR 0.85 - 1.15 1.87        Effective 7/11/2021, the reference range for this assay has changed.     Most Recent 3 Creatinines:  Recent Labs   Lab Test 05/22/25  0820 05/21/25  0912 05/20/25  1424   CR 1.14 1.24* 1.12     Most Recent 3 Hemoglobins:  Recent Labs   Lab Test 05/24/25  0627 05/21/25  0912 05/20/25  0855   HGB 8.4* 12.4* 12.9*     Most Recent 3 Troponin's:  Recent Labs   Lab Test 06/04/21  2323   TROPI <0.015     Most Recent 3 BNP's:No lab results found.  Most Recent D-dimer:No lab results found.  Most Recent Cholesterol Panel:No lab results found.  7-Day Micro Results       Collected Updated Procedure Result Status       05/20/2025 1429 05/24/2025 1716 Blood Culture Peripheral blood (BC) Wrist, Left [[id number]]   Peripheral blood (BC) from Wrist, Left    Preliminary result Component Value   Culture No growth after 4 days  [P]                05/20/2025 1424 05/24/2025 1716 Blood Culture Peripheral blood (BC) Hand, Right [[id number]]   Peripheral blood (BC) from Hand, Right    Preliminary result Component Value   Culture No growth after 4 days  [P]                05/20/2025 1308 05/21/2025 1154 Urine Culture [[id number]]   Urine, Alonzo Catheter    Final result Component Value   Culture No Growth                     Most Recent TSH and T4:  Recent Labs   Lab Test 05/18/25  0759   TSH 4.29*   T4 1.36     Most Recent Hemoglobin A1c:No lab results found.  Most Recent 6 glucoses:  Recent Labs   Lab Test 05/22/25  0820 05/20/25  1424 05/16/25  0003 08/08/21  1213 08/07/21  1051 08/06/21  0212   * 156* 174* 176* 205* 126*     Most Recent Urinalysis:  Recent Labs   Lab Test 05/20/25  1308   COLOR Brown*   APPEARANCE Slightly Cloudy*   URINEGLC Negative   URINEBILI Negative   URINEKETONE Negative   SG 1.016   UBLD Large*   URINEPH 5.5   PROTEIN 50*   NITRITE Negative   LEUKEST Large*   RBCU >182*   WBCU >182*     Most Recent ABG:  Recent Labs   Lab Test 08/06/21  0119   PH 7.48*     Most Recent ESR & CRP:No lab results found.  Most Recent Anemia Panel:  Recent Labs   Lab Test 05/24/25  0627   WBC 8.0   HGB 8.4*   HCT 25.3*           Most Recent CPK:  Recent Labs   Lab Test 05/18/25  0759 05/17/25  0721 05/16/25  1708   CKT 1,181* 2,193* 4,176*   ,   Results for orders placed or performed during the hospital encounter of 05/15/25   XR Chest 2 Views    Narrative    EXAM: XR CHEST 2 VIEWS  LOCATION: [facility]  DATE: 5/16/2025    INDICATION: Fever  COMPARISON: 8/6/2021.    FINDINGS: The heart size is normal. The thoracic aorta is calcified and mildly tortuous. The right hemidiaphragm is  elevated and there is mild right basilar atelectasis. The lungs are otherwise clear. No pneumothorax or pleural effusion.      Impression    IMPRESSION: No acute abnormality.   CT Abdomen Pelvis w/o Contrast    Narrative    EXAM: CT ABDOMEN PELVIS W/O CONTRAST  LOCATION: [facility]  DATE: 5/16/2025    INDICATION: evaluate for stone. has elevated lactic acid, fever  COMPARISON: Noncontrast CT of the abdomen and pelvis 8/6/2021.  TECHNIQUE: CT scan of the abdomen and pelvis was performed without IV contrast. Multiplanar reformats were obtained. Dose reduction techniques were used.  CONTRAST: None.    FINDINGS:   LOWER CHEST: Mild bibasilar atelectasis. Advanced multivessel coronary artery calcification.    HEPATOBILIARY: Stable 2.2 cm benign appearing rim calcified observation in the liver. Absent gallbladder. No biliary dilatation.    PANCREAS: Normal.    SPLEEN: Normal.    ADRENAL GLANDS: Normal.    KIDNEYS/BLADDER: Focal wall thickening of the urinary bladder on the right near the right ureterovesical junction concerning for possible neoplasm measures 3.1 x 1.6 cm on axial images (image 172 of series 4). No hydronephrosis. 2 mm stone in the   bladder. 2 mm nonobstructing left intrarenal stone. Chronic cortical thinning of the kidneys. Tiny benign cysts of the kidneys not warranting follow-up.    BOWEL: Mild colonic diverticulosis. No bowel obstruction or inflammation. No evidence for appendicitis.    LYMPH NODES: Normal.    VASCULATURE: Mild aortoiliac atherosclerosis. No aneurysm.    PELVIC ORGANS: Normal.    MUSCULOSKELETAL: Tiny fat-containing umbilical hernia. Degenerative changes of the spine.      Impression    IMPRESSION:   1.  Focal masslike thickening of the urinary bladder wall on the right measuring up to 3.1 cm concerning for possible neoplasm. Urology follow-up recommended.  2.  Tiny nonobstructing stones in the bladder and left renal collecting system. No hydronephrosis.  3.   Advanced coronary artery calcification.     XR Chest 2 Views    Narrative    EXAM: XR CHEST 2 VIEWS  LOCATION: [facility]  DATE: 5/19/2025    INDICATION: shortness of breath  COMPARISON: Chest radiograph 5/16/2025.      Impression    IMPRESSION: Heart size and pulmonary vascularity are normal. Similar mild right basilar atelectasis. No new focal consolidation, pleural effusion, or pneumothorax. Exaggerated thoracic kyphosis.       Discharge Medications   Current Discharge Medication List        START taking these medications    Details   acetaminophen (TYLENOL) 325 MG tablet Take 3 tablets (975 mg) by mouth 3 times daily.    Associated Diagnoses: Back pain, unspecified back location, unspecified back pain laterality, unspecified chronicity      diclofenac (VOLTAREN) 1 % topical gel Apply 4 g topically 4 times daily.    Associated Diagnoses: Back pain, unspecified back location, unspecified back pain laterality, unspecified chronicity           CONTINUE these medications which have NOT CHANGED    Details   amLODIPine-benazepril (LOTREL) 5-10 MG capsule Take 1 capsule by mouth daily      cetirizine (ZYRTEC) 10 MG tablet Take 10 mg by mouth every evening       lactobacillus rhamnosus, GG, (CULTURELLE KIDS) packet Take 1 packet by mouth 2 times daily.      Multiple Vitamins-Minerals (PRESERVISION AREDS) CAPS Take 1 capsule by mouth 2 times daily       multivitamin, therapeutic (THERA-VIT) TABS tablet Take 1 tablet by mouth daily      polyethylene glycol (MIRALAX) 17 g packet Take 1 packet by mouth daily.      Pramoxine HCl (CERAVE ITCH RELIEF) 1 % CREA Externally apply topically 2 times daily as needed      torsemide (DEMADEX) 10 MG tablet Take 10 mg by mouth daily      Vitamin D (Cholecalciferol) 50 MCG (2000 UT) CAPS Take 1 capsule by mouth daily.      zinc gluconate 50 MG tablet Take 50 mg by mouth daily.           Allergies   Allergies   Allergen Reactions    Azithromycin      PN: KRYSTIAN  Reaction: UNKNOWN    Cefpodoxime Itching

## 2025-05-25 NOTE — PROGRESS NOTES
[facility] Urology Progress Note          Assessment and Plan:     Active Problems:  Shilo Menchaca is a 85 year old male with history of BPH s/p PVP in 2021, nephrolithiasis and bladder stones, obstructive sleep apnea, prior PE on anticoagulation, hypertension who presented to the emergency department after a fall at home and was found to be febrile with acute COVID, as well as hematuria and a possible bladder mass on CT imaging for which urology was consulted at that time. Outpatient cystoscopy, CT Urogram, and urine cytology were recommended at that time. He received course of Zosyn for possible UTI as well, stopped 5/17.  He was also found to be retaining 5/16 and had a jessica catheter placed, on 5/18 he failed TOV retaining close to 1L and jessica was replaced. This morning, 5/23, found to have kaley blood in the Jessica bag for which urology has been reconsulted. DVT prophylaxis with Lovenox has been discontinued due to hematuria.     - Urine continues to drain clear  - Okay for discharge from urology standpoint with catheter in place  - He is currently scheduled for follow-up with me on 6/16/2025, but I will work with my office to see if we can move up this follow-up visit and I sent a message to my office to move up his follow-up visit to 6/2/2025 and this will be adjusted after the holiday weekend    Shadi Man MD   [facility] Urology  Office: [phone]               Interval History:     Seen and examined.  Resting comfortably.  Hungry and asking about food and water intake              Review of Systems:     The 5 point Review of Systems is negative other than noted in the HPI             Medications:     Current Facility-Administered Medications   Medication Dose Route Frequency Provider Last Rate Last Admin    acetaminophen (TYLENOL) tablet 975 mg  975 mg Oral TID Libra Stokes MD   975 mg at 05/24/25 2235    amLODIPine (NORVASC) tablet 5 mg  5 mg Oral Daily Libra Stokes MD    5 mg at 05/24/25 1106    artificial saliva (BIOTENE MT) solution 2 spray  2 spray Mouth/Throat Q1H PRN Griffiths, Mil Dial MD        bisacodyl (DULCOLAX) suppository 10 mg  10 mg Rectal Daily PRN Griffiths, Mil Dial MD        calcium carbonate (TUMS) chewable tablet 1,000 mg  1,000 mg Oral 4x Daily PRN Griffiths, Mil Dial MD        cetirizine (zyrTEC) tablet 10 mg  10 mg Oral QPM Libra Stokes MD   10 mg at 05/24/25 2033    diclofenac (VOLTAREN) 1 % topical gel 4 g  4 g Topical 4x Daily Libra Stokes MD   4 g at 05/24/25 1736    hydrALAZINE (APRESOLINE) tablet 10 mg  10 mg Oral 4x Daily PRN Libra Stokes MD        HYDROmorphone (DILAUDID) injection 0.2 mg  0.2 mg Intravenous Q2H PRN Griffiths, Mil Dial MD        HYDROmorphone (DILAUDID) injection 0.4 mg  0.4 mg Intravenous Q2H PRN Griffiths, Mil Dial MD        lactobacillus rhamnosus (GG) (CULTURELL) capsule 1 capsule  1 capsule Oral BID Libra Stokes MD   1 capsule at 05/24/25 2033    lidocaine (LMX4) cream   Topical Q1H PRN Griffiths, Mil Dial MD        lidocaine 1 % 0.1-1 mL  0.1-1 mL Other Q1H PRN Griffiths, Mil Dial MD        lisinopril (ZESTRIL) tablet 10 mg  10 mg Oral Once JanaJessenia MD        melatonin tablet 1 mg  1 mg Oral At Bedtime PRN Griffiths, Mil Dial MD        miconazole (MICATIN) 2 % powder   Topical BID Libra Stokes MD   Given at 05/24/25 2034    naloxone (NARCAN) injection 0.2 mg  0.2 mg Intravenous Q2 Min PRN Tunde, Mil Dial MD        Or    naloxone (NARCAN) injection 0.4 mg  0.4 mg Intravenous Q2 Min PRN Griffiths, Mil Dial MD        Or    naloxone (NARCAN) injection 0.2 mg  0.2 mg Intramuscular Q2 Min PRN Griffiths, Mil Dial MD        Or    naloxone (NARCAN) injection 0.4 mg  0.4 mg Intramuscular Q2 Min PRN Griffiths, Mil Dial MD        ondansetron (ZOFRAN ODT) ODT tab 4 mg  4 mg Oral Q6H PRN Griffiths, Mil Dial MD        Or    ondansetron (ZOFRAN) injection 4 mg  4 mg Intravenous Q6H PRN Griffiths, Mil Dial MD        oxyCODONE  (ROXICODONE) tablet 5 mg  5 mg Oral Q4H PRN Tunde, Mil Dial MD        oxyCODONE IR (ROXICODONE) half-tab 2.5 mg  2.5 mg Oral Q4H PRN Tunde, Mil Dial MD        Patient is already receiving anticoagulation with heparin, enoxaparin (LOVENOX), warfarin (COUMADIN)  or other anticoagulant medication   Does not apply Continuous PRN Tunde, Mil Dial MD        polyethylene glycol (MIRALAX) Packet 17 g  17 g Oral BID PRN Tunde, Mil Dial MD        polyethylene glycol (MIRALAX) Packet 17 g  17 g Oral Daily Libra Stokes MD   17 g at 05/24/25 1314    prochlorperazine (COMPAZINE) injection 5 mg  5 mg Intravenous Q6H PRN Tunde, Mil Dial MD        Or    prochlorperazine (COMPAZINE) tablet 5 mg  5 mg Oral Q6H PRN Tunde, Mil Dial MD        senna-docusate (SENOKOT-S/PERICOLACE) 8.6-50 MG per tablet 1 tablet  1 tablet Oral BID PRN Tunde, Mil Dial MD        Or    senna-docusate (SENOKOT-S/PERICOLACE) 8.6-50 MG per tablet 2 tablet  2 tablet Oral BID PRN Tunde, Mil Dial MD        sodium chloride (PF) 0.9% PF flush 3 mL  3 mL Intracatheter Q8H MANI Mil Griffiths MD   3 mL at 05/24/25 2034    sodium chloride (PF) 0.9% PF flush 3 mL  3 mL Intracatheter q1 min prn Tunde, Mil Dial MD   3 mL at 05/21/25 0840    torsemide (DEMADEX) tablet 10 mg  10 mg Oral Daily Libra Stokes MD   10 mg at 05/24/25 1106                  Physical Exam:   Vitals were reviewed  Patient Vitals for the past 8 hrs:   BP Temp Temp src Pulse Resp SpO2 Weight   05/25/25 0828 (!) 144/72 98.3  F (36.8  C) Oral 97 16 95 % --   05/25/25 0543 -- -- -- -- -- -- 93.6 kg (206 lb 5.6 oz)   05/25/25 0429 (!) 159/83 97.3  F (36.3  C) Oral 93 18 97 % --     GEN: NAD, lying in bed  HEENT: EOMI  : Alonzo draining clear           Data:     Lab Results   Component Value Date    CR 1.14 05/22/2025    CR 1.24 (H) 05/21/2025    CR 1.12 05/20/2025    CR 1.03 05/18/2025    CR 1.09 05/17/2025     Lab Results   Component Value Date    WBC 8.0 05/24/2025     WBC 10.0 05/21/2025    WBC 9.8 05/20/2025    HGB 8.4 (L) 05/24/2025    HGB 12.4 (L) 05/21/2025    HGB 12.9 (L) 05/20/2025    HCT 25.3 (L) 05/24/2025    HCT 37.6 (L) 05/21/2025    HCT 39.4 (L) 05/20/2025     05/24/2025    MCV 99 05/21/2025    MCV 98 05/20/2025     05/24/2025     05/21/2025     05/20/2025     Lab Results   Component Value Date    INR 1.87 (H) 08/06/2021

## 2025-05-25 NOTE — PLAN OF CARE
Goal Outcome Evaluation:      Plan of Care Reviewed With: patient    Overall Patient Progress: improvingOverall Patient Progress: improving          Date/Time:5/24/25 @ 0228-6782    Trauma/Ortho/Medical (Choose one) Medical    Diagnosis:Acute COVID-19 with associated severe sepsis, Pyuria, with bladder tumor, Gross hematuria, Bladder stones, Right sided bladder mass  POD#:N/A  Mental Status:A and O x 4  Activity/dangle:A1 with GB and maria elena najera. Turned and repo in bed  Diet:Regular  Pain:Managed with scheduled Tylenol  Alonzo/Voiding:Alonzo catheter   Tele/Restraints/Iso:Special precaution maintained  02/LDA:On RA. PIV SL L antecubital and R hand  D/C Date:Discharging to [facility] Living ride setup between [id number]  Other Info:BP elevated. Foam dressings in BLE and sacral area CDI.

## 2025-05-25 NOTE — PLAN OF CARE
Goal Outcome Evaluation:  Trauma/Ortho/Medical (Choose one)  medical  Diagnosis: gross hematuria-resolved/bladder stones/right sided bladder mass; COVID-19 with sepsis   Mental Status: A/Ox4  Activity/dangle 2 assist/GB/maria elena-steady  Diet: regular  Pain: denies  Jessica/Voiding: jessica  Tele/Restraints/Iso: special precautions  02/LDA: RA/SLx2 - removed for discharge  D/C Date: TCU today between 14:10-14:55  Other Info: copy of AVS given to patient

## 2025-05-25 NOTE — PROGRESS NOTES
Care Management Follow Up    Length of Stay (days): 9    Expected Discharge Date: 05/25/2025     Concerns to be Addressed: discharge planning     Patient plan of care discussed at interdisciplinary rounds: Yes    Anticipated Discharge Disposition:  TCU placement              Anticipated Discharge Services: Therapy  Anticipated Discharge DME: None    Patient/family educated on Medicare website which has current facility and service quality ratings: no  Education Provided on the Discharge Plan: No  Patient/Family in Agreement with the Plan: TBD    Referrals Placed by CM/SW:  TCU referrals  Private pay costs discussed: Not applicable    Discussed  Partnership in Safe Discharge Planning  document with patient/family: No     Handoff Completed: No, handoff not indicated or clinically appropriate    Additional Information:  Calls placed x 3 and messages left for [facility] to follow up on the referral from yesterday.  Awaiting a return call.  Call placed x 2 to update patient's wife.  Patient's wife states she will also call [facility].  On the second call to patient's wife, explained that we still have the bed available at [facility] so we will need to move forward with the discharge at that facility.  Explained what time transport is arranged for.  Patient's states she understands.    Next Steps: Complete the discharge.        AUDI Aleman, [facility]    [phone]  [facility]

## 2025-05-27 ENCOUNTER — TELEPHONE (OUTPATIENT)
Dept: UROLOGY | Facility: CLINIC | Age: 85
End: 2025-05-27
Payer: MEDICARE

## 2025-05-27 NOTE — TELEPHONE ENCOUNTER
----- Message from Shadi Man sent at 5/24/2025 10:14 AM CDT -----  Regarding: Move up his follow-up visit to 6/2  Hi team,    I would like to move up Can's follow-up visit to 6/2/2025 with a cystoscopy and trial of void.    Thanks,   Shadi Man M.D.

## 2025-05-27 NOTE — PLAN OF CARE
"Physical Therapy Discharge Summary    Reason for therapy discharge:    Discharged to transitional care facility.    Progress towards therapy goal(s). See goals on Care Plan in [facility] electronic health record for goal details.  Goals not met.  Barriers to achieving goals:   limited tolerance for therapy and discharge from facility.    Therapy recommendation(s):    Continued therapy is recommended.  Rationale/Recommendations:  Per treating therapist: \"Pt is below baseline, progressing mobility. Now able to ambulate short distances with assist of 1-2 and use of FWW\".TCU was recommended by treating therapist. This note written per chart review. Documenting therapist did not see patient.                                 "

## 2025-06-02 ENCOUNTER — OFFICE VISIT (OUTPATIENT)
Dept: UROLOGY | Facility: CLINIC | Age: 85
End: 2025-06-02
Payer: COMMERCIAL

## 2025-06-02 VITALS
HEART RATE: 89 BPM | DIASTOLIC BLOOD PRESSURE: 76 MMHG | SYSTOLIC BLOOD PRESSURE: 148 MMHG | OXYGEN SATURATION: 98 % | HEIGHT: 63 IN | WEIGHT: 199 LBS | BODY MASS INDEX: 35.26 KG/M2

## 2025-06-02 DIAGNOSIS — R33.9 URINARY RETENTION: ICD-10-CM

## 2025-06-02 DIAGNOSIS — N13.8 BPH WITH OBSTRUCTION/LOWER URINARY TRACT SYMPTOMS: Primary | ICD-10-CM

## 2025-06-02 DIAGNOSIS — N32.89 BLADDER MASS: ICD-10-CM

## 2025-06-02 DIAGNOSIS — N40.1 BPH WITH OBSTRUCTION/LOWER URINARY TRACT SYMPTOMS: Primary | ICD-10-CM

## 2025-06-02 LAB — RESIDUAL VOLUME (RV) (EXTERNAL): NORMAL

## 2025-06-02 PROCEDURE — 52000 CYSTOURETHROSCOPY: CPT | Performed by: UROLOGY

## 2025-06-02 PROCEDURE — 3078F DIAST BP <80 MM HG: CPT | Performed by: UROLOGY

## 2025-06-02 PROCEDURE — 99214 OFFICE O/P EST MOD 30 MIN: CPT | Mod: 25 | Performed by: UROLOGY

## 2025-06-02 PROCEDURE — 3077F SYST BP >= 140 MM HG: CPT | Performed by: UROLOGY

## 2025-06-02 PROCEDURE — 51798 US URINE CAPACITY MEASURE: CPT | Performed by: UROLOGY

## 2025-06-02 PROCEDURE — 1126F AMNT PAIN NOTED NONE PRSNT: CPT | Performed by: UROLOGY

## 2025-06-02 RX ORDER — LIDOCAINE HYDROCHLORIDE 20 MG/ML
JELLY TOPICAL ONCE
Status: COMPLETED | OUTPATIENT
Start: 2025-06-02 | End: 2025-06-02

## 2025-06-02 RX ADMIN — LIDOCAINE HYDROCHLORIDE 5 ML: 20 JELLY TOPICAL at 08:36

## 2025-06-02 ASSESSMENT — PAIN SCALES - GENERAL: PAINLEVEL_OUTOF10: NO PAIN (0)

## 2025-06-02 NOTE — PROGRESS NOTES
[facility]  CHIEF COMPLAINT   It was my pleasure to see Shilo Menchaca who is a 85 year old male for follow-up of recent episode of retention and possible bladder mass    HPI  Shilo Menchaca is a 81 year old male with significant past medical history of hypertension, a history of BPH with an episode of retention about 20 years ago for which she has been on Avodart for the last 20 years.  He was recently hospitalized at [facility] with a UTI.  He underwent an outpatient CT on 6/4/2021 which demonstrated a significantly distended bladder, bladder stones, and right hydroureteronephrosis with no clear evidence of obstruction.  He was then admitted through the emergency room on 6/5/2021 and noted to have significant leukocytosis to 17.4 and urinalysis concerning for infection.  Alonzo catheter placement was complicated by his significant phimosis which required bedside cystoscopy and placement of a catheter over a wire.  A CT urogram was then obtained on 6/7/2021 which demonstrated persistent right hydronephrosis and an incidental pulmonary embolism and he was started on anticoagulation.  On 6/9/2021 he underwent a dorsal slit of his phimosis, cystoscopy and right retrograde pyelogram, diagnostic ureteroscopy, and right ureteral stent placement.  He returns today for a trial of void.    8/4/21:  He returns today for follow-up for ureteral stent removal  He notes that he has been having significant urinary frequency with small volume voids and urine leakage especially overnight  He notes intermittent sensation of incomplete bladder emptying    9/8/21:  He has been able stopped the Xarelto on Monday 9/7/21  He has noted some dizziness with the tamsulosin  He was very eager to have surgery done get rid of the catheter  His foreskin has healed well from the dorsal slit, though there is still some small amount of smegma    9/17/21:  Greenlight photovaporization of the prostate (PVP), Cystolitholapaxy with bladder stone  "fragmentation and removal    12/15/21:  Follow-up today after his PVP  He has been doing strongly well since surgery and is very happy with the outcome  He reports that he is \"never had plumbing like this before \"  He notes periodic groin and perineal itching for which he has been using topical metronidazole powder, no active issue at this time    5/16/2025 - 9/25/2025:  Seen during recent hospital admission for COVID  He was noted on CT scan to have evidence of urinary retention as well as a possible bladder mass  He was noted during the hospitalization to have intermittent gross hematuria  Ultimately discharged with a Alonzo catheter in place    TODAY 6/2/2025:  Follow-up today for cystoscopy and trial of void  He continues at a TCU    PHYSICAL EXAM  Patient is a 85 year old  male   Vitals: Blood pressure (!) 148/76, pulse 89, height 1.6 m (5' 3\"), weight 90.3 kg (199 lb), SpO2 98%.  Body mass index is 35.25 kg/m .  General Appearance Adult:   Alert, no acute distress, oriented  HENT: throat/mouth:normal, good dentition  Lungs: no respiratory distress, or pursed lip breathing  Heart: No obvious jugular venous distension present  Abdomen: soft, nontender, no organomegaly or masses  Musculoskeltal: extremities normal, no peripheral edema  Skin: no suspicious lesions or rashes  Neuro: Alert, oriented, speech and mentation normal  Psych: affect and mood normal    Creatinine   Date Value Ref Range Status   05/22/2025 1.14 0.67 - 1.17 mg/dL Final   05/21/2025 1.24 (H) 0.67 - 1.17 mg/dL Final   05/20/2025 1.12 0.67 - 1.17 mg/dL Final   05/18/2025 1.03 0.67 - 1.17 mg/dL Final   05/17/2025 1.09 0.67 - 1.17 mg/dL Final   05/16/2025 1.19 (H) 0.67 - 1.17 mg/dL Final   08/08/2021 1.17 0.66 - 1.25 mg/dL Final   08/07/2021 1.13 0.66 - 1.25 mg/dL Final   08/06/2021 1.66 (H) 0.66 - 1.25 mg/dL Final   06/08/2021 1.00 0.66 - 1.25 mg/dL Final       IMAGING  All pertinent imaging reviewed:    All imaging studies reviewed by me.  I " personally reviewed these imaging films.  A formal report from radiology will follow.    CT Abd/Pel 5/16/2025:  FINDINGS:   LOWER CHEST: Mild bibasilar atelectasis. Advanced multivessel coronary artery calcification.     HEPATOBILIARY: Stable 2.2 cm benign appearing rim calcified observation in the liver. Absent gallbladder. No biliary dilatation.     PANCREAS: Normal.     SPLEEN: Normal.     ADRENAL GLANDS: Normal.     KIDNEYS/BLADDER: Focal wall thickening of the urinary bladder on the right near the right ureterovesical junction concerning for possible neoplasm measures 3.1 x 1.6 cm on axial images (image 172 of series 4). No hydronephrosis. 2 mm stone in the   bladder. 2 mm nonobstructing left intrarenal stone. Chronic cortical thinning of the kidneys. Tiny benign cysts of the kidneys not warranting follow-up.     BOWEL: Mild colonic diverticulosis. No bowel obstruction or inflammation. No evidence for appendicitis.     LYMPH NODES: Normal.     VASCULATURE: Mild aortoiliac atherosclerosis. No aneurysm.     PELVIC ORGANS: Normal.     MUSCULOSKELETAL: Tiny fat-containing umbilical hernia. Degenerative changes of the spine.                                                                      IMPRESSION:   1.  Focal masslike thickening of the urinary bladder wall on the right measuring up to 3.1 cm concerning for possible neoplasm. Urology follow-up recommended.  2.  Tiny nonobstructing stones in the bladder and left renal collecting system. No hydronephrosis.  3.  Advanced coronary artery calcification.      ASSESSMENT and PLAN  85-year-old man with with history of a PVP on 9/17/2021.  Recent admission for COVID and found to have a bladder mass and urinary retention    Bladder mass  - Cystoscopy today with evidence of a 3 to 4 cm papillary tumor concerning for urothelial carcinoma on the right wall just lateral to the right ureteral orifice  - We discussed the need for a TURBT  - We discussed that this can often be  done as an outpatient, however given his frailty and other medical comorbidities we may need to plan for 1 night hospitalization for observation  - Order for surgery placed  - This is an undiagnosed problem with an uncertain prognosis    Urinary retention  -He is status post a PVP on 9/17/2021.  - Cystoscopy today with an open bladder neck and only very mild regrowth of some adenoma at the apex  - Trial of void today      Time spent: 22 minutes spent on the date of the encounter doing chart review, history and exam, documentation and further activities as noted above.  This was in addition to cystoscopy time    Shadi Man MD   Urology  [facility]  [facility] Phone: [phone]  [facility] Phone: [phone]

## 2025-06-02 NOTE — NURSING NOTE
Chief Complaint   Patient presents with    BPH without obstruction/lower urinary tract symptoms     Patient here today for for cystoscopy     Prior to the start of the procedure DR MAN and with procedural staff participation, I verbally confirmed the patient s identity using two indicators, relevant allergies, that the procedure was appropriate and matched the consent or emergent situation, and that the correct equipment/implants were available. Immediately prior to starting the procedure I conducted the Time Out with the procedural staff and re-confirmed the patient s name, procedure, and site/side. I have wiped the patient off with the povidone-Iodine solution, draped them,  used Lidocaine hydrochloride jelly, and instilled sterile water into the bladder. (The Joint Commission universal protocol was followed.)  Yes    5mL 2% lidocaine hydrochloride Urojet instilled into urethra.    NDC# [address]-5  Lot #: [id number]  Expiration Date:  01-27  [facility]      Catheter removal documentation on 6/2/2025:    Shilo Menchaca presents to the clinic for catheter removal.  Reason for removal: for cystoscopy  Order has been verified. DR Man  Catheter successfully removed at 8.50 AM without immediate complication.  200 cc's of urine present in the catheter bag.DR Man placed 300cc for sterile water into the bladder patient voided 150ml after cystoscopy   Urethral meatus is free of secretions and encrustation.  The patient is afebrile.  The patient tolerated the procedure and was instructed to drink plenty of water .    [facility]

## 2025-06-02 NOTE — PROCEDURES
CYSTOSCOPY PROCEDURE NOTE:    Shilo Menchaca is a 85 year old male  who presents with possible bladder mass for cystoscopy.    Pt ID verified with patient: Yes     Procedure verified with patient: Yes     Procedure confirmed with physician and support staff: Yes     Consent form confirmed with physician and support staff.    Sign In  History and Physical Exam reviewed.  Informed Consent Discussed: Yes   Sign in Communication: Yes   Time Out:  Team Confirms the Correct Patient, Correct Procedure; Yes , Correct Site and Site Marking, Correct Position (if applicable).    Affirmation of Time Out: Yes   Sign Out:  Sign Out Discussion: Yes   Physician: Shadi Man MD    A urinalysis was performed revealing no evidence of infection.    The benefits, risks, alternatives of the cystoscopy procedure and personnel were discussed with the patient. The verbal consent was obtained and the patient agrees to proceed.      Description of procedure:   After fully informed, voluntary consent was obtained, the patient was brought into the procedure room, identified and placed in a supine position on the cystoscopy table.  The groin/scrotum were prepped with betadine and draped in a sterile fashion.  Urojet lidocaine gel was introduced.  A 15F flexible cystoscope was inserted into the urethra, and the bladder and urethra wereexamined in a systematic manner.  The patient tolerated the procedure well and there were no complications.      Cystoscopic findings:  The urethra was normal without strictures.  The prostate was 3-4cm long and demonstrated very bilobar hypertrophy regrowth at the apex.  The remainder of the prostatic urethra showed evidence of prior PVP with no evidence of bladder neck contracture.  The external sphincter coapted normally. The bladder was  entered and careful pan endoscopy was carried out. The posterior, superior and lateral walls and dome of the bladder were all well visualized and the scope was  retroflexed upon itself..  There was moderate to severe trabeculation.  There were no stones, or diverticula identifed.  The ureteric orifices were normal in position and number and effluxing clear urine.  There was a 3 to 4 cm papillary mass on the right sidewall just lateral to the right ureteral orifice which did not appear involved.    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

## 2025-06-02 NOTE — PATIENT INSTRUCTIONS
"AFTER YOUR CYSTOSCOPY  ?  ?  You have just completed a cystoscopy, or \"cysto\", which allowed your physician to learn more about your bladder (or to remove a stent placed after surgery). We suggest that you continue to avoid caffeine, fruit juice, and alcohol for the next 24 hours, however, you are encouraged to return to your normal activities.  ?  ?  A few things that are considered normal after your cystoscopy:  ?  * small amount of bleeding (or spotting) that clears within the next 24 hours  ?  * slight burning sensation with urination  ?  * sensation of needing to void (urinate) more frequently  ?  * the feeling of \"air\" in your urine  ?  * mild discomfort that is relieved with Tylenol    * bladder spasms  ?  ?  ?  Please contact our office promptly if you:  ?  * develop a fever above 101 degrees  ?  * are unable to urinate  ?  * develop bright red blood that does not stop  ?  * experience severe pain or swelling  ?  ?  ?  And of course, please contact our office with any concerns or questions [phone].  ?    AFTER YOUR CYSTOSCOPY        You have just completed a cystoscopy, or \"cysto\", which allowed your physician to learn more about your bladder (or to remove a stent placed after surgery). We suggest that you continue to avoid caffeine, fruit juice, and alcohol for the next 24 hours, however, you are encouraged to return to your normal activities.         A few things that are considered normal after your cystoscopy:     * Small amount of bleeding (or spotting) that clears within the next 24 hours     * Slight burning sensation with urination     * Sensation to of needing to avoid more frequently     * The feeling of \"air\" in your urine     * Mild discomfort that is relieved with Tylenol        Please contact our office promptly if you:     * Develop a fever above 101 degrees     * Are unable to urinate     * Develop bright red blood that does not stop     * Severe pain or swelling         Please contact " our office with any concerns or questions @[facility].

## 2025-06-04 ENCOUNTER — TELEPHONE (OUTPATIENT)
Dept: UROLOGY | Facility: CLINIC | Age: 85
End: 2025-06-04
Payer: MEDICARE

## 2025-06-25 ENCOUNTER — MEDICAL CORRESPONDENCE (OUTPATIENT)
Dept: HEALTH INFORMATION MANAGEMENT | Facility: CLINIC | Age: 85
End: 2025-06-25
Payer: MEDICARE

## 2025-06-30 ENCOUNTER — ANESTHESIA (OUTPATIENT)
Dept: SURGERY | Facility: CLINIC | Age: 85
End: 2025-06-30
Payer: MEDICARE

## 2025-06-30 ENCOUNTER — ANESTHESIA EVENT (OUTPATIENT)
Dept: SURGERY | Facility: CLINIC | Age: 85
End: 2025-06-30
Payer: MEDICARE

## 2025-06-30 ENCOUNTER — HOSPITAL ENCOUNTER (OUTPATIENT)
Facility: CLINIC | Age: 85
Discharge: NURSING, REHAB OR LONG-TERM CARE | End: 2025-07-01
Attending: UROLOGY | Admitting: UROLOGY
Payer: MEDICARE

## 2025-06-30 ENCOUNTER — MEDICAL CORRESPONDENCE (OUTPATIENT)
Dept: HEALTH INFORMATION MANAGEMENT | Facility: CLINIC | Age: 85
End: 2025-06-30

## 2025-06-30 DIAGNOSIS — N32.89 BLADDER MASS: Primary | ICD-10-CM

## 2025-06-30 PROCEDURE — 250N000009 HC RX 250: Performed by: UROLOGY

## 2025-06-30 PROCEDURE — 88307 TISSUE EXAM BY PATHOLOGIST: CPT | Mod: TC | Performed by: UROLOGY

## 2025-06-30 PROCEDURE — 370N000017 HC ANESTHESIA TECHNICAL FEE, PER MIN: Performed by: UROLOGY

## 2025-06-30 PROCEDURE — 272N000001 HC OR GENERAL SUPPLY STERILE: Performed by: UROLOGY

## 2025-06-30 PROCEDURE — 250N000011 HC RX IP 250 OP 636: Performed by: ANESTHESIOLOGY

## 2025-06-30 PROCEDURE — 258N000003 HC RX IP 258 OP 636: Performed by: ANESTHESIOLOGY

## 2025-06-30 PROCEDURE — 250N000013 HC RX MED GY IP 250 OP 250 PS 637: Performed by: UROLOGY

## 2025-06-30 PROCEDURE — 250N000013 HC RX MED GY IP 250 OP 250 PS 637: Performed by: ANESTHESIOLOGY

## 2025-06-30 PROCEDURE — 250N000009 HC RX 250: Performed by: ANESTHESIOLOGY

## 2025-06-30 PROCEDURE — 710N000009 HC RECOVERY PHASE 1, LEVEL 1, PER MIN: Performed by: UROLOGY

## 2025-06-30 PROCEDURE — 52240 CYSTOSCOPY AND TREATMENT: CPT | Performed by: UROLOGY

## 2025-06-30 PROCEDURE — 258N000001 HC RX 258: Performed by: UROLOGY

## 2025-06-30 PROCEDURE — 360N000076 HC SURGERY LEVEL 3, PER MIN: Performed by: UROLOGY

## 2025-06-30 PROCEDURE — 250N000011 HC RX IP 250 OP 636: Performed by: UROLOGY

## 2025-06-30 PROCEDURE — 250N000025 HC SEVOFLURANE, PER MIN: Performed by: UROLOGY

## 2025-06-30 PROCEDURE — 999N000141 HC STATISTIC PRE-PROCEDURE NURSING ASSESSMENT: Performed by: UROLOGY

## 2025-06-30 PROCEDURE — 258N000003 HC RX IP 258 OP 636: Performed by: UROLOGY

## 2025-06-30 RX ORDER — ACETAMINOPHEN 325 MG/1
975 TABLET ORAL EVERY 6 HOURS
Status: DISCONTINUED | OUTPATIENT
Start: 2025-06-30 | End: 2025-07-01 | Stop reason: HOSPADM

## 2025-06-30 RX ORDER — ALBUTEROL SULFATE 90 UG/1
INHALANT RESPIRATORY (INHALATION) PRN
Status: DISCONTINUED | OUTPATIENT
Start: 2025-06-30 | End: 2025-06-30

## 2025-06-30 RX ORDER — LISINOPRIL 10 MG/1
10 TABLET ORAL DAILY
Status: DISCONTINUED | OUTPATIENT
Start: 2025-06-30 | End: 2025-06-30

## 2025-06-30 RX ORDER — TORSEMIDE 20 MG/1
20 TABLET ORAL DAILY
Status: DISCONTINUED | OUTPATIENT
Start: 2025-07-01 | End: 2025-07-01 | Stop reason: HOSPADM

## 2025-06-30 RX ORDER — SODIUM CHLORIDE, SODIUM LACTATE, POTASSIUM CHLORIDE, CALCIUM CHLORIDE 600; 310; 30; 20 MG/100ML; MG/100ML; MG/100ML; MG/100ML
INJECTION, SOLUTION INTRAVENOUS CONTINUOUS
Status: DISCONTINUED | OUTPATIENT
Start: 2025-06-30 | End: 2025-06-30 | Stop reason: HOSPADM

## 2025-06-30 RX ORDER — LISINOPRIL 10 MG/1
20 TABLET ORAL DAILY
COMMUNITY

## 2025-06-30 RX ORDER — FENTANYL CITRATE 50 UG/ML
25 INJECTION, SOLUTION INTRAMUSCULAR; INTRAVENOUS EVERY 5 MIN PRN
Status: DISCONTINUED | OUTPATIENT
Start: 2025-06-30 | End: 2025-06-30 | Stop reason: HOSPADM

## 2025-06-30 RX ORDER — HYDROMORPHONE HCL IN WATER/PF 6 MG/30 ML
0.2 PATIENT CONTROLLED ANALGESIA SYRINGE INTRAVENOUS EVERY 5 MIN PRN
Status: DISCONTINUED | OUTPATIENT
Start: 2025-06-30 | End: 2025-06-30 | Stop reason: HOSPADM

## 2025-06-30 RX ORDER — DEXAMETHASONE SODIUM PHOSPHATE 4 MG/ML
INJECTION, SOLUTION INTRA-ARTICULAR; INTRALESIONAL; INTRAMUSCULAR; INTRAVENOUS; SOFT TISSUE PRN
Status: DISCONTINUED | OUTPATIENT
Start: 2025-06-30 | End: 2025-06-30

## 2025-06-30 RX ORDER — CETIRIZINE HYDROCHLORIDE 10 MG/1
10 TABLET ORAL EVERY EVENING
Status: DISCONTINUED | OUTPATIENT
Start: 2025-06-30 | End: 2025-07-01 | Stop reason: HOSPADM

## 2025-06-30 RX ORDER — ONDANSETRON 2 MG/ML
INJECTION INTRAMUSCULAR; INTRAVENOUS PRN
Status: DISCONTINUED | OUTPATIENT
Start: 2025-06-30 | End: 2025-06-30

## 2025-06-30 RX ORDER — ONDANSETRON 2 MG/ML
4 INJECTION INTRAMUSCULAR; INTRAVENOUS EVERY 30 MIN PRN
Status: DISCONTINUED | OUTPATIENT
Start: 2025-06-30 | End: 2025-06-30 | Stop reason: HOSPADM

## 2025-06-30 RX ORDER — METOPROLOL SUCCINATE 25 MG/1
25 TABLET, EXTENDED RELEASE ORAL 2 TIMES DAILY
COMMUNITY

## 2025-06-30 RX ORDER — METOPROLOL SUCCINATE 25 MG/1
25 TABLET, EXTENDED RELEASE ORAL 2 TIMES DAILY
Status: DISCONTINUED | OUTPATIENT
Start: 2025-06-30 | End: 2025-07-01 | Stop reason: HOSPADM

## 2025-06-30 RX ORDER — ONDANSETRON 4 MG/1
4 TABLET, ORALLY DISINTEGRATING ORAL EVERY 30 MIN PRN
Status: DISCONTINUED | OUTPATIENT
Start: 2025-06-30 | End: 2025-06-30 | Stop reason: HOSPADM

## 2025-06-30 RX ORDER — FENTANYL CITRATE 50 UG/ML
50 INJECTION, SOLUTION INTRAMUSCULAR; INTRAVENOUS EVERY 5 MIN PRN
Status: DISCONTINUED | OUTPATIENT
Start: 2025-06-30 | End: 2025-06-30 | Stop reason: HOSPADM

## 2025-06-30 RX ORDER — LIDOCAINE HYDROCHLORIDE 20 MG/ML
INJECTION, SOLUTION INFILTRATION; PERINEURAL PRN
Status: DISCONTINUED | OUTPATIENT
Start: 2025-06-30 | End: 2025-06-30

## 2025-06-30 RX ORDER — AMLODIPINE BESYLATE 5 MG/1
5 TABLET ORAL DAILY
Status: DISCONTINUED | OUTPATIENT
Start: 2025-06-30 | End: 2025-06-30

## 2025-06-30 RX ORDER — LIDOCAINE 40 MG/G
CREAM TOPICAL
Status: DISCONTINUED | OUTPATIENT
Start: 2025-06-30 | End: 2025-07-01 | Stop reason: HOSPADM

## 2025-06-30 RX ORDER — NALOXONE HYDROCHLORIDE 0.4 MG/ML
0.1 INJECTION, SOLUTION INTRAMUSCULAR; INTRAVENOUS; SUBCUTANEOUS
Status: DISCONTINUED | OUTPATIENT
Start: 2025-06-30 | End: 2025-06-30 | Stop reason: HOSPADM

## 2025-06-30 RX ORDER — ACETAMINOPHEN 325 MG/1
975 TABLET ORAL ONCE
Status: COMPLETED | OUTPATIENT
Start: 2025-06-30 | End: 2025-06-30

## 2025-06-30 RX ORDER — DEXAMETHASONE SODIUM PHOSPHATE 4 MG/ML
4 INJECTION, SOLUTION INTRA-ARTICULAR; INTRALESIONAL; INTRAMUSCULAR; INTRAVENOUS; SOFT TISSUE
Status: DISCONTINUED | OUTPATIENT
Start: 2025-06-30 | End: 2025-06-30 | Stop reason: HOSPADM

## 2025-06-30 RX ORDER — POLYETHYLENE GLYCOL 3350 17 G/17G
17 POWDER, FOR SOLUTION ORAL DAILY
Status: DISCONTINUED | OUTPATIENT
Start: 2025-06-30 | End: 2025-07-01 | Stop reason: HOSPADM

## 2025-06-30 RX ORDER — ACETAMINOPHEN 650 MG/1
650 SUPPOSITORY RECTAL ONCE
Status: COMPLETED | OUTPATIENT
Start: 2025-06-30 | End: 2025-06-30

## 2025-06-30 RX ORDER — FENTANYL CITRATE 50 UG/ML
INJECTION, SOLUTION INTRAMUSCULAR; INTRAVENOUS PRN
Status: DISCONTINUED | OUTPATIENT
Start: 2025-06-30 | End: 2025-06-30

## 2025-06-30 RX ORDER — HYDROMORPHONE HCL IN WATER/PF 6 MG/30 ML
0.4 PATIENT CONTROLLED ANALGESIA SYRINGE INTRAVENOUS EVERY 5 MIN PRN
Status: DISCONTINUED | OUTPATIENT
Start: 2025-06-30 | End: 2025-06-30 | Stop reason: HOSPADM

## 2025-06-30 RX ORDER — LISINOPRIL 10 MG/1
20 TABLET ORAL DAILY
Status: DISCONTINUED | OUTPATIENT
Start: 2025-07-01 | End: 2025-07-01 | Stop reason: HOSPADM

## 2025-06-30 RX ORDER — CEFAZOLIN SODIUM/WATER 2 G/20 ML
2 SYRINGE (ML) INTRAVENOUS SEE ADMIN INSTRUCTIONS
Status: DISCONTINUED | OUTPATIENT
Start: 2025-06-30 | End: 2025-06-30

## 2025-06-30 RX ORDER — MAGNESIUM HYDROXIDE 1200 MG/15ML
LIQUID ORAL PRN
Status: DISCONTINUED | OUTPATIENT
Start: 2025-06-30 | End: 2025-06-30 | Stop reason: HOSPADM

## 2025-06-30 RX ORDER — SODIUM CHLORIDE, SODIUM LACTATE, POTASSIUM CHLORIDE, CALCIUM CHLORIDE 600; 310; 30; 20 MG/100ML; MG/100ML; MG/100ML; MG/100ML
INJECTION, SOLUTION INTRAVENOUS CONTINUOUS PRN
Status: DISCONTINUED | OUTPATIENT
Start: 2025-06-30 | End: 2025-06-30

## 2025-06-30 RX ORDER — METOPROLOL TARTRATE 25 MG/1
25 TABLET, FILM COATED ORAL DAILY
Status: ON HOLD | COMMUNITY
End: 2025-06-30

## 2025-06-30 RX ORDER — CEFAZOLIN SODIUM/WATER 2 G/20 ML
2 SYRINGE (ML) INTRAVENOUS
Status: COMPLETED | OUTPATIENT
Start: 2025-06-30 | End: 2025-06-30

## 2025-06-30 RX ORDER — SODIUM CHLORIDE 9 MG/ML
INJECTION, SOLUTION INTRAVENOUS CONTINUOUS
Status: DISCONTINUED | OUTPATIENT
Start: 2025-06-30 | End: 2025-07-01 | Stop reason: HOSPADM

## 2025-06-30 RX ORDER — PROPOFOL 10 MG/ML
INJECTION, EMULSION INTRAVENOUS PRN
Status: DISCONTINUED | OUTPATIENT
Start: 2025-06-30 | End: 2025-06-30

## 2025-06-30 RX ADMIN — FENTANYL CITRATE 25 MCG: 50 INJECTION INTRAMUSCULAR; INTRAVENOUS at 12:41

## 2025-06-30 RX ADMIN — LIDOCAINE HYDROCHLORIDE 60 MG: 20 INJECTION, SOLUTION INFILTRATION; PERINEURAL at 12:09

## 2025-06-30 RX ADMIN — PHENYLEPHRINE HYDROCHLORIDE 0.5 MCG/KG/MIN: 10 INJECTION INTRAVENOUS at 12:13

## 2025-06-30 RX ADMIN — ROCURONIUM BROMIDE 10 MG: 50 INJECTION, SOLUTION INTRAVENOUS at 12:31

## 2025-06-30 RX ADMIN — PROPOFOL 150 MG: 10 INJECTION, EMULSION INTRAVENOUS at 12:09

## 2025-06-30 RX ADMIN — SODIUM CHLORIDE: 0.9 INJECTION, SOLUTION INTRAVENOUS at 17:17

## 2025-06-30 RX ADMIN — PHENYLEPHRINE HYDROCHLORIDE 100 MCG: 10 INJECTION INTRAVENOUS at 12:12

## 2025-06-30 RX ADMIN — DEXMEDETOMIDINE HYDROCHLORIDE 8 MCG: 100 INJECTION, SOLUTION INTRAVENOUS at 13:00

## 2025-06-30 RX ADMIN — FENTANYL CITRATE 50 MCG: 50 INJECTION INTRAMUSCULAR; INTRAVENOUS at 12:09

## 2025-06-30 RX ADMIN — CETIRIZINE HYDROCHLORIDE 10 MG: 10 TABLET, FILM COATED ORAL at 20:07

## 2025-06-30 RX ADMIN — ONDANSETRON 4 MG: 2 INJECTION INTRAMUSCULAR; INTRAVENOUS at 12:52

## 2025-06-30 RX ADMIN — SODIUM CHLORIDE, SODIUM LACTATE, POTASSIUM CHLORIDE, AND CALCIUM CHLORIDE: .6; .31; .03; .02 INJECTION, SOLUTION INTRAVENOUS at 11:58

## 2025-06-30 RX ADMIN — PHENYLEPHRINE HYDROCHLORIDE 100 MCG: 10 INJECTION INTRAVENOUS at 12:50

## 2025-06-30 RX ADMIN — ACETAMINOPHEN 975 MG: 325 TABLET, FILM COATED ORAL at 20:07

## 2025-06-30 RX ADMIN — DEXAMETHASONE SODIUM PHOSPHATE 4 MG: 4 INJECTION, SOLUTION INTRA-ARTICULAR; INTRALESIONAL; INTRAMUSCULAR; INTRAVENOUS; SOFT TISSUE at 12:15

## 2025-06-30 RX ADMIN — FENTANYL CITRATE 25 MCG: 50 INJECTION INTRAMUSCULAR; INTRAVENOUS at 12:31

## 2025-06-30 RX ADMIN — METOPROLOL SUCCINATE 25 MG: 25 TABLET, EXTENDED RELEASE ORAL at 20:07

## 2025-06-30 RX ADMIN — ALBUTEROL SULFATE 6 PUFF: 108 INHALANT RESPIRATORY (INHALATION) at 13:06

## 2025-06-30 RX ADMIN — Medication 2 G: at 12:09

## 2025-06-30 RX ADMIN — Medication 200 MG: at 12:56

## 2025-06-30 RX ADMIN — ROCURONIUM BROMIDE 50 MG: 50 INJECTION, SOLUTION INTRAVENOUS at 12:10

## 2025-06-30 RX ADMIN — DEXMEDETOMIDINE HYDROCHLORIDE 8 MCG: 100 INJECTION, SOLUTION INTRAVENOUS at 12:41

## 2025-06-30 ASSESSMENT — ACTIVITIES OF DAILY LIVING (ADL)
ADLS_ACUITY_SCORE: 67
ADLS_ACUITY_SCORE: 67
ADLS_ACUITY_SCORE: 61
ADLS_ACUITY_SCORE: 67
ADLS_ACUITY_SCORE: 68
ADLS_ACUITY_SCORE: 67
ADLS_ACUITY_SCORE: 68
ADLS_ACUITY_SCORE: 67
ADLS_ACUITY_SCORE: 68
ADLS_ACUITY_SCORE: 61

## 2025-06-30 ASSESSMENT — ENCOUNTER SYMPTOMS: SEIZURES: 0

## 2025-06-30 ASSESSMENT — COPD QUESTIONNAIRES: COPD: 0

## 2025-06-30 NOTE — ANESTHESIA CARE TRANSFER NOTE
Patient: Shilo Menchaca    Procedure: Procedure(s):  CYSTOSCOPY, WITH TRANSURETHRAL RESECTION BLADDER TUMOR       Diagnosis: Bladder mass [N32.89]  Diagnosis Additional Information: No value filed.    Anesthesia Type:   General     Note:    Oropharynx: oropharynx clear of all foreign objects and spontaneously breathing  Level of Consciousness: awake  Oxygen Supplementation: face mask  Level of Supplemental Oxygen (L/min / FiO2): 4  Independent Airway: airway patency satisfactory and stable  Dentition: dentition unchanged  Vital Signs Stable: post-procedure vital signs reviewed and stable  Report to RN Given: handoff report given  Patient transferred to: PACU    Handoff Report: Identifed the Patient, Identified the Reponsible Provider, Reviewed the pertinent medical history, Discussed the surgical course, Reviewed Intra-OP anesthesia mangement and issues during anesthesia, Set expectations for post-procedure period and Allowed opportunity for questions and acknowledgement of understanding      Vitals:  Vitals Value Taken Time   /81 06/30/25 13:21   Temp     Pulse 89 06/30/25 13:23   Resp 25 06/30/25 13:23   SpO2 96 % 06/30/25 13:23   Vitals shown include unfiled device data.    Electronically Signed By: GARY Mays CRNA  June 30, 2025  1:24 PM

## 2025-06-30 NOTE — PLAN OF CARE
Goal Outcome Evaluation:       POD 0CYSTOSCOPY, WITH TRANSURETHRAL RESECTION BLADDER TUMOR      Alonzo intact with Pink color urine.Alert and oriented forgetful.Assist x2 with lift moved to commode . Pt has an old wound on the right shin .Manisha area red ,IVF running tolerating diet

## 2025-06-30 NOTE — ANESTHESIA POSTPROCEDURE EVALUATION
Patient: Shilo Schroederea    Procedure: Procedure(s):  CYSTOSCOPY, WITH TRANSURETHRAL RESECTION BLADDER TUMOR       Anesthesia Type:  General    Note:  Disposition: Outpatient   Postop Pain Control: Uneventful            Sign Out: Well controlled pain   PONV: No   Neuro/Psych: Uneventful            Sign Out: Acceptable/Baseline neuro status   Airway/Respiratory: Uneventful            Sign Out: Acceptable/Baseline resp. status   CV/Hemodynamics: Uneventful            Sign Out: Acceptable CV status   Other NRE:    DID A NON-ROUTINE EVENT OCCUR? No       Last vitals:  Vitals Value Taken Time   /163 06/30/25 16:15   Temp 36.4  C (97.5  F) 06/30/25 16:00   Pulse 81 06/30/25 16:33   Resp 38 06/30/25 16:34   SpO2 96 % 06/30/25 15:00   Vitals shown include unfiled device data.    Electronically Signed By: Diana Wise  June 30, 2025  4:52 PM

## 2025-06-30 NOTE — OP NOTE
OPERATIVE REPORT  DATE OF SURGERY: 06/30/25  LOCATION OF SURGERY: [facility] OR  PREOPERATIVE DIAGNOSIS:  (N32.89) Bladder mass  (primary encounter diagnosis)  POSTOPERATIVE DIAGNOSIS: (N32.89) Bladder mass  (primary encounter diagnosis)    START TIME: 12:31 PM  END TIME: 12:54 PM    PROCEDURE PERFORMED:   Transurethral resection of bladder tumor (TURBT) - Large >5cm resection area     STAFF SURGEON: Shadi Man MD  ANESTHESIA: General.   ESTIMATED BLOOD LOSS: 2 mL.   DRAINS AND TUBES: 18fr jessica catheter with 10cc in the balloon  COMPLICATIONS: None.   DISPOSITION: PACU.   SPECIMENS OBTAINED:   ID Type Source Tests Collected by Time Destination   1 : right lateral bladder wall tumor for permanent Tissue Urinary Bladder SURGICAL PATHOLOGY EXAM Shadi Man MD 6/30/2025 12:38 PM    2 : left anterior bladder wall tumor for permanent Tissue Urinary Bladder SURGICAL PATHOLOGY EXAM Shadi Man MD 6/30/2025 12:46 PM       SIGNIFICANT FINDINGS: Cystoscopy with very mild by lobar hypertrophy regrowth at the apex.  Evidence of a 3 to 4 cm papillary mass on the right sidewall just lateral to the right ureteral orifice.  Additional area of concern on the left anterior wall.  Areas resected separately.     HISTORY OF PRESENT ILLNESS: Shilo Menchaca is a 85 year old man with a history of a PVP on 9/17/2021 with recent admission and evidence of urinary retention and a possible bladder mass.  Outpatient cystoscopy with a concerning 3 to 4 centimeter papillary tumor on the right sidewall.  He was counseled on treatment options and elected to proceed with the above surgery.    OPERATION PERFORMED:   Informed consent was obtained and the patient was brought to the operating room where general anesthesia was induced. The patient was given appropriate preoperative antibiotics and positioned supine. The patient was then repositioned in dorsal lithotomy with all pressure points padded. We then performed a timeout, verifying the  correct patient's site and procedure to be performed.    A 26 Peruvian continuous-flow resectoscope was inserted atraumatically into the bladder.  Cystoscopy was performed with the 3 to 4 cm papillary tumor on the right lateral sidewall lateral to the right ureteral orifice which did not appear involved.  There was an additional area of concern on the left anterior sidewall of the bladder.  These areas were resected in entirety separately and specimens were removed separately.  Total resection area was greater than 5 cm.  Hemostasis was ensured and noted to be excellent at each of the sites.  The resectoscope was removed and a 18 Peruvian Alonzo catheter was placed with 10 cc in the balloon.  He was emerged from anesthesia and taken to the recovery room in stable condition.    Shadi Man MD   Urology  [facility]  Clinic Phone [phone]

## 2025-06-30 NOTE — ANESTHESIA PROCEDURE NOTES
Airway       Patient location during procedure: OR       Procedure Start/Stop Times: 6/30/2025 12:12 PM  Staff -        Anesthesiologist:  Diana Wise       CRNA: Frances Flores APRN CRNA       Performed By: CRNA  Consent for Airway        Urgency: elective  Indications and Patient Condition       Indications for airway management: tyron-procedural       Induction type:intravenous       Mask difficulty assessment: 2 - vent by mask + OA or adjuvant +/- NMBA    Final Airway Details       Final airway type: endotracheal airway       Successful airway: ETT - single and Oral  Endotracheal Airway Details        ETT size (mm): 8.0       Cuffed: yes       Successful intubation technique: video laryngoscopy       VL Blade Size: Thompson 4       Grade View of Cords: 1       Adjucts: stylet       Position: Right       Measured from: gums/teeth       Secured at (cm): 22       Bite block used: None    Post intubation assessment        Placement verified by: capnometry, equal breath sounds and chest rise        Number of attempts at approach: 1       Secured with: tape       Ease of procedure: easy       Dentition: Intact and Unchanged    Medication(s) Administered   Medication Administration Time: 6/30/2025 12:12 PM

## 2025-06-30 NOTE — ANESTHESIA PREPROCEDURE EVALUATION
Anesthesia Pre-Procedure Evaluation    Patient: Shilo Menchaca   MRN: [id number] : 1940          Procedure : Procedure(s):  CYSTOSCOPY, WITH TRANSURETHRAL RESECTION BLADDER TUMOR         Past Medical History:   Diagnosis Date    Anemia     Bladder mass     BPH with obstruction/lower urinary tract symptoms     Cancer (H)     skin    Essential hypertension     Hx pulmonary embolism     Hydronephrosis of right kidney     Hypokalemia     Lower extremity edema     Obesity (BMI 30.0-34.9)     KEZIA (obstructive sleep apnea)     Spinal stenosis     Urinary retention       Past Surgical History:   Procedure Laterality Date    CATARACT REMOVAL WITH IMPLANT  Left     CATARACT REMOVAL WITH IMPLANT  Right     CATARACT SURGERY      ,     CIRCUMCISION N/A 2021    Procedure: DORSAL SLIT;  Surgeon: Shadi Man MD;  Location:  OR    COMBINED CYSTOSCOPY, RETROGRADES, EXCHANGE STENT URETER(S) Right 2021    Procedure: CYSTOSCOPY, WITH RIGHT RETROGRADE PYELOGRAM, RIGHT DIAGNOSTIC  URETEROSCOPY  AND URETERAL STENT REPLACEMENT AND PENILE BLOCK;  Surgeon: Shadi Man MD;  Location:  OR    CYSTOSCOPY      CYSTOSCOPY BLADDER WITH STONE EXTRACTION  2021    Procedure: Cystoscopy Bladder With Stone Extraction;  Surgeon: Shadi Man MD;  Location:  OR    ENT SURGERY      HERNIA REPAIR      LAPAROSCOPIC APPENDECTOMY      LASER KTP GREEN LIGHT PHOTOSELECTIVE VAPORIZATION PROSTATE N/A 2021    Procedure: CYSTOSCOPY, PHOTOVAPORIZATION OF THE PROSTATE USING THE GREENLIGHT LASER;  Surgeon: Shadi Man MD;  Location:  OR    OPEN CHOLECYSTECTOMY  1984      Allergies   Allergen Reactions    Azithromycin     Cefpodoxime Itching      Social History     Tobacco Use    Smoking status: Former     Types: Cigars    Smokeless tobacco: Never   Substance Use Topics    Alcohol use: Not Currently     Comment: 1 glass of wine per week      Wt Readings from Last 1 Encounters:   25 92.5 kg  (204 lb)        Anesthesia Evaluation            ROS/MED HX  ENT/Pulmonary:     (+) sleep apnea, uses CPAP,                                   (-) asthma and COPD   Neurologic:    (-) no seizures and no CVA   Cardiovascular:     (+)  hypertension- -   -  - -                                      METS/Exercise Tolerance:     Hematologic:     (+) History of blood clots,               Musculoskeletal:       GI/Hepatic:    (-) GERD and liver disease   Renal/Genitourinary: Comment: Bladder mass    (+) renal disease, type: CRI,      BPH,      Endo:       Psychiatric/Substance Use:       Infectious Disease:       Malignancy:       Other:              Physical Exam  Airway  Mallampati: II  TM distance: >3 FB  Neck ROM: full    Cardiovascular - normal exam   Dental   (+) Minor Abnormalities - some fillings, tiny chips      Pulmonary - normal exam      Neurological   Other Findings       OUTSIDE LABS:  CBC:   Lab Results   Component Value Date    WBC 8.0 05/24/2025    WBC 10.0 05/21/2025    HGB 8.4 (L) 05/24/2025    HGB 12.4 (L) 05/21/2025    HCT 25.3 (L) 05/24/2025    HCT 37.6 (L) 05/21/2025     05/24/2025     05/21/2025     BMP:   Lab Results   Component Value Date     05/22/2025     (L) 05/20/2025    POTASSIUM 3.9 05/22/2025    POTASSIUM 3.8 05/20/2025    CHLORIDE 101 05/22/2025    CHLORIDE 97 (L) 05/20/2025    CO2 27 05/22/2025    CO2 27 05/20/2025    BUN 23.6 (H) 05/22/2025    BUN 25.4 (H) 05/20/2025    CR 1.14 05/22/2025    CR 1.24 (H) 05/21/2025     (H) 05/22/2025     (H) 05/20/2025     COAGS:   Lab Results   Component Value Date    PTT 62 (H) 06/10/2021    INR 1.87 (H) 08/06/2021     POC:   Lab Results   Component Value Date     (H) 06/05/2021     HEPATIC:   Lab Results   Component Value Date    ALBUMIN 2.7 (L) 05/20/2025    PROTTOTAL 5.3 (L) 05/20/2025    ALT 58 05/20/2025    AST 72 (H) 05/20/2025    ALKPHOS 148 05/20/2025    BILITOTAL 0.3 05/20/2025     OTHER:   Lab  "Results   Component Value Date    PH 7.48 (H) 08/06/2021    LACT 1.7 05/16/2025    SABRINA 8.2 (L) 05/22/2025    TSH 4.29 (H) 05/18/2025    T4 1.36 05/18/2025       Anesthesia Plan    ASA Status:  3       Anesthesia Type: General.  Airway: oral.   Techniques and Equipment:     - Airway:  Planned airway equipment includes video laryngoscope.     - Monitoring Plan: standard ASA monitoring     Consents    Anesthesia Plan(s) and associated risks, benefits, and realistic alternatives discussed. Questions answered and patient/representative(s) expressed understanding.     - Discussed:     - Discussed with:  Patient        - Pt is DNR/DNI Status: DNR     - Suspend during perioperative period? Yes        Postoperative Care         Comments:                   Diana Wise    I have reviewed the pertinent notes and labs in the chart from the past 30 days and (re)examined the patient.  Any updates or changes from those notes are reflected in this note.    Clinically Significant Risk Factors Present on Admission                   # Hypertension: Home medication list includes antihypertensive(s)           # Obesity: Estimated body mass index is 36.15 kg/m  as calculated from the following:    Height as of this encounter: 1.6 m (5' 2.99\").    Weight as of this encounter: 92.5 kg (204 lb).       # Financial/Environmental Concerns:                 "

## 2025-07-01 VITALS
TEMPERATURE: 97.5 F | HEIGHT: 63 IN | BODY MASS INDEX: 36.14 KG/M2 | SYSTOLIC BLOOD PRESSURE: 143 MMHG | DIASTOLIC BLOOD PRESSURE: 78 MMHG | RESPIRATION RATE: 19 BRPM | HEART RATE: 78 BPM | WEIGHT: 204 LBS | OXYGEN SATURATION: 96 %

## 2025-07-01 LAB
ANION GAP SERPL CALCULATED.3IONS-SCNC: 10 MMOL/L (ref 7–15)
BUN SERPL-MCNC: 33.7 MG/DL (ref 8–23)
CALCIUM SERPL-MCNC: 9.5 MG/DL (ref 8.8–10.4)
CHLORIDE SERPL-SCNC: 107 MMOL/L (ref 98–107)
CREAT SERPL-MCNC: 1.54 MG/DL (ref 0.67–1.17)
EGFRCR SERPLBLD CKD-EPI 2021: 43 ML/MIN/1.73M2
ERYTHROCYTE [DISTWIDTH] IN BLOOD BY AUTOMATED COUNT: 14.7 % (ref 10–15)
GLUCOSE SERPL-MCNC: 117 MG/DL (ref 70–99)
HCO3 SERPL-SCNC: 27 MMOL/L (ref 22–29)
HCT VFR BLD AUTO: 33.6 % (ref 40–53)
HGB BLD-MCNC: 10.9 G/DL (ref 13.3–17.7)
MCH RBC QN AUTO: 34.4 PG (ref 26.5–33)
MCHC RBC AUTO-ENTMCNC: 32.4 G/DL (ref 31.5–36.5)
MCV RBC AUTO: 106 FL (ref 78–100)
PATH REPORT.COMMENTS IMP SPEC: ABNORMAL
PATH REPORT.COMMENTS IMP SPEC: ABNORMAL
PATH REPORT.COMMENTS IMP SPEC: YES
PATH REPORT.FINAL DX SPEC: ABNORMAL
PATH REPORT.GROSS SPEC: ABNORMAL
PATH REPORT.MICROSCOPIC SPEC OTHER STN: ABNORMAL
PATH REPORT.RELEVANT HX SPEC: ABNORMAL
PHOTO IMAGE: ABNORMAL
PLATELET # BLD AUTO: 202 10E3/UL (ref 150–450)
POTASSIUM SERPL-SCNC: 4.4 MMOL/L (ref 3.4–5.3)
RBC # BLD AUTO: 3.17 10E6/UL (ref 4.4–5.9)
SODIUM SERPL-SCNC: 144 MMOL/L (ref 135–145)
WBC # BLD AUTO: 10.6 10E3/UL (ref 4–11)

## 2025-07-01 PROCEDURE — 85014 HEMATOCRIT: CPT | Performed by: UROLOGY

## 2025-07-01 PROCEDURE — 250N000013 HC RX MED GY IP 250 OP 250 PS 637: Performed by: UROLOGY

## 2025-07-01 PROCEDURE — 36415 COLL VENOUS BLD VENIPUNCTURE: CPT | Performed by: UROLOGY

## 2025-07-01 PROCEDURE — 88307 TISSUE EXAM BY PATHOLOGIST: CPT | Mod: 26 | Performed by: PATHOLOGY

## 2025-07-01 PROCEDURE — 80048 BASIC METABOLIC PNL TOTAL CA: CPT | Performed by: UROLOGY

## 2025-07-01 RX ADMIN — ACETAMINOPHEN 975 MG: 325 TABLET, FILM COATED ORAL at 08:37

## 2025-07-01 RX ADMIN — TORSEMIDE 20 MG: 20 TABLET ORAL at 08:38

## 2025-07-01 RX ADMIN — POLYETHYLENE GLYCOL 3350 17 G: 17 POWDER, FOR SOLUTION ORAL at 08:38

## 2025-07-01 RX ADMIN — LISINOPRIL 20 MG: 10 TABLET ORAL at 08:38

## 2025-07-01 RX ADMIN — ACETAMINOPHEN 975 MG: 325 TABLET, FILM COATED ORAL at 01:33

## 2025-07-01 RX ADMIN — METOPROLOL SUCCINATE 25 MG: 25 TABLET, EXTENDED RELEASE ORAL at 08:37

## 2025-07-01 ASSESSMENT — ACTIVITIES OF DAILY LIVING (ADL)
ADLS_ACUITY_SCORE: 65
ADLS_ACUITY_SCORE: 68
ADLS_ACUITY_SCORE: 65
ADLS_ACUITY_SCORE: 68
ADLS_ACUITY_SCORE: 68
DEPENDENT_IADLS:: CLEANING;COOKING;LAUNDRY;SHOPPING;MEAL PREPARATION;MEDICATION MANAGEMENT;MONEY MANAGEMENT;TRANSPORTATION
ADLS_ACUITY_SCORE: 68
ADLS_ACUITY_SCORE: 65
ADLS_ACUITY_SCORE: 65

## 2025-07-01 NOTE — DISCHARGE INSTRUCTIONS
POSTOPERATIVE INSTRUCTIONS    Diagnosis-------------------------------   Bladder mass    Procedure-------------------------------  Procedure(s) (LRB):  CYSTOSCOPY, WITH TRANSURETHRAL RESECTION BLADDER TUMOR (N/A)      Findings--------------------------------  Bladder mass removed     Home-going instructions-----------------          FOLLOW THESE INSTRUCTIONS AS INDICATED BELOW:  - Observe operative area for signs of excessive bleeding.  - You may shower.  - Increase fluid intake to promote clear urine.  - Resume usual diet as tolerated    What to expect while recovering-----------  - You may experience some intermittent bleeding that makes your urine pink or cherry colored. This is normal.  - However, if you are unable to urinate, passing large amount of clots, have kaley blood in your urine, or have a temperature >101 degrees, call the urology nurse on call, or present to your nearest emergency department.  - You are encouraged to walk daily, and have no activity restrictions.   - Maintain jessica catheter on discharge    Discharge Medications/instructions:   - Resume all home medications       Questions/concerns------------------------  [facility]: [phone]    Future appointments  You are scheduled for follow up on 7/7/2025      Shadi Man MD

## 2025-07-01 NOTE — PLAN OF CARE
Goal Outcome Evaluation:      Plan of Care Reviewed With: patient, spouse          Outcome Evaluation: Patient anticipates return to TCU today at [facility]. Toya hutchinson.

## 2025-07-01 NOTE — CONSULTS
Care Management Initial Consult    General Information  Assessment completed with: Patient, Spouse or significant other, Adrien  Type of CM/SW Visit: Initial Assessment    Primary Care Provider verified and updated as needed: Yes   Readmission within the last 30 days: no previous admission in last 30 days      Reason for Consult: discharge planning  Advance Care Planning: Advance Care Planning Reviewed: verified with patient, no concerns identified (copy at home)          Communication Assessment  Patient's communication style: spoken language (English or Bilingual)    Hearing Difficulty or Deaf: yes        Cognitive  Cognitive/Neuro/Behavioral: WDL  Level of Consciousness: alert  Arousal Level: opens eyes spontaneously  Orientation: oriented x 4  Mood/Behavior: calm, cooperative  Best Language: 0 - No aphasia  Speech: clear, spontaneous    Living Environment:   People in home: spouse     Current living Arrangements: other (see comments) (TCU)  Name of Facility: [facility] in [place]   Able to return to prior arrangements: yes (see SW notes about communication with [facility])       Family/Social Support:  Care provided by: self, spouse/significant other, other (see comments) (staff at facility)  Provides care for: no one  Marital Status:   Support system: Wife, Facility resident(s)/Staff  Aziza       Description of Support System: Supportive, Involved         Current Resources:   Patient receiving home care services: No        Community Resources: Transitional Care  Equipment currently used at home: walker, standard, wheelchair, manual  Supplies currently used at home: Other (jessica catheter supplies)    Employment/Financial:  Employment Status: retired        Financial Concerns: none   Referral to Financial Worker: No       Does the patient's insurance plan have a 3 day qualifying hospital stay waiver?  No    Lifestyle & Psychosocial Needs:  Social Drivers of Health     Food Insecurity: Not on  file   Depression: Not at risk (6/2/2025)    PHQ-2     PHQ-2 Score: 0   Housing Stability: Not on file   Tobacco Use: Medium Risk (6/30/2025)    Patient History     Smoking Tobacco Use: Former     Smokeless Tobacco Use: Never     Passive Exposure: Not on file   Financial Resource Strain: Not on file   Alcohol Use: Not At Risk (6/23/2021)    AUDIT-C     Frequency of Alcohol Consumption: Monthly or less     Average Number of Drinks: 1 or 2     Frequency of Binge Drinking: Never   Transportation Needs: Not on file   Physical Activity: Not on file   Interpersonal Safety: Low Risk  (6/30/2025)    Interpersonal Safety     Do you feel physically and emotionally safe where you currently live?: Yes     Within the past 12 months, have you been hit, slapped, kicked or otherwise physically hurt by someone?: No     Within the past 12 months, have you been humiliated or emotionally abused in other ways by your partner or ex-partner?: No   Stress: Not on file   Social Connections: Not on file   Health Literacy: Not on file       Functional Status:  Prior to admission patient needed assistance:   Dependent ADLs:: Ambulation-walker, Wheelchair-with assist, Bathing, Dressing, Transfers, Toileting  Dependent IADLs:: Cleaning, Cooking, Laundry, Shopping, Meal Preparation, Medication Management, Money Management, Transportation       Mental Health Status:  Mental Health Status: No Current Concerns       Chemical Dependency Status:  Chemical Dependency Status: No Current Concerns             Values/Beliefs:  Spiritual, Cultural Beliefs, Restorationist Practices, Values that affect care: no (Restorationist)               Discussed  Partnership in Safe Discharge Planning  document with patient/family: No    Additional Information:  Writer alerted by nurse that patient needs to return to TCU today, has discharge orders. Writer  met with patient and spouse. [facility] They use transportation through [facility] [employer]  [phone]. Writer called [facility] [employer] and writer spoke to Froylan and coordinated a wheelchair ride back to facility today. Agency had been expecting call for patient to return to facility, [facility] [place] [place]. Froylan has a ride available at 1130 today and ask that staff and spouse bring patient in the wheelchair to door 2 for pickup.     Next Steps: updated RN and patient and wife about ride being at door 2 at 1130 and patient needs to be brought down to door 2.     Conchita Lyman, RN,   [facility]

## 2025-07-01 NOTE — PROGRESS NOTES
"Urology progress note    S:    O:  BP (!) 143/78 (BP Location: Right arm)   Pulse 78   Temp 97.5  F (36.4  C) (Oral)   Resp 19   Ht 1.6 m (5' 2.99\")   Wt 92.5 kg (204 lb)   SpO2 96%   BMI 36.15 kg/m      Intake/Output Summary (Last 24 hours) at 7/1/2025 0752  Last data filed at 7/1/2025 0543  Gross per 24 hour   Intake 1968.67 ml   Output 852 ml   Net 1116.67 ml      Latest Reference Range & Units 07/01/25 06:52   Sodium 135 - 145 mmol/L 144   Potassium 3.4 - 5.3 mmol/L 4.4   Chloride 98 - 107 mmol/L 107   Carbon Dioxide (CO2) 22 - 29 mmol/L 27   Urea Nitrogen 8.0 - 23.0 mg/dL 33.7 (H)   Creatinine 0.67 - 1.17 mg/dL 1.54 (H)   GFR Estimate >60 mL/min/1.73m2 43 (L)   Calcium 8.8 - 10.4 mg/dL 9.5   Anion Gap 7 - 15 mmol/L 10   Glucose 70 - 99 mg/dL 117 (H)   WBC 4.0 - 11.0 10e3/uL 10.6   Hemoglobin 13.3 - 17.7 g/dL 10.9 (L)   Hematocrit 40.0 - 53.0 % 33.6 (L)   Platelet Count 150 - 450 10e3/uL 202   (H): Data is abnormally high  (L): Data is abnormally low      Physical exam:  - General No acute distress   clear yellow urine in catheter    A/P:  85-year-old male status post TURBT of bladder lesions.    Doing well today should be able discharge to TCU.    Will put in discharge order.  Follow-up plan will be determined on pathology and per Dr. Jernigan on catheter removal.    Jersey Rojas  [facility]    "

## 2025-07-01 NOTE — PLAN OF CARE
Date & Time: 7/1/2025 [id number]  Surgery/POD#: POD 1:CYSTOSCOPY, WITH TRANSURETHRAL RESECTION BLADDER TUMOR  Behavior & Aggression: Green  Fall Risk: Yes  Orientation:A&O X4 (forgetful at times)  ABNL VS/O2:VSS 2L NC  ABNL Labs: See results  Pain Management:Denies. Scheduled Tylenol  Bowel/Bladder: Alonzo in place with AUO, +BS, +gas, -BM this shift (tried on bedside commode)  Drains: Alonzo, PIV SL  Wounds/incisions: Leg abrasions, red tyron area.  Diet:Reg (tolerating)  Number of times OUT OF BED this shift 0  Tests/Procedures: n/a  Anticipated  DC Date: pending  Significant Information:

## 2025-07-01 NOTE — PROGRESS NOTES
Care Management Discharge Note    Discharge Date: 07/01/2025       Discharge Disposition: Transitional Care    Discharge Services: Transportation Services    Discharge DME: None    Discharge Transportation: other (see comments) ([employer])    Private pay costs discussed: patient and spouse had prepaid for return wheelchair ride.     Does the patient's insurance plan have a 3 day qualifying hospital stay waiver?  No    PAS Confirmation Code:    Patient/family educated on Medicare website which has current facility and service quality ratings:      Education Provided on the Discharge Plan: Yes  Persons Notified of Discharge Plans: patient, Spouse, RN and Froylan at [facility], facility notified by   Patient/Family in Agreement with the Plan: yes    Handoff Referral Completed: No, handoff not indicated or clinically appropriate    Additional Information:  Discharge back to [facility] via [facility] at 1130.      Conchita Lyman RN,   [facility]

## 2025-07-01 NOTE — PLAN OF CARE
Goal Outcome Evaluation:       Pt discharge and went to the TCU with all the paperwork and belongings. Periferal line removed,

## 2025-07-01 NOTE — PROGRESS NOTES
Care Management Follow Up    Length of Stay (days): 0    Expected Discharge Date: 07/01/2025     Concerns to be Addressed:       Patient plan of care discussed at interdisciplinary rounds: Yes    Anticipated Discharge Disposition:  TCU   Anticipated Discharge Services:    Anticipated Discharge DME:      Patient/family educated on Medicare website which has current facility and service quality ratings:    Education Provided on the Discharge Plan:    Patient/Family in Agreement with the Plan:      Referrals Placed by CM/SW:    Private pay costs discussed: Not applicable    Discussed  Partnership in Safe Discharge Planning  document with patient/family: Yes:     Handoff Completed:     Additional Information:  Writer heard from nursing staff that patient comes from TCU   Determined patient admitted from [facility], writer spoke with staff at [facility] who confirmed that patient is in the TCU and just left for a procedure - he has a bed waiting and is able to return.     Please call them back and let them know the time of return.     Next Steps: Writer sent discharge orders to TCU at [facility]           HARLEY Hatch   Care Management  General Surgery/ICU  [phone] or   I can be reached on [email or web address]

## 2025-07-01 NOTE — PROGRESS NOTES
Notified provider about indwelling jessica catheter discussed removal or continued need.    Did provider choose to remove indwelling jessica catheter? no    Provider's jessica indication for keeping indwelling jessica catheter: surgical procedure.    Is the catheter for retention? no    *If there is a plan to keep jessica catheter in place at discharge daily notification with provider is not necessary, but please add a notation in the treatment team sticky note that the patient will be discharging with the catheter.

## 2025-07-07 ENCOUNTER — PATIENT OUTREACH (OUTPATIENT)
Dept: ONCOLOGY | Facility: CLINIC | Age: 85
End: 2025-07-07

## 2025-07-07 ENCOUNTER — VIRTUAL VISIT (OUTPATIENT)
Dept: UROLOGY | Facility: CLINIC | Age: 85
End: 2025-07-07
Payer: MEDICARE

## 2025-07-07 VITALS — BODY MASS INDEX: 36.32 KG/M2 | HEIGHT: 63 IN | WEIGHT: 205 LBS

## 2025-07-07 DIAGNOSIS — N40.1 BPH WITH OBSTRUCTION/LOWER URINARY TRACT SYMPTOMS: ICD-10-CM

## 2025-07-07 DIAGNOSIS — C67.2 MALIGNANT NEOPLASM OF LATERAL WALL OF URINARY BLADDER (H): Primary | ICD-10-CM

## 2025-07-07 DIAGNOSIS — N13.8 BPH WITH OBSTRUCTION/LOWER URINARY TRACT SYMPTOMS: ICD-10-CM

## 2025-07-07 DIAGNOSIS — R33.9 URINARY RETENTION: ICD-10-CM

## 2025-07-07 PROCEDURE — 1126F AMNT PAIN NOTED NONE PRSNT: CPT | Performed by: UROLOGY

## 2025-07-07 PROCEDURE — 98006 SYNCH AUDIO-VIDEO EST MOD 30: CPT | Performed by: UROLOGY

## 2025-07-07 ASSESSMENT — PAIN SCALES - GENERAL: PAINLEVEL_OUTOF10: NO PAIN (0)

## 2025-07-07 NOTE — PROGRESS NOTES
[facility]  CHIEF COMPLAINT   It was my pleasure to see Shilo Menchaca who is a 85 year old male for follow-up of recent episode of retention and possible bladder mass    HPI  Shilo Menchaca is a 81 year old male with significant past medical history of hypertension, a history of BPH with an episode of retention about 20 years ago for which she has been on Avodart for the last 20 years.  He was recently hospitalized at [facility] [place] [place] with a UTI.  He underwent an outpatient CT on 6/4/2021 which demonstrated a significantly distended bladder, bladder stones, and right hydroureteronephrosis with no clear evidence of obstruction.  He was then admitted through the emergency room on 6/5/2021 and noted to have significant leukocytosis to 17.4 and urinalysis concerning for infection.  Alonzo catheter placement was complicated by his significant phimosis which required bedside cystoscopy and placement of a catheter over a wire.  A CT urogram was then obtained on 6/7/2021 which demonstrated persistent right hydronephrosis and an incidental pulmonary embolism and he was started on anticoagulation.  On 6/9/2021 he underwent a dorsal slit of his phimosis, cystoscopy and right retrograde pyelogram, diagnostic ureteroscopy, and right ureteral stent placement.  He returns today for a trial of void.    8/4/21:  He returns today for follow-up for ureteral stent removal  He notes that he has been having significant urinary frequency with small volume voids and urine leakage especially overnight  He notes intermittent sensation of incomplete bladder emptying    9/8/21:  He has been able stopped the Xarelto on Monday 9/7/21  He has noted some dizziness with the tamsulosin  He was very eager to have surgery done get rid of the catheter  His foreskin has healed well from the dorsal slit, though there is still some small amount of smegma    9/17/21:  Greenlight photovaporization of the prostate (PVP), Cystolitholapaxy with bladder stone  "fragmentation and removal    12/15/21:  Follow-up today after his PVP  He has been doing strongly well since surgery and is very happy with the outcome  He reports that he is \"never had plumbing like this before \"  He notes periodic groin and perineal itching for which he has been using topical metronidazole powder, no active issue at this time    5/16/2025 - 9/25/2025:  Seen during recent hospital admission for COVID  He was noted on CT scan to have evidence of urinary retention as well as a possible bladder mass  He was noted during the hospitalization to have intermittent gross hematuria  Ultimately discharged with a Alonzo catheter in place    6/2/2025:  Follow-up today for cystoscopy and trial of void  He continues at a TCU    TODAY 7/7/2025:  Follow-up today to review his pathology after his TURBT  He remains at his TCU with plans for discharge later this week  His wife and daughter joined for this visit    PHYSICAL EXAM  Patient is a 85 year old  male   Vitals: Height 1.6 m (5' 3\"), weight 93 kg (205 lb).  Body mass index is 36.31 kg/m .  General Appearance Adult:   Alert, no acute distress, oriented.    Neuro: Alert, oriented, speech and mentation normal  Psych: affect and mood normal       Creatinine   Date Value Ref Range Status   07/01/2025 1.54 (H) 0.67 - 1.17 mg/dL Final   05/22/2025 1.14 0.67 - 1.17 mg/dL Final   05/21/2025 1.24 (H) 0.67 - 1.17 mg/dL Final   05/20/2025 1.12 0.67 - 1.17 mg/dL Final   05/18/2025 1.03 0.67 - 1.17 mg/dL Final   05/17/2025 1.09 0.67 - 1.17 mg/dL Final   05/16/2025 1.19 (H) 0.67 - 1.17 mg/dL Final   08/08/2021 1.17 0.66 - 1.25 mg/dL Final   08/07/2021 1.13 0.66 - 1.25 mg/dL Final   08/06/2021 1.66 (H) 0.66 - 1.25 mg/dL Final     PATHOLOGY:  6/30/2025:  Final Diagnosis   A.  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  Bladder, left anterior wall, " biopsy-  Benign bladder mucosa with acute and chronic inflammatory change       IMAGING  All pertinent imaging reviewed:    All imaging studies reviewed by me.  I personally reviewed these imaging films.  A formal report from radiology will follow.    CT Abd/Pel 5/16/2025:  FINDINGS:   LOWER CHEST: Mild bibasilar atelectasis. Advanced multivessel coronary artery calcification.     HEPATOBILIARY: Stable 2.2 cm benign appearing rim calcified observation in the liver. Absent gallbladder. No biliary dilatation.     PANCREAS: Normal.     SPLEEN: Normal.     ADRENAL GLANDS: Normal.     KIDNEYS/BLADDER: Focal wall thickening of the urinary bladder on the right near the right ureterovesical junction concerning for possible neoplasm measures 3.1 x 1.6 cm on axial images (image 172 of series 4). No hydronephrosis. 2 mm stone in the   bladder. 2 mm nonobstructing left intrarenal stone. Chronic cortical thinning of the kidneys. Tiny benign cysts of the kidneys not warranting follow-up.     BOWEL: Mild colonic diverticulosis. No bowel obstruction or inflammation. No evidence for appendicitis.     LYMPH NODES: Normal.     VASCULATURE: Mild aortoiliac atherosclerosis. No aneurysm.     PELVIC ORGANS: Normal.     MUSCULOSKELETAL: Tiny fat-containing umbilical hernia. Degenerative changes of the spine.                                                                      IMPRESSION:   1.  Focal masslike thickening of the urinary bladder wall on the right measuring up to 3.1 cm concerning for possible neoplasm. Urology follow-up recommended.  2.  Tiny nonobstructing stones in the bladder and left renal collecting system. No hydronephrosis.  3.  Advanced coronary artery calcification.      ASSESSMENT and PLAN  85-year-old man with with history of a PVP on 9/17/2021.  Recent admission for COVID and found to have a bladder mass and urinary retention.  Now status post TURBT with evidence of high-grade muscle invasive bladder  cancer    High-grade muscle invasive bladder cancer  - We reviewed his pathology from his TURBT with high-grade muscle invasive bladder cancer  - This was a solitary lesion and the additional questionable area was negative for malignancy  - The lesion was resected in entirety.  - We discussed that options for treatment would include neoadjuvant chemo with cystectomy, however he is not a cystectomy candidate given his age and medical comorbidities.  We discussed alternatively I would recommend referral for chemo and radiation  - Reviewed his recent CT scan with no evidence of hydronephrosis and there was no evidence of right ureteral involvement on TURBT  - Referral orders placed, however patient and his family are leaning towards having care through [facility] and will reach out to Dr. Linton for the appropriate referrals through their system    Urinary retention  -He is status post a PVP on 9/17/2021.  - Cystoscopy previously with an open bladder neck and only very mild regrowth of some adenoma at the apex  - We will plan for a trial of void at his TCU, at [facility] in [place], tomorrow.  I would also like them to recheck a BMP  - If the trial of void is unsuccessful, plan for Alonzo catheter replacement and likely maintain a Alonzo catheter with monthly changes until he has completed his radiation therapy      Time spent: 35 minutes spent on the date of the encounter doing chart review, history and exam, documentation and further activities as noted above.      Note copy attestation: the elements have been reviewed, edited as needed, and remain pertinent to today's visit.     Shadi Man MD   Urology  [facility]  [facility] Phone: [phone]  [facility] Phone: [phone]        Video-Visit Details    Type of service:  Video Visit    Video Start Time: 11:30 AM    Video End Time:11:57  KATIA    Originating Location (pt. Location): TCU    Distant Location (provider location):  [facility]     Platform used for Video Visit: Hammad

## 2025-07-07 NOTE — NURSING NOTE
Can is a 85 year old who is being evaluated via a billable video visit.    How would you like to obtain your AVS? MyChart  If the video visit is dropped, the invitation should be resent by: Send to e-mail at: [email or web address]  Will anyone else be joining your video visit? Yes: . How would they like to receive their invitation? Send to e-mail at: [email or web address]    Video-Visit Details    Type of service:  Video Visit   Video End Time:11:10 AM  Originating Location (pt. Location): Assisted Living    Distant Location (provider location):  On-site  [facility] Visit[facility]

## 2025-07-07 NOTE — PATIENT INSTRUCTIONS
New Orders ; Have jessica removed in am  tomorrow. Bladder scan  after urination or at least once a shift after he ha attempted to urinate . If the bladder scan shows over  250 ml  remaining in bladder  replace jessica . 16 fr  coude tip catheter with 0 ml in balloon      Please bladder scan ever shift or sooner  if unable to void x  48 hours       Also please do a BMP lab draw  and fax results to Dr Man  at [phone]    Any questions  call our clinic [phone]

## 2025-07-07 NOTE — PROGRESS NOTES
New Patient Hematology / Oncology Nurse Navigator Note     Referral Date: 7/7/25    Referring provider:   Shadi Man MD   [address] [facility] [place] [address]   Phone: [phone]   Fax: [phone]       Referring Clinic/Organization: [facility]     Referred to: Medical Oncology, Radiation Oncology     Requested provider (if applicable): First available - did not specify     Evaluation for : High grade bladder bladder cancer      Clinical History (per Nurse review of records provided):    Virtual Visit today with Urology (note pending) -- BOOKMARKED  6/30/25 Path:  Final Diagnosis   A.  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  Bladder, left anterior wall, biopsy-  Benign bladder mucosa with acute and chronic inflammatory change   5/16/25 CT Abd/Pelvis:  IMPRESSION:   1.  Focal masslike thickening of the urinary bladder wall on the right measuring up to 3.1 cm concerning for possible neoplasm. Urology follow-up recommended.  2.  Tiny nonobstructing stones in the bladder and left renal collecting system. No hydronephrosis.  3.  Advanced coronary artery calcification. -- BOOKMARKED   -- BOOKMARKED    Clinical Assessment / Barriers to Care (Per Nurse):  Lives in [place] at baseline, currently at [facility] in [place]     Records Location: [facility]     Records Needed:   N/A    Additional testing needed prior to consult:   N/A    Referral updates and Plan:   OUTGOING CALL to pt and reached spouse Aziza:  Introduced my role as [facility] and that we have recd the referral to Medical Oncology and Radiation Oncology from Dr Donovan Ervin confirms they are aware of the referral, but planning to schedule through [facility] as this is where pt's PCP is through.     Provided contact information via ROIÂ² if  future questions arise.   Will route to NPS to send External Referrals to [facility] as requested.     Alfreda Kam, BSN, RN, PHN, OCN  Hematology/Oncology Nurse Navigator  [facility]  [phone]

## 2025-07-07 NOTE — PROGRESS NOTES
"Virtual Visit Details    Type of service:  Video Visit   Video Start Time: {video visit start/end time for provider to select:699341}  Video End Time:{video visit start/end time for provider to select:077595}    Originating Location (pt. Location): {video visit patient location:569628::\"Home\"}  {PROVIDER LOCATION On-site should be selected for visits conducted from your clinic location or adjoining [facility] hospital, academic office, or other nearby [facility] building. Off-site should be selected for all other provider locations, including home:025192}  Distant Location (provider location):  {virtual location provider:285987}  Platform used for Video Visit: {Virtual Visit Platforms:943348::\"[employer]\"}  "

## 2025-07-07 NOTE — LETTER
7/7/2025       RE: Shilo Menchaca  [address]  [place] [address]     Dear Colleague,    Thank you for referring your patient, Shilo Menchaca, to the [facility] at [facility]. Please see a copy of my visit note below.    BRIEFLY:  His TURBT demonstrates high-grade muscle invasive bladder cancer.  He is not a candidate for cystectomy, and so treatment options are chemotherapy with radiation.  He would like to have this done through [facility].  Can you please assist with the necessary referrals.  Will plan for a trial of void tomorrow, but if this is not possible then I would recommend he maintain a Alonzo catheter throughout his radiation therapy.    Please let me know if you have any questions or concerns.    Thanks,   Shadi Man M.D.  Cell: [phone]     [facility]  CHIEF COMPLAINT   It was my pleasure to see Shilo Menchaca who is a 85 year old male for follow-up of recent episode of retention and possible bladder mass    HPI  Shilo Menchaca is a 81 year old male with significant past medical history of hypertension, a history of BPH with an episode of retention about 20 years ago for which she has been on Avodart for the last 20 years.  He was recently hospitalized at [facility] with a UTI.  He underwent an outpatient CT on 6/4/2021 which demonstrated a significantly distended bladder, bladder stones, and right hydroureteronephrosis with no clear evidence of obstruction.  He was then admitted through the emergency room on 6/5/2021 and noted to have significant leukocytosis to 17.4 and urinalysis concerning for infection.  Alonzo catheter placement was complicated by his significant phimosis which required bedside cystoscopy and placement of a catheter over a wire.  A CT urogram was then obtained on 6/7/2021 which demonstrated persistent right hydronephrosis and an incidental pulmonary embolism and he was  "started on anticoagulation.  On 6/9/2021 he underwent a dorsal slit of his phimosis, cystoscopy and right retrograde pyelogram, diagnostic ureteroscopy, and right ureteral stent placement.  He returns today for a trial of void.    8/4/21:  He returns today for follow-up for ureteral stent removal  He notes that he has been having significant urinary frequency with small volume voids and urine leakage especially overnight  He notes intermittent sensation of incomplete bladder emptying    9/8/21:  He has been able stopped the Xarelto on Monday 9/7/21  He has noted some dizziness with the tamsulosin  He was very eager to have surgery done get rid of the catheter  His foreskin has healed well from the dorsal slit, though there is still some small amount of smegma    9/17/21:  Greenlight photovaporization of the prostate (PVP), Cystolitholapaxy with bladder stone fragmentation and removal    12/15/21:  Follow-up today after his PVP  He has been doing strongly well since surgery and is very happy with the outcome  He reports that he is \"never had plumbing like this before \"  He notes periodic groin and perineal itching for which he has been using topical metronidazole powder, no active issue at this time    5/16/2025 - 9/25/2025:  Seen during recent hospital admission for COVID  He was noted on CT scan to have evidence of urinary retention as well as a possible bladder mass  He was noted during the hospitalization to have intermittent gross hematuria  Ultimately discharged with a Alonzo catheter in place    6/2/2025:  Follow-up today for cystoscopy and trial of void  He continues at a TCU    TODAY 7/7/2025:  Follow-up today to review his pathology after his TURBT  He remains at his TCU with plans for discharge later this week  His wife and daughter joined for this visit    PHYSICAL EXAM  Patient is a 85 year old  male   Vitals: Height 1.6 m (5' 3\"), weight 93 kg (205 lb).  Body mass index is 36.31 kg/m .  General " Appearance Adult:   Alert, no acute distress, oriented.    Neuro: Alert, oriented, speech and mentation normal  Psych: affect and mood normal       Creatinine   Date Value Ref Range Status   07/01/2025 1.54 (H) 0.67 - 1.17 mg/dL Final   05/22/2025 1.14 0.67 - 1.17 mg/dL Final   05/21/2025 1.24 (H) 0.67 - 1.17 mg/dL Final   05/20/2025 1.12 0.67 - 1.17 mg/dL Final   05/18/2025 1.03 0.67 - 1.17 mg/dL Final   05/17/2025 1.09 0.67 - 1.17 mg/dL Final   05/16/2025 1.19 (H) 0.67 - 1.17 mg/dL Final   08/08/2021 1.17 0.66 - 1.25 mg/dL Final   08/07/2021 1.13 0.66 - 1.25 mg/dL Final   08/06/2021 1.66 (H) 0.66 - 1.25 mg/dL Final     PATHOLOGY:  6/30/2025:  Final Diagnosis   A.  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  Bladder, left anterior wall, biopsy-  Benign bladder mucosa with acute and chronic inflammatory change       IMAGING  All pertinent imaging reviewed:    All imaging studies reviewed by me.  I personally reviewed these imaging films.  A formal report from radiology will follow.    CT Abd/Pel 5/16/2025:  FINDINGS:   LOWER CHEST: Mild bibasilar atelectasis. Advanced multivessel coronary artery calcification.     HEPATOBILIARY: Stable 2.2 cm benign appearing rim calcified observation in the liver. Absent gallbladder. No biliary dilatation.     PANCREAS: Normal.     SPLEEN: Normal.     ADRENAL GLANDS: Normal.     KIDNEYS/BLADDER: Focal wall thickening of the urinary bladder on the right near the right ureterovesical junction concerning for possible neoplasm measures 3.1 x 1.6 cm on axial images (image 172 of series 4). No hydronephrosis. 2 mm stone in the   bladder. 2 mm nonobstructing left intrarenal stone. Chronic cortical thinning of the kidneys. Tiny benign cysts of the kidneys not warranting follow-up.     BOWEL: Mild colonic diverticulosis. No bowel obstruction or inflammation. No evidence for  appendicitis.     LYMPH NODES: Normal.     VASCULATURE: Mild aortoiliac atherosclerosis. No aneurysm.     PELVIC ORGANS: Normal.     MUSCULOSKELETAL: Tiny fat-containing umbilical hernia. Degenerative changes of the spine.                                                                      IMPRESSION:   1.  Focal masslike thickening of the urinary bladder wall on the right measuring up to 3.1 cm concerning for possible neoplasm. Urology follow-up recommended.  2.  Tiny nonobstructing stones in the bladder and left renal collecting system. No hydronephrosis.  3.  Advanced coronary artery calcification.      ASSESSMENT and PLAN  85-year-old man with with history of a PVP on 9/17/2021.  Recent admission for COVID and found to have a bladder mass and urinary retention.  Now status post TURBT with evidence of high-grade muscle invasive bladder cancer    High-grade muscle invasive bladder cancer  - We reviewed his pathology from his TURBT with high-grade muscle invasive bladder cancer  - This was a solitary lesion and the additional questionable area was negative for malignancy  - The lesion was resected in entirety.  - We discussed that options for treatment would include neoadjuvant chemo with cystectomy, however he is not a cystectomy candidate given his age and medical comorbidities.  We discussed alternatively I would recommend referral for chemo and radiation  - Reviewed his recent CT scan with no evidence of hydronephrosis and there was no evidence of right ureteral involvement on TURBT  - Referral orders placed, however patient and his family are leaning towards having care through [facility] and will reach out to Dr. Linton for the appropriate referrals through their system    Urinary retention  -He is status post a PVP on 9/17/2021.  - Cystoscopy previously with an open bladder neck and only very mild regrowth of some adenoma at the apex  - We will plan for a trial of void at his TCU, at  [facility] in [place], tomorrow.  I would also like them to recheck a BMP  - If the trial of void is unsuccessful, plan for Alonzo catheter replacement and likely maintain a Alonzo catheter with monthly changes until he has completed his radiation therapy      Time spent: 35 minutes spent on the date of the encounter doing chart review, history and exam, documentation and further activities as noted above.      Note copy attestation: the elements have been reviewed, edited as needed, and remain pertinent to today's visit.     Shadi Man MD   Urology  [facility]  [facility] Phone: [phone]  [facility] Phone: [phone]        Video-Visit Details    Type of service:  Video Visit    Video Start Time: 11:30 AM    Video End Time:11:57 AM    Originating Location (pt. Location): [facility]    Distant Location (provider location):  [facility]     Platform used for Video Visit: Hammad    Again, thank you for allowing me to participate in the care of your patient.      Sincerely,    Shadi Man MD

## (undated) DEVICE — SOL NACL 0.9% IRRIG 1000ML BOTTLE 2F7124

## (undated) DEVICE — PAD CHUX UNDERPAD 23X24" 7136

## (undated) DEVICE — PACK CYSTOSCOPY SBA15CYFSI

## (undated) DEVICE — CATH FOLEY COUDE 20FR 5ML LATEX 0168SI20

## (undated) DEVICE — GLOVE PROTEXIS MICRO 7.5  2D73PM75

## (undated) DEVICE — SOL WATER IRRIG 1000ML BOTTLE 2F7114

## (undated) DEVICE — GLOVE PROTEXIS W/NEU-THERA 7.5  2D73TE75

## (undated) DEVICE — ESU PENCIL W/HOLSTER E2350H

## (undated) DEVICE — BNDG KLING 2" 2231

## (undated) DEVICE — CATH URETERAL FLEX TIP TIGERTAIL 06FRX70CM 139006

## (undated) DEVICE — BAG CYSTO TABLE DRAIN

## (undated) DEVICE — ESU GROUND PAD UNIVERSAL W/O CORD

## (undated) DEVICE — RAD RX ISOVUE 300 (50ML) 61% IOPAMIDOL CHARGE PER ML

## (undated) DEVICE — SOL NACL 0.9% IRRIG 3000ML BAG 2B7477

## (undated) DEVICE — SU CHROMIC 3-0 PS-2 27" 1638H

## (undated) DEVICE — CATH FOLEY 18FR 5ML LATEX 0165SI18

## (undated) DEVICE — CATH FOLEY COUDE 18FR 5ML LATEX

## (undated) DEVICE — LINEN TOWEL PACK X10 5473

## (undated) DEVICE — SOL NACL 0.9% INJ 1000ML BAG 2B1324X

## (undated) DEVICE — GLOVE PROTEXIS BLUE W/NEU-THERA 8.0  2D73EB80

## (undated) DEVICE — BAG DRAIN URO FOR SIEMENS 8MM ADAPTER NS CC164NS-A

## (undated) DEVICE — Device

## (undated) DEVICE — ESU ELEC NDL 1" COATED/INSULATED E1465

## (undated) DEVICE — SOLUTION IV IRRIGATION 0.9% NACL 3L R8206

## (undated) DEVICE — GUIDEWIRE SENSOR DUAL FLEX STR 0.035"X150CM M0066703080

## (undated) DEVICE — PACK TUR CUSTOM SBA15RUFSE

## (undated) DEVICE — KIT TURNOVER FAIRVIEW SOUTHDALE HALF SP3890

## (undated) DEVICE — PAD CHUX UNDERPAD 30X30"

## (undated) DEVICE — ESU ELEC BIPOLAR CUTTING LOOP EXT LENGTH 24/26FR 27040GP130-

## (undated) DEVICE — SUCTION MANIFOLD NEPTUNE 2 SYS 4 PORT 0702-020-000

## (undated) RX ORDER — FENTANYL CITRATE 50 UG/ML
INJECTION, SOLUTION INTRAMUSCULAR; INTRAVENOUS
Status: DISPENSED
Start: 2021-06-09

## (undated) RX ORDER — FENTANYL CITRATE 50 UG/ML
INJECTION, SOLUTION INTRAMUSCULAR; INTRAVENOUS
Status: DISPENSED
Start: 2021-09-17

## (undated) RX ORDER — ACETAMINOPHEN 325 MG/1
TABLET ORAL
Status: DISPENSED
Start: 2025-06-30

## (undated) RX ORDER — LIDOCAINE HYDROCHLORIDE 20 MG/ML
INJECTION, SOLUTION EPIDURAL; INFILTRATION; INTRACAUDAL; PERINEURAL
Status: DISPENSED
Start: 2021-09-17

## (undated) RX ORDER — ONDANSETRON 2 MG/ML
INJECTION INTRAMUSCULAR; INTRAVENOUS
Status: DISPENSED
Start: 2021-09-17

## (undated) RX ORDER — DEXAMETHASONE SODIUM PHOSPHATE 4 MG/ML
INJECTION, SOLUTION INTRA-ARTICULAR; INTRALESIONAL; INTRAMUSCULAR; INTRAVENOUS; SOFT TISSUE
Status: DISPENSED
Start: 2021-09-17

## (undated) RX ORDER — FUROSEMIDE 10 MG/ML
INJECTION INTRAMUSCULAR; INTRAVENOUS
Status: DISPENSED
Start: 2021-09-17

## (undated) RX ORDER — LIDOCAINE HYDROCHLORIDE 10 MG/ML
INJECTION, SOLUTION EPIDURAL; INFILTRATION; INTRACAUDAL; PERINEURAL
Status: DISPENSED
Start: 2021-06-09

## (undated) RX ORDER — CEFAZOLIN SODIUM 2 G/100ML
INJECTION, SOLUTION INTRAVENOUS
Status: DISPENSED
Start: 2021-09-17

## (undated) RX ORDER — DEXAMETHASONE SODIUM PHOSPHATE 4 MG/ML
INJECTION, SOLUTION INTRA-ARTICULAR; INTRALESIONAL; INTRAMUSCULAR; INTRAVENOUS; SOFT TISSUE
Status: DISPENSED
Start: 2021-06-09

## (undated) RX ORDER — PROPOFOL 10 MG/ML
INJECTION, EMULSION INTRAVENOUS
Status: DISPENSED
Start: 2021-09-17

## (undated) RX ORDER — GINSENG 100 MG
CAPSULE ORAL
Status: DISPENSED
Start: 2021-06-09

## (undated) RX ORDER — PROPOFOL 10 MG/ML
INJECTION, EMULSION INTRAVENOUS
Status: DISPENSED
Start: 2021-06-09

## (undated) RX ORDER — BUPIVACAINE HYDROCHLORIDE 2.5 MG/ML
INJECTION, SOLUTION EPIDURAL; INFILTRATION; INTRACAUDAL
Status: DISPENSED
Start: 2021-06-09

## (undated) RX ORDER — ONDANSETRON 2 MG/ML
INJECTION INTRAMUSCULAR; INTRAVENOUS
Status: DISPENSED
Start: 2021-06-09

## (undated) RX ORDER — FENTANYL CITRATE 50 UG/ML
INJECTION, SOLUTION INTRAMUSCULAR; INTRAVENOUS
Status: DISPENSED
Start: 2025-06-30

## (undated) RX ORDER — CEFAZOLIN SODIUM 2 G/100ML
INJECTION, SOLUTION INTRAVENOUS
Status: DISPENSED
Start: 2021-06-09

## (undated) RX ORDER — ATROPA BELLADONNA AND OPIUM 16.2; 3 MG/1; MG/1
SUPPOSITORY RECTAL
Status: DISPENSED
Start: 2021-09-17

## (undated) RX ORDER — LIDOCAINE HYDROCHLORIDE 20 MG/ML
INJECTION, SOLUTION EPIDURAL; INFILTRATION; INTRACAUDAL; PERINEURAL
Status: DISPENSED
Start: 2021-06-09